# Patient Record
Sex: FEMALE | Race: WHITE | NOT HISPANIC OR LATINO | Employment: UNEMPLOYED | URBAN - NONMETROPOLITAN AREA
[De-identification: names, ages, dates, MRNs, and addresses within clinical notes are randomized per-mention and may not be internally consistent; named-entity substitution may affect disease eponyms.]

---

## 2020-12-31 ENCOUNTER — APPOINTMENT (OUTPATIENT)
Dept: CT IMAGING | Facility: HOSPITAL | Age: 75
End: 2020-12-31

## 2020-12-31 ENCOUNTER — HOSPITAL ENCOUNTER (INPATIENT)
Facility: HOSPITAL | Age: 75
LOS: 14 days | Discharge: NURSING, REHAB OR LONG-TERM CARE | End: 2021-01-14
Attending: STUDENT IN AN ORGANIZED HEALTH CARE EDUCATION/TRAINING PROGRAM | Admitting: INTERNAL MEDICINE

## 2020-12-31 ENCOUNTER — APPOINTMENT (OUTPATIENT)
Dept: GENERAL RADIOLOGY | Facility: HOSPITAL | Age: 75
End: 2020-12-31

## 2020-12-31 DIAGNOSIS — G93.40 SEPSIS WITH ENCEPHALOPATHY WITHOUT SEPTIC SHOCK, DUE TO UNSPECIFIED ORGANISM (HCC): ICD-10-CM

## 2020-12-31 DIAGNOSIS — R53.81 DEBILITY: ICD-10-CM

## 2020-12-31 DIAGNOSIS — N17.9 ACUTE RENAL FAILURE, UNSPECIFIED ACUTE RENAL FAILURE TYPE (HCC): Primary | ICD-10-CM

## 2020-12-31 DIAGNOSIS — A41.9 SEPSIS WITH ENCEPHALOPATHY WITHOUT SEPTIC SHOCK, DUE TO UNSPECIFIED ORGANISM (HCC): ICD-10-CM

## 2020-12-31 DIAGNOSIS — I48.92 ATRIAL FLUTTER, UNSPECIFIED TYPE (HCC): ICD-10-CM

## 2020-12-31 DIAGNOSIS — R65.20 SEPSIS WITH ENCEPHALOPATHY WITHOUT SEPTIC SHOCK, DUE TO UNSPECIFIED ORGANISM (HCC): ICD-10-CM

## 2020-12-31 LAB
6-ACETYL MORPHINE: NEGATIVE
A-A DO2: 42.1 MMHG (ref 0–300)
A-A DO2: 46 MMHG (ref 0–300)
ACETONE BLD QL: NEGATIVE
ALBUMIN SERPL-MCNC: 3.64 G/DL (ref 3.5–5.2)
ALBUMIN/GLOB SERPL: 1.1 G/DL
ALP SERPL-CCNC: 105 U/L (ref 39–117)
ALT SERPL W P-5'-P-CCNC: 10 U/L (ref 1–33)
AMPHET+METHAMPHET UR QL: NEGATIVE
ANION GAP SERPL CALCULATED.3IONS-SCNC: 12.6 MMOL/L (ref 5–15)
APAP SERPL-MCNC: <5 MCG/ML (ref 0–30)
ARTERIAL PATENCY WRIST A: POSITIVE
ARTERIAL PATENCY WRIST A: POSITIVE
AST SERPL-CCNC: 13 U/L (ref 1–32)
ATMOSPHERIC PRESS: 731 MMHG
ATMOSPHERIC PRESS: 731 MMHG
BACTERIA UR QL AUTO: ABNORMAL /HPF
BARBITURATES UR QL SCN: NEGATIVE
BASE EXCESS BLDA CALC-SCNC: -20.2 MMOL/L (ref 0–2)
BASE EXCESS BLDA CALC-SCNC: -20.7 MMOL/L (ref 0–2)
BASOPHILS # BLD AUTO: 0.06 10*3/MM3 (ref 0–0.2)
BASOPHILS NFR BLD AUTO: 0.5 % (ref 0–1.5)
BDY SITE: ABNORMAL
BDY SITE: ABNORMAL
BENZODIAZ UR QL SCN: NEGATIVE
BILIRUB SERPL-MCNC: 0.2 MG/DL (ref 0–1.2)
BILIRUB UR QL STRIP: NEGATIVE
BODY TEMPERATURE: 0 C
BODY TEMPERATURE: 0 C
BUN SERPL-MCNC: 70 MG/DL (ref 8–23)
BUN/CREAT SERPL: 40.9 (ref 7–25)
BUPRENORPHINE SERPL-MCNC: NEGATIVE NG/ML
CALCIUM SPEC-SCNC: 9.9 MG/DL (ref 8.6–10.5)
CANNABINOIDS SERPL QL: NEGATIVE
CHLORIDE SERPL-SCNC: 127 MMOL/L (ref 98–107)
CK SERPL-CCNC: 92 U/L (ref 20–180)
CLARITY UR: ABNORMAL
CO2 BLDA-SCNC: 7.7 MMOL/L (ref 22–33)
CO2 BLDA-SCNC: 7.8 MMOL/L (ref 22–33)
CO2 SERPL-SCNC: 7.4 MMOL/L (ref 22–29)
COCAINE UR QL: NEGATIVE
COHGB MFR BLD: 0.7 % (ref 0–5)
COHGB MFR BLD: 0.8 % (ref 0–5)
COLOR UR: YELLOW
CREAT SERPL-MCNC: 1.71 MG/DL (ref 0.57–1)
CRP SERPL-MCNC: 0.75 MG/DL (ref 0–0.5)
D-LACTATE SERPL-SCNC: 0.9 MMOL/L (ref 0.5–2)
DEPRECATED RDW RBC AUTO: 59 FL (ref 37–54)
EOSINOPHIL # BLD AUTO: 0.16 10*3/MM3 (ref 0–0.4)
EOSINOPHIL NFR BLD AUTO: 1.4 % (ref 0.3–6.2)
ERYTHROCYTE [DISTWIDTH] IN BLOOD BY AUTOMATED COUNT: 15.9 % (ref 12.3–15.4)
ERYTHROCYTE [SEDIMENTATION RATE] IN BLOOD: 24 MM/HR (ref 0–30)
FLUAV RNA RESP QL NAA+PROBE: NOT DETECTED
FLUBV RNA RESP QL NAA+PROBE: NOT DETECTED
GAS FLOW AIRWAY: 2 LPM
GAS FLOW AIRWAY: 2 LPM
GFR SERPL CREATININE-BSD FRML MDRD: 29 ML/MIN/1.73
GLOBULIN UR ELPH-MCNC: 3.4 GM/DL
GLUCOSE BLDC GLUCOMTR-MCNC: 125 MG/DL (ref 70–130)
GLUCOSE BLDC GLUCOMTR-MCNC: 69 MG/DL (ref 70–130)
GLUCOSE SERPL-MCNC: 112 MG/DL (ref 65–99)
GLUCOSE UR STRIP-MCNC: NEGATIVE MG/DL
HCO3 BLDA-SCNC: 7.1 MMOL/L (ref 20–26)
HCO3 BLDA-SCNC: 7.1 MMOL/L (ref 20–26)
HCT VFR BLD AUTO: 35.4 % (ref 34–46.6)
HCT VFR BLD CALC: 30.3 % (ref 38–51)
HCT VFR BLD CALC: 33.5 % (ref 38–51)
HGB BLD-MCNC: 11.3 G/DL (ref 12–15.9)
HGB BLDA-MCNC: 10.9 G/DL (ref 13.5–17.5)
HGB BLDA-MCNC: 9.9 G/DL (ref 13.5–17.5)
HGB UR QL STRIP.AUTO: ABNORMAL
HYALINE CASTS UR QL AUTO: ABNORMAL /LPF
IMM GRANULOCYTES # BLD AUTO: 0.13 10*3/MM3 (ref 0–0.05)
IMM GRANULOCYTES NFR BLD AUTO: 1.1 % (ref 0–0.5)
INHALED O2 CONCENTRATION: 28 %
INHALED O2 CONCENTRATION: 28 %
INR PPP: 1.08 (ref 0.9–1.1)
KETONES UR QL STRIP: NEGATIVE
LEUKOCYTE ESTERASE UR QL STRIP.AUTO: ABNORMAL
LIPASE SERPL-CCNC: 51 U/L (ref 13–60)
LYMPHOCYTES # BLD AUTO: 2.24 10*3/MM3 (ref 0.7–3.1)
LYMPHOCYTES NFR BLD AUTO: 19.1 % (ref 19.6–45.3)
Lab: ABNORMAL
MAGNESIUM SERPL-MCNC: 2.2 MG/DL (ref 1.6–2.4)
MCH RBC QN AUTO: 32.1 PG (ref 26.6–33)
MCHC RBC AUTO-ENTMCNC: 31.9 G/DL (ref 31.5–35.7)
MCV RBC AUTO: 100.6 FL (ref 79–97)
METHADONE UR QL SCN: NEGATIVE
METHGB BLD QL: 0.3 % (ref 0–3)
METHGB BLD QL: 0.5 % (ref 0–3)
MODALITY: ABNORMAL
MODALITY: ABNORMAL
MONOCYTES # BLD AUTO: 0.82 10*3/MM3 (ref 0.1–0.9)
MONOCYTES NFR BLD AUTO: 7 % (ref 5–12)
NEUTROPHILS NFR BLD AUTO: 70.9 % (ref 42.7–76)
NEUTROPHILS NFR BLD AUTO: 8.32 10*3/MM3 (ref 1.7–7)
NITRITE UR QL STRIP: POSITIVE
NOTE: ABNORMAL
NOTE: ABNORMAL
NOTIFIED BY: ABNORMAL
NOTIFIED BY: ABNORMAL
NOTIFIED WHO: ABNORMAL
NOTIFIED WHO: ABNORMAL
NRBC BLD AUTO-RTO: 0 /100 WBC (ref 0–0.2)
NT-PROBNP SERPL-MCNC: 1580 PG/ML (ref 0–900)
OPIATES UR QL: NEGATIVE
OXYCODONE UR QL SCN: NEGATIVE
OXYHGB MFR BLDV: 97.6 % (ref 94–99)
OXYHGB MFR BLDV: 97.8 % (ref 94–99)
PCO2 BLDA: 21.1 MM HG (ref 35–45)
PCO2 BLDA: 22.2 MM HG (ref 35–45)
PCO2 TEMP ADJ BLD: ABNORMAL MM[HG]
PCO2 TEMP ADJ BLD: ABNORMAL MM[HG]
PCP UR QL SCN: NEGATIVE
PH BLDA: 7.11 PH UNITS (ref 7.35–7.45)
PH BLDA: 7.14 PH UNITS (ref 7.35–7.45)
PH UR STRIP.AUTO: 6 [PH] (ref 5–8)
PH, TEMP CORRECTED: ABNORMAL
PH, TEMP CORRECTED: ABNORMAL
PHOSPHATE SERPL-MCNC: 3.6 MG/DL (ref 2.5–4.5)
PLATELET # BLD AUTO: 449 10*3/MM3 (ref 140–450)
PMV BLD AUTO: 9 FL (ref 6–12)
PO2 BLDA: 122 MM HG (ref 83–108)
PO2 BLDA: 125 MM HG (ref 83–108)
PO2 TEMP ADJ BLD: ABNORMAL MM[HG]
PO2 TEMP ADJ BLD: ABNORMAL MM[HG]
POTASSIUM SERPL-SCNC: 3.9 MMOL/L (ref 3.5–5.2)
PROT SERPL-MCNC: 7 G/DL (ref 6–8.5)
PROT UR QL STRIP: ABNORMAL
PROTHROMBIN TIME: 13.8 SECONDS (ref 11.9–14.1)
RBC # BLD AUTO: 3.52 10*6/MM3 (ref 3.77–5.28)
RBC # UR: ABNORMAL /HPF
REF LAB TEST METHOD: ABNORMAL
SALICYLATES SERPL-MCNC: <0.3 MG/DL
SAO2 % BLDCOA: 98.8 % (ref 94–99)
SAO2 % BLDCOA: 98.9 % (ref 94–99)
SARS-COV-2 RNA RESP QL NAA+PROBE: NOT DETECTED
SODIUM SERPL-SCNC: 147 MMOL/L (ref 136–145)
SP GR UR STRIP: 1.02 (ref 1–1.03)
SQUAMOUS #/AREA URNS HPF: ABNORMAL /HPF
TROPONIN T SERPL-MCNC: 0.05 NG/ML (ref 0–0.03)
TSH SERPL DL<=0.05 MIU/L-ACNC: 1.24 UIU/ML (ref 0.27–4.2)
UROBILINOGEN UR QL STRIP: ABNORMAL
VENTILATOR MODE: ABNORMAL
VENTILATOR MODE: ABNORMAL
WBC # BLD AUTO: 11.73 10*3/MM3 (ref 3.4–10.8)
WBC UR QL AUTO: ABNORMAL /HPF

## 2020-12-31 PROCEDURE — 80307 DRUG TEST PRSMV CHEM ANLYZR: CPT | Performed by: STUDENT IN AN ORGANIZED HEALTH CARE EDUCATION/TRAINING PROGRAM

## 2020-12-31 PROCEDURE — 80053 COMPREHEN METABOLIC PANEL: CPT | Performed by: STUDENT IN AN ORGANIZED HEALTH CARE EDUCATION/TRAINING PROGRAM

## 2020-12-31 PROCEDURE — 82805 BLOOD GASES W/O2 SATURATION: CPT

## 2020-12-31 PROCEDURE — 83880 ASSAY OF NATRIURETIC PEPTIDE: CPT | Performed by: STUDENT IN AN ORGANIZED HEALTH CARE EDUCATION/TRAINING PROGRAM

## 2020-12-31 PROCEDURE — 82550 ASSAY OF CK (CPK): CPT | Performed by: STUDENT IN AN ORGANIZED HEALTH CARE EDUCATION/TRAINING PROGRAM

## 2020-12-31 PROCEDURE — 83605 ASSAY OF LACTIC ACID: CPT | Performed by: STUDENT IN AN ORGANIZED HEALTH CARE EDUCATION/TRAINING PROGRAM

## 2020-12-31 PROCEDURE — G0480 DRUG TEST DEF 1-7 CLASSES: HCPCS | Performed by: STUDENT IN AN ORGANIZED HEALTH CARE EDUCATION/TRAINING PROGRAM

## 2020-12-31 PROCEDURE — 82009 KETONE BODYS QUAL: CPT | Performed by: STUDENT IN AN ORGANIZED HEALTH CARE EDUCATION/TRAINING PROGRAM

## 2020-12-31 PROCEDURE — 74176 CT ABD & PELVIS W/O CONTRAST: CPT

## 2020-12-31 PROCEDURE — 84100 ASSAY OF PHOSPHORUS: CPT | Performed by: STUDENT IN AN ORGANIZED HEALTH CARE EDUCATION/TRAINING PROGRAM

## 2020-12-31 PROCEDURE — 36600 WITHDRAWAL OF ARTERIAL BLOOD: CPT

## 2020-12-31 PROCEDURE — 83050 HGB METHEMOGLOBIN QUAN: CPT

## 2020-12-31 PROCEDURE — 71045 X-RAY EXAM CHEST 1 VIEW: CPT | Performed by: RADIOLOGY

## 2020-12-31 PROCEDURE — 83690 ASSAY OF LIPASE: CPT | Performed by: STUDENT IN AN ORGANIZED HEALTH CARE EDUCATION/TRAINING PROGRAM

## 2020-12-31 PROCEDURE — 85025 COMPLETE CBC W/AUTO DIFF WBC: CPT | Performed by: STUDENT IN AN ORGANIZED HEALTH CARE EDUCATION/TRAINING PROGRAM

## 2020-12-31 PROCEDURE — 83036 HEMOGLOBIN GLYCOSYLATED A1C: CPT | Performed by: INTERNAL MEDICINE

## 2020-12-31 PROCEDURE — 93010 ELECTROCARDIOGRAM REPORT: CPT | Performed by: INTERNAL MEDICINE

## 2020-12-31 PROCEDURE — 82962 GLUCOSE BLOOD TEST: CPT

## 2020-12-31 PROCEDURE — 84443 ASSAY THYROID STIM HORMONE: CPT | Performed by: STUDENT IN AN ORGANIZED HEALTH CARE EDUCATION/TRAINING PROGRAM

## 2020-12-31 PROCEDURE — 84484 ASSAY OF TROPONIN QUANT: CPT | Performed by: STUDENT IN AN ORGANIZED HEALTH CARE EDUCATION/TRAINING PROGRAM

## 2020-12-31 PROCEDURE — 99223 1ST HOSP IP/OBS HIGH 75: CPT | Performed by: INTERNAL MEDICINE

## 2020-12-31 PROCEDURE — 87086 URINE CULTURE/COLONY COUNT: CPT | Performed by: INTERNAL MEDICINE

## 2020-12-31 PROCEDURE — 71250 CT THORAX DX C-: CPT

## 2020-12-31 PROCEDURE — 87150 DNA/RNA AMPLIFIED PROBE: CPT | Performed by: STUDENT IN AN ORGANIZED HEALTH CARE EDUCATION/TRAINING PROGRAM

## 2020-12-31 PROCEDURE — 70450 CT HEAD/BRAIN W/O DYE: CPT

## 2020-12-31 PROCEDURE — 87077 CULTURE AEROBIC IDENTIFY: CPT | Performed by: INTERNAL MEDICINE

## 2020-12-31 PROCEDURE — 87186 SC STD MICRODIL/AGAR DIL: CPT | Performed by: INTERNAL MEDICINE

## 2020-12-31 PROCEDURE — 87636 SARSCOV2 & INF A&B AMP PRB: CPT | Performed by: STUDENT IN AN ORGANIZED HEALTH CARE EDUCATION/TRAINING PROGRAM

## 2020-12-31 PROCEDURE — 85652 RBC SED RATE AUTOMATED: CPT | Performed by: STUDENT IN AN ORGANIZED HEALTH CARE EDUCATION/TRAINING PROGRAM

## 2020-12-31 PROCEDURE — 87186 SC STD MICRODIL/AGAR DIL: CPT | Performed by: STUDENT IN AN ORGANIZED HEALTH CARE EDUCATION/TRAINING PROGRAM

## 2020-12-31 PROCEDURE — 93005 ELECTROCARDIOGRAM TRACING: CPT | Performed by: STUDENT IN AN ORGANIZED HEALTH CARE EDUCATION/TRAINING PROGRAM

## 2020-12-31 PROCEDURE — 82375 ASSAY CARBOXYHB QUANT: CPT

## 2020-12-31 PROCEDURE — 25010000002 VANCOMYCIN: Performed by: STUDENT IN AN ORGANIZED HEALTH CARE EDUCATION/TRAINING PROGRAM

## 2020-12-31 PROCEDURE — 87077 CULTURE AEROBIC IDENTIFY: CPT | Performed by: STUDENT IN AN ORGANIZED HEALTH CARE EDUCATION/TRAINING PROGRAM

## 2020-12-31 PROCEDURE — 25010000002 CEFEPIME PER 500 MG: Performed by: STUDENT IN AN ORGANIZED HEALTH CARE EDUCATION/TRAINING PROGRAM

## 2020-12-31 PROCEDURE — 86140 C-REACTIVE PROTEIN: CPT | Performed by: STUDENT IN AN ORGANIZED HEALTH CARE EDUCATION/TRAINING PROGRAM

## 2020-12-31 PROCEDURE — 71045 X-RAY EXAM CHEST 1 VIEW: CPT

## 2020-12-31 PROCEDURE — 99285 EMERGENCY DEPT VISIT HI MDM: CPT

## 2020-12-31 PROCEDURE — 81001 URINALYSIS AUTO W/SCOPE: CPT | Performed by: STUDENT IN AN ORGANIZED HEALTH CARE EDUCATION/TRAINING PROGRAM

## 2020-12-31 PROCEDURE — 83735 ASSAY OF MAGNESIUM: CPT | Performed by: STUDENT IN AN ORGANIZED HEALTH CARE EDUCATION/TRAINING PROGRAM

## 2020-12-31 PROCEDURE — 85610 PROTHROMBIN TIME: CPT | Performed by: STUDENT IN AN ORGANIZED HEALTH CARE EDUCATION/TRAINING PROGRAM

## 2020-12-31 PROCEDURE — 87040 BLOOD CULTURE FOR BACTERIA: CPT | Performed by: STUDENT IN AN ORGANIZED HEALTH CARE EDUCATION/TRAINING PROGRAM

## 2020-12-31 RX ORDER — ASPIRIN 81 MG/1
324 TABLET, CHEWABLE ORAL ONCE
Status: DISCONTINUED | OUTPATIENT
Start: 2020-12-31 | End: 2021-01-05

## 2020-12-31 RX ORDER — DEXTROSE MONOHYDRATE 25 G/50ML
25 INJECTION, SOLUTION INTRAVENOUS ONCE
Status: COMPLETED | OUTPATIENT
Start: 2020-12-31 | End: 2020-12-31

## 2020-12-31 RX ORDER — SODIUM CHLORIDE 0.9 % (FLUSH) 0.9 %
10 SYRINGE (ML) INJECTION AS NEEDED
Status: DISCONTINUED | OUTPATIENT
Start: 2020-12-31 | End: 2021-01-14 | Stop reason: HOSPADM

## 2020-12-31 RX ORDER — DEXTROSE MONOHYDRATE 25 G/50ML
INJECTION, SOLUTION INTRAVENOUS
Status: DISPENSED
Start: 2020-12-31 | End: 2021-01-01

## 2020-12-31 RX ORDER — DEXTROSE AND SODIUM CHLORIDE 5; .9 G/100ML; G/100ML
50 INJECTION, SOLUTION INTRAVENOUS CONTINUOUS
Status: DISCONTINUED | OUTPATIENT
Start: 2020-12-31 | End: 2021-01-01

## 2020-12-31 RX ADMIN — DEXTROSE AND SODIUM CHLORIDE 50 ML/HR: 5; 900 INJECTION, SOLUTION INTRAVENOUS at 19:24

## 2020-12-31 RX ADMIN — CEFEPIME 2 G: 2 INJECTION, POWDER, FOR SOLUTION INTRAVENOUS at 21:12

## 2020-12-31 RX ADMIN — SODIUM CHLORIDE 500 ML: 9 INJECTION, SOLUTION INTRAVENOUS at 19:23

## 2020-12-31 RX ADMIN — VANCOMYCIN HYDROCHLORIDE 1250 MG: 10 INJECTION, POWDER, LYOPHILIZED, FOR SOLUTION INTRAVENOUS at 22:12

## 2020-12-31 RX ADMIN — DEXTROSE MONOHYDRATE 25 G: 25 INJECTION, SOLUTION INTRAVENOUS at 23:34

## 2020-12-31 RX ADMIN — SODIUM BICARBONATE 150 MEQ: 84 INJECTION, SOLUTION INTRAVENOUS at 23:58

## 2021-01-01 ENCOUNTER — APPOINTMENT (OUTPATIENT)
Dept: CARDIOLOGY | Facility: HOSPITAL | Age: 76
End: 2021-01-01

## 2021-01-01 LAB
A-A DO2: 113.8 MMHG (ref 0–300)
ALBUMIN SERPL-MCNC: 3.34 G/DL (ref 3.5–5.2)
ALBUMIN/GLOB SERPL: 1 G/DL
ALP SERPL-CCNC: 102 U/L (ref 39–117)
ALT SERPL W P-5'-P-CCNC: 9 U/L (ref 1–33)
ANION GAP SERPL CALCULATED.3IONS-SCNC: 12.3 MMOL/L (ref 5–15)
ARTERIAL PATENCY WRIST A: POSITIVE
AST SERPL-CCNC: 14 U/L (ref 1–32)
ATMOSPHERIC PRESS: 730 MMHG
BACTERIA BLD CULT: ABNORMAL
BASE EXCESS BLDA CALC-SCNC: -12.1 MMOL/L (ref 0–2)
BASOPHILS # BLD AUTO: 0.08 10*3/MM3 (ref 0–0.2)
BASOPHILS NFR BLD AUTO: 0.6 % (ref 0–1.5)
BDY SITE: ABNORMAL
BH CV ECHO MEAS - ACS: 1 CM
BH CV ECHO MEAS - AO MAX PG: 18.7 MMHG
BH CV ECHO MEAS - AO MEAN PG: 8 MMHG
BH CV ECHO MEAS - AO ROOT AREA (BSA CORRECTED): 1.8
BH CV ECHO MEAS - AO ROOT AREA: 8.3 CM^2
BH CV ECHO MEAS - AO ROOT DIAM: 3.3 CM
BH CV ECHO MEAS - AO V2 MAX: 216 CM/SEC
BH CV ECHO MEAS - AO V2 MEAN: 121 CM/SEC
BH CV ECHO MEAS - AO V2 VTI: 41.5 CM
BH CV ECHO MEAS - BSA(HAYCOCK): 1.9 M^2
BH CV ECHO MEAS - BSA: 1.8 M^2
BH CV ECHO MEAS - BZI_BMI: 29.8 KILOGRAMS/M^2
BH CV ECHO MEAS - BZI_METRIC_HEIGHT: 160 CM
BH CV ECHO MEAS - BZI_METRIC_WEIGHT: 76.2 KG
BH CV ECHO MEAS - EDV(MOD-SP4): 20.4 ML
BH CV ECHO MEAS - EF(MOD-SP4): 54.4 %
BH CV ECHO MEAS - ESV(MOD-SP4): 9.3 ML
BH CV ECHO MEAS - LA DIMENSION: 2.4 CM
BH CV ECHO MEAS - LA/AO: 0.74
BH CV ECHO MEAS - LV DIASTOLIC VOL/BSA (35-75): 11.4 ML/M^2
BH CV ECHO MEAS - LV SYSTOLIC VOL/BSA (12-30): 5.2 ML/M^2
BH CV ECHO MEAS - LVLD AP4: 6.6 CM
BH CV ECHO MEAS - LVLS AP4: 5.8 CM
BH CV ECHO MEAS - LVOT AREA (M): 2.3 CM^2
BH CV ECHO MEAS - LVOT AREA: 2.3 CM^2
BH CV ECHO MEAS - LVOT DIAM: 1.7 CM
BH CV ECHO MEAS - MV E MAX VEL: 202 CM/SEC
BH CV ECHO MEAS - PA ACC TIME: 0.11 SEC
BH CV ECHO MEAS - PA PR(ACCEL): 30 MMHG
BH CV ECHO MEAS - RAP SYSTOLE: 10 MMHG
BH CV ECHO MEAS - RVSP: 26.8 MMHG
BH CV ECHO MEAS - SI(AO): 191.7 ML/M^2
BH CV ECHO MEAS - SI(MOD-SP4): 6.2 ML/M^2
BH CV ECHO MEAS - SV(AO): 344.3 ML
BH CV ECHO MEAS - SV(MOD-SP4): 11.1 ML
BH CV ECHO MEAS - TR MAX VEL: 205 CM/SEC
BILIRUB SERPL-MCNC: 0.2 MG/DL (ref 0–1.2)
BODY TEMPERATURE: 0 C
BOTTLE TYPE: ABNORMAL
BUN SERPL-MCNC: 62 MG/DL (ref 8–23)
BUN/CREAT SERPL: 43.1 (ref 7–25)
CALCIUM SPEC-SCNC: 9.5 MG/DL (ref 8.6–10.5)
CHLORIDE SERPL-SCNC: 130 MMOL/L (ref 98–107)
CO2 BLDA-SCNC: 13.6 MMOL/L (ref 22–33)
CO2 SERPL-SCNC: 10.7 MMOL/L (ref 22–29)
COHGB MFR BLD: 1.2 % (ref 0–5)
CREAT SERPL-MCNC: 1.44 MG/DL (ref 0.57–1)
CRP SERPL-MCNC: 0.78 MG/DL (ref 0–0.5)
D-LACTATE SERPL-SCNC: 0.9 MMOL/L (ref 0.5–2)
DEPRECATED RDW RBC AUTO: 61.8 FL (ref 37–54)
EOSINOPHIL # BLD AUTO: 0.25 10*3/MM3 (ref 0–0.4)
EOSINOPHIL NFR BLD AUTO: 2 % (ref 0.3–6.2)
ERYTHROCYTE [DISTWIDTH] IN BLOOD BY AUTOMATED COUNT: 16.1 % (ref 12.3–15.4)
GAS FLOW AIRWAY: 2 LPM
GFR SERPL CREATININE-BSD FRML MDRD: 36 ML/MIN/1.73
GLOBULIN UR ELPH-MCNC: 3.3 GM/DL
GLUCOSE BLDC GLUCOMTR-MCNC: 120 MG/DL (ref 70–130)
GLUCOSE BLDC GLUCOMTR-MCNC: 123 MG/DL (ref 70–130)
GLUCOSE BLDC GLUCOMTR-MCNC: 235 MG/DL (ref 70–130)
GLUCOSE BLDC GLUCOMTR-MCNC: 318 MG/DL (ref 70–130)
GLUCOSE BLDC GLUCOMTR-MCNC: 425 MG/DL (ref 70–130)
GLUCOSE SERPL-MCNC: 103 MG/DL (ref 65–99)
HBA1C MFR BLD: 7.9 % (ref 4.8–5.6)
HCO3 BLDA-SCNC: 12.8 MMOL/L (ref 20–26)
HCT VFR BLD AUTO: 37 % (ref 34–46.6)
HCT VFR BLD CALC: 33.5 % (ref 38–51)
HGB BLD-MCNC: 11.3 G/DL (ref 12–15.9)
HGB BLDA-MCNC: 10.9 G/DL (ref 13.5–17.5)
IMM GRANULOCYTES # BLD AUTO: 0.1 10*3/MM3 (ref 0–0.05)
IMM GRANULOCYTES NFR BLD AUTO: 0.8 % (ref 0–0.5)
INHALED O2 CONCENTRATION: 28 %
LYMPHOCYTES # BLD AUTO: 1.93 10*3/MM3 (ref 0.7–3.1)
LYMPHOCYTES NFR BLD AUTO: 15.4 % (ref 19.6–45.3)
Lab: ABNORMAL
Lab: ABNORMAL
MAXIMAL PREDICTED HEART RATE: 146 BPM
MCH RBC QN AUTO: 31.6 PG (ref 26.6–33)
MCHC RBC AUTO-ENTMCNC: 30.5 G/DL (ref 31.5–35.7)
MCV RBC AUTO: 103.4 FL (ref 79–97)
METHGB BLD QL: 0.2 % (ref 0–3)
MODALITY: ABNORMAL
MONOCYTES # BLD AUTO: 1.29 10*3/MM3 (ref 0.1–0.9)
MONOCYTES NFR BLD AUTO: 10.3 % (ref 5–12)
NEUTROPHILS NFR BLD AUTO: 70.9 % (ref 42.7–76)
NEUTROPHILS NFR BLD AUTO: 8.87 10*3/MM3 (ref 1.7–7)
NOTE: ABNORMAL
NOTIFIED BY: ABNORMAL
NOTIFIED WHO: ABNORMAL
NRBC BLD AUTO-RTO: 0.2 /100 WBC (ref 0–0.2)
OXYHGB MFR BLDV: 85.8 % (ref 94–99)
PCO2 BLDA: 26.2 MM HG (ref 35–45)
PCO2 TEMP ADJ BLD: ABNORMAL MM[HG]
PH BLDA: 7.3 PH UNITS (ref 7.35–7.45)
PH, TEMP CORRECTED: ABNORMAL
PHOSPHATE SERPL-MCNC: 2.3 MG/DL (ref 2.5–4.5)
PLATELET # BLD AUTO: 413 10*3/MM3 (ref 140–450)
PMV BLD AUTO: 8.9 FL (ref 6–12)
PO2 BLDA: 48.1 MM HG (ref 83–108)
PO2 TEMP ADJ BLD: ABNORMAL MM[HG]
POTASSIUM SERPL-SCNC: 3.2 MMOL/L (ref 3.5–5.2)
PROT SERPL-MCNC: 6.6 G/DL (ref 6–8.5)
QT INTERVAL: 328 MS
QTC INTERVAL: 437 MS
RBC # BLD AUTO: 3.58 10*6/MM3 (ref 3.77–5.28)
SAO2 % BLDCOA: 87 % (ref 94–99)
SODIUM SERPL-SCNC: 153 MMOL/L (ref 136–145)
STRESS TARGET HR: 124 BPM
TROPONIN T SERPL-MCNC: 0.04 NG/ML (ref 0–0.03)
VENTILATOR MODE: ABNORMAL
WBC # BLD AUTO: 12.52 10*3/MM3 (ref 3.4–10.8)

## 2021-01-01 PROCEDURE — 80053 COMPREHEN METABOLIC PANEL: CPT | Performed by: INTERNAL MEDICINE

## 2021-01-01 PROCEDURE — 36600 WITHDRAWAL OF ARTERIAL BLOOD: CPT

## 2021-01-01 PROCEDURE — 63710000001 INSULIN ASPART PER 5 UNITS: Performed by: INTERNAL MEDICINE

## 2021-01-01 PROCEDURE — 25010000002 CEFTRIAXONE PER 250 MG: Performed by: INTERNAL MEDICINE

## 2021-01-01 PROCEDURE — 84484 ASSAY OF TROPONIN QUANT: CPT | Performed by: INTERNAL MEDICINE

## 2021-01-01 PROCEDURE — 84100 ASSAY OF PHOSPHORUS: CPT | Performed by: INTERNAL MEDICINE

## 2021-01-01 PROCEDURE — 82375 ASSAY CARBOXYHB QUANT: CPT

## 2021-01-01 PROCEDURE — 93306 TTE W/DOPPLER COMPLETE: CPT

## 2021-01-01 PROCEDURE — 86140 C-REACTIVE PROTEIN: CPT | Performed by: INTERNAL MEDICINE

## 2021-01-01 PROCEDURE — 82805 BLOOD GASES W/O2 SATURATION: CPT

## 2021-01-01 PROCEDURE — 92610 EVALUATE SWALLOWING FUNCTION: CPT

## 2021-01-01 PROCEDURE — 97165 OT EVAL LOW COMPLEX 30 MIN: CPT

## 2021-01-01 PROCEDURE — 82962 GLUCOSE BLOOD TEST: CPT

## 2021-01-01 PROCEDURE — 97163 PT EVAL HIGH COMPLEX 45 MIN: CPT

## 2021-01-01 PROCEDURE — 99233 SBSQ HOSP IP/OBS HIGH 50: CPT | Performed by: STUDENT IN AN ORGANIZED HEALTH CARE EDUCATION/TRAINING PROGRAM

## 2021-01-01 PROCEDURE — 83050 HGB METHEMOGLOBIN QUAN: CPT

## 2021-01-01 PROCEDURE — 85025 COMPLETE CBC W/AUTO DIFF WBC: CPT | Performed by: INTERNAL MEDICINE

## 2021-01-01 PROCEDURE — 83605 ASSAY OF LACTIC ACID: CPT | Performed by: INTERNAL MEDICINE

## 2021-01-01 PROCEDURE — 25010000002 ENOXAPARIN PER 10 MG: Performed by: INTERNAL MEDICINE

## 2021-01-01 PROCEDURE — 93306 TTE W/DOPPLER COMPLETE: CPT | Performed by: INTERNAL MEDICINE

## 2021-01-01 PROCEDURE — 94799 UNLISTED PULMONARY SVC/PX: CPT

## 2021-01-01 RX ORDER — POTASSIUM CHLORIDE 1.5 G/1.77G
40 POWDER, FOR SOLUTION ORAL AS NEEDED
Status: DISCONTINUED | OUTPATIENT
Start: 2021-01-01 | End: 2021-01-14 | Stop reason: HOSPADM

## 2021-01-01 RX ORDER — PANTOPRAZOLE SODIUM 40 MG/1
40 TABLET, DELAYED RELEASE ORAL DAILY
Status: CANCELLED | OUTPATIENT
Start: 2021-01-01

## 2021-01-01 RX ORDER — PANTOPRAZOLE SODIUM 40 MG/1
40 TABLET, DELAYED RELEASE ORAL DAILY
COMMUNITY

## 2021-01-01 RX ORDER — NICOTINE POLACRILEX 4 MG
15 LOZENGE BUCCAL
Status: DISCONTINUED | OUTPATIENT
Start: 2021-01-01 | End: 2021-01-08 | Stop reason: SDUPTHER

## 2021-01-01 RX ORDER — AMLODIPINE BESYLATE 10 MG/1
10 TABLET ORAL DAILY
Status: CANCELLED | OUTPATIENT
Start: 2021-01-01

## 2021-01-01 RX ORDER — SODIUM CHLORIDE 0.9 % (FLUSH) 0.9 %
3 SYRINGE (ML) INJECTION EVERY 12 HOURS SCHEDULED
Status: DISCONTINUED | OUTPATIENT
Start: 2021-01-01 | End: 2021-01-14 | Stop reason: HOSPADM

## 2021-01-01 RX ORDER — DEXTROSE MONOHYDRATE 25 G/50ML
25 INJECTION, SOLUTION INTRAVENOUS
Status: DISCONTINUED | OUTPATIENT
Start: 2021-01-01 | End: 2021-01-08

## 2021-01-01 RX ORDER — AMLODIPINE BESYLATE 10 MG/1
10 TABLET ORAL DAILY
COMMUNITY

## 2021-01-01 RX ORDER — ALBUTEROL SULFATE 90 UG/1
1 AEROSOL, METERED RESPIRATORY (INHALATION) EVERY 4 HOURS
Status: ON HOLD | COMMUNITY
End: 2021-01-14 | Stop reason: SDUPTHER

## 2021-01-01 RX ORDER — SODIUM CHLORIDE 0.9 % (FLUSH) 0.9 %
3-10 SYRINGE (ML) INJECTION AS NEEDED
Status: DISCONTINUED | OUTPATIENT
Start: 2021-01-01 | End: 2021-01-14 | Stop reason: HOSPADM

## 2021-01-01 RX ORDER — DEXTROSE MONOHYDRATE 25 G/50ML
25 INJECTION, SOLUTION INTRAVENOUS
Status: DISCONTINUED | OUTPATIENT
Start: 2021-01-01 | End: 2021-01-08 | Stop reason: SDUPTHER

## 2021-01-01 RX ORDER — SODIUM CHLORIDE 9 MG/ML
75 INJECTION, SOLUTION INTRAVENOUS CONTINUOUS
Status: DISCONTINUED | OUTPATIENT
Start: 2021-01-01 | End: 2021-01-03

## 2021-01-01 RX ORDER — NICOTINE POLACRILEX 4 MG
15 LOZENGE BUCCAL
Status: DISCONTINUED | OUTPATIENT
Start: 2021-01-01 | End: 2021-01-08

## 2021-01-01 RX ORDER — POTASSIUM CHLORIDE 7.45 MG/ML
10 INJECTION INTRAVENOUS
Status: DISCONTINUED | OUTPATIENT
Start: 2021-01-01 | End: 2021-01-14 | Stop reason: HOSPADM

## 2021-01-01 RX ORDER — ASPIRIN 81 MG/1
81 TABLET ORAL DAILY
COMMUNITY

## 2021-01-01 RX ORDER — ASPIRIN 81 MG/1
81 TABLET ORAL DAILY
Status: CANCELLED | OUTPATIENT
Start: 2021-01-02

## 2021-01-01 RX ORDER — PANTOPRAZOLE SODIUM 40 MG/1
40 TABLET, DELAYED RELEASE ORAL DAILY
Status: DISCONTINUED | OUTPATIENT
Start: 2021-01-01 | End: 2021-01-14 | Stop reason: HOSPADM

## 2021-01-01 RX ORDER — NITROGLYCERIN 0.4 MG/1
0.4 TABLET SUBLINGUAL
Status: DISCONTINUED | OUTPATIENT
Start: 2021-01-01 | End: 2021-01-14 | Stop reason: HOSPADM

## 2021-01-01 RX ORDER — ALBUTEROL SULFATE 2.5 MG/3ML
2.5 SOLUTION RESPIRATORY (INHALATION)
Status: CANCELLED | OUTPATIENT
Start: 2021-01-01

## 2021-01-01 RX ORDER — POTASSIUM CHLORIDE 750 MG/1
40 CAPSULE, EXTENDED RELEASE ORAL AS NEEDED
Status: DISCONTINUED | OUTPATIENT
Start: 2021-01-01 | End: 2021-01-14 | Stop reason: HOSPADM

## 2021-01-01 RX ORDER — LISINOPRIL AND HYDROCHLOROTHIAZIDE 25; 20 MG/1; MG/1
1 TABLET ORAL DAILY
COMMUNITY
End: 2021-01-14 | Stop reason: HOSPADM

## 2021-01-01 RX ADMIN — INSULIN ASPART 7 UNITS: 100 INJECTION, SOLUTION INTRAVENOUS; SUBCUTANEOUS at 20:41

## 2021-01-01 RX ADMIN — DEXTROSE AND SODIUM CHLORIDE 50 ML/HR: 5; 900 INJECTION, SOLUTION INTRAVENOUS at 00:57

## 2021-01-01 RX ADMIN — SODIUM CHLORIDE, PRESERVATIVE FREE 3 ML: 5 INJECTION INTRAVENOUS at 01:11

## 2021-01-01 RX ADMIN — ENOXAPARIN SODIUM 30 MG: 30 INJECTION SUBCUTANEOUS at 15:13

## 2021-01-01 RX ADMIN — SODIUM CHLORIDE, PRESERVATIVE FREE 3 ML: 5 INJECTION INTRAVENOUS at 20:01

## 2021-01-01 RX ADMIN — VANCOMYCIN HYDROCHLORIDE 1000 MG: 1 INJECTION, POWDER, LYOPHILIZED, FOR SOLUTION INTRAVENOUS at 20:01

## 2021-01-01 RX ADMIN — SODIUM BICARBONATE: 84 INJECTION, SOLUTION INTRAVENOUS at 05:47

## 2021-01-01 RX ADMIN — CEFTRIAXONE 2 G: 2 INJECTION, POWDER, FOR SOLUTION INTRAMUSCULAR; INTRAVENOUS at 05:21

## 2021-01-01 RX ADMIN — SODIUM CHLORIDE, PRESERVATIVE FREE 3 ML: 5 INJECTION INTRAVENOUS at 15:12

## 2021-01-01 RX ADMIN — POTASSIUM PHOSPHATE, MONOBASIC AND POTASSIUM PHOSPHATE, DIBASIC 15 MMOL: 224; 236 INJECTION, SOLUTION, CONCENTRATE INTRAVENOUS at 18:08

## 2021-01-01 RX ADMIN — SODIUM CHLORIDE 75 ML/HR: 9 INJECTION, SOLUTION INTRAVENOUS at 20:41

## 2021-01-01 RX ADMIN — SODIUM BICARBONATE: 84 INJECTION, SOLUTION INTRAVENOUS at 20:01

## 2021-01-01 NOTE — ED NOTES
Per EMS Pt was picked up at [facility] in [place]. Contacted [facility] at this time for well check for Pt .       Vera Preciado RN  12/31/20 [id number]

## 2021-01-01 NOTE — PAYOR COMM NOTE
"[facility]   KIT HERNÁNDEZ  PHONE  [phone]  FAX  [phone]  NPI:  [id number]    PENDING REF#[id number]    Ashley Roland (75 y.o. Female)     Date of Birth Social Security Number Address Home Phone MRN    1945  [address] [facility] [place] [address] [phone] [id number]    Denominational Marital Status          None        Admission Date Admission Type Admitting Provider Attending Provider Department, Room/Bed    20 Emergency Norman Saravia MD Cagle, William, DO [facility] PROGRESS CARE, P203/S2    Discharge Date Discharge Disposition Discharge Destination                       Attending Provider: Jorge Sidhu DO    Allergies: No Known Allergies    Isolation: None   Infection: COVID (rule out) (20)   Code Status: CPR    Ht: 160 cm (63\")   Wt: 76.2 kg (168 lb)    Admission Cmt: None   Principal Problem: Sepsis (CMS/[facility]) [A41.9]                 Active Insurance as of 2020     Primary Coverage     Payor Plan Insurance Group Employer/Plan Group    HUMANA MEDICARE REPLACEMENT HUMANA MEDICARE REPLACEMENT [id number]     Payor Plan Address Payor Plan Phone Number Payor Plan Fax Number Effective Dates    [address] [address] [phone]  2018 - None Entered    [facility] [id number]       Subscriber Name Subscriber Birth Date Member ID       ASHLEY ROLAND 1945 [id number]                 Emergency Contacts      (Rel.) Home Phone Work Phone Mobile Phone    FREDDIE ROLAND (Spouse) [phone] -- --               History & Physical      Norman Saravia MD at 20 2316              [facility] HOSPITALIST HISTORY AND PHYSICAL    Patient Identification:  Name:  Ashley Roland  Age:  75 y.o.  Sex:  female  :  1945  MRN:  [id number]   Visit Number:  [id number]  Admit Date: 2020   Admit Date: 2020   Room number:  P203/S2  Primary Care Physician:  Provider, No Known    Date of Admission: 2020     "   Subjective     Chief complaint:    Chief Complaint   Patient presents with   • Altered Mental Status     History of presenting illness:  75 y.o. female who was admitted on 12/31/2020 from the ED via EMS for confusion.  The patient had previously been at [facility] emergency department for pneumonia and discharged home with some antibiotics.  The  called EMS and this as the patient was not able to talk to him and appeared ill.  By the time she arrived in our emergency department, the patient was able to talk to the ER provider.  The patient was slightly oriented but able to answer most questions appropriately.  Please note that currently the patient is not oriented and as a result I am unable to obtain any history from her.  No family members are at bedside.  The previous HPI was obtained from review of the emergency department records.  Please note that the emergency department tried several times to contact the  via phone and they were unsuccessful.    The patient was able to tell me that she is not experienced any dysuria and she does not have any pain or fevers.  ---------------------------------------------------------------------------------------------------------------------   Review of Systems   Unable to perform ROS: Mental status change     ---------------------------------------------------------------------------------------------------------------------   Past Medical History:   Diagnosis Date   • Anemia    • Anterolisthesis     Grade 1 anterolisthesis of L5 on S1 due to bilateral L5 pars defects   • Diverticulosis    • Essential hypertension    • Lacunar infarction (CMS/HCC)    • Type 2 diabetes mellitus (CMS/HCC)      Past Surgical History:   Procedure Laterality Date   • CHOLECYSTECTOMY     • HYSTERECTOMY       Family History   Family history unknown: Yes     Social History     Socioeconomic History   • Marital status:    Tobacco Use   • Smoking status: Never Smoker    • Smokeless tobacco: Never Used   Substance and Sexual Activity   • Alcohol use: Never     Frequency: Never   • Drug use: Never   • Sexual activity: Defer     ---------------------------------------------------------------------------------------------------------------------   Allergies:  Patient has no known allergies.  ---------------------------------------------------------------------------------------------------------------------   Medications below are reported home medications pulling from within the system; at this time, these medications have not been reconciled unless otherwise specified and are in the verification process for further verifcation as current home medications.      Prior to Admission Medications     None        Objective     Vital Signs:  Temp:  [98.2 °F (36.8 °C)-98.4 °F (36.9 °C)] 98.2 °F (36.8 °C)  Heart Rate:  [] 118  Resp:  [16-22] 22  BP: ()/(52-98) 141/79    Mean Arterial Pressure (Non-Invasive) for the past 24 hrs (Last 3 readings):   Noninvasive MAP (mmHg)   01/01/21 0302 90   01/01/21 0105 102   01/01/21 0050 111     SpO2:  [92 %-100 %] 99 %  on  Flow (L/min):  [2] 2;   Device (Oxygen Therapy): nasal cannula  Body mass index is 29.76 kg/m².    Wt Readings from Last 3 Encounters:   01/01/21 76.2 kg (168 lb)      ----------------------------------------------------------------------------------------------------------------------  Physical Exam  Vitals signs reviewed.   Constitutional:       General: She is sleeping. She is in acute distress.      Appearance: Normal appearance. She is well-developed. She is not toxic-appearing or diaphoretic.      Interventions: Nasal cannula in place.   HENT:      Head: Normocephalic and atraumatic.      Right Ear: External ear normal.      Left Ear: External ear normal.      Nose: Nose normal.   Eyes:      General: No scleral icterus.        Right eye: No discharge.         Left eye: No discharge.      Pupils: Pupils are equal,  round, and reactive to light.   Cardiovascular:      Rate and Rhythm: Normal rate and regular rhythm.      Heart sounds: No murmur.   Pulmonary:      Effort: Pulmonary effort is normal. No respiratory distress.      Breath sounds: No stridor. No wheezing or rales.   Abdominal:      General: Abdomen is flat. Bowel sounds are normal. There is no distension.   Musculoskeletal:         General: No swelling, deformity or signs of injury.   Skin:     General: Skin is warm.      Capillary Refill: Capillary refill takes less than 2 seconds.      Coloration: Skin is not jaundiced.      Findings: No erythema or rash.      Comments: Well-healed vertical suprapubic incision; well-healed trocar sites in the right upper quadrant.   Neurological:      General: No focal deficit present.      Mental Status: She is easily aroused. She is confused.      Cranial Nerves: Cranial nerves are intact. No dysarthria.   Psychiatric:         Attention and Perception: Attention normal.         Mood and Affect: Mood normal.         Speech: Speech normal.         Behavior: Behavior normal.         Thought Content: Thought content normal.         Cognition and Memory: Cognition is impaired.         Judgment: Judgment normal.       ---------------------------------------------------------------------------------------------------------------------  EKG: Sinus tachycardia with a heart rate of 107 and a QTC of 437 ms.  I see inferior Q waves but I do not see any acute ST elevation.  There are no comparison EKGs.    Telemetry: Sinus tachycardia with heart rates 125.    I have personally looked at both the EKG and the telemetry strips.  --------------------------------------------------------------------------------------------------------------------  LABS:    CBC and coagulation:  Results from last 7 days   Lab Units 01/01/21 0347 12/31/20 2009   LACTATE mmol/L 0.9 0.9   SED RATE mm/hr  --  24   CRP mg/dL 0.78* 0.75*   WBC 10*3/mm3 12.52* 11.73*      HEMOGLOBIN g/dL 11.3* 11.3*   HEMATOCRIT % 37.0 35.4   MCV fL 103.4* 100.6*   MCHC g/dL 30.5* 31.9   PLATELETS 10*3/mm3 413 449   INR   --  1.08     Acid/base balance:  Results from last 7 days   Lab Units 01/01/21  0322 12/31/20  2344 12/31/20  1902   PH, ARTERIAL pH units 7.300* 7.112* 7.137*   PO2 ART mm Hg 48.1* 125.0* 122.0*   PCO2, ARTERIAL mm Hg 26.2* 22.2* 21.1*   HCO3 ART mmol/L 12.8* 7.1* 7.1*     Renal and electrolytes:  Results from last 7 days   Lab Units 01/01/21 0347 12/31/20 2009   SODIUM mmol/L 153* 147*   POTASSIUM mmol/L 3.2* 3.9   MAGNESIUM mg/dL  --  2.2   CHLORIDE mmol/L 130* 127*   CO2 mmol/L 10.7* 7.4*   BUN mg/dL 62* 70*   CREATININE mg/dL 1.44* 1.71*   EGFR IF NONAFRICN AM mL/min/1.73 36* 29*   CALCIUM mg/dL 9.5 9.9   PHOSPHORUS mg/dL 2.3* 3.6   GLUCOSE mg/dL 103* 112*     Estimated Creatinine Clearance: 33.5 mL/min (A) (by C-G formula based on SCr of 1.44 mg/dL (H)).    Liver and pancreatic function:  Results from last 7 days   Lab Units 01/01/21 0347 12/31/20 2009   ALBUMIN g/dL 3.34* 3.64   BILIRUBIN mg/dL 0.2 0.2   ALK PHOS U/L 102 105   AST (SGOT) U/L 14 13   ALT (SGPT) U/L 9 10   LIPASE U/L  --  51     Endocrine function:  Lab Results   Component Value Date    HGBA1C 7.90 (H) 12/31/2020     Point of care bedside glucose levels:  Results from last 7 days   Lab Units 01/01/21  0119 12/31/20  2329 12/31/20  1856   GLUCOSE mg/dL 123 69* 125     Lab Results   Component Value Date    TSH 1.240 12/31/2020     Cardiac:  Results from last 7 days   Lab Units 01/01/21 0347 12/31/20  2009   CK TOTAL U/L  --  92   TROPONIN T ng/mL 0.037* 0.052*   PROBNP pg/mL  --  1,580.0*       Cultures:  Lab Results   Component Value Date    COLORU Yellow 12/31/2020    CLARITYU Cloudy (A) 12/31/2020    PHUR 6.0 12/31/2020    GLUCOSEU Negative 12/31/2020    KETONESU Negative 12/31/2020    BLOODU Moderate (2+) (A) 12/31/2020    NITRITEU Positive (A) 12/31/2020    LEUKOCYTESUR Large (3+) (A) 12/31/2020     BILIRUBINUR Negative 12/31/2020    UROBILINOGEN 0.2 E.U./dL 12/31/2020    RBCUA 6-12 (A) 12/31/2020    WBCUA Too Numerous to Count (A) 12/31/2020    BACTERIA None Seen 12/31/2020       I have personally looked at the labs and they are summarized above.  ----------------------------------------------------------------------------------------------------------------------  Detailed radiology reports for the last 24 hours:    Imaging Results (Last 24 Hours)     Procedure Component Value Units Date/Time    CT Abdomen Pelvis Without Contrast [[id number]] Collected: 12/31/20 2232     Updated: 12/31/20 2234    Narrative:      CT Abdomen Pelvis WO    INDICATION:   Abdominal pain.    TECHNIQUE:   CT of the abdomen and pelvis without IV contrast. Coronal and sagittal reconstructions were obtained.  Radiation dose reduction techniques included automated exposure control or exposure modulation based on body size. Count of known CT and cardiac nuc  med studies performed in previous 12 months: 0.     COMPARISON:   None available.    FINDINGS:  Please see chest CT report dictated separately. Exam is motion degraded. Gallbladder is surgically absent. Calcification in the portacaval space is likely a densely calcified lymph node. It measures up to 2.2 cm. There is diffuse atherosclerotic disease  with no aortic aneurysm. There is a 1.2 x 1.8 cm low density left adrenal nodule, likely a benign adenoma in the absence of a known malignancy. Calcifications in both kidneys are probably vascular. No ureteral stones are seen on either side, and there is  no hydronephrosis. Solid abdominal organs are otherwise grossly normal allowing for excessive motion.    The uterus is surgically absent. Urinary bladder is not well distended. There is mild bladder wall thickening which may indicate cystitis or may be artifact due to incomplete distention. Large amount of stool is noted in the rectal vault. There is  colonic diverticulosis without focal  diverticulitis. The appendix is normal. No small bowel obstruction is seen. There is no free fluid. There is grade 1 anterolisthesis of L5 on S1 due to bilateral L5 pars defects.      Impression:        1. Motion degraded exam.  2. Atherosclerotic disease with probable vascular calcifications in both kidneys. No ureteral stones are seen on either side, and there is no hydronephrosis.  3. Mild thickening of the urinary bladder wall may be due to incomplete distention or cystitis.  4. Large amount of stool in the rectal vault. There is colonic diverticulosis without diverticulitis. The appendix and small bowel are within normal limits.  5. Small left adrenal nodule, likely a benign adenoma in the absence of a known malignancy. This could be assessed with nonemergent adrenal protocol MRI or CT if indicated.  6. Cholecystectomy and hysterectomy.        Signer Name: Rm Ku MD   Signed: 12/31/2020 10:32 PM   Workstation Name: [email or web address]    [facility]    CT Chest Without Contrast [[id number]] Collected: 12/31/20 2220     Updated: 12/31/20 2222    Narrative:      CT Chest WO    INDICATION:   Increased work of breathing. Abnormal chest x-ray.    TECHNIQUE:   CT of the thorax without IV contrast. Coronal and sagittal reconstructions were obtained.  Radiation dose reduction techniques included automated exposure control or exposure modulation based on body size. Count of known CT and cardiac nuc med studies  performed in previous 12 months: 0.     COMPARISON:   None available.    FINDINGS:  There is some motion degradation. There is atherosclerotic disease and extensive coronary artery disease. There is a large hiatal hernia to the left of midline which probably accounts for density on the patient's prior chest x-ray. The hernia contains  the gastric fundus and a portion of the gastric body. No pleural or pericardial effusion. There is no adenopathy. Images of the upper abdomen show  cholecystectomy. There is a calcification in the portacaval space which is probably a large calcified lymph  node. There is a small left adrenal nodule measuring 1.2 x 1.8 cm. While technically indeterminate, this is likely a small benign adenoma.    There is a 7 mm noncalcified right lower lobe nodule. There is some atelectasis in the left lower lobe adjacent to the hernia sac. The lungs are otherwise clear. No CT findings present to indicate pneumonia. Note: CT may be negative in the earliest  stages of COVID-19.      Impression:        1. No evidence of acute pneumonia.  2. Large hiatal hernia at the left lung base.  3. Noncalcified nodule in the right lower lobe measuring 7 mm. Chest CT follow-up in 6 months is recommended.  4. Left adrenal nodule, most likely a benign adenoma. This can be further characterized with adrenal protocol CT or MRI on a nonemergent basis if indicated.    Signer Name: Rm Ku MD   Signed: 12/31/2020 10:20 PM   Workstation Name: [email or web address]    [facility]    CT Head Without Contrast [[id number]] Collected: 12/31/20 2209     Updated: 12/31/20 2211    Narrative:      CT Head WO    HISTORY:   Altered mental status, patient in the emergency    TECHNIQUE:   Routine noncontrast head CT. Radiation dose reduction techniques included automated exposure control or exposure modulation based on body size. Radiation audit for CT and nuclear cardiology exams in the last 12 months: 0.    COMPARISON:   None.    FINDINGS:       No evidence of acute intracranial hemorrhage. There is a CSF density mass in the right posterior fossa causing some mass effect on the right cerebellar hemisphere, most likely an arachnoid cyst but poorly evaluated on this exam. Mild global volume loss.    The gray-white matter differentiation is preserved. There are scattered ill-defined and patchy hypoattenuating foci within the bilateral cerebral and deep white matter which are nonspecific but  most commonly associated with chronic microvascular ischemic  change. Probably remote right basal ganglia lacunar infarcts.    Partially empty sella.    Visualized paranasal sinuses are clear. Visualized mastoid air cells are clear.    No acute osseous abnormality.        Impression:        1.  No acute intracranial abnormality.  2.  CSF density mass right posterior fossa with mild mass effect on the right cerebellar convexity, most likely an arachnoid cyst. Consider further evaluation with MRI without and with contrast.  3.  Presumed chronic microvascular ischemic changes.    Signer Name: AMANDA SAWYER MD   Signed: 12/31/2020 10:09 PM   Workstation Name: [id number]    [facility]    XR Chest 1 View [[id number]] Collected: 12/31/20 2034     Updated: 12/31/20 2037    Narrative:      XR CHEST 1 VW-     CLINICAL INDICATION: increased work of breathing        COMPARISON: None available      TECHNIQUE: Single frontal view of the chest.     FINDINGS:     Left effusion and left basilar consolidation. Consider CT to exclude  mass  The cardiac silhouette is normal. The pulmonary vasculature is  unremarkable.  There is no evidence of an acute osseous abnormality.              Impression:      Left effusion and left basilar consolidation. Consider CT to evaluate  for mass     This report was finalized on 12/31/2020 8:35 PM by Dr. Serafin Yip MD.           I have personally looked at the radiology images and I have read the available final reports.    Assessment & Plan       -Sepsis that was present on admission (respiratory rate 22, heart rate , CRP 0.75, metabolic cephalopathy) due to an acute urinary tract infection  -Acute kidney injury versus acute on chronic kidney disease versus chronic kidney disease with baseline creatinine unknown and admission creatinine 1.71; creatinine on 12/30/2020 at [facility] was 1.98  -Acidemia with a non-anion gap metabolic acidosis and respiratory  alkalosis  -Noncalcified nodule in the right lower lobe measuring 7 mm. Chest CT follow-up in 6 months  -CSF density mass seen on noncontrasted head CT, right posterior fossa with mild mass effect on the right cerebellar convexity, most likely an arachnoid cyst  -Large hiatal hernia at the left lung base  -Acute hypophosphatemia  -Acute hypokalemia  -History of type 2 diabetes mellitus with some hypoglycemia while in the emergency department  -History of essential hypertension  -History of prior lacunar infarcts  -History of anemia, currently with macrocytic anemia    Admitted to the progressive care unit.  We will start the patient on D5 with 3 A of bicarbonate added at 100 mL/h; we will continue to replace the bicarbonate until it is above 20 as I suspect that the acidemia is due to kidney failure.  We will trend the creatinine; it is already starting to trend downward.  We will start the patient on high-dose Rocephin for the UTI and we will send a urine collected in the emergency department for culture.  We will replace the potassium and phosphorus per our replacement protocols.  We will monitor her glucose levels 4 times a day, especially since she came in with hypoglycemia.  The hypoglycemia protocol has been added.  We will obtain an echocardiogram as patient does have an elevated proBNP on admission and she has sepsis and prior lacunar infarcts as well.  We will repeat her blood work in the morning.  We will also follow serial ABGs to see how her acidosis is trending.  Because of the patient's advanced age, we will consult PT, OT, and speech.    VTE Prophylaxis:   Mechanical Order History:     None      Pharmalogical Order History:      Ordered     Dose Route Frequency Stop    Signed and Held  Pharmacy to Dose enoxaparin (LOVENOX)     Question:  Indication of use  Answer:  Prophylaxis    -- XX Continuous PRN --              The patient is high risk due to the following diagnoses/reasons: Sepsis and acute  "kidney injury.      Norman Saravia MD  [facility] Hospitalist  21  04:54 EST        Electronically signed by Norman Saravia MD at 21 0530          Emergency Department Notes      Vera Preciado RN at 20 1848        Pt presents with IV in place to right forearm with no date or time on dressing, dressing partially removed, IV catheter not in place, IV removed.      Vera Preciado RN  20      Electronically signed by Vera Preciado RN at 20     Vera Preciado RN at 20 1850        Pt presents to the ER at this time via EMS. Per EMS report Pt was discharged from [place] with home health with home medications for IV rocephin and fluids. Per EMS home health stated that Pt was \"too sick to be at home and needs to be admitted.\" Per EMS, Pt baseline mental status is unknown. Pt presents alert, does not answer questions when asked, Per EMS report, Pt wears O2 at 2L NC at all time. No family contact information was given, no social security or further information was given. Per EMS Pt  told them that they would come to the ER tomorrow. Pt noted to have old hospital bracelet in place, with name and . Per EMS reports, the name and birthday was verified with home health prior to departure of home. EMS states that on arrival Pt BG was 60 and home health administered dextrose prior to departure. Provider made aware.     Vera Preciado RN  20       Vera Preciado RN  20      Electronically signed by Vera Preciado RN at 20     Vera Preciado RN at 20        Dr. Oliver made aware of Pt pH 7.137 and HCO3 7.1, no new orders received.      Vera Preciado RN  20      Electronically signed by Vera Preciado RN at 20     Vera Preciado RN at 20        Small amount of normal saline infiltrated to IV site, no redness, minor edema noted. Infusion " stopped, compress applied, IV no longer patent. No extravasation protocol needed per protocol.      Vera Preciado RN  12/31/20 2050      Electronically signed by Vera Preciado RN at 12/31/20 2050     Vera Preciado RN at 12/31/20 1945        Pt moved from hallway to room 113. Pt noted to have dressing to bilateral lower extremities, removed banadages at this time. Pt noted to have multiple small circular ulcerations below each bilateral knee to ankle. Ulcerations noted to have yellow/green drainage. Provider made aware. Clean, non-adherant dressings applied. Pt noted to have large bowel movement and brief soiled with urine, Pt cleaned, noted to have non-blanchable redness to bilateral buttocks, small circular area to coccyx with thick white drainage. Provider made aware. Mepalex applied. Pt turned to left side with pillow support.      Vera Preciado RN  12/31/20 2056      Electronically signed by Vera Preciado RN at 12/31/20 2056     Vera Preciado RN at 12/31/20 2052        Contacted [facility] at this time for records per Dr. Benito LEONARD, awaiting records to be faxed. Unable to have authorization of release of medical records due to Pt AMS and no available family contact.      Vera Preciado RN  12/31/20 2053      Electronically signed by Vera Preciado RN at 12/31/20 2053     Vera Preciado RN at 12/31/20 2316        Per [facility] Pt was picked up at [facility] in [place]. Contacted [facility] Dispatch at this time for well check for Pt .       Vera Preciado RN  12/31/20 2317      Electronically signed by Vera Preciado RN at 12/31/20 2317     Vera Preciado RN at 12/31/20 2329        Dispatch called with Pt  phone number Zack Galvez [phone]. Contacted Pt  at this time, states that at baseline Pt is always confused and bedridden and how she presents today is normal for her. Pt  states that Pt was seen at [place]  last night and discharged back home but the home health nurse was concerned due to [facility] stating that she needed admitted but they could not admit her due to not having a bed. Pt  states that he will come to the hospital tomorrow and bring Pt documents and registration forms. Provider made aware.     Vera Preciado RN  12/31/20 2343      Electronically signed by Vera Preciado RN at 12/31/20 2343     Vera Preciado RN at 12/31/20 2332        Pt BG 69, Dr. Oliver made aware with new orders received.      Vera Preciado RN  12/31/20 2335      Electronically signed by Vera Preciado RN at 12/31/20 2335     Vera Preciado RN at 01/01/21 0011        Report called to MIGEL [facility].      Vera Preciado RN  01/01/21 0012      Electronically signed by Vera Preciado RN at 01/01/21 0012         Current Facility-Administered Medications   Medication Dose Route Frequency Provider Last Rate Last Admin   • aspirin chewable tablet 324 mg  324 mg Oral Once Norman Saravia MD       • cefTRIAXone (ROCEPHIN) 2 g/100 mL 0.9% NS IVPB (MBP)  2 g Intravenous Q24H Norman Saravia MD   2 g at 01/01/21 0521   • dextrose (D50W) 25 g/ 50mL Intravenous Solution 25 g  25 g Intravenous Q15 Min PRN Norman Saravia MD       • dextrose (GLUTOSE) oral gel 15 g  15 g Oral Q15 Min PRN Norman Saravia MD       • dextrose 5 % 850 mL with sodium bicarbonate 8.4 % 150 mEq infusion   Intravenous Continuous Norman Saravia  mL/hr at 01/01/21 0547 New Bag at 01/01/21 0547   • enoxaparin (LOVENOX) syringe 30 mg  30 mg Subcutaneous Daily Norman Saravia MD       • glucagon (human recombinant) (GLUCAGEN DIAGNOSTIC) injection 1 mg  1 mg Subcutaneous Q15 Min PRN Norman Saravia MD       • nitroglycerin (NITROSTAT) SL tablet 0.4 mg  0.4 mg Sublingual Q5 Min PRN Norman Saravia MD       • Pharmacy to Dose enoxaparin (LOVENOX)   Does not apply Continuous PRN Norman Saravia MD       •  potassium chloride (MICRO-K) CR capsule 40 mEq  40 mEq Oral PRN Norman Saravia MD        Or   • potassium chloride (KLOR-CON) packet 40 mEq  40 mEq Oral PRN Norman Saravia MD        Or   • potassium chloride 10 mEq in 100 mL IVPB  10 mEq Intravenous Q1H PRN Norman Saravia MD       • potassium phosphate 45 mmol in sodium chloride 0.9 % 500 mL infusion  45 mmol Intravenous PRN Norman Saravia MD        Or   • potassium phosphate 30 mmol in sodium chloride 0.9 % 250 mL infusion  30 mmol Intravenous PRN Norman Saravia MD        Or   • potassium phosphate 15 mmol in sodium chloride 0.9 % 100 mL infusion  15 mmol Intravenous PRN Norman Saravia MD        Or   • sodium phosphates 45 mmol in sodium chloride 0.9 % 500 mL IVPB  45 mmol Intravenous PRN Norman Saravia MD        Or   • sodium phosphates 30 mmol in sodium chloride 0.9 % 250 mL IVPB  30 mmol Intravenous PRN Norman Saravia MD        Or   • sodium phosphates 15 mmol in sodium chloride 0.9 % 250 mL IVPB  15 mmol Intravenous PRN Norman Saravia MD       • sodium chloride 0.9 % flush 10 mL  10 mL Intravenous PRN Norman Saravia MD       • sodium chloride 0.9 % flush 3 mL  3 mL Intravenous Q12H Norman Saravia MD   3 mL at 01/01/21 0111   • sodium chloride 0.9 % flush 3-10 mL  3-10 mL Intravenous PRN Norman Saravia MD         Orders (last 24 hrs)      Start     Ordered    01/02/21 0600  Hemoglobin A1c  Morning Draw      01/01/21 0336    01/01/21 1245  Blood Culture ID, PCR - Blood, Arm, Left  Once      01/01/21 1244    01/01/21 1149  POC Glucose Once  Once      01/01/21 1133    01/01/21 1016  Diet Pureed; Thin  Diet Effective Now      01/01/21 1015    01/01/21 0900  enoxaparin (LOVENOX) syringe 30 mg  Daily      01/01/21 0113    01/01/21 0623  POC Glucose Once  Once      01/01/21 0549    01/01/21 0600  POC Glucose Q6H  Every 6 Hours      01/01/21 0317    01/01/21 0545  dextrose 5 % 850 mL with sodium bicarbonate 8.4 % 150 mEq  infusion  Continuous      01/01/21 0458    01/01/21 0500  cefTRIAXone (ROCEPHIN) 2 g/100 mL 0.9% NS IVPB (MBP)  Every 24 Hours      01/01/21 0335    01/01/21 0459  PT Consult: Eval & Treat As Tolerated  Once      01/01/21 0500    01/01/21 0459  SLP Consult: Eval & Treat Swallow Disorder; Regular - No Dietary Restrictions, Consistent Carbohydrate  Once     Comments: Came in confused from sepsis and is below baseline functioning    01/01/21 0500    01/01/21 0459  OT Consult: Eval & Treat  Once      01/01/21 0500    01/01/21 0458  Patient Currently On Electrolyte Replacement Protocol - Please Refer to MAR for Protocol Details  Misc Nursing Order (Specify)  Daily     Comments: Patient Currently On Electrolyte Replacement Protocol - Please Refer to MAR for Protocol Details    01/01/21 0457    01/01/21 0456  potassium chloride (MICRO-K) CR capsule 40 mEq  As Needed      01/01/21 0457    01/01/21 0456  potassium chloride (KLOR-CON) packet 40 mEq  As Needed      01/01/21 0457    01/01/21 0456  potassium chloride 10 mEq in 100 mL IVPB  Every 1 Hour PRN      01/01/21 0457    01/01/21 0456  potassium phosphate 45 mmol in sodium chloride 0.9 % 500 mL infusion  As Needed      01/01/21 0457    01/01/21 0456  potassium phosphate 30 mmol in sodium chloride 0.9 % 250 mL infusion  As Needed      01/01/21 0457    01/01/21 0456  potassium phosphate 15 mmol in sodium chloride 0.9 % 100 mL infusion  As Needed      01/01/21 0457    01/01/21 0456  sodium phosphates 45 mmol in sodium chloride 0.9 % 500 mL IVPB  As Needed      01/01/21 0457    01/01/21 0456  sodium phosphates 30 mmol in sodium chloride 0.9 % 250 mL IVPB  As Needed      01/01/21 0457    01/01/21 0456  sodium phosphates 15 mmol in sodium chloride 0.9 % 250 mL IVPB  As Needed      01/01/21 0457    01/01/21 0347  Urine Culture - Urine, Urine, Catheter  Once      01/01/21 0346    01/01/21 0336  Urinalysis With Culture If Indicated - Urine, Catheter  Once,   Status:  Canceled       01/01/21 0335    01/01/21 0333  Hemoglobin & Hematocrit, Blood  STAT,   Status:  Canceled      01/01/21 0332    01/01/21 0333  C-reactive Protein  STAT      01/01/21 0332    01/01/21 0333  CBC & Differential  STAT      01/01/21 0332    01/01/21 0333  Comprehensive Metabolic Panel  STAT      01/01/21 0332    01/01/21 0333  Troponin  STAT      01/01/21 0332    01/01/21 0333  Lactic Acid, Plasma  STAT      01/01/21 0332    01/01/21 0333  Phosphorus  STAT      01/01/21 0332    01/01/21 0333  CBC Auto Differential  PROCEDURE ONCE      01/01/21 0332    01/01/21 0328  Blood Gas, Arterial With Co-Ox  Once      01/01/21 0322    01/01/21 0318  Hemoglobin A1c  Once      01/01/21 0317    01/01/21 0317  Do NOT Hold Basal or Correction Scale Insulin When Patient is NPO, Hold Scheduled Mealtime (Bolus) Insulin if NPO  Continuous      01/01/21 0317    01/01/21 0317  Follow [facility] Hypoglycemia Standing Orders For Blood Glucose Less Than 70 mg/dL  Until Discontinued     Comments: ALERT PATIENT - NOT NPO & CAN SAFELY SWALLOW  Administer 4 oz Fruit Juice OR 4 oz Regular Soda OR 8 oz Milk OR 15-30 grams (1 tube) of Glucose Gel.  Recheck Blood Glucose Approximately 15 Minutes After Ingestion, Repeat Treatment & Continue to Recheck Blood Sugar Approximately Every 15 Minutes Until Blood Glucose is 70 or Higher.  Once Blood Glucose is 70 or Higher & if It Will Be More Than 60 Minutes Until Next Meal, Provide Appropriate Snack (Including Carbohydrate Food) Based on Meal Plan Order. Give Meal Tray As Soon As Possible.    PATIENT HAS IV ACCESS - UNRESPONSIVE, NPO OR UNABLE TO SAFELY SWALLOW  Administer 25g (50ml) D50W IV Push.  Recheck Blood Glucose Approximately 15 Minutes After Administration, if Blood Glucose Remains Less Than 70, Repeat Treatment   Recheck Blood Glucose Approximately 15 Minutes After 2nd Administration, if Blood Glucose Remains Less Than 70 After 2nd Dose of D50W, Contact Provider for Further Treatment Orders & Consider  Adding IVF With D5W for Maintenance    PATIENT WITHOUT IV ACCESS - UNRESPONSIVE, NPO OR UNABLE TO SAFELY SWALLOW  Administer 1mg Glucagon SQ & Establish IV Access.  Turn Patient on Side - Nausea / Vomiting May Occur.  Recheck Blood Glucose Approximately 15 Minutes After Administration.  If Blood Glucose Remains Less Than 70, Administer 25g D50W IV Push (50ml).  Recheck Blood Glucose Approximately 15 Minutes After Administration of D50W, if Blood Glucose Remains Less Than 70, Contact Provider for Further Treatment Orders & Consider Adding IVF With D5 for Maintenance    Document Event & Patient Response to Interventions in EMR, Document Medications on MAR  Notify Provider if Hypoglycemia Treatment Needed    01/01/21 0317 01/01/21 0316  dextrose (D50W) 25 g/ 50mL Intravenous Solution 25 g  Every 15 Minutes PRN      01/01/21 0317    01/01/21 0316  glucagon (human recombinant) (GLUCAGEN DIAGNOSTIC) injection 1 mg  Every 15 Minutes PRN      01/01/21 0317    01/01/21 0316  dextrose (GLUTOSE) oral gel 15 g  Every 15 Minutes PRN      01/01/21 0317    01/01/21 0310  Adult Transthoracic Echo Complete W/ Cont if Necessary Per Protocol  Once      01/01/21 0310    01/01/21 0308  Blood Gas, Arterial -With Co-Ox Panel: Yes  STAT      01/01/21 0308    01/01/21 0200  sodium chloride 0.9 % flush 3 mL  Every 12 Hours Scheduled      01/01/21 0100    01/01/21 0153  Stage III Pressure Ulcer Care - Twice Daily  Every 12 Hours     Comments: - Gently Cleanse With Normal Saline  - Pack Loosely With Moist to Moist Normal Saline Fluffed Gauze  - Cover With Dry Dressing Twice Daily    01/01/21 0152    01/01/21 0153  Stage III Pressure Ulcer Care - As Needed  Per Order Details     Comments: - Gently Cleanse With Normal Saline  - Pack Loosely With Moist to Moist Normal Saline Fluffed Gauze  - Cover With Dry Dressing As Needed    01/01/21 0152    01/01/21 0152  Wound Ostomy Eval & Treat  Once      01/01/21 0152    01/01/21 0127  POC Glucose Once   Once      01/01/21 0119    01/01/21 0101  Vital Signs  Per Hospital Policy      01/01/21 0100    01/01/21 0101  Weigh Patient  Once      01/01/21 0100 01/01/21 0101  Oxygen Therapy- Nasal Cannula; Titrate for SPO2: 90% - 95%  Continuous      01/01/21 0100    01/01/21 0101  Insert Peripheral IV  Once      01/01/21 0100    01/01/21 0101  Saline Lock & Maintain IV Access  Continuous,   Status:  Canceled      01/01/21 0100    01/01/21 0101  Telemetry - Maintain IV Access  Continuous      01/01/21 0100 01/01/21 0101  Continuous Cardiac Monitoring  Continuous,   Status:  Canceled      01/01/21 0100 01/01/21 0101  May Be Off Telemetry for Tests  Continuous      01/01/21 0100    01/01/21 0101  ACLS Protocol For Life Threatening Dysrhythmias (Unless Code Status Indicates Otherwise)  Continuous      01/01/21 0100    01/01/21 0101  Notify Provider if ACLS Protocol Activated  Until Discontinued      01/01/21 0100    01/01/21 0101  Cardiac Monitoring  Continuous      01/01/21 0100    01/01/21 0101  Pulse Oximetry, Continuous  Continuous      01/01/21 0100    01/01/21 0101  Activity - Strict Bed Rest  Until Discontinued      01/01/21 0100 01/01/21 0101  Fall Precautions  Continuous      01/01/21 0100    01/01/21 0101  POC Glucose Once  Once      01/01/21 0100    01/01/21 0101  Diet Clear Liquid  Diet Effective Now,   Status:  Canceled      01/01/21 0100    01/01/21 0100  Intake & Output  Every Shift      01/01/21 0100    01/01/21 0100  sodium chloride 0.9 % flush 3-10 mL  As Needed      01/01/21 0100    01/01/21 0100  Pharmacy to Dose enoxaparin (LOVENOX)  Continuous PRN      01/01/21 0100 01/01/21 0100  Telemetry - Pulse Oximetry  Continuous PRN,   Status:  Canceled     Comments: If Patient Develops Unresponsiveness, Acute Dyspnea, Cyanosis or Suspected Hypoxemia Start Continuous Pulse Ox Monitoring, Apply Oxygen & Notify Provider    01/01/21 0100    01/01/21 0100  nitroglycerin (NITROSTAT) SL tablet 0.4 mg   Every 5 Minutes PRN      01/01/21 0100    12/31/20 2351  sodium bicarbonate injection 8.4% 150 mEq  Once      12/31/20 2349    12/31/20 2347  Blood Gas, Arterial With Co-Ox  Once      12/31/20 2344    12/31/20 2342  POC Glucose Once  Once      12/31/20 2329    12/31/20 2332  dextrose (D50W) 25 g/ 50mL Intravenous Solution 25 g  Once      12/31/20 2330    12/31/20 2326  POC Glucose STAT  STAT      12/31/20 2325    12/31/20 2318  COVID-19 and FLU A/B PCR - Swab, Nasopharynx  Once      12/31/20 2317 12/31/20 2314  Code Status and Medical Interventions:  Continuous      12/31/20 2315 12/31/20 2314  Inpatient Admission  Once      12/31/20 2315 12/31/20 2310  Blood Gas, Arterial -With Co-Ox Panel: Yes  Once      12/31/20 2309 12/31/20 2145  vancomycin 1250 mg/250 mL 0.9% NS IVPB ([facility])  Once      12/31/20 2047    12/31/20 2049  aspirin chewable tablet 324 mg  Once      12/31/20 2047    12/31/20 2049  cefepime (MAXIPIME) 2 g/100 mL 0.9% NS (mbp)  Once      12/31/20 2047    12/31/20 2048  Acetone  Once      12/31/20 2048    12/31/20 2047  CT Abdomen Pelvis Without Contrast  1 Time Imaging     Comments: NON-CONTRASTED STUDY      12/31/20 2046    12/31/20 2046  CT Chest Without Contrast  1 Time Imaging      12/31/20 2045    12/31/20 2042  CT Head Without Contrast  1 Time Imaging      12/31/20 2041    12/31/20 2029  Urinalysis, Microscopic Only - Urine, Catheter  Once      12/31/20 2028 12/31/20 1909  sodium chloride 0.9 % bolus 500 mL  Once      12/31/20 1907 12/31/20 1909  dextrose 5 % and sodium chloride 0.9 % infusion  Continuous,   Status:  Discontinued      12/31/20 1907    12/31/20 1908  Acetaminophen Level  Once      12/31/20 1907    12/31/20 1908  Salicylate Level  Once      12/31/20 1907 12/31/20 1906  ECG 12 Lead  STAT     Comments: Abdominal pain    12/31/20 1905    12/31/20 1906  Blood Gas, Arterial -With Co-Ox Panel: Yes  Once      12/31/20 1907 12/31/20 1906  Insert peripheral IV  Once         12/31/20 1907    12/31/20 1905  sodium chloride 0.9 % flush 10 mL  As Needed      12/31/20 1907    12/31/20 1904  Blood Gas, Arterial With Co-Ox  Once      12/31/20 1902    12/31/20 1904  Comprehensive Metabolic Panel  Once      12/31/20 1905    12/31/20 1904  Protime-INR  Once      12/31/20 1905    12/31/20 1904  Lipase  Once      12/31/20 1905    12/31/20 1904  Urinalysis With Microscopic If Indicated (No Culture) - Urine, Catheter  Once      12/31/20 1905    12/31/20 1904  Troponin  Once      12/31/20 1905    12/31/20 1904  BNP  Once      12/31/20 1905    12/31/20 1904  Blood Culture - Blood, Arm, Right  Once      12/31/20 1905    12/31/20 1904  Blood Culture - Blood, Arm, Left  Once      12/31/20 1905    12/31/20 1904  Lactic Acid, Plasma  Once      12/31/20 1905    12/31/20 1904  Sedimentation Rate  Once      12/31/20 1905    12/31/20 1904  C-reactive Protein  Once      12/31/20 1905    12/31/20 1904  Urine Drug Screen - Urine, Clean Catch  Once      12/31/20 1905    12/31/20 1904  CK  Once      12/31/20 1905    12/31/20 1904  Magnesium  Once      12/31/20 1905    12/31/20 1904  Phosphorus  Once      12/31/20 1905    12/31/20 1904  TSH  Once      12/31/20 1905    12/31/20 1904  XR Chest 1 View  1 Time Imaging      12/31/20 1905    12/31/20 1903  POC Glucose Once  Once      12/31/20 1856    12/31/20 1903  CBC & Differential  Once      12/31/20 1905    12/31/20 1903  CBC Auto Differential  PROCEDURE ONCE      12/31/20 1905    Unscheduled  Straight cath  As Needed      12/31/20 2047    Unscheduled  Oxygen Therapy- Nasal Cannula; Titrate for SPO2: 90% - 95%  Continuous PRN     Comments: If Patient Develops Unresponsiveness, Acute Dyspnea, Cyanosis or Suspected Hypoxemia Start Continuous Pulse Ox Monitoring, Apply Oxygen & Notify Provider    01/01/21 0100    Unscheduled  ECG 12 Lead  As Needed     Comments: Nurse to Release if Patient Expericences Acute Chest Pain or Dysrhythmias    01/01/21 0100    Unscheduled   Potassium  As Needed     Comments: For Ventricular Arrhythmias      01/01/21 0100    Unscheduled  Magnesium  As Needed     Comments: For Ventricular Arrhythmias      01/01/21 0100    Unscheduled  Troponin  As Needed     Comments: For Chest Pain      01/01/21 0100    Unscheduled  Digoxin Level  As Needed     Comments: For Atrial Arrhythmias      01/01/21 0100    Unscheduled  Blood Gas, Arterial -With Co-Ox Panel: Yes  As Needed     Comments: Per O2 PolicyNotify Physician      01/01/21 0100    Unscheduled  Magnesium  As Needed      01/01/21 0457    Unscheduled  Potassium  As Needed      01/01/21 0457                Physician Progress Notes (last 24 hours) (Notes from 12/31/20 1253 through 01/01/21 1253)    No notes of this type exist for this encounter.         Consult Notes (last 24 hours) (Notes from 12/31/20 1253 through 01/01/21 1253)    No notes of this type exist for this encounter.

## 2021-01-01 NOTE — ED NOTES
Contacted [facility] at this time for records per Dr. Benito LEONARD, awaiting records to be faxed. Unable to have authorization of release of medical records due to Pt AMS and no available family contact.      Vera Preciado RN  12/31/20 2053

## 2021-01-01 NOTE — ED NOTES
Pt moved from hallway to room 113. Pt noted to have dressing to bilateral lower extremities, removed banadages at this time. Pt noted to have multiple small circular ulcerations below each bilateral knee to ankle. Ulcerations noted to have yellow/green drainage. Provider made aware. Clean, non-adherant dressings applied. Pt noted to have large bowel movement and brief soiled with urine, Pt cleaned, noted to have non-blanchable redness to bilateral buttocks, small circular area to coccyx with thick white drainage. Provider made aware. Mepalex applied. Pt turned to left side with pillow support.      Vera Preciado, RN  12/31/20 1029

## 2021-01-01 NOTE — PROGRESS NOTES
Patient initiated on vancomycin for bacteremia. Based on patient's age, weight and SCr, will start vancomycin at 1000 mg IV q24h. Will check a trough level at steady state and follow.    Thank you for this consult,  Jose PrietoD

## 2021-01-01 NOTE — H&P
[facility]    Patient Identification:  Name:  Ashley Galvez  Age:  75 y.o.  Sex:  female  :  1945  MRN:  [id number]   Visit Number:  [id number]  Admit Date: 2020   Admit Date: 2020   Room number:  P203/S2  Primary Care Physician:  Provider, No Known    Date of Admission: 2020     Subjective     Chief complaint:    Chief Complaint   Patient presents with   • Altered Mental Status     History of presenting illness:  75 y.o. female who was admitted on 2020 from the ED via EMS for confusion.  The patient had previously been at [facility] [place] emergency department for pneumonia and discharged home with some antibiotics.  The  called EMS and this as the patient was not able to talk to him and appeared ill.  By the time she arrived in our emergency department, the patient was able to talk to the ER provider.  The patient was slightly oriented but able to answer most questions appropriately.  Please note that currently the patient is not oriented and as a result I am unable to obtain any history from her.  No family members are at bedside.  The previous HPI was obtained from review of the emergency department records.  Please note that the emergency department tried several times to contact the  via phone and they were unsuccessful.    The patient was able to tell me that she is not experienced any dysuria and she does not have any pain or fevers.  ---------------------------------------------------------------------------------------------------------------------   Review of Systems   Unable to perform ROS: Mental status change     ---------------------------------------------------------------------------------------------------------------------   Past Medical History:   Diagnosis Date   • Anemia    • Anterolisthesis     Grade 1 anterolisthesis of L5 on S1 due to bilateral L5 pars defects   • Diverticulosis    •  Essential hypertension    • Lacunar infarction (CMS/HCC)    • Type 2 diabetes mellitus (CMS/HCC)      Past Surgical History:   Procedure Laterality Date   • CHOLECYSTECTOMY     • HYSTERECTOMY       Family History   Family history unknown: Yes     Social History     Socioeconomic History   • Marital status:    Tobacco Use   • Smoking status: Never Smoker   • Smokeless tobacco: Never Used   Substance and Sexual Activity   • Alcohol use: Never     Frequency: Never   • Drug use: Never   • Sexual activity: Defer     ---------------------------------------------------------------------------------------------------------------------   Allergies:  Patient has no known allergies.  ---------------------------------------------------------------------------------------------------------------------   Medications below are reported home medications pulling from within the system; at this time, these medications have not been reconciled unless otherwise specified and are in the verification process for further verifcation as current home medications.      Prior to Admission Medications     None        Objective     Vital Signs:  Temp:  [98.2 °F (36.8 °C)-98.4 °F (36.9 °C)] 98.2 °F (36.8 °C)  Heart Rate:  [] 118  Resp:  [16-22] 22  BP: ()/(52-98) 141/79    Mean Arterial Pressure (Non-Invasive) for the past 24 hrs (Last 3 readings):   Noninvasive MAP (mmHg)   01/01/21 0302 90   01/01/21 0105 102   01/01/21 0050 111     SpO2:  [92 %-100 %] 99 %  on  Flow (L/min):  [2] 2;   Device (Oxygen Therapy): nasal cannula  Body mass index is 29.76 kg/m².    Wt Readings from Last 3 Encounters:   01/01/21 76.2 kg (168 lb)      ----------------------------------------------------------------------------------------------------------------------  Physical Exam  Vitals signs reviewed.   Constitutional:       General: She is sleeping. She is in acute distress.      Appearance: Normal appearance. She is well-developed. She is not  toxic-appearing or diaphoretic.      Interventions: Nasal cannula in place.   HENT:      Head: Normocephalic and atraumatic.      Right Ear: External ear normal.      Left Ear: External ear normal.      Nose: Nose normal.   Eyes:      General: No scleral icterus.        Right eye: No discharge.         Left eye: No discharge.      Pupils: Pupils are equal, round, and reactive to light.   Cardiovascular:      Rate and Rhythm: Normal rate and regular rhythm.      Heart sounds: No murmur.   Pulmonary:      Effort: Pulmonary effort is normal. No respiratory distress.      Breath sounds: No stridor. No wheezing or rales.   Abdominal:      General: Abdomen is flat. Bowel sounds are normal. There is no distension.   Musculoskeletal:         General: No swelling, deformity or signs of injury.   Skin:     General: Skin is warm.      Capillary Refill: Capillary refill takes less than 2 seconds.      Coloration: Skin is not jaundiced.      Findings: No erythema or rash.      Comments: Well-healed vertical suprapubic incision; well-healed trocar sites in the right upper quadrant.   Neurological:      General: No focal deficit present.      Mental Status: She is easily aroused. She is confused.      Cranial Nerves: Cranial nerves are intact. No dysarthria.   Psychiatric:         Attention and Perception: Attention normal.         Mood and Affect: Mood normal.         Speech: Speech normal.         Behavior: Behavior normal.         Thought Content: Thought content normal.         Cognition and Memory: Cognition is impaired.         Judgment: Judgment normal.       ---------------------------------------------------------------------------------------------------------------------  EKG: Sinus tachycardia with a heart rate of 107 and a QTC of 437 ms.  I see inferior Q waves but I do not see any acute ST elevation.  There are no comparison EKGs.    Telemetry: Sinus tachycardia with heart rates 125.    I have personally looked at  both the EKG and the telemetry strips.  --------------------------------------------------------------------------------------------------------------------  LABS:    CBC and coagulation:  Results from last 7 days   Lab Units 01/01/21 0347 12/31/20 2009   LACTATE mmol/L 0.9 0.9   SED RATE mm/hr  --  24   CRP mg/dL 0.78* 0.75*   WBC 10*3/mm3 12.52* 11.73*   HEMOGLOBIN g/dL 11.3* 11.3*   HEMATOCRIT % 37.0 35.4   MCV fL 103.4* 100.6*   MCHC g/dL 30.5* 31.9   PLATELETS 10*3/mm3 413 449   INR   --  1.08     Acid/base balance:  Results from last 7 days   Lab Units 01/01/21  0322 12/31/20  2344 12/31/20  1902   PH, ARTERIAL pH units 7.300* 7.112* 7.137*   PO2 ART mm Hg 48.1* 125.0* 122.0*   PCO2, ARTERIAL mm Hg 26.2* 22.2* 21.1*   HCO3 ART mmol/L 12.8* 7.1* 7.1*     Renal and electrolytes:  Results from last 7 days   Lab Units 01/01/21 0347 12/31/20 2009   SODIUM mmol/L 153* 147*   POTASSIUM mmol/L 3.2* 3.9   MAGNESIUM mg/dL  --  2.2   CHLORIDE mmol/L 130* 127*   CO2 mmol/L 10.7* 7.4*   BUN mg/dL 62* 70*   CREATININE mg/dL 1.44* 1.71*   EGFR IF NONAFRICN AM mL/min/1.73 36* 29*   CALCIUM mg/dL 9.5 9.9   PHOSPHORUS mg/dL 2.3* 3.6   GLUCOSE mg/dL 103* 112*     Estimated Creatinine Clearance: 33.5 mL/min (A) (by C-G formula based on SCr of 1.44 mg/dL (H)).    Liver and pancreatic function:  Results from last 7 days   Lab Units 01/01/21 0347 12/31/20 2009   ALBUMIN g/dL 3.34* 3.64   BILIRUBIN mg/dL 0.2 0.2   ALK PHOS U/L 102 105   AST (SGOT) U/L 14 13   ALT (SGPT) U/L 9 10   LIPASE U/L  --  51     Endocrine function:  Lab Results   Component Value Date    HGBA1C 7.90 (H) 12/31/2020     Point of care bedside glucose levels:  Results from last 7 days   Lab Units 01/01/21  0119 12/31/20  2329 12/31/20  1856   GLUCOSE mg/dL 123 69* 125     Lab Results   Component Value Date    TSH 1.240 12/31/2020     Cardiac:  Results from last 7 days   Lab Units 01/01/21 0347 12/31/20 2009   CK TOTAL U/L  --  92   TROPONIN T ng/mL 0.037*  0.052*   PROBNP pg/mL  --  1,580.0*       Cultures:  Lab Results   Component Value Date    COLORU Yellow 12/31/2020    CLARITYU Cloudy (A) 12/31/2020    PHUR 6.0 12/31/2020    GLUCOSEU Negative 12/31/2020    KETONESU Negative 12/31/2020    BLOODU Moderate (2+) (A) 12/31/2020    NITRITEU Positive (A) 12/31/2020    LEUKOCYTESUR Large (3+) (A) 12/31/2020    BILIRUBINUR Negative 12/31/2020    UROBILINOGEN 0.2 E.U./dL 12/31/2020    RBCUA 6-12 (A) 12/31/2020    WBCUA Too Numerous to Count (A) 12/31/2020    BACTERIA None Seen 12/31/2020       I have personally looked at the labs and they are summarized above.  ----------------------------------------------------------------------------------------------------------------------  Detailed radiology reports for the last 24 hours:    Imaging Results (Last 24 Hours)     Procedure Component Value Units Date/Time    CT Abdomen Pelvis Without Contrast [[id number]] Collected: 12/31/20 2232     Updated: 12/31/20 2234    Narrative:      CT Abdomen Pelvis WO    INDICATION:   Abdominal pain.    TECHNIQUE:   CT of the abdomen and pelvis without IV contrast. Coronal and sagittal reconstructions were obtained.  Radiation dose reduction techniques included automated exposure control or exposure modulation based on body size. Count of known CT and cardiac nuc  med studies performed in previous 12 months: 0.     COMPARISON:   None available.    FINDINGS:  Please see chest CT report dictated separately. Exam is motion degraded. Gallbladder is surgically absent. Calcification in the portacaval space is likely a densely calcified lymph node. It measures up to 2.2 cm. There is diffuse atherosclerotic disease  with no aortic aneurysm. There is a 1.2 x 1.8 cm low density left adrenal nodule, likely a benign adenoma in the absence of a known malignancy. Calcifications in both kidneys are probably vascular. No ureteral stones are seen on either side, and there is  no hydronephrosis. Solid abdominal  organs are otherwise grossly normal allowing for excessive motion.    The uterus is surgically absent. Urinary bladder is not well distended. There is mild bladder wall thickening which may indicate cystitis or may be artifact due to incomplete distention. Large amount of stool is noted in the rectal vault. There is  colonic diverticulosis without focal diverticulitis. The appendix is normal. No small bowel obstruction is seen. There is no free fluid. There is grade 1 anterolisthesis of L5 on S1 due to bilateral L5 pars defects.      Impression:        1. Motion degraded exam.  2. Atherosclerotic disease with probable vascular calcifications in both kidneys. No ureteral stones are seen on either side, and there is no hydronephrosis.  3. Mild thickening of the urinary bladder wall may be due to incomplete distention or cystitis.  4. Large amount of stool in the rectal vault. There is colonic diverticulosis without diverticulitis. The appendix and small bowel are within normal limits.  5. Small left adrenal nodule, likely a benign adenoma in the absence of a known malignancy. This could be assessed with nonemergent adrenal protocol MRI or CT if indicated.  6. Cholecystectomy and hysterectomy.        Signer Name: Rm Ku MD   Signed: 12/31/2020 10:32 PM   Workstation Name: [facility]    [facility]    CT Chest Without Contrast [[id number]] Collected: 12/31/20 2220     Updated: 12/31/20 2222    Narrative:      CT Chest WO    INDICATION:   Increased work of breathing. Abnormal chest x-ray.    TECHNIQUE:   CT of the thorax without IV contrast. Coronal and sagittal reconstructions were obtained.  Radiation dose reduction techniques included automated exposure control or exposure modulation based on body size. Count of known CT and cardiac nuc med studies  performed in previous 12 months: 0.     COMPARISON:   None available.    FINDINGS:  There is some motion degradation. There is  atherosclerotic disease and extensive coronary artery disease. There is a large hiatal hernia to the left of midline which probably accounts for density on the patient's prior chest x-ray. The hernia contains  the gastric fundus and a portion of the gastric body. No pleural or pericardial effusion. There is no adenopathy. Images of the upper abdomen show cholecystectomy. There is a calcification in the portacaval space which is probably a large calcified lymph  node. There is a small left adrenal nodule measuring 1.2 x 1.8 cm. While technically indeterminate, this is likely a small benign adenoma.    There is a 7 mm noncalcified right lower lobe nodule. There is some atelectasis in the left lower lobe adjacent to the hernia sac. The lungs are otherwise clear. No CT findings present to indicate pneumonia. Note: CT may be negative in the earliest  stages of COVID-19.      Impression:        1. No evidence of acute pneumonia.  2. Large hiatal hernia at the left lung base.  3. Noncalcified nodule in the right lower lobe measuring 7 mm. Chest CT follow-up in 6 months is recommended.  4. Left adrenal nodule, most likely a benign adenoma. This can be further characterized with adrenal protocol CT or MRI on a nonemergent basis if indicated.    Signer Name: Rm Ku MD   Signed: 12/31/2020 10:20 PM   Workstation Name: [facility]    [facility]    CT Head Without Contrast [[id number]] Collected: 12/31/20 2209     Updated: 12/31/20 2211    Narrative:      CT Head WO    HISTORY:   Altered mental status, patient in the emergency    TECHNIQUE:   Routine noncontrast head CT. Radiation dose reduction techniques included automated exposure control or exposure modulation based on body size. Radiation audit for CT and nuclear cardiology exams in the last 12 months: 0.    COMPARISON:   None.    FINDINGS:       No evidence of acute intracranial hemorrhage. There is a CSF density mass in the right posterior  fossa causing some mass effect on the right cerebellar hemisphere, most likely an arachnoid cyst but poorly evaluated on this exam. Mild global volume loss.    The gray-white matter differentiation is preserved. There are scattered ill-defined and patchy hypoattenuating foci within the bilateral cerebral and deep white matter which are nonspecific but most commonly associated with chronic microvascular ischemic  change. Probably remote right basal ganglia lacunar infarcts.    Partially empty sella.    Visualized paranasal sinuses are clear. Visualized mastoid air cells are clear.    No acute osseous abnormality.        Impression:        1.  No acute intracranial abnormality.  2.  CSF density mass right posterior fossa with mild mass effect on the right cerebellar convexity, most likely an arachnoid cyst. Consider further evaluation with MRI without and with contrast.  3.  Presumed chronic microvascular ischemic changes.    Signer Name: AMANDA SAWYER MD   Signed: 12/31/2020 10:09 PM   [facility]: [facility]    [facility]    XR Chest 1 View [[id number]] Collected: 12/31/20 2034     Updated: 12/31/20 2037    Narrative:      XR CHEST 1 VW-     CLINICAL INDICATION: increased work of breathing        COMPARISON: None available      TECHNIQUE: Single frontal view of the chest.     FINDINGS:     Left effusion and left basilar consolidation. Consider CT to exclude  mass  The cardiac silhouette is normal. The pulmonary vasculature is  unremarkable.  There is no evidence of an acute osseous abnormality.              Impression:      Left effusion and left basilar consolidation. Consider CT to evaluate  for mass     This report was finalized on 12/31/2020 8:35 PM by Dr. Serfain Yip MD.           I have personally looked at the radiology images and I have read the available final reports.    Assessment & Plan       -Sepsis that was present on admission (respiratory rate 22, heart rate , CRP  0.75, metabolic cephalopathy) due to an acute urinary tract infection  -Acute kidney injury versus acute on chronic kidney disease versus chronic kidney disease with baseline creatinine unknown and admission creatinine 1.71; creatinine on 12/30/2020 at [facility] was 1.98  -Acidemia with a non-anion gap metabolic acidosis and respiratory alkalosis  -Noncalcified nodule in the right lower lobe measuring 7 mm. Chest CT follow-up in 6 months  -CSF density mass seen on noncontrasted head CT, right posterior fossa with mild mass effect on the right cerebellar convexity, most likely an arachnoid cyst  -Large hiatal hernia at the left lung base  -Acute hypophosphatemia  -Acute hypokalemia  -History of type 2 diabetes mellitus with some hypoglycemia while in the emergency department  -History of essential hypertension  -History of prior lacunar infarcts  -History of anemia, currently with macrocytic anemia    Admitted to the progressive care unit.  We will start the patient on D5 with 3 A of bicarbonate added at 100 mL/h; we will continue to replace the bicarbonate until it is above 20 as I suspect that the acidemia is due to kidney failure.  We will trend the creatinine; it is already starting to trend downward.  We will start the patient on high-dose Rocephin for the UTI and we will send a urine collected in the emergency department for culture.  We will replace the potassium and phosphorus per our replacement protocols.  We will monitor her glucose levels 4 times a day, especially since she came in with hypoglycemia.  The hypoglycemia protocol has been added.  We will obtain an echocardiogram as patient does have an elevated proBNP on admission and she has sepsis and prior lacunar infarcts as well.  We will repeat her blood work in the morning.  We will also follow serial ABGs to see how her acidosis is trending.  Because of the patient's advanced age, we will consult PT, OT, and speech.    VTE Prophylaxis:      Mechanical Order History:     None      Pharmalogical Order History:      Ordered     Dose Route Frequency Stop    Signed and Held  Pharmacy to Dose enoxaparin (LOVENOX)     Question:  Indication of use  Answer:  Prophylaxis    -- XX Continuous PRN --              The patient is high risk due to the following diagnoses/reasons: Sepsis and acute kidney injury.      Norman Saravia MD  [facility] Hospitalist  01/01/21  04:54 EST

## 2021-01-01 NOTE — ED NOTES
Dispatch called with Pt  phone number Zack Galvez [phone]. Contacted Pt  at this time, states that at baseline Pt is always confused and bedridden and how she presents today is normal for her. Pt  states that Pt was seen at [facility] last night and discharged back home but the home health nurse was concerned due to [facility] stating that she needed admitted but they could not admit her due to not having a bed. Pt  states that he will come to the hospital tomorrow and bring Pt documents and registration forms. Provider made aware.     Vera Preciado, RN  12/31/20 [id number]

## 2021-01-01 NOTE — ED NOTES
"Pt presents to the ER at this time via EMS. Per EMS report Pt was discharged from [facility] with home health with home medications for IV rocephin and fluids. Per EMS home health stated that Pt was \"too sick to be at home and needs to be admitted.\" Per EMS, Pt baseline mental status is unknown. Pt presents alert, does not answer questions when asked, Per EMS report, Pt wears O2 at 2L NC at all time. No family contact information was given, no social security or further information was given. Per EMS Pt  told them that they would come to the ER tomorrow. Pt noted to have old hospital bracelet in place, with name and . Per EMS reports, the name and birthday was verified with home health prior to departure of home. EMS states that on arrival Pt BG was 60 and home health administered dextrose prior to departure. Provider made aware.     Vera Preciado, RN  20       Vera Preciado RN  20    "

## 2021-01-01 NOTE — THERAPY EVALUATION
Acute Care - Physical Therapy Initial Evaluation   [facility]     Patient Name: Ashley Galvez  : 1945  MRN: [id number]  Today's Date: 2021   Onset of Illness/Injury or Date of Surgery: 20       PT Assessment (last 12 hours)      PT Evaluation and Treatment     Row Name 2129          Physical Therapy Time and Intention    Subjective Information  no complaints  -BC     Document Type  evaluation;therapy note (daily note)  -BC     Patient Effort  fair  -BC     Row Name 21          General Information    Patient Profile Reviewed  yes  -BC     Onset of Illness/Injury or Date of Surgery  20  -BC     Existing Precautions/Restrictions  fall  -BC     Row Name 21          Living Environment    Current Living Arrangements  home/apartment/condo  -BC     Lives With  spouse  -BC     Row Name 21          Cognition    Affect/Mental Status (Cognitive)  confused;low arousal/lethargic  -BC     Orientation Status (Cognition)  disoriented to  -BC     Follows Commands (Cognition)  does not follow one-step commands  -BC     Cognitive Function (Cognitive)  safety deficit  -BC     Safety Deficit (Cognitive)  severe deficit;moderate deficit  -BC     Row Name 21          Mobility    Extremity Weight-bearing Status  --  -BC     Row Name 21          Gait/Stairs (Locomotion)    Comment (Gait/Stairs)  pt. unable to ambulate  -BC     Row Name             Wound 21 0040 Right gluteal    Wound - Properties Group Placement Date: 21 Placement Time: 40 Present on Hospital Admission: Y  -SA Side: Right  -SA Location: gluteal  -SA    Retired Wound - Properties Group Date first assessed: 21 Time first assessed: 40 Present on Hospital Admission: Y  -SA Side: Right  -SA Location: gluteal  -SA    Row Name             Wound 21 0042 Left anterior greater trochanter    Wound - Properties Group Placement Date: 21  Placement Time: 0042 -SA Side: Left  -SA Orientation: anterior  -SA Location: greater trochanter  -SA    Retired Wound - Properties Group Date first assessed: 01/01/21 -SA Time first assessed: 0042  -SA Side: Left  -SA Location: greater trochanter  -SA    Row Name             Wound 01/01/21 0044 Left lower leg    Wound - Properties Group Placement Date: 01/01/21 -SA Placement Time: 0044  -SA Side: Left  -SA Orientation: lower  -SA Location: leg  -SA    Retired Wound - Properties Group Date first assessed: 01/01/21 -SA Time first assessed: 0044  -SA Side: Left  -SA Location: leg  -SA    Row Name             Wound 01/01/21 0044 Right lower leg    Wound - Properties Group Placement Date: 01/01/21 -SA Placement Time: 0044 -SA Side: Right  -SA Orientation: lower  -SA Location: leg  -SA    Retired Wound - Properties Group Date first assessed: 01/01/21 -SA Time first assessed: 0044  -SA Side: Right  -SA Location: leg  -SA    Row Name 01/01/21 0929          Plan of Care Review    Plan of Care Reviewed With  patient  -BC     Row Name 01/01/21 0929          Positioning and Restraints    Pre-Treatment Position  in bed  -BC     Post Treatment Position  bed  -BC     In Bed  notified nsg;supine;call light within reach;encouraged to call for assist  -BC       User Key  (r) = Recorded By, (t) = Taken By, (c) = Cosigned By    Initials Name Provider Type    Michelle Berg PT Physical Therapist    Joy Mckinnon RN Registered Nurse          PT Recommendation and Plan     Plan of Care Reviewed With: patient       Time Calculation:   PT Charges     Row Name 01/01/21 0941             Time Calculation    PT Received On  01/01/21  -BC         Time Calculation- PT    Total Timed Code Minutes- PT  60 minute(s)  -BC        User Key  (r) = Recorded By, (t) = Taken By, (c) = Cosigned By    Initials Name Provider Type    Michelle Berg PT Physical Therapist        Therapy Charges for Today     Code Description Service  Date Service Provider Modifiers Qty    [id number]  PT EVAL HIGH COMPLEXITY 4 1/1/2021 Michelle Jenkins, PT GP 1               Michelle Jenkins, PT  1/1/2021

## 2021-01-01 NOTE — PLAN OF CARE
Goal Outcome Evaluation:  Plan of Care Reviewed With: patient  Progress: no change   Pt. Resting quietly in bed with eyes closed. She marlyn. Her supper well. No SOA noted. VS are noted. Call bell in reach. Skin clean and dry. Ext. Cath working. No fall reported or noted. IV fluids infusing. HOB is up after supper.

## 2021-01-01 NOTE — ED NOTES
Pt presents with IV in place to right forearm with no date or time on dressing, dressing partially removed, IV catheter not in place, IV removed.      Vera Preciado, RN  12/31/20 5902

## 2021-01-01 NOTE — ED NOTES
Dr. Oliver made aware of Pt pH 7.137 and HCO3 7.1, no new orders received.      Vera Preciado RN  12/31/20 0613

## 2021-01-01 NOTE — PLAN OF CARE
Goal Outcome Evaluation:  Plan of Care Reviewed With: patient  Progress: no change  Outcome Summary: Pt is oriented to self only, tachycardic, afebrile.

## 2021-01-01 NOTE — ED NOTES
Small amount of normal saline infiltrated to IV site, no redness, minor edema noted. Infusion stopped, compress applied, IV no longer patent. No extravasation protocol needed per protocol.      Vera Preciado RN  12/31/20 1056

## 2021-01-01 NOTE — THERAPY EVALUATION
Acute Care - Speech Language Pathology   Swallow Initial Evaluation [facility]   CLINICAL DYSPHAGIA ASSESSMENT     Patient Name: Ashley Galvez  : 1945  MRN: [id number]  Today's Date: 2021  Onset of Illness/Injury or Date of Surgery: 20            Admit Date: 2020    Visit Dx:     ICD-10-CM ICD-9-CM   1. Acute renal failure, unspecified acute renal failure type (CMS/HCC)  N17.9 584.9     Patient Active Problem List   Diagnosis   • Sepsis (CMS/HCC)     Past Medical History:   Diagnosis Date   • Anemia    • Anterolisthesis     Grade 1 anterolisthesis of L5 on S1 due to bilateral L5 pars defects   • Diverticulosis    • Essential hypertension    • Lacunar infarction (CMS/HCC)    • Type 2 diabetes mellitus (CMS/HCC)      Past Surgical History:   Procedure Laterality Date   • CHOLECYSTECTOMY     • HYSTERECTOMY       Ashley Galvez  is seen at bedside this am on PCU to assess safety/efficacy of swallowing fnx, determine safest/least restrictive diet tolerance. She is currently on a clear liquid diet. Ms. Galvez is admitted to [facility] w/ ams and sepsis 2/2 acute UTI.     Social History     Socioeconomic History   • Marital status:      Spouse name: Not on file   • Number of children: Not on file   • Years of education: Not on file   • Highest education level: Not on file   Tobacco Use   • Smoking status: Never Smoker   • Smokeless tobacco: Never Used   Substance and Sexual Activity   • Alcohol use: Never     Frequency: Never   • Drug use: Never   • Sexual activity: Defer      CT Chest WO     INDICATION:   Increased work of breathing. Abnormal chest x-ray.     TECHNIQUE:   CT of the thorax without IV contrast. Coronal and sagittal reconstructions were obtained.  Radiation dose reduction techniques included automated exposure control or exposure modulation based on body size. Count of known CT and cardiac nuc med studies  performed in previous 12 months: 0.      COMPARISON:   None  available.     FINDINGS:  There is some motion degradation. There is atherosclerotic disease and extensive coronary artery disease. There is a large hiatal hernia to the left of midline which probably accounts for density on the patient's prior chest x-ray. The hernia contains  the gastric fundus and a portion of the gastric body. No pleural or pericardial effusion. There is no adenopathy. Images of the upper abdomen show cholecystectomy. There is a calcification in the portacaval space which is probably a large calcified lymph  node. There is a small left adrenal nodule measuring 1.2 x 1.8 cm. While technically indeterminate, this is likely a small benign adenoma.     There is a 7 mm noncalcified right lower lobe nodule. There is some atelectasis in the left lower lobe adjacent to the hernia sac. The lungs are otherwise clear. No CT findings present to indicate pneumonia. Note: CT may be negative in the earliest  stages of COVID-19.     IMPRESSION:     1. No evidence of acute pneumonia.  2. Large hiatal hernia at the left lung base.  3. Noncalcified nodule in the right lower lobe measuring 7 mm. Chest CT follow-up in 6 months is recommended.  4. Left adrenal nodule, most likely a benign adenoma. This can be further characterized with adrenal protocol CT or MRI on a nonemergent basis if indicated.     Signer Name: Rm Ku MD   Signed: 12/31/2020 10:20 PM   Workstation Name: [id number]    [facility]    Diet Orders (active) (From admission, onward)     Start     Ordered    01/01/21 1016  Diet Pureed; Thin  Diet Effective Now      01/01/21 1015              She is observed on 2L O2 via NC w/o complications.     Pt is positioned upright and centered in bed to accept multiple po presentations of ice chips, solid cracker, puree and thin liquids via spoon, cup and straw. She does not self feed.     Facial/oral structures are symmetrical upon observation. Lingual protrusion reveals no  deviation. Oral mucosa are moderately xerostomic, pink. Secretions are white tinged, tacky and controlled orally. She is noted w/ mild xerostomic accumulations on labial surfaces. OROM/WALKER is generally weak and delayed to imitate oral postures. Gag is not assessed. Volitional cough CNA 2/2 pt unable to imitate. Voice is adequate in intensity, clear in quality w/ intelligible speech. She is evidenced w/ verbalization x1 only of her . She does intermittently follow basic one step directives w/ mod cues, but is overall confused and non-interactive.     Upon po presentations, adequate bolus anticipation and acceptance w/ mildly weak labial seal for bolus clearance via spoon bowl, cup rim stability and suction via straw. Bolus formation, manipulation and control are generally weak w/ rolling of solid cracker, eventually expelled from oral cavity w/ mod verbal cues. A-p transit is moderately delayed w/o significant oral residue. No overt s/s aspiration evidenced before the swallow.     Pharyngeal swallow is timely w/ adequate hyolaryngeal elevation per palpation. No overt s/s aspiration evidenced across this assessment. No silent aspiration suspected as pt is w/o changes in vocal quality, respirations or secretions post po presentations.     Impression: Per this assessment, Ms. Galvez presents w/ a moderately severe oral dysphagia, wfl pharyngeal dysphagia. She is felt to most benefit from a modified po diet of puree consistency, thin liquids, 1:1 feeding assistance. Observed dysphagia felt to be most likely related to ams per current medical status.       EDUCATION  The patient has been educated in the following areas:   Dysphagia (Swallowing Impairment) Modified Diet Instruction.    SLP Recommendation and Plan  1. Puree, thin liquids.    2. Meds whole in puree, crush PRN.   3. Upright and centered for all po intake.  4. DANIEL precautions.  5. Oral care protocol.  6. 1:1 feeding assistance w/ all po.   SLP to f/u for  diet safety/assessment for upgrade.      D/w pt results and recommendations w/ verbal agreement.    D/w RN results and recommendations w/ verbal agreement.    Thank you for allowing me to participate in the care of your patient-  Ruth Mccray M.A., CCC-SLP       Time Calculation:   Time Calculation- SLP     Row Name 01/01/21 1317             Time Calculation- SLP    SLP Received On  01/01/21  -ABISAI      SLP - Next Appointment  01/04/21  -ABISAI        User Key  (r) = Recorded By, (t) = Taken By, (c) = Cosigned By    Initials Name Provider Type    Ruth Pham MA,CCC-SLP Speech and Language Pathologist          Therapy Charges for Today     Code Description Service Date Service Provider Modifiers Qty    [id number] HC ST EVAL ORAL PHARYNG SWALLOW 4 1/1/2021 Ruth Mccray MA,CCC-SLP GN 1        Patient was not wearing a face mask during this therapy encounter. Therapist used appropriate personal protective equipment including mask, eye protection and gloves.  Mask used was standard procedure mask. Appropriate PPE was worn during the entire therapy session. The patient did not cough during this evaluation. Therapist was within 6 feet for 15 minutes or more during the evaluation. Hand hygiene was completed before and after therapy session. Patient is not in enhanced droplet precautions.            Ruth Mccray MA,CCC-SLP  1/1/2021

## 2021-01-01 NOTE — THERAPY EVALUATION
Acute Care - Occupational Therapy Initial Evaluation   [facility]     Patient Name: Ashley Galvez  : 1945  MRN: [id number]  Today's Date: 2021  Onset of Illness/Injury or Date of Surgery: 20          Admit Date: 2020       ICD-10-CM ICD-9-CM   1. Acute renal failure, unspecified acute renal failure type (CMS/HCC)  N17.9 584.9     Patient Active Problem List   Diagnosis   • Sepsis (CMS/HCC)     Past Medical History:   Diagnosis Date   • Anemia    • Anterolisthesis     Grade 1 anterolisthesis of L5 on S1 due to bilateral L5 pars defects   • Diverticulosis    • Essential hypertension    • Lacunar infarction (CMS/HCC)    • Type 2 diabetes mellitus (CMS/HCC)      Past Surgical History:   Procedure Laterality Date   • CHOLECYSTECTOMY     • HYSTERECTOMY              OT ASSESSMENT FLOWSHEET (last 12 hours)      OT Evaluation and Treatment     Row Name 21 0906                   OT Time and Intention    Subjective Information  no complaints  -LM        Document Type  evaluation  -LM        Mode of Treatment  occupational therapy  -LM        Patient Effort  fair  -LM        Comment  Evaluation only, patient unable to follow commands, total assist with all badl and fxl mobility  -LM           General Information    Patient Profile Reviewed  yes  -LM        Onset of Illness/Injury or Date of Surgery  20  -LM        Prior Level of Function  -- unknown  -LM        Equipment Currently Used at Home  -- unknown  -LM        Pertinent History of Current Functional Problem  Admitted with sepsis.  PMH:  anemia, diverticulosis, htn, lacunar infarct, dm  -LM        Existing Precautions/Restrictions  fall;other (see comments) cognitive  -LM        Limitations/Impairments  safety/cognitive;visual possible visual deficit  -LM           Previous Level of Function/Home Environm    BADLs, Premorbid Functional Level  other (see comments) unknown.  Appears chronic debility  -LM           Living Environment     Current Living Arrangements  home/apartment/condo  -LM        Lives With  spouse  -LM           Cognition    Affect/Mental Status (Cognitive)  confused;low arousal/lethargic  -LM        Orientation Status (Cognition)  disoriented to;place;situation;time  -LM        Follows Commands (Cognition)  does not follow one-step commands;verbal cues/prompting required;repetition of directions required;physical/tactile prompts required;unable/difficult to assess;delayed response/completion;increased processing time needed;initiation impaired  -LM        Cognitive Function (Cognitive)  safety deficit  -LM        Safety Deficit (Cognitive)  severe deficit;moderate deficit  -LM           Range of Motion (ROM)    Range of Motion  bilateral upper extremities shoulders 1/4, otherwise 3/4  -LM           Strength (Manual Muscle Testing)    Strength (Manual Muscle Testing)  bilateral upper extremities 2+/5 shoulders, otherwise 3-/5  -LM           Transfer Assessment/Treatment    Comment (Transfers)  unable to perform  -LM           Activities of Daily Living    BADL Assessment/Intervention  bathing;upper body dressing;lower body dressing;grooming;feeding;toileting  -LM           Bathing Assessment/Intervention    [place] Level (Bathing)  dependent (less than 25% patient effort)  -LM           Upper Body Dressing Assessment/Training    [place] Level (Upper Body Dressing)  dependent (less than 25% patient effort)  -LM           Lower Body Dressing Assessment/Training    [place] Level (Lower Body Dressing)  dependent (less than 25% patient effort)  -LM           Grooming Assessment/Training    [place] Level (Grooming)  dependent (less than 25% patient effort)  -LM           Self-Feeding Assessment/Training    [place] Level (Feeding)  dependent (less than 25% patient effort)  -LM           Toileting Assessment/Training    [place] Level (Toileting)  dependent (less than 25% patient effort)  -LM           Wound  01/01/21 0040 Right gluteal    Wound - Properties Group Placement Date: 01/01/21 -SA Placement Time: 0040  -SA Present on Hospital Admission: Y  -SA Side: Right  -SA Location: gluteal  -SA    Retired Wound - Properties Group Date first assessed: 01/01/21 -SA Time first assessed: 0040 -SA Present on Hospital Admission: Y  -SA Side: Right  -SA Location: gluteal  -SA       Wound 01/01/21 0042 Left anterior greater trochanter    Wound - Properties Group Placement Date: 01/01/21 -SA Placement Time: 0042  -SA Side: Left  -SA Orientation: anterior  -SA Location: greater trochanter  -SA    Retired Wound - Properties Group Date first assessed: 01/01/21 -SA Time first assessed: 0042  -SA Side: Left  -SA Location: greater trochanter  -SA       Wound 01/01/21 0044 Left lower leg    Wound - Properties Group Placement Date: 01/01/21 -SA Placement Time: 0044  -SA Side: Left  -SA Orientation: lower  -SA Location: leg  -SA    Retired Wound - Properties Group Date first assessed: 01/01/21 -SA Time first assessed: 0044  -SA Side: Left  -SA Location: leg  -SA       Wound 01/01/21 0044 Right lower leg    Wound - Properties Group Placement Date: 01/01/21 -SA Placement Time: 0044 -SA Side: Right  -SA Orientation: lower  -SA Location: leg  -SA    Retired Wound - Properties Group Date first assessed: 01/01/21 -SA Time first assessed: 0044  -SA Side: Right  -SA Location: leg  -SA       Plan of Care Review    Plan of Care Reviewed With  patient  -LM           Positioning and Restraints    Post Treatment Position  bed  -LM        In Bed  call light within reach;encouraged to call for assist  -LM           Therapy Assessment/Plan (OT)    OT Diagnosis  debility  -LM        Criteria for Skilled Therapeutic Interventions Met (OT)  does not meet criteria for skilled intervention decreased cognition, total assist with badl and fxl mobiltit  -LM        Comment, Therapy Assessment/Plan (OT)  Please re-consult as cognition and med status  improves  -          User Key  (r) = Recorded By, (t) = Taken By, (c) = Cosigned By    Initials Name Effective Dates     Sheron Finley OT 03/14/16 -     Joy Mckinnon RN 01/22/20 -                OT Recommendation and Plan     Plan of Care Review  Plan of Care Reviewed With: patient  Plan of Care Reviewed With: patient        Time Calculation:     Therapy Charges for Today     Code Description Service Date Service Provider Modifiers Qty    [id number]  OT EVAL LOW COMPLEXITY 4 1/1/2021 Sheron Finley OT GO 1               Sheron Finley OT  1/1/2021

## 2021-01-02 LAB
A-A DO2: 28.3 MMHG (ref 0–300)
ALBUMIN SERPL-MCNC: 3.1 G/DL (ref 3.5–5.2)
ALBUMIN/GLOB SERPL: 1 G/DL
ALP SERPL-CCNC: 96 U/L (ref 39–117)
ALT SERPL W P-5'-P-CCNC: 9 U/L (ref 1–33)
ANION GAP SERPL CALCULATED.3IONS-SCNC: 10.8 MMOL/L (ref 5–15)
ARTERIAL PATENCY WRIST A: POSITIVE
AST SERPL-CCNC: 12 U/L (ref 1–32)
ATMOSPHERIC PRESS: 727 MMHG
BASE EXCESS BLDA CALC-SCNC: -3.7 MMOL/L (ref 0–2)
BASOPHILS # BLD AUTO: 0.06 10*3/MM3 (ref 0–0.2)
BASOPHILS NFR BLD AUTO: 0.5 % (ref 0–1.5)
BDY SITE: ABNORMAL
BILIRUB SERPL-MCNC: 0.2 MG/DL (ref 0–1.2)
BODY TEMPERATURE: 0 C
BUN SERPL-MCNC: 39 MG/DL (ref 8–23)
BUN/CREAT SERPL: 33.1 (ref 7–25)
CALCIUM SPEC-SCNC: 8.9 MG/DL (ref 8.6–10.5)
CHLORIDE SERPL-SCNC: 122 MMOL/L (ref 98–107)
CO2 BLDA-SCNC: 21.3 MMOL/L (ref 22–33)
CO2 SERPL-SCNC: 18.2 MMOL/L (ref 22–29)
COHGB MFR BLD: 1.1 % (ref 0–5)
CREAT SERPL-MCNC: 1.18 MG/DL (ref 0.57–1)
DEPRECATED RDW RBC AUTO: 54.5 FL (ref 37–54)
EOSINOPHIL # BLD AUTO: 0.18 10*3/MM3 (ref 0–0.4)
EOSINOPHIL NFR BLD AUTO: 1.4 % (ref 0.3–6.2)
ERYTHROCYTE [DISTWIDTH] IN BLOOD BY AUTOMATED COUNT: 15.9 % (ref 12.3–15.4)
GFR SERPL CREATININE-BSD FRML MDRD: 45 ML/MIN/1.73
GLOBULIN UR ELPH-MCNC: 3 GM/DL
GLUCOSE BLDC GLUCOMTR-MCNC: 116 MG/DL (ref 70–130)
GLUCOSE BLDC GLUCOMTR-MCNC: 232 MG/DL (ref 70–130)
GLUCOSE BLDC GLUCOMTR-MCNC: 270 MG/DL (ref 70–130)
GLUCOSE BLDC GLUCOMTR-MCNC: 279 MG/DL (ref 70–130)
GLUCOSE BLDC GLUCOMTR-MCNC: 284 MG/DL (ref 70–130)
GLUCOSE SERPL-MCNC: 135 MG/DL (ref 65–99)
HCO3 BLDA-SCNC: 20.3 MMOL/L (ref 20–26)
HCT VFR BLD AUTO: 33.5 % (ref 34–46.6)
HCT VFR BLD CALC: 33.3 % (ref 38–51)
HGB BLD-MCNC: 11 G/DL (ref 12–15.9)
HGB BLDA-MCNC: 10.9 G/DL (ref 13.5–17.5)
IMM GRANULOCYTES # BLD AUTO: 0.11 10*3/MM3 (ref 0–0.05)
IMM GRANULOCYTES NFR BLD AUTO: 0.9 % (ref 0–0.5)
INHALED O2 CONCENTRATION: 21 %
LYMPHOCYTES # BLD AUTO: 2.64 10*3/MM3 (ref 0.7–3.1)
LYMPHOCYTES NFR BLD AUTO: 21.2 % (ref 19.6–45.3)
Lab: ABNORMAL
MAGNESIUM SERPL-MCNC: 1.8 MG/DL (ref 1.6–2.4)
MCH RBC QN AUTO: 31.1 PG (ref 26.6–33)
MCHC RBC AUTO-ENTMCNC: 32.8 G/DL (ref 31.5–35.7)
MCV RBC AUTO: 94.6 FL (ref 79–97)
METHGB BLD QL: 0.3 % (ref 0–3)
MODALITY: ABNORMAL
MONOCYTES # BLD AUTO: 1.84 10*3/MM3 (ref 0.1–0.9)
MONOCYTES NFR BLD AUTO: 14.7 % (ref 5–12)
NEUTROPHILS NFR BLD AUTO: 61.3 % (ref 42.7–76)
NEUTROPHILS NFR BLD AUTO: 7.65 10*3/MM3 (ref 1.7–7)
NOTE: ABNORMAL
NRBC BLD AUTO-RTO: 0.2 /100 WBC (ref 0–0.2)
OXYHGB MFR BLDV: 95.1 % (ref 94–99)
PCO2 BLDA: 32.4 MM HG (ref 35–45)
PCO2 TEMP ADJ BLD: ABNORMAL MM[HG]
PH BLDA: 7.41 PH UNITS (ref 7.35–7.45)
PH, TEMP CORRECTED: ABNORMAL
PHOSPHATE SERPL-MCNC: 3 MG/DL (ref 2.5–4.5)
PLATELET # BLD AUTO: 406 10*3/MM3 (ref 140–450)
PMV BLD AUTO: 9.2 FL (ref 6–12)
PO2 BLDA: 77.9 MM HG (ref 83–108)
PO2 TEMP ADJ BLD: ABNORMAL MM[HG]
POTASSIUM SERPL-SCNC: 2.9 MMOL/L (ref 3.5–5.2)
POTASSIUM SERPL-SCNC: 5 MMOL/L (ref 3.5–5.2)
PROT SERPL-MCNC: 6.1 G/DL (ref 6–8.5)
QT INTERVAL: 324 MS
QTC INTERVAL: 454 MS
RBC # BLD AUTO: 3.54 10*6/MM3 (ref 3.77–5.28)
SAO2 % BLDCOA: 96.5 % (ref 94–99)
SODIUM SERPL-SCNC: 151 MMOL/L (ref 136–145)
VENTILATOR MODE: ABNORMAL
WBC # BLD AUTO: 12.48 10*3/MM3 (ref 3.4–10.8)

## 2021-01-02 PROCEDURE — 25010000002 CEFTRIAXONE PER 250 MG: Performed by: INTERNAL MEDICINE

## 2021-01-02 PROCEDURE — 82962 GLUCOSE BLOOD TEST: CPT

## 2021-01-02 PROCEDURE — 82805 BLOOD GASES W/O2 SATURATION: CPT

## 2021-01-02 PROCEDURE — 84132 ASSAY OF SERUM POTASSIUM: CPT | Performed by: STUDENT IN AN ORGANIZED HEALTH CARE EDUCATION/TRAINING PROGRAM

## 2021-01-02 PROCEDURE — 83735 ASSAY OF MAGNESIUM: CPT | Performed by: STUDENT IN AN ORGANIZED HEALTH CARE EDUCATION/TRAINING PROGRAM

## 2021-01-02 PROCEDURE — 25010000002 ENOXAPARIN PER 10 MG: Performed by: INTERNAL MEDICINE

## 2021-01-02 PROCEDURE — 94799 UNLISTED PULMONARY SVC/PX: CPT

## 2021-01-02 PROCEDURE — 63710000001 INSULIN ASPART PER 5 UNITS: Performed by: STUDENT IN AN ORGANIZED HEALTH CARE EDUCATION/TRAINING PROGRAM

## 2021-01-02 PROCEDURE — 83050 HGB METHEMOGLOBIN QUAN: CPT

## 2021-01-02 PROCEDURE — 87040 BLOOD CULTURE FOR BACTERIA: CPT | Performed by: STUDENT IN AN ORGANIZED HEALTH CARE EDUCATION/TRAINING PROGRAM

## 2021-01-02 PROCEDURE — 93010 ELECTROCARDIOGRAM REPORT: CPT | Performed by: INTERNAL MEDICINE

## 2021-01-02 PROCEDURE — 99233 SBSQ HOSP IP/OBS HIGH 50: CPT | Performed by: STUDENT IN AN ORGANIZED HEALTH CARE EDUCATION/TRAINING PROGRAM

## 2021-01-02 PROCEDURE — 82375 ASSAY CARBOXYHB QUANT: CPT

## 2021-01-02 PROCEDURE — 80053 COMPREHEN METABOLIC PANEL: CPT | Performed by: STUDENT IN AN ORGANIZED HEALTH CARE EDUCATION/TRAINING PROGRAM

## 2021-01-02 PROCEDURE — 93005 ELECTROCARDIOGRAM TRACING: CPT | Performed by: INTERNAL MEDICINE

## 2021-01-02 PROCEDURE — 85025 COMPLETE CBC W/AUTO DIFF WBC: CPT | Performed by: STUDENT IN AN ORGANIZED HEALTH CARE EDUCATION/TRAINING PROGRAM

## 2021-01-02 PROCEDURE — 84100 ASSAY OF PHOSPHORUS: CPT | Performed by: STUDENT IN AN ORGANIZED HEALTH CARE EDUCATION/TRAINING PROGRAM

## 2021-01-02 PROCEDURE — 36600 WITHDRAWAL OF ARTERIAL BLOOD: CPT

## 2021-01-02 RX ORDER — POTASSIUM CHLORIDE 1.5 G/1.77G
40 POWDER, FOR SOLUTION ORAL EVERY 4 HOURS
Status: COMPLETED | OUTPATIENT
Start: 2021-01-02 | End: 2021-01-02

## 2021-01-02 RX ORDER — AMLODIPINE BESYLATE 10 MG/1
10 TABLET ORAL DAILY
Status: DISCONTINUED | OUTPATIENT
Start: 2021-01-02 | End: 2021-01-02

## 2021-01-02 RX ORDER — METOPROLOL TARTRATE 5 MG/5ML
5 INJECTION INTRAVENOUS ONCE
Status: COMPLETED | OUTPATIENT
Start: 2021-01-02 | End: 2021-01-02

## 2021-01-02 RX ADMIN — METOPROLOL TARTRATE 5 MG: 1 INJECTION, SOLUTION INTRAVENOUS at 02:27

## 2021-01-02 RX ADMIN — INSULIN ASPART 4 UNITS: 100 INJECTION, SOLUTION INTRAVENOUS; SUBCUTANEOUS at 11:16

## 2021-01-02 RX ADMIN — SODIUM CHLORIDE, PRESERVATIVE FREE 3 ML: 5 INJECTION INTRAVENOUS at 21:37

## 2021-01-02 RX ADMIN — LISINOPRIL: 10 TABLET ORAL at 09:54

## 2021-01-02 RX ADMIN — SODIUM CHLORIDE, PRESERVATIVE FREE 3 ML: 5 INJECTION INTRAVENOUS at 08:01

## 2021-01-02 RX ADMIN — POTASSIUM CHLORIDE 40 MEQ: 1.5 POWDER, FOR SOLUTION ORAL at 13:39

## 2021-01-02 RX ADMIN — CEFTRIAXONE 2 G: 2 INJECTION, POWDER, FOR SOLUTION INTRAMUSCULAR; INTRAVENOUS at 05:55

## 2021-01-02 RX ADMIN — PANTOPRAZOLE SODIUM 40 MG: 40 TABLET, DELAYED RELEASE ORAL at 08:01

## 2021-01-02 RX ADMIN — INSULIN ASPART 3 UNITS: 100 INJECTION, SOLUTION INTRAVENOUS; SUBCUTANEOUS at 08:00

## 2021-01-02 RX ADMIN — METRONIDAZOLE 500 MG: 500 INJECTION, SOLUTION INTRAVENOUS at 21:37

## 2021-01-02 RX ADMIN — SODIUM CHLORIDE 75 ML/HR: 9 INJECTION, SOLUTION INTRAVENOUS at 11:17

## 2021-01-02 RX ADMIN — POTASSIUM CHLORIDE 40 MEQ: 1.5 POWDER, FOR SOLUTION ORAL at 09:53

## 2021-01-02 RX ADMIN — POTASSIUM CHLORIDE 40 MEQ: 1.5 POWDER, FOR SOLUTION ORAL at 17:02

## 2021-01-02 RX ADMIN — ENOXAPARIN SODIUM 30 MG: 30 INJECTION SUBCUTANEOUS at 08:01

## 2021-01-02 RX ADMIN — INSULIN ASPART 4 UNITS: 100 INJECTION, SOLUTION INTRAVENOUS; SUBCUTANEOUS at 17:00

## 2021-01-02 RX ADMIN — METRONIDAZOLE 500 MG: 500 INJECTION, SOLUTION INTRAVENOUS at 11:17

## 2021-01-02 RX ADMIN — VANCOMYCIN HYDROCHLORIDE 1000 MG: 1 INJECTION, POWDER, LYOPHILIZED, FOR SOLUTION INTRAVENOUS at 21:37

## 2021-01-02 NOTE — PROGRESS NOTES
[facility] HOSPITALIST PROGRESS NOTE     Patient Identification:  Name:  Ashley Galvez  Age:  75 y.o.  Sex:  female  :  1945  MRN:  [id number]  Visit Number:  [id number]  ROOM: [place] [id number]     Primary Care Provider:  Provider, No Known    Length of stay in inpatient status:  1    Subjective     Chief Compliant:    Chief Complaint   Patient presents with   • Altered Mental Status       History of Presenting Illness: Patient seen and evaluated in follow-up for acute encephalopathy, acute on chronic general debility, with acute kidney injury and general malaise.  Patient today minimally interactive and requires repetitive questioning to obtain answers to questions.  Only oriented to person.    Objective     Current Hospital Meds:aspirin, 324 mg, Oral, Once  cefTRIAXone, 2 g, Intravenous, Q24H  enoxaparin, 30 mg, Subcutaneous, Daily  potassium phosphate, 15 mmol, Intravenous, Once  sodium chloride, 3 mL, Intravenous, Q12H  vancomycin, 1,000 mg, Intravenous, Q24H    custom IV infusion builder, , Last Rate: 100 mL/hr at 21 0547  Pharmacy to Dose enoxaparin (LOVENOX),         Current Antimicrobial Therapy:  Anti-Infectives (From admission, onward)    Ordered     Dose/Rate Route Frequency Start Stop    21 1453  vancomycin 1 g/250 mL 0.9% NS (vial-mate)     Ordering Provider: Jorge Sidhu DO    1,000 mg  over 60 Minutes Intravenous Every 24 Hours 21 2100 21 2059    21 0335  cefTRIAXone (ROCEPHIN) 2 g/100 mL 0.9% NS IVPB (MBP)     Ordering Provider: Norman Saravia MD    2 g  over 30 Minutes Intravenous Every 24 Hours 21 0500 21 0459    20 2047  vancomycin 1250 mg/250 mL 0.9% NS IVPB ([facility])     Ordering Provider: Mal Oliver DO    20 mg/kg × 68 kg  over 90 Minutes Intravenous Once 20 2145 20 2322    20 2047  cefepime (MAXIPIME) 2 g/100 mL 0.9% NS (mbp)     Ordering Provider: Mal Oliver DO    2 g  200 mL/hr over 30 Minutes  Intravenous Once 12/31/20 2049 12/31/20 2142        Current Diuretic Therapy:  Diuretics (From admission, onward)    None        ----------------------------------------------------------------------------------------------------------------------  Vital Signs:  Temp:  [97.8 °F (36.6 °C)-98.4 °F (36.9 °C)] 97.8 °F (36.6 °C)  Heart Rate:  [] 122  Resp:  [16-22] 20  BP: ()/() 122/100  SpO2:  [92 %-100 %] 97 %  on  Flow (L/min):  [2] 2;   Device (Oxygen Therapy): nasal cannula  Body mass index is 29.76 kg/m².    Wt Readings from Last 3 Encounters:   01/01/21 76.2 kg (168 lb)     Intake & Output (last 3 days)       12/30 0701 - 12/31 0700 12/31 0701 - 01/01 0700 01/01 0701 - 01/02 0700    P.O.   480    I.V. (mL/kg)  302.8 (4) 1035 (13.6)    IV Piggyback  850     Total Intake(mL/kg)  1152.8 (15.1) 1515 (19.9)    Urine (mL/kg/hr)  1475     Emesis/NG output  0     Stool  0     Total Output  1475     Net  -322.2 +1515           Urine Unmeasured Occurrence  3 x     Stool Unmeasured Occurrence  1 x     Emesis Unmeasured Occurrence  0 x         Diet Pureed; Thin  ----------------------------------------------------------------------------------------------------------------------  Physical exam:  Constitutional: Chronically ill-appearing and malnourished elderly female..      HENT:  Head:  Normocephalic and atraumatic.  Mouth:  Moist mucous membranes.    Eyes:  Conjunctivae and EOM are normal. No scleral icterus.    Neck:  Neck supple.  No JVD present.    Cardiovascular:  Normal rate, regular rhythm and normal heart sounds with no murmur.  Pulmonary/Chest:  No respiratory distress, no wheezes, no crackles, with normal breath sounds and good air movement.  Abdominal:  Soft.  Bowel sounds are normal.  No distension and no tenderness.   Musculoskeletal:  No edema, no tenderness, and no deformity.  No red or swollen joints anywhere.  Functional ROM intact.   Neurological:  No cranial nerve deficit.  No tongue  deviation.  No facial droop.  Skin:  Skin is warm and dry. No rash or lesion noted. No pallor.   Peripheral vascular:  Pulses in all 4 extremities with no clubbing, no cyanosis, no edema.  Psychiatric: Patient lethargic and minimally interactive does occasionally answer questions appropriately but mostly does not interact.  ----------------------------------------------------------------------------------------------------------------------  Tele:    ----------------------------------------------------------------------------------------------------------------------  Results from last 7 days   Lab Units 01/01/21 0347 12/31/20 2009   CRP mg/dL 0.78* 0.75*   LACTATE mmol/L 0.9 0.9   WBC 10*3/mm3 12.52* 11.73*   HEMOGLOBIN g/dL 11.3* 11.3*   HEMATOCRIT % 37.0 35.4   MCV fL 103.4* 100.6*   MCHC g/dL 30.5* 31.9   PLATELETS 10*3/mm3 413 449   INR   --  1.08     Results from last 7 days   Lab Units 01/01/21  0322   PH, ARTERIAL pH units 7.300*   PO2 ART mm Hg 48.1*   PCO2, ARTERIAL mm Hg 26.2*   HCO3 ART mmol/L 12.8*     Results from last 7 days   Lab Units 01/01/21 0347 12/31/20 2009   SODIUM mmol/L 153* 147*   POTASSIUM mmol/L 3.2* 3.9   MAGNESIUM mg/dL  --  2.2   CHLORIDE mmol/L 130* 127*   CO2 mmol/L 10.7* 7.4*   BUN mg/dL 62* 70*   CREATININE mg/dL 1.44* 1.71*   EGFR IF NONAFRICN AM mL/min/1.73 36* 29*   CALCIUM mg/dL 9.5 9.9   PHOSPHORUS mg/dL 2.3* 3.6   GLUCOSE mg/dL 103* 112*   ALBUMIN g/dL 3.34* 3.64   BILIRUBIN mg/dL 0.2 0.2   ALK PHOS U/L 102 105   AST (SGOT) U/L 14 13   ALT (SGPT) U/L 9 10   Estimated Creatinine Clearance: 33.5 mL/min (A) (by C-G formula based on SCr of 1.44 mg/dL (H)).  No results found for: AMMONIA  Results from last 7 days   Lab Units 01/01/21  0347 12/31/20 2009   CK TOTAL U/L  --  92   TROPONIN T ng/mL 0.037* 0.052*     Results from last 7 days   Lab Units 12/31/20 2009   PROBNP pg/mL 1,580.0*         Hemoglobin A1C   Date/Time Value Ref Range Status   12/31/2020 2009 7.90 (H) 4.80  - 5.60 % Final     Glucose   Date/Time Value Ref Range Status   01/01/2021 1651 318 (H) 70 - 130 mg/dL Final   01/01/2021 1133 235 (H) 70 - 130 mg/dL Final   01/01/2021 0549 120 70 - 130 mg/dL Final   01/01/2021 0119 123 70 - 130 mg/dL Final   12/31/2020 2329 69 (L) 70 - 130 mg/dL Final   12/31/2020 1856 125 70 - 130 mg/dL Final     Lab Results   Component Value Date    TSH 1.240 12/31/2020     No results found for: PREGTESTUR, PREGSERUM, HCG, HCGQUANT  Pain Management Panel     Pain Management Panel Latest Ref Rng & Units 12/31/2020    AMPHETAMINES SCREEN, URINE Negative Negative    BARBITURATES SCREEN Negative Negative    BENZODIAZEPINE SCREEN, URINE Negative Negative    BUPRENORPHINEUR Negative Negative    COCAINE SCREEN, URINE Negative Negative    METHADONE SCREEN, URINE Negative Negative        Brief Urine Lab Results  (Last result in the past 365 days)      Color   Clarity   Blood   Leuk Est   Nitrite   Protein   CREAT   Urine HCG        12/31/20 2011 Yellow Cloudy Moderate (2+) Large (3+) Positive 30 mg/dL (1+)             Blood Culture   Date Value Ref Range Status   12/31/2020 Abnormal Stain (C)  Preliminary     No results found for: URINECX  No results found for: WOUNDCX  No results found for: STOOLCX  No results found for: RESPCX  No results found for: AFBCX  Results from last 7 days   Lab Units 01/01/21  0347 12/31/20 2009   LACTATE mmol/L 0.9 0.9   SED RATE mm/hr  --  24   CRP mg/dL 0.78* 0.75*       I have personally looked at the labs and they are summarized above.  ----------------------------------------------------------------------------------------------------------------------  Detailed radiology reports for the last 24 hours:    Imaging Results (Last 24 Hours)     Procedure Component Value Units Date/Time    CT Abdomen Pelvis Without Contrast [[id number]] Collected: 12/31/20 2232     Updated: 12/31/20 2234    Narrative:      CT Abdomen Pelvis WO    INDICATION:   Abdominal pain.    TECHNIQUE:      CT of the abdomen and pelvis without IV contrast. Coronal and sagittal reconstructions were obtained.  Radiation dose reduction techniques included automated exposure control or exposure modulation based on body size. Count of known CT and cardiac nuc  med studies performed in previous 12 months: 0.     COMPARISON:   None available.    FINDINGS:  Please see chest CT report dictated separately. Exam is motion degraded. Gallbladder is surgically absent. Calcification in the portacaval space is likely a densely calcified lymph node. It measures up to 2.2 cm. There is diffuse atherosclerotic disease  with no aortic aneurysm. There is a 1.2 x 1.8 cm low density left adrenal nodule, likely a benign adenoma in the absence of a known malignancy. Calcifications in both kidneys are probably vascular. No ureteral stones are seen on either side, and there is  no hydronephrosis. Solid abdominal organs are otherwise grossly normal allowing for excessive motion.    The uterus is surgically absent. Urinary bladder is not well distended. There is mild bladder wall thickening which may indicate cystitis or may be artifact due to incomplete distention. Large amount of stool is noted in the rectal vault. There is  colonic diverticulosis without focal diverticulitis. The appendix is normal. No small bowel obstruction is seen. There is no free fluid. There is grade 1 anterolisthesis of L5 on S1 due to bilateral L5 pars defects.      Impression:        1. Motion degraded exam.  2. Atherosclerotic disease with probable vascular calcifications in both kidneys. No ureteral stones are seen on either side, and there is no hydronephrosis.  3. Mild thickening of the urinary bladder wall may be due to incomplete distention or cystitis.  4. Large amount of stool in the rectal vault. There is colonic diverticulosis without diverticulitis. The appendix and small bowel are within normal limits.  5. Small left adrenal nodule, likely a benign adenoma  in the absence of a known malignancy. This could be assessed with nonemergent adrenal protocol MRI or CT if indicated.  6. Cholecystectomy and hysterectomy.        Signer Name: Rm Ku MD   Signed: 12/31/2020 10:32 PM   Workstation Name: [email or web address]    [facility]    CT Chest Without Contrast [[id number]] Collected: 12/31/20 2220     Updated: 12/31/20 2222    Narrative:      CT Chest WO    INDICATION:   Increased work of breathing. Abnormal chest x-ray.    TECHNIQUE:   CT of the thorax without IV contrast. Coronal and sagittal reconstructions were obtained.  Radiation dose reduction techniques included automated exposure control or exposure modulation based on body size. Count of known CT and cardiac nuc med studies  performed in previous 12 months: 0.     COMPARISON:   None available.    FINDINGS:  There is some motion degradation. There is atherosclerotic disease and extensive coronary artery disease. There is a large hiatal hernia to the left of midline which probably accounts for density on the patient's prior chest x-ray. The hernia contains  the gastric fundus and a portion of the gastric body. No pleural or pericardial effusion. There is no adenopathy. Images of the upper abdomen show cholecystectomy. There is a calcification in the portacaval space which is probably a large calcified lymph  node. There is a small left adrenal nodule measuring 1.2 x 1.8 cm. While technically indeterminate, this is likely a small benign adenoma.    There is a 7 mm noncalcified right lower lobe nodule. There is some atelectasis in the left lower lobe adjacent to the hernia sac. The lungs are otherwise clear. No CT findings present to indicate pneumonia. Note: CT may be negative in the earliest  stages of COVID-19.      Impression:        1. No evidence of acute pneumonia.  2. Large hiatal hernia at the left lung base.  3. Noncalcified nodule in the right lower lobe measuring 7 mm. Chest CT  follow-up in 6 months is recommended.  4. Left adrenal nodule, most likely a benign adenoma. This can be further characterized with adrenal protocol CT or MRI on a nonemergent basis if indicated.    Signer Name: Rm Ku MD   Signed: 12/31/2020 10:20 PM   Workstation Name: [email or web address]    [facility]    CT Head Without Contrast [[id number]] Collected: 12/31/20 2209     Updated: 12/31/20 2211    Narrative:      CT Head WO    HISTORY:   Altered mental status, patient in the emergency    TECHNIQUE:   Routine noncontrast head CT. Radiation dose reduction techniques included automated exposure control or exposure modulation based on body size. Radiation audit for CT and nuclear cardiology exams in the last 12 months: 0.    COMPARISON:   None.    FINDINGS:       No evidence of acute intracranial hemorrhage. There is a CSF density mass in the right posterior fossa causing some mass effect on the right cerebellar hemisphere, most likely an arachnoid cyst but poorly evaluated on this exam. Mild global volume loss.    The gray-white matter differentiation is preserved. There are scattered ill-defined and patchy hypoattenuating foci within the bilateral cerebral and deep white matter which are nonspecific but most commonly associated with chronic microvascular ischemic  change. Probably remote right basal ganglia lacunar infarcts.    Partially empty sella.    Visualized paranasal sinuses are clear. Visualized mastoid air cells are clear.    No acute osseous abnormality.        Impression:        1.  No acute intracranial abnormality.  2.  CSF density mass right posterior fossa with mild mass effect on the right cerebellar convexity, most likely an arachnoid cyst. Consider further evaluation with MRI without and with contrast.  3.  Presumed chronic microvascular ischemic changes.    Signer Name: AMANDA SAWYER MD   Signed: 12/31/2020 10:09 PM   Workstation Name: [email or web address]    [facility]    XR Chest 1 View [[id number]] Collected: 12/31/20 2034     Updated: 12/31/20 2037    Narrative:      XR CHEST 1 VW-     CLINICAL INDICATION: increased work of breathing        COMPARISON: None available      TECHNIQUE: Single frontal view of the chest.     FINDINGS:     Left effusion and left basilar consolidation. Consider CT to exclude  mass  The cardiac silhouette is normal. The pulmonary vasculature is  unremarkable.  There is no evidence of an acute osseous abnormality.              Impression:      Left effusion and left basilar consolidation. Consider CT to evaluate  for mass     This report was finalized on 12/31/2020 8:35 PM by Dr. Serafin Yip MD.           Assessment & Plan      Sepsis, present on admission  Enterococcus bacteremia  Possible urinary tract infection   -Patient with acute on chronic debility and decline according to  has been progressively becoming more weak and unresponsive over the last 2 years.  Patient seen in [place] ER 6 months ago for similar symptoms and found to have a nodule with recommended follow-up.   -Approximately 2 years ago patient had been placed in acute inpatient rehab and has been doing better but then ran out of insurance days and was sent home with home health physical therapy where she maintains somewhat level of independence before those insurance days ran out and then has been gotten progressively weaker since.   - says that the patient is essentially bedbound and that he has to provide all of her care and that she interacts minimally and he has to help change her and that she is incontinent.   -Of note blood cultures obtained in the ER returned with 1 of 2 being positive by PCR ID for enteric coccus and patient was started on vancomycin.  We will continue Rocephin for now as patient being ruled out for urinary tract infection.   -Infectious disease not available today but will consult tomorrow.    Acute kidney injury  Possible CKD, no  known baseline  Non-anion gap metabolic acidosis   -Suspect kidney injury and NAGMA due to poor p.o. intake as well as ongoing above infectious etiology.  -Supportive care and treatment as above.    Essential hypertension   -Blood pressure very labile at this time with periods of relative hypotension, will hold home antihypertensives for now    Diabetes mellitus type 2   -Initially hypoglycemic in the ER, however becoming hypoglycemic later today, will start low-dose sliding scale insulin in the face of her acute kidney injury despite being on 20 units every morning and 10 units every afternoon of insulin NPH.    CF density mass with mild mass-effect  History of prior lacunar infarcts   -CT of the head showing CSF density in the right posterior fossa with mild mass-effect on the right cerebellar convexity believed most likely be an arachnoid cyst.  However radiology does recommend evaluation with MRI with and without contrast.   -MRI ordered.        VTE Prophylaxis:   Mechanical Order History:     None      Pharmalogical Order History:      Ordered     Dose Route Frequency Stop    01/01/21 0113  enoxaparin (LOVENOX) syringe 30 mg      30 mg SC Daily --    01/01/21 0100  Pharmacy to Dose enoxaparin (LOVENOX)     Question:  Indication of use  Answer:  Prophylaxis    -- XX Continuous PRN --                Jorge Sidhu DO  [facility] ER[facility]  01/01/21  19:24 EST

## 2021-01-02 NOTE — PROGRESS NOTES
[facility] HOSPITALIST PROGRESS NOTE     Patient Identification:  Name:  Ashley Galvez  Age:  75 y.o.  Sex:  female  :  1945  MRN:  [id number]  Visit Number:  [id number]  ROOM: [place] [address]     Primary Care Provider:  Provider, No Known    Length of stay in inpatient status:  2    Subjective     Chief Compliant:    Chief Complaint   Patient presents with   • Altered Mental Status       History of Presenting Illness: Patient seen and evaluated in follow-up for acute encephalopathy, acute on chronic general debility, with acute kidney injury and general malaise newly found to have have enterococcus bacteremia.  Patient today much improved in mentation able to answer questions appropriately and does not nod off during conversation as previously yesterday.    Objective     Current Hospital Meds:aspirin, 324 mg, Oral, Once  [START ON 1/3/2021] enoxaparin, 40 mg, Subcutaneous, Daily  insulin aspart, 0-7 Units, Subcutaneous, TID AC  lisinopril-hydroCHLOROthiazide (ZESTORTIC) 20-25 mg combo dose, , Oral, Q24H  metroNIDAZOLE, 500 mg, Intravenous, Q8H  pantoprazole, 40 mg, Oral, Daily  sodium chloride, 3 mL, Intravenous, Q12H  vancomycin, 1,000 mg, Intravenous, Q24H    Pharmacy to Dose enoxaparin (LOVENOX),   sodium chloride, 75 mL/hr, Last Rate: 75 mL/hr (21 1117)        Current Antimicrobial Therapy:  Anti-Infectives (From admission, onward)    Ordered     Dose/Rate Route Frequency Start Stop    21 1012  metroNIDAZOLE (FLAGYL) 500 mg/100mL IVPB     Ordering Provider: Yvan Dempsey MD    500 mg  over 60 Minutes Intravenous Every 8 Hours 21 1200 21 1159    21 1453  vancomycin 1 g/250 mL 0.9% NS (vial-mate)     Ordering Provider: Jorge Sidhu DO    1,000 mg  over 60 Minutes Intravenous Every 24 Hours 21 2100 21  vancomycin 1250 mg/250 mL 0.9% NS IVPB ([facility])     Ordering Provider: Mal Oliver DO    20 mg/kg × 68 kg  over 90  Minutes Intravenous Once 12/31/20 2145 12/31/20 2322 12/31/20 2047  cefepime (MAXIPIME) 2 g/100 mL 0.9% NS (mbp)     Ordering Provider: Mal Oliver DO    2 g  200 mL/hr over 30 Minutes Intravenous Once 12/31/20 2049 12/31/20 2142        Current Diuretic Therapy:  Diuretics (From admission, onward)    Ordered     Dose/Rate Route Frequency Start Stop    01/02/21 0808  lisinopril (PRINIVIL,ZESTRIL) 20 mg, hydroCHLOROthiazide (HYDRODIURIL) 25 mg for ZESTORETIC 20-25     Ordering Provider: Jorge Sidhu DO     Oral Every 24 Hours Scheduled 01/02/21 1000          ----------------------------------------------------------------------------------------------------------------------  Vital Signs:  Temp:  [97.3 °F (36.3 °C)-98.7 °F (37.1 °C)] 97.8 °F (36.6 °C)  Heart Rate:  [] 114  Resp:  [18-20] 20  BP: ()/() 137/84  SpO2:  [93 %-100 %] 98 %  on  Flow (L/min):  [2] 2;   Device (Oxygen Therapy): nasal cannula  Body mass index is 29.76 kg/m².    Wt Readings from Last 3 Encounters:   01/02/21 76.2 kg (168 lb)     Intake & Output (last 3 days)       12/30 0701 - 12/31 0700 12/31 0701 - 01/01 0700 01/01 0701 - 01/02 0700 01/02 0701 - 01/03 0700    P.O.   540 680    I.V. (mL/kg)  302.8 (4) 2143.7 (28.1)     IV Piggyback  850      Total Intake(mL/kg)  1152.8 (15.1) 2683.7 (35.2) 680 (8.9)    Urine (mL/kg/hr)  1475 1350 (0.7) 700 (0.8)    Emesis/NG output  0      Stool  0  0    Total Output  1475 1350 700    Net  -322.2 +1333.7 -20            Urine Unmeasured Occurrence  3 x  1 x    Stool Unmeasured Occurrence  1 x  1 x    Emesis Unmeasured Occurrence  0 x          Diet Pureed; Thin  ----------------------------------------------------------------------------------------------------------------------  Physical exam:  Constitutional: Chronically ill-appearing and malnourished elderly female..      HENT:  Head:  Normocephalic and atraumatic.  Mouth:  Moist mucous membranes.    Eyes:  Conjunctivae and EOM  are normal. No scleral icterus.    Neck:  Neck supple.  No JVD present.    Cardiovascular:  Normal rate, regular rhythm and normal heart sounds with no murmur.  Pulmonary/Chest:  No respiratory distress, no wheezes, no crackles, with normal breath sounds and good air movement.  Abdominal:  Soft.  Bowel sounds are normal.  No distension and no tenderness.   Musculoskeletal:  No edema, no tenderness, and no deformity.  No red or swollen joints anywhere.  Functional ROM intact.   Neurological:  No cranial nerve deficit.  No tongue deviation.  No facial droop.  Skin:  Skin is warm and dry. No rash or lesion noted. No pallor.   Peripheral vascular:  Pulses in all 4 extremities with no clubbing, no cyanosis, no edema.  Psychiatric: Patient lethargic and minimally interactive does occasionally answer questions appropriately but mostly does not interact.  ----------------------------------------------------------------------------------------------------------------------  Tele:    ----------------------------------------------------------------------------------------------------------------------  Results from last 7 days   Lab Units 01/02/21  0135 01/01/21  0347 12/31/20 2009   CRP mg/dL  --  0.78* 0.75*   LACTATE mmol/L  --  0.9 0.9   WBC 10*3/mm3 12.48* 12.52* 11.73*   HEMOGLOBIN g/dL 11.0* 11.3* 11.3*   HEMATOCRIT % 33.5* 37.0 35.4   MCV fL 94.6 103.4* 100.6*   MCHC g/dL 32.8 30.5* 31.9   PLATELETS 10*3/mm3 406 413 449   INR   --   --  1.08     Results from last 7 days   Lab Units 01/02/21  0419   PH, ARTERIAL pH units 7.406   PO2 ART mm Hg 77.9*   PCO2, ARTERIAL mm Hg 32.4*   HCO3 ART mmol/L 20.3     Results from last 7 days   Lab Units 01/02/21  0135 01/01/21  0347 12/31/20 2009   SODIUM mmol/L 151* 153* 147*   POTASSIUM mmol/L 2.9* 3.2* 3.9   MAGNESIUM mg/dL 1.8  --  2.2   CHLORIDE mmol/L 122* 130* 127*   CO2 mmol/L 18.2* 10.7* 7.4*   BUN mg/dL 39* 62* 70*   CREATININE mg/dL 1.18* 1.44* 1.71*   EGFR IF NONAFRICN  AM mL/min/1.73 45* 36* 29*   CALCIUM mg/dL 8.9 9.5 9.9   PHOSPHORUS mg/dL 3.0 2.3* 3.6   GLUCOSE mg/dL 135* 103* 112*   ALBUMIN g/dL 3.10* 3.34* 3.64   BILIRUBIN mg/dL 0.2 0.2 0.2   ALK PHOS U/L 96 102 105   AST (SGOT) U/L 12 14 13   ALT (SGPT) U/L 9 9 10   Estimated Creatinine Clearance: 40.9 mL/min (A) (by C-G formula based on SCr of 1.18 mg/dL (H)).  No results found for: AMMONIA  Results from last 7 days   Lab Units 01/01/21  0347 12/31/20 2009   CK TOTAL U/L  --  92   TROPONIN T ng/mL 0.037* 0.052*     Results from last 7 days   Lab Units 12/31/20 2009   PROBNP pg/mL 1,580.0*         Hemoglobin A1C   Date/Time Value Ref Range Status   12/31/2020 2009 7.90 (H) 4.80 - 5.60 % Final     Glucose   Date/Time Value Ref Range Status   01/02/2021 1657 279 (H) 70 - 130 mg/dL Final   01/02/2021 1110 284 (H) 70 - 130 mg/dL Final   01/02/2021 0555 232 (H) 70 - 130 mg/dL Final   01/02/2021 0010 116 70 - 130 mg/dL Final   01/01/2021 2026 425 (C) 70 - 130 mg/dL Final   01/01/2021 1651 318 (H) 70 - 130 mg/dL Final   01/01/2021 1133 235 (H) 70 - 130 mg/dL Final   01/01/2021 0549 120 70 - 130 mg/dL Final     Lab Results   Component Value Date    TSH 1.240 12/31/2020     No results found for: PREGTESTUR, PREGSERUM, HCG, HCGQUANT  Pain Management Panel     Pain Management Panel Latest Ref Rng & Units 12/31/2020    AMPHETAMINES SCREEN, URINE Negative Negative    BARBITURATES SCREEN Negative Negative    BENZODIAZEPINE SCREEN, URINE Negative Negative    BUPRENORPHINEUR Negative Negative    COCAINE SCREEN, URINE Negative Negative    METHADONE SCREEN, URINE Negative Negative        Brief Urine Lab Results  (Last result in the past 365 days)      Color   Clarity   Blood   Leuk Est   Nitrite   Protein   CREAT   Urine HCG        12/31/20 2011 Yellow Cloudy Moderate (2+) Large (3+) Positive 30 mg/dL (1+)             Blood Culture   Date Value Ref Range Status   12/31/2020 Abnormal Stain (C)  Preliminary     No results found for:  URINECX  No results found for: WOUNDCX  No results found for: STOOLCX  No results found for: RESPCX  No results found for: AFBCX  Results from last 7 days   Lab Units 01/01/21  0347 12/31/20 2009   LACTATE mmol/L 0.9 0.9   SED RATE mm/hr  --  24   CRP mg/dL 0.78* 0.75*       I have personally looked at the labs and they are summarized above.  ----------------------------------------------------------------------------------------------------------------------  Detailed radiology reports for the last 24 hours:    Imaging Results (Last 24 Hours)     ** No results found for the last 24 hours. **        Assessment & Plan      Sepsis, present on admission  Enterococcus bacteremia  Possible urinary tract infection   -Patient with acute on chronic debility and decline according to  has been progressively becoming more weak and unresponsive over the last 2 years.  Patient seen in [place] [facility] 6 months ago for similar symptoms and found to have a nodule with recommended follow-up.   -Approximately 2 years ago patient had been placed in acute inpatient rehab and has been doing better but then ran out of insurance days and was sent home with home health physical therapy where she maintains somewhat level of independence before those insurance days ran out and then has been gotten progressively weaker since.   - says that the patient is essentially bedbound and that he has to provide all of her care and that she interacts minimally and he has to help change her and that she is incontinent.   -Of note blood cultures obtained in the ER returned with 1 of 2 being positive by PCR ID for enteric coccus and patient was started on vancomycin.    -Infectious disease consulted and has continued vancomycin with the addition of flagyl.      Acute kidney injury, improving  Possible CKD, no known baseline  Non-anion gap metabolic acidosis   -Suspect kidney injury and NAGMA due to poor p.o. intake as well as ongoing above  infectious etiology.  -Supportive care and treatment as above.    Essential hypertension   -Blood pressure very labile at this time with periods of relative hypotension, will hold home antihypertensives for now    Diabetes mellitus type 2   -Initially hypoglycemic in the ER, however becoming hypoglycemic later today, will start low-dose sliding scale insulin in the face of her acute kidney injury despite being on 20 units every morning and 10 units every afternoon of insulin NPH.    CF density mass with mild mass-effect  History of prior lacunar infarcts   -CT of the head showing CSF density in the right posterior fossa with mild mass-effect on the right cerebellar convexity believed most likely be an arachnoid cyst.  However radiology does recommend evaluation with MRI with and without contrast.   -MRI ordered.        VTE Prophylaxis:   Mechanical Order History:     None      Pharmalogical Order History:      Ordered     Dose Route Frequency Stop    01/01/21 0113  enoxaparin (LOVENOX) syringe 30 mg      30 mg SC Daily --    01/01/21 0100  Pharmacy to Dose enoxaparin (LOVENOX)     Question:  Indication of use  Answer:  Prophylaxis    -- XX Continuous PRN --                Jorge Sidhu DO  [facility] Hospitalist  01/02/21  18:17 EST

## 2021-01-02 NOTE — PLAN OF CARE
Goal Outcome Evaluation:  Plan of Care Reviewed With: patient  Progress: no change   Patient heart rate increased today. Her rate stays between 90 to 126 beats per minute. She has been drowsy today; however, she has become increasingly alert. Her rhythm is sinus tachycardia. Her blood pressure has been elevated, and it currently 144/88 (97). Patient's potassium has been replaced per protocol. Most recent potassium was 2.9. Patient has continually received normal saline and her antibiotics as ordered. Patient's  visited with her today, and she has no complaints at this time. Will continue to monitor.

## 2021-01-02 NOTE — CONSULTS
INFECTIOUS DISEASE CONSULTATION REPORT        Patient Identification:  Name:  Ashley Galvez  Age:  75 y.o.  Sex:  female  :  1945  MRN:  [id number]   Visit Number:  [id number]  Primary Care Physician:  Provider, No Known       LOS: 2 days        Subjective       Subjective     History of present illness:      Thank you Dr. Sidhu for allowing us to participate in the care of your patient.  As you well know, Ms. Ashley Galvez is a 75 y.o. female with past medical history significant for diverticulosis, essential hypertension, lacunar infarction, and type 2 diabetes, who presented to [facility] on 2020 for confusion.    Patient was recently seen at [facility] emergency department for pneumonia and was discharged home with some antibiotics.  The patient's  called EMS after she began having worsening confusion at home.    The patient is currently on 2 L nasal cannula.  Afebrile, no diarrhea.    CRP is slightly elevated at 0.78.  White count is elevated at 12.48.  Lactic acid is normal at 0.9.    Urinalysis on 2020 was positive with WBCs too numerous to count.  Urine culture is in process.    Chest x-ray on 2020 showed left effusion and left basilar consolidation with recommendation to consider CT to evaluate for mass.  CT chest without contrast on 2020 showed no evidence of acute pneumonia.  Large hiatal hernia at the left lung base.  Noncalcified nodule in the right lower lobe measuring 7 mm with recommended chest CT follow-up in 6 months.  Left adrenal nodule, most likely a benign adenoma.  This can be further characterized with adrenal protocol CT or MRI on a nonemergent basis if indicated.    CT head without contrast on 2020 showed no acute intracranial abnormality.  CSF density mass of the right posterior fossa with mild mass-effect on the right cerebellar convexity, most likely an arachnoid cyst with recommendation to  evaluate further with MRI without and with contrast.  Presumed chronic microvascular ischemic changes.    CT abdomen pelvis without contrast on 12/31/2020 showed atherosclerotic disease with probable vascular calcifications in both kidneys.  No ureteral stones as seen on either side, and there is no hydronephrosis.  Mild thickening of the urinary bladder wall may be due to incomplete distention or cystitis.  Large amount of stool in the rectal vault.  There is colonic diverticulosis without diverticulitis.  The appendix and small bowel are within normal limits.  Small left adrenal nodule, likely a benign adenoma in the absence of known malignancy.  This could be assessed with nonemergent adrenal protocol MRI or CT if indicated.  Cholecystectomy and hysterectomy.    Blood cultures on 12/31/2020 are so far showing 1 out of 2 with Enterococcus.  BC ID on 12/31/2020 finalized as Enterococcus.  Repeat blood cultures are in process.    COVID-19 and flu A/B PCR on 12/31/2020 was negative.    Infectious Disease consultation was requested for antimicrobial management.    ---------------------------------------------------------------------------------------------------------------------     Review Of Systems:    Unable to obtain due to patient's mental status.    ---------------------------------------------------------------------------------------------------------------------     Past Medical History    Past Medical History:   Diagnosis Date   • Anemia    • Anterolisthesis     Grade 1 anterolisthesis of L5 on S1 due to bilateral L5 pars defects   • Diverticulosis    • Essential hypertension    • Lacunar infarction (CMS/HCC)    • Type 2 diabetes mellitus (CMS/HCC)        Past Surgical History    Past Surgical History:   Procedure Laterality Date   • CHOLECYSTECTOMY     • HYSTERECTOMY         Family History    Family History   Family history unknown: Yes       Social History    Social History     Tobacco Use   • Smoking  status: Never Smoker   • Smokeless tobacco: Never Used   Substance Use Topics   • Alcohol use: Never     Frequency: Never   • Drug use: Never       Allergies    Patient has no known allergies.  ---------------------------------------------------------------------------------------------------------------------     Home Medications:    Prior to Admission Medications     Prescriptions Last Dose Informant Patient Reported? Taking?    albuterol sulfate  (90 Base) MCG/ACT inhaler Unknown Spouse/Significant Other Yes No    Inhale 1 puff Every 4 (Four) Hours.    amLODIPine (NORVASC) 10 MG tablet Unknown Spouse/Significant Other Yes No    Take 10 mg by mouth Daily.    aspirin 81 MG EC tablet Unknown Spouse/Significant Other Yes No    Take 81 mg by mouth Daily.    insulin NPH (humuLIN N,novoLIN N) 100 UNIT/ML injection Unknown Spouse/Significant Other Yes No    Inject 20 Units under the skin into the appropriate area as directed Every Morning.    insulin NPH (humuLIN N,novoLIN N) 100 UNIT/ML injection Unknown Spouse/Significant Other Yes No    Inject 10 Units under the skin into the appropriate area as directed every night at bedtime.    lisinopril-hydrochlorothiazide (PRINZIDE,ZESTORETIC) 20-25 MG per tablet Unknown Spouse/Significant Other Yes No    Take 1 tablet by mouth Daily.    pantoprazole (PROTONIX) 40 MG EC tablet Unknown Spouse/Significant Other Yes No    Take 40 mg by mouth Daily.        ---------------------------------------------------------------------------------------------------------------------    Objective       Objective     [facility] Scheduled Meds:  aspirin, 324 mg, Oral, Once  [START ON 1/3/2021] enoxaparin, 40 mg, Subcutaneous, Daily  insulin aspart, 0-7 Units, Subcutaneous, TID AC  lisinopril-hydroCHLOROthiazide (ZESTORTIC) 20-25 mg combo dose, , Oral, Q24H  metroNIDAZOLE, 500 mg, Intravenous, Q8H  pantoprazole, 40 mg, Oral, Daily  potassium chloride, 40 mEq, Oral, Q4H  sodium chloride, 3 mL,  Intravenous, Q12H  vancomycin, 1,000 mg, Intravenous, Q24H      Pharmacy to Dose enoxaparin (LOVENOX),   sodium chloride, 75 mL/hr, Last Rate: 75 mL/hr (01/01/21 2041)      ---------------------------------------------------------------------------------------------------------------------   Vital Signs:  Temp:  [97.3 °F (36.3 °C)-98.7 °F (37.1 °C)] 97.9 °F (36.6 °C)  Heart Rate:  [] 117  Resp:  [18-20] 20  BP: ()/() 109/69  Mean Arterial Pressure (Non-Invasive) for the past 24 hrs (Last 3 readings):   Noninvasive MAP (mmHg)   01/02/21 0903 98   01/02/21 0803 121   01/02/21 0703 138     SpO2 Percentage    01/02/21 0703 01/02/21 0803 01/02/21 0903   SpO2: 97% 98% 93%     SpO2:  [93 %-100 %] 93 %  on  Flow (L/min):  [2] 2;   Device (Oxygen Therapy): nasal cannula    Body mass index is 29.76 kg/m².  Wt Readings from Last 3 Encounters:   01/02/21 76.2 kg (168 lb)     ---------------------------------------------------------------------------------------------------------------------     Physical Exam:    Constitutional:  Well-developed and well-nourished.  No respiratory distress.      HENT:  Head: Normocephalic and atraumatic.  Mouth:  Moist mucous membranes.    Eyes:  Conjunctivae and EOM are normal.  No scleral icterus.  Neck:  Neck supple.  No JVD present.    Cardiovascular:  Normal rate, regular rhythm and normal heart sounds with no murmur. No edema.  Pulmonary/Chest:  No respiratory distress, no wheezes, no crackles, with normal breath sounds and good air movement.  Abdominal:  Soft.  Bowel sounds are normal.  No distension.  Tenderness to palpation in all 4 quadrants, but especially in left upper quadrant.    Musculoskeletal:  No edema, no tenderness, and no deformity.  No swelling or redness of joints.  Neurological:  Alert and oriented to person.  Confused.  No facial droop.  No slurred speech.   Skin:  Skin is warm and dry.  No rash noted.  No pallor.  Onychomycosis.  Psychiatric: Awake, but  confused.    ---------------------------------------------------------------------------------------------------------------------    Results from last 7 days   Lab Units 01/01/21 0347 12/31/20 2009   CK TOTAL U/L  --  92   TROPONIN T ng/mL 0.037* 0.052*     Results from last 7 days   Lab Units 12/31/20 2009   PROBNP pg/mL 1,580.0*       Results from last 7 days   Lab Units 01/02/21 0419   PH, ARTERIAL pH units 7.406   PO2 ART mm Hg 77.9*   PCO2, ARTERIAL mm Hg 32.4*   HCO3 ART mmol/L 20.3     Results from last 7 days   Lab Units 01/02/21 0135 01/01/21 0347 12/31/20 2009   CRP mg/dL  --  0.78* 0.75*   LACTATE mmol/L  --  0.9 0.9   WBC 10*3/mm3 12.48* 12.52* 11.73*   HEMOGLOBIN g/dL 11.0* 11.3* 11.3*   HEMATOCRIT % 33.5* 37.0 35.4   MCV fL 94.6 103.4* 100.6*   MCHC g/dL 32.8 30.5* 31.9   PLATELETS 10*3/mm3 406 413 449   INR   --   --  1.08     Results from last 7 days   Lab Units 01/02/21 0135 01/01/21 0347 12/31/20 2009   SODIUM mmol/L 151* 153* 147*   POTASSIUM mmol/L 2.9* 3.2* 3.9   MAGNESIUM mg/dL 1.8  --  2.2   CHLORIDE mmol/L 122* 130* 127*   CO2 mmol/L 18.2* 10.7* 7.4*   BUN mg/dL 39* 62* 70*   CREATININE mg/dL 1.18* 1.44* 1.71*   EGFR IF NONAFRICN AM mL/min/1.73 45* 36* 29*   CALCIUM mg/dL 8.9 9.5 9.9   GLUCOSE mg/dL 135* 103* 112*   ALBUMIN g/dL 3.10* 3.34* 3.64   BILIRUBIN mg/dL 0.2 0.2 0.2   ALK PHOS U/L 96 102 105   AST (SGOT) U/L 12 14 13   ALT (SGPT) U/L 9 9 10   Estimated Creatinine Clearance: 40.9 mL/min (A) (by C-G formula based on SCr of 1.18 mg/dL (H)).  No results found for: AMMONIA    Hemoglobin A1C   Date/Time Value Ref Range Status   12/31/2020 2009 7.90 (H) 4.80 - 5.60 % Final     Glucose   Date/Time Value Ref Range Status   01/02/2021 0555 232 (H) 70 - 130 mg/dL Final   01/02/2021 0010 116 70 - 130 mg/dL Final   01/01/2021 2026 425 (C) 70 - 130 mg/dL Final   01/01/2021 1651 318 (H) 70 - 130 mg/dL Final   01/01/2021 1133 235 (H) 70 - 130 mg/dL Final   01/01/2021 0549 120 70 - 130  mg/dL Final   01/01/2021 0119 123 70 - 130 mg/dL Final   12/31/2020 2329 69 (L) 70 - 130 mg/dL Final     Lab Results   Component Value Date    HGBA1C 7.90 (H) 12/31/2020     Lab Results   Component Value Date    TSH 1.240 12/31/2020       Blood Culture   Date Value Ref Range Status   12/31/2020 No growth at 24 hours  Preliminary   12/31/2020 Enterococcus species (C)  Preliminary     Comment:     Infectious disease consultation is highly recommended.     No results found for: URINECX  No results found for: WOUNDCX  No results found for: STOOLCX  No results found for: RESPCX  Pain Management Panel     Pain Management Panel Latest Ref Rng & Units 12/31/2020    AMPHETAMINES SCREEN, URINE Negative Negative    BARBITURATES SCREEN Negative Negative    BENZODIAZEPINE SCREEN, URINE Negative Negative    BUPRENORPHINEUR Negative Negative    COCAINE SCREEN, URINE Negative Negative    METHADONE SCREEN, URINE Negative Negative        I have personally reviewed the above laboratory results.   ---------------------------------------------------------------------------------------------------------------------  Imaging Results (Last 7 Days)     Procedure Component Value Units Date/Time    CT Abdomen Pelvis Without Contrast [[id number]] Collected: 12/31/20 2232     Updated: 12/31/20 2234    Narrative:      CT Abdomen Pelvis WO    INDICATION:   Abdominal pain.    TECHNIQUE:   CT of the abdomen and pelvis without IV contrast. Coronal and sagittal reconstructions were obtained.  Radiation dose reduction techniques included automated exposure control or exposure modulation based on body size. Count of known CT and cardiac nuc  med studies performed in previous 12 months: 0.     COMPARISON:   None available.    FINDINGS:  Please see chest CT report dictated separately. Exam is motion degraded. Gallbladder is surgically absent. Calcification in the portacaval space is likely a densely calcified lymph node. It measures up to 2.2 cm. There is  diffuse atherosclerotic disease  with no aortic aneurysm. There is a 1.2 x 1.8 cm low density left adrenal nodule, likely a benign adenoma in the absence of a known malignancy. Calcifications in both kidneys are probably vascular. No ureteral stones are seen on either side, and there is  no hydronephrosis. Solid abdominal organs are otherwise grossly normal allowing for excessive motion.    The uterus is surgically absent. Urinary bladder is not well distended. There is mild bladder wall thickening which may indicate cystitis or may be artifact due to incomplete distention. Large amount of stool is noted in the rectal vault. There is  colonic diverticulosis without focal diverticulitis. The appendix is normal. No small bowel obstruction is seen. There is no free fluid. There is grade 1 anterolisthesis of L5 on S1 due to bilateral L5 pars defects.      Impression:        1. Motion degraded exam.  2. Atherosclerotic disease with probable vascular calcifications in both kidneys. No ureteral stones are seen on either side, and there is no hydronephrosis.  3. Mild thickening of the urinary bladder wall may be due to incomplete distention or cystitis.  4. Large amount of stool in the rectal vault. There is colonic diverticulosis without diverticulitis. The appendix and small bowel are within normal limits.  5. Small left adrenal nodule, likely a benign adenoma in the absence of a known malignancy. This could be assessed with nonemergent adrenal protocol MRI or CT if indicated.  6. Cholecystectomy and hysterectomy.        Signer Name: Rm Ku MD   Signed: 12/31/2020 10:32 PM   Workstation Name: [facility]    [facility]    CT Chest Without Contrast [[id number]] Collected: 12/31/20 2220     Updated: 12/31/20 2222    Narrative:      CT Chest WO    INDICATION:   Increased work of breathing. Abnormal chest x-ray.    TECHNIQUE:   CT of the thorax without IV contrast. Coronal and sagittal  reconstructions were obtained.  Radiation dose reduction techniques included automated exposure control or exposure modulation based on body size. Count of known CT and cardiac nuc med studies  performed in previous 12 months: 0.     COMPARISON:   None available.    FINDINGS:  There is some motion degradation. There is atherosclerotic disease and extensive coronary artery disease. There is a large hiatal hernia to the left of midline which probably accounts for density on the patient's prior chest x-ray. The hernia contains  the gastric fundus and a portion of the gastric body. No pleural or pericardial effusion. There is no adenopathy. Images of the upper abdomen show cholecystectomy. There is a calcification in the portacaval space which is probably a large calcified lymph  node. There is a small left adrenal nodule measuring 1.2 x 1.8 cm. While technically indeterminate, this is likely a small benign adenoma.    There is a 7 mm noncalcified right lower lobe nodule. There is some atelectasis in the left lower lobe adjacent to the hernia sac. The lungs are otherwise clear. No CT findings present to indicate pneumonia. Note: CT may be negative in the earliest  stages of COVID-19.      Impression:        1. No evidence of acute pneumonia.  2. Large hiatal hernia at the left lung base.  3. Noncalcified nodule in the right lower lobe measuring 7 mm. Chest CT follow-up in 6 months is recommended.  4. Left adrenal nodule, most likely a benign adenoma. This can be further characterized with adrenal protocol CT or MRI on a nonemergent basis if indicated.    Signer Name: Rm Ku MD   Signed: 12/31/2020 10:20 PM   Workstation Name: [email or web address]    [facility]    CT Head Without Contrast [[id number]] Collected: 12/31/20 2209     Updated: 12/31/20 2211    Narrative:      CT Head WO    HISTORY:   Altered mental status, patient in the emergency    TECHNIQUE:   Routine noncontrast head CT. Radiation  dose reduction techniques included automated exposure control or exposure modulation based on body size. Radiation audit for CT and nuclear cardiology exams in the last 12 months: 0.    COMPARISON:   None.    FINDINGS:       No evidence of acute intracranial hemorrhage. There is a CSF density mass in the right posterior fossa causing some mass effect on the right cerebellar hemisphere, most likely an arachnoid cyst but poorly evaluated on this exam. Mild global volume loss.    The gray-white matter differentiation is preserved. There are scattered ill-defined and patchy hypoattenuating foci within the bilateral cerebral and deep white matter which are nonspecific but most commonly associated with chronic microvascular ischemic  change. Probably remote right basal ganglia lacunar infarcts.    Partially empty sella.    Visualized paranasal sinuses are clear. Visualized mastoid air cells are clear.    No acute osseous abnormality.        Impression:        1.  No acute intracranial abnormality.  2.  CSF density mass right posterior fossa with mild mass effect on the right cerebellar convexity, most likely an arachnoid cyst. Consider further evaluation with MRI without and with contrast.  3.  Presumed chronic microvascular ischemic changes.    Signer Name: AMANDA SAWYER MD   Signed: 12/31/2020 10:09 PM   Workstation Name: [email or web address]    [facility]    XR Chest 1 View [[id number]] Collected: 12/31/20 2034     Updated: 12/31/20 2037    Narrative:      XR CHEST 1 VW-     CLINICAL INDICATION: increased work of breathing        COMPARISON: None available      TECHNIQUE: Single frontal view of the chest.     FINDINGS:     Left effusion and left basilar consolidation. Consider CT to exclude  mass  The cardiac silhouette is normal. The pulmonary vasculature is  unremarkable.  There is no evidence of an acute osseous abnormality.              Impression:      Left effusion and left basilar consolidation.  Consider CT to evaluate  for mass     This report was finalized on 12/31/2020 8:35 PM by Dr. Serafin Yip MD.           I have personally reviewed the above radiology results.   ---------------------------------------------------------------------------------------------------------------------      Assessment & Plan        Assessment/Plan       ASSESSMENT:    1.  Sepsis  2.  Pyelonephritis  3.  Enterococcus bacteremia    PLAN:    [facility].    She was recently seen at [facility] emergency department for pneumonia and was discharged home with some antibiotics.  The patient's  called EMS after she began having worsening confusion at home.    Currently on 2 L nasal cannula.  Afebrile, no diarrhea.    CRP is slightly elevated at 0.78.  White count is elevated at 12.48.  Lactic acid is normal at 0.9.    Urinalysis on 12/31/2020 was positive with WBCs too numerous to count.  Urine culture is in process.    Chest x-ray on 12/31/2020 showed left effusion and left basilar consolidation with recommendation to consider CT to evaluate for mass.  CT chest without contrast on 12/31/2020 showed no evidence of acute pneumonia.  Large hiatal hernia at the left lung base.  Noncalcified nodule in the right lower lobe measuring 7 mm with recommended chest CT follow-up in 6 months.  Left adrenal nodule, most likely a benign adenoma.  This can be further characterized with adrenal protocol CT or MRI on a nonemergent basis if indicated.    CT head without contrast on 12/31/2020 showed no acute intracranial abnormality.  CSF density mass of the right posterior fossa with mild mass-effect on the right cerebellar convexity, most likely an arachnoid cyst with recommendation to evaluate further with MRI without and with contrast.  Presumed chronic microvascular ischemic changes.    CT abdomen pelvis without contrast on 12/31/2020 showed  atherosclerotic disease with probable vascular calcifications in both kidneys.  No ureteral stones as seen on either side, and there is no hydronephrosis.  Mild thickening of the urinary bladder wall may be due to incomplete distention or cystitis.  Large amount of stool in the rectal vault.  There is colonic diverticulosis without diverticulitis.  The appendix and small bowel are within normal limits.  Small left adrenal nodule, likely a benign adenoma in the absence of known malignancy.  This could be assessed with nonemergent adrenal protocol MRI or CT if indicated.  Cholecystectomy and hysterectomy.    Blood cultures on 12/31/2020 are so far showing 1 out of 2 with Enterococcus.  BC ID on 12/31/2020 finalized as Enterococcus.  Repeat blood cultures are in process.    COVID-19 and flu A/B PCR on 12/31/2020 was negative.    The primary team have initiated high-dose Rocephin and vancomycin.  We are agreeable with vancomycin, which is appropriate coverage for Enterococcus bacteremia.  High-dose Rocephin was discontinued today.  Flagyl 500 mg IV every 8 hours x 7 days was initiated today for concerns of possible intra-abdominal infection.  We will follow urine culture results and adjust antibiotic therapy as appropriate.    We also recommend MRI for brain mass/cyst.    Again, thank you Dr. Sidhu for allowing us to participate in the care of your patient and please feel free to call for any questions you may have.    Code Status:   Code Status and Medical Interventions:   Ordered at: 12/31/20 7099     Level Of Support Discussed With:    Patient     Code Status:    CPR     Medical Interventions (Level of Support Prior to Arrest):    Full       PATRICE Baires  01/02/21  10:39 EST

## 2021-01-02 NOTE — PLAN OF CARE
Goal Outcome Evaluation:  Plan of Care Reviewed With: patient  Progress: no change  Outcome Summary: tachycardiac, resting with safety measures in place.

## 2021-01-03 LAB
ALBUMIN SERPL-MCNC: 2.82 G/DL (ref 3.5–5.2)
ALBUMIN/GLOB SERPL: 0.9 G/DL
ALP SERPL-CCNC: 87 U/L (ref 39–117)
ALT SERPL W P-5'-P-CCNC: 10 U/L (ref 1–33)
ANION GAP SERPL CALCULATED.3IONS-SCNC: 9.2 MMOL/L (ref 5–15)
AST SERPL-CCNC: 18 U/L (ref 1–32)
BACTERIA SPEC AEROBE CULT: ABNORMAL
BASOPHILS # BLD AUTO: 0.07 10*3/MM3 (ref 0–0.2)
BASOPHILS NFR BLD AUTO: 0.7 % (ref 0–1.5)
BILIRUB SERPL-MCNC: 0.2 MG/DL (ref 0–1.2)
BUN SERPL-MCNC: 32 MG/DL (ref 8–23)
BUN/CREAT SERPL: 33.7 (ref 7–25)
CALCIUM SPEC-SCNC: 8.4 MG/DL (ref 8.6–10.5)
CHLORIDE SERPL-SCNC: 128 MMOL/L (ref 98–107)
CO2 SERPL-SCNC: 16.8 MMOL/L (ref 22–29)
CREAT SERPL-MCNC: 0.95 MG/DL (ref 0.57–1)
DEPRECATED RDW RBC AUTO: 63.3 FL (ref 37–54)
EOSINOPHIL # BLD AUTO: 0.21 10*3/MM3 (ref 0–0.4)
EOSINOPHIL NFR BLD AUTO: 2.2 % (ref 0.3–6.2)
ERYTHROCYTE [DISTWIDTH] IN BLOOD BY AUTOMATED COUNT: 16.8 % (ref 12.3–15.4)
GFR SERPL CREATININE-BSD FRML MDRD: 58 ML/MIN/1.73
GLOBULIN UR ELPH-MCNC: 3.1 GM/DL
GLUCOSE BLDC GLUCOMTR-MCNC: 248 MG/DL (ref 70–130)
GLUCOSE BLDC GLUCOMTR-MCNC: 282 MG/DL (ref 70–130)
GLUCOSE BLDC GLUCOMTR-MCNC: 311 MG/DL (ref 70–130)
GLUCOSE BLDC GLUCOMTR-MCNC: 368 MG/DL (ref 70–130)
GLUCOSE BLDC GLUCOMTR-MCNC: 386 MG/DL (ref 70–130)
GLUCOSE SERPL-MCNC: 249 MG/DL (ref 65–99)
GRAM STN SPEC: ABNORMAL
HCT VFR BLD AUTO: 31.9 % (ref 34–46.6)
HGB BLD-MCNC: 9.6 G/DL (ref 12–15.9)
IMM GRANULOCYTES # BLD AUTO: 0.06 10*3/MM3 (ref 0–0.05)
IMM GRANULOCYTES NFR BLD AUTO: 0.6 % (ref 0–0.5)
ISOLATED FROM: ABNORMAL
LYMPHOCYTES # BLD AUTO: 2.39 10*3/MM3 (ref 0.7–3.1)
LYMPHOCYTES NFR BLD AUTO: 24.7 % (ref 19.6–45.3)
MCH RBC QN AUTO: 30.9 PG (ref 26.6–33)
MCHC RBC AUTO-ENTMCNC: 30.1 G/DL (ref 31.5–35.7)
MCV RBC AUTO: 102.6 FL (ref 79–97)
MONOCYTES # BLD AUTO: 1.39 10*3/MM3 (ref 0.1–0.9)
MONOCYTES NFR BLD AUTO: 14.4 % (ref 5–12)
NEUTROPHILS NFR BLD AUTO: 5.54 10*3/MM3 (ref 1.7–7)
NEUTROPHILS NFR BLD AUTO: 57.4 % (ref 42.7–76)
NRBC BLD AUTO-RTO: 0.2 /100 WBC (ref 0–0.2)
PHOSPHATE SERPL-MCNC: 1.6 MG/DL (ref 2.5–4.5)
PLATELET # BLD AUTO: 364 10*3/MM3 (ref 140–450)
PMV BLD AUTO: 9.1 FL (ref 6–12)
POTASSIUM SERPL-SCNC: 4.7 MMOL/L (ref 3.5–5.2)
PROT SERPL-MCNC: 5.9 G/DL (ref 6–8.5)
RBC # BLD AUTO: 3.11 10*6/MM3 (ref 3.77–5.28)
SODIUM SERPL-SCNC: 154 MMOL/L (ref 136–145)
VANCOMYCIN TROUGH SERPL-MCNC: 17.6 MCG/ML (ref 5–20)
WBC # BLD AUTO: 9.66 10*3/MM3 (ref 3.4–10.8)

## 2021-01-03 PROCEDURE — 84100 ASSAY OF PHOSPHORUS: CPT | Performed by: STUDENT IN AN ORGANIZED HEALTH CARE EDUCATION/TRAINING PROGRAM

## 2021-01-03 PROCEDURE — 99233 SBSQ HOSP IP/OBS HIGH 50: CPT | Performed by: STUDENT IN AN ORGANIZED HEALTH CARE EDUCATION/TRAINING PROGRAM

## 2021-01-03 PROCEDURE — 82962 GLUCOSE BLOOD TEST: CPT

## 2021-01-03 PROCEDURE — 63710000001 INSULIN DETEMIR PER 5 UNITS: Performed by: STUDENT IN AN ORGANIZED HEALTH CARE EDUCATION/TRAINING PROGRAM

## 2021-01-03 PROCEDURE — 63710000001 INSULIN ASPART PER 5 UNITS: Performed by: STUDENT IN AN ORGANIZED HEALTH CARE EDUCATION/TRAINING PROGRAM

## 2021-01-03 PROCEDURE — 85025 COMPLETE CBC W/AUTO DIFF WBC: CPT | Performed by: STUDENT IN AN ORGANIZED HEALTH CARE EDUCATION/TRAINING PROGRAM

## 2021-01-03 PROCEDURE — 25010000002 VANCOMYCIN 1 G RECONSTITUTED SOLUTION: Performed by: STUDENT IN AN ORGANIZED HEALTH CARE EDUCATION/TRAINING PROGRAM

## 2021-01-03 PROCEDURE — 94799 UNLISTED PULMONARY SVC/PX: CPT

## 2021-01-03 PROCEDURE — 80202 ASSAY OF VANCOMYCIN: CPT | Performed by: STUDENT IN AN ORGANIZED HEALTH CARE EDUCATION/TRAINING PROGRAM

## 2021-01-03 PROCEDURE — 80053 COMPREHEN METABOLIC PANEL: CPT | Performed by: STUDENT IN AN ORGANIZED HEALTH CARE EDUCATION/TRAINING PROGRAM

## 2021-01-03 PROCEDURE — 94640 AIRWAY INHALATION TREATMENT: CPT

## 2021-01-03 PROCEDURE — 25010000002 ENOXAPARIN PER 10 MG: Performed by: STUDENT IN AN ORGANIZED HEALTH CARE EDUCATION/TRAINING PROGRAM

## 2021-01-03 RX ORDER — DEXTROSE MONOHYDRATE 50 MG/ML
100 INJECTION, SOLUTION INTRAVENOUS CONTINUOUS
Status: DISCONTINUED | OUTPATIENT
Start: 2021-01-03 | End: 2021-01-04

## 2021-01-03 RX ORDER — IPRATROPIUM BROMIDE AND ALBUTEROL SULFATE 2.5; .5 MG/3ML; MG/3ML
3 SOLUTION RESPIRATORY (INHALATION)
Status: DISCONTINUED | OUTPATIENT
Start: 2021-01-03 | End: 2021-01-14 | Stop reason: HOSPADM

## 2021-01-03 RX ORDER — CASTOR OIL AND BALSAM, PERU 788; 87 MG/G; MG/G
OINTMENT TOPICAL EVERY 12 HOURS SCHEDULED
Status: DISCONTINUED | OUTPATIENT
Start: 2021-01-03 | End: 2021-01-14 | Stop reason: HOSPADM

## 2021-01-03 RX ADMIN — INSULIN ASPART 8 UNITS: 100 INJECTION, SOLUTION INTRAVENOUS; SUBCUTANEOUS at 11:03

## 2021-01-03 RX ADMIN — DEXTROSE MONOHYDRATE 100 ML/HR: 50 INJECTION, SOLUTION INTRAVENOUS at 08:21

## 2021-01-03 RX ADMIN — INSULIN DETEMIR 15 UNITS: 100 INJECTION, SOLUTION SUBCUTANEOUS at 20:38

## 2021-01-03 RX ADMIN — SODIUM CHLORIDE, PRESERVATIVE FREE 3 ML: 5 INJECTION INTRAVENOUS at 08:26

## 2021-01-03 RX ADMIN — METRONIDAZOLE 500 MG: 500 INJECTION, SOLUTION INTRAVENOUS at 11:04

## 2021-01-03 RX ADMIN — SODIUM CHLORIDE, PRESERVATIVE FREE 3 ML: 5 INJECTION INTRAVENOUS at 20:38

## 2021-01-03 RX ADMIN — IPRATROPIUM BROMIDE AND ALBUTEROL SULFATE 3 ML: .5; 3 SOLUTION RESPIRATORY (INHALATION) at 19:12

## 2021-01-03 RX ADMIN — LISINOPRIL: 10 TABLET ORAL at 08:22

## 2021-01-03 RX ADMIN — SODIUM CHLORIDE 75 ML/HR: 9 INJECTION, SOLUTION INTRAVENOUS at 05:05

## 2021-01-03 RX ADMIN — INSULIN ASPART 7 UNITS: 100 INJECTION, SOLUTION INTRAVENOUS; SUBCUTANEOUS at 16:14

## 2021-01-03 RX ADMIN — METRONIDAZOLE 500 MG: 500 INJECTION, SOLUTION INTRAVENOUS at 04:54

## 2021-01-03 RX ADMIN — SODIUM PHOSPHATE, MONOBASIC, MONOHYDRATE AND SODIUM PHOSPHATE, DIBASIC, ANHYDROUS 30 MMOL: 276; 142 INJECTION, SOLUTION INTRAVENOUS at 05:08

## 2021-01-03 RX ADMIN — METRONIDAZOLE 500 MG: 500 INJECTION, SOLUTION INTRAVENOUS at 20:38

## 2021-01-03 RX ADMIN — PANTOPRAZOLE SODIUM 40 MG: 40 TABLET, DELAYED RELEASE ORAL at 08:23

## 2021-01-03 RX ADMIN — ENOXAPARIN SODIUM 40 MG: 40 INJECTION SUBCUTANEOUS at 08:23

## 2021-01-03 RX ADMIN — VANCOMYCIN HYDROCHLORIDE 1000 MG: 1 INJECTION, POWDER, LYOPHILIZED, FOR SOLUTION INTRAVENOUS at 21:48

## 2021-01-03 RX ADMIN — CASTOR OIL AND BALSAM, PERU: 788; 87 OINTMENT TOPICAL at 20:38

## 2021-01-03 RX ADMIN — CASTOR OIL AND BALSAM, PERU: 788; 87 OINTMENT TOPICAL at 11:04

## 2021-01-03 RX ADMIN — INSULIN ASPART 8 UNITS: 100 INJECTION, SOLUTION INTRAVENOUS; SUBCUTANEOUS at 08:22

## 2021-01-03 NOTE — PROGRESS NOTES
PROGRESS NOTE         Patient Identification:  Name:  Ashley Galvez  Age:  75 y.o.  Sex:  female  :  1945  MRN:  [id number]  Visit Number:  [id number]  Primary Care Provider:  Provider, No Known         LOS: 3 days       ----------------------------------------------------------------------------------------------------------------------  Subjective       Chief Complaints:    Altered Mental Status        Interval History:      The patient is sitting up in bed this morning with nurse at bedside.  She is much more awake and alert this morning and is answering questions easily denies any complaints at this time.  Afebrile, no diarrhea.  Leukocytosis is resolved.    Review of Systems:    Constitutional: no fever, chills and night sweats.  Generalized fatigue.    Eyes: no eye drainage, itching or redness.  HEENT: no mouth sores, dysphagia or nose bleed.  Respiratory: no for shortness of breath, cough or production of sputum.  Cardiovascular: no chest pain, no palpitations, no orthopnea.  Gastrointestinal: no nausea, vomiting or diarrhea. No abdominal pain, hematemesis or rectal bleeding.  Genitourinary: no dysuria or polyuria.  Hematologic/lymphatic: no lymph node abnormalities, no easy bruising or easy bleeding.  Musculoskeletal: no muscle or joint pain.  Skin: No rash and no itching.  Neurological: no loss of consciousness, no seizure, no headache.  Behavioral/Psych: no depression or suicidal ideation.  Endocrine: no hot flashes.  Immunologic: negative.    ----------------------------------------------------------------------------------------------------------------------      Objective       Current Hospital Meds:  aspirin, 324 mg, Oral, Once  castor oil-balsam peru, , Topical, Q12H  enoxaparin, 40 mg, Subcutaneous, Daily  insulin aspart, 0-9 Units, Subcutaneous, TID AC  insulin detemir, 15 Units, Subcutaneous, Nightly  lisinopril-hydroCHLOROthiazide (ZESTORTIC) 20-25 mg combo dose, , Oral,  Q24H  metroNIDAZOLE, 500 mg, Intravenous, Q8H  pantoprazole, 40 mg, Oral, Daily  sodium chloride, 3 mL, Intravenous, Q12H  sodium phosphate IVPB, 30 mmol, Intravenous, Once  vancomycin, 1,000 mg, Intravenous, Q24H      dextrose, 100 mL/hr, Last Rate: 100 mL/hr (01/03/21 0821)  Pharmacy to Dose enoxaparin (LOVENOX),       ----------------------------------------------------------------------------------------------------------------------    Vital Signs:  Temp:  [97.6 °F (36.4 °C)-98.2 °F (36.8 °C)] 97.6 °F (36.4 °C)  Heart Rate:  [] 105  Resp:  [16-20] 18  BP: ()/() 103/82  Mean Arterial Pressure (Non-Invasive) for the past 24 hrs (Last 3 readings):   Noninvasive MAP (mmHg)   01/03/21 0902 82   01/03/21 0802 85   01/03/21 0702 114     SpO2 Percentage    01/03/21 0302 01/03/21 0506 01/03/21 0902   SpO2: 97% 96% 98%     SpO2:  [93 %-98 %] 98 %  on  Flow (L/min):  [2] 2;   Device (Oxygen Therapy): room air    Body mass index is 30.61 kg/m².  Wt Readings from Last 3 Encounters:   01/03/21 78.4 kg (172 lb 12.8 oz)        Intake/Output Summary (Last 24 hours) at 1/3/2021 1134  Last data filed at 1/3/2021 0600  Gross per 24 hour   Intake 2426 ml   Output 1200 ml   Net 1226 ml     Diet Pureed; Thin  ----------------------------------------------------------------------------------------------------------------------      Physical Exam:    Constitutional: Chronically ill-appearing elderly lady.  No respiratory distress. Much more awake and alert today.     HENT:  Head: Normocephalic and atraumatic.  Mouth:  Moist mucous membranes.    Eyes:  Conjunctivae and EOM are normal.  No scleral icterus.  Neck:  Neck supple.  No JVD present.    Cardiovascular:  Normal rate, regular rhythm and normal heart sounds with no murmur. No edema.  Pulmonary/Chest:  No respiratory distress, no wheezes, no crackles, with normal breath sounds and good air movement.  Abdominal:  Soft.  Bowel sounds are normal.  No distension and  no tenderness to palpation.  Musculoskeletal:  No edema, no tenderness, and no deformity.  No swelling or redness of joints.  Neurological:  Alert and oriented to person, place, and time.  No facial droop.  No slurred speech.   Skin:  Skin is warm and dry.  No rash noted.  No pallor.  Onychomycosis.  Psychiatric: Normal mood and affect.  Behavior is normal.  ----------------------------------------------------------------------------------------------------------------------  Results from last 7 days   Lab Units 01/01/21  0347 12/31/20 2009   CK TOTAL U/L  --  92   TROPONIN T ng/mL 0.037* 0.052*     Results from last 7 days   Lab Units 12/31/20 2009   PROBNP pg/mL 1,580.0*       Results from last 7 days   Lab Units 01/02/21  0419   PH, ARTERIAL pH units 7.406   PO2 ART mm Hg 77.9*   PCO2, ARTERIAL mm Hg 32.4*   HCO3 ART mmol/L 20.3     Results from last 7 days   Lab Units 01/03/21  0121 01/02/21  0135 01/01/21  0347 12/31/20 2009   CRP mg/dL  --   --  0.78* 0.75*   LACTATE mmol/L  --   --  0.9 0.9   WBC 10*3/mm3 9.66 12.48* 12.52* 11.73*   HEMOGLOBIN g/dL 9.6* 11.0* 11.3* 11.3*   HEMATOCRIT % 31.9* 33.5* 37.0 35.4   MCV fL 102.6* 94.6 103.4* 100.6*   MCHC g/dL 30.1* 32.8 30.5* 31.9   PLATELETS 10*3/mm3 364 406 413 449   INR   --   --   --  1.08     Results from last 7 days   Lab Units 01/03/21  0121 01/02/21  1735 01/02/21  0135 01/01/21  0347 12/31/20 2009   SODIUM mmol/L 154*  --  151* 153* 147*   POTASSIUM mmol/L 4.7 5.0 2.9* 3.2* 3.9   MAGNESIUM mg/dL  --   --  1.8  --  2.2   CHLORIDE mmol/L 128*  --  122* 130* 127*   CO2 mmol/L 16.8*  --  18.2* 10.7* 7.4*   BUN mg/dL 32*  --  39* 62* 70*   CREATININE mg/dL 0.95  --  1.18* 1.44* 1.71*   EGFR IF NONAFRICN AM mL/min/1.73 58*  --  45* 36* 29*   CALCIUM mg/dL 8.4*  --  8.9 9.5 9.9   GLUCOSE mg/dL 249*  --  135* 103* 112*   ALBUMIN g/dL 2.82*  --  3.10* 3.34* 3.64   BILIRUBIN mg/dL 0.2  --  0.2 0.2 0.2   ALK PHOS U/L 87  --  96 102 105   AST (SGOT) U/L 18  --  12  14 13   ALT (SGPT) U/L 10  --  9 9 10   Estimated Creatinine Clearance: 51.5 mL/min (by C-G formula based on SCr of 0.95 mg/dL).  No results found for: AMMONIA    Hemoglobin A1C   Date/Time Value Ref Range Status   12/31/2020 2009 7.90 (H) 4.80 - 5.60 % Final     Glucose   Date/Time Value Ref Range Status   01/03/2021 0817 368 (H) 70 - 130 mg/dL Final   01/03/2021 0008 248 (H) 70 - 130 mg/dL Final   01/02/2021 2128 270 (H) 70 - 130 mg/dL Final   01/02/2021 1657 279 (H) 70 - 130 mg/dL Final   01/02/2021 1110 284 (H) 70 - 130 mg/dL Final   01/02/2021 0555 232 (H) 70 - 130 mg/dL Final   01/02/2021 0010 116 70 - 130 mg/dL Final   01/01/2021 2026 425 (C) 70 - 130 mg/dL Final     Lab Results   Component Value Date    HGBA1C 7.90 (H) 12/31/2020     Lab Results   Component Value Date    TSH 1.240 12/31/2020       Blood Culture   Date Value Ref Range Status   01/02/2021 No growth at 24 hours  Preliminary   01/02/2021 No growth at 24 hours  Preliminary   12/31/2020 No growth at 2 days  Preliminary   12/31/2020 Enterococcus faecalis (C)  Final     Comment:     Infectious disease consultation is highly recommended.     Urine Culture   Date Value Ref Range Status   12/31/2020 Culture in progress  Preliminary     No results found for: WOUNDCX  No results found for: STOOLCX  No results found for: RESPCX  Pain Management Panel     Pain Management Panel Latest Ref Rng & Units 12/31/2020    AMPHETAMINES SCREEN, URINE Negative Negative    BARBITURATES SCREEN Negative Negative    BENZODIAZEPINE SCREEN, URINE Negative Negative    BUPRENORPHINEUR Negative Negative    COCAINE SCREEN, URINE Negative Negative    METHADONE SCREEN, URINE Negative Negative            ----------------------------------------------------------------------------------------------------------------------  Imaging Results (Last 24 Hours)     ** No results found for the last 24 hours. **           ----------------------------------------------------------------------------------------------------------------------    Assessment/Plan       Assessment/Plan     ASSESSMENT:    1.  Sepsis  2.  Pyelonephritis  3.  Enterococcus bacteremia    PLAN:    The patient is sitting up in bed this morning with nurse at bedside.  She is much more awake and alert this morning and is answering questions easily denies any complaints at this time.  Afebrile, no diarrhea.  Leukocytosis is resolved.    Urinalysis on 12/31/2020 was positive with WBCs too numerous to count.  Urine culture is in process.     Chest x-ray on 12/31/2020 showed left effusion and left basilar consolidation with recommendation to consider CT to evaluate for mass.  CT chest without contrast on 12/31/2020 showed no evidence of acute pneumonia.  Large hiatal hernia at the left lung base.  Noncalcified nodule in the right lower lobe measuring 7 mm with recommended chest CT follow-up in 6 months.  Left adrenal nodule, most likely a benign adenoma.  This can be further characterized with adrenal protocol CT or MRI on a nonemergent basis if indicated.     CT head without contrast on 12/31/2020 showed no acute intracranial abnormality.  CSF density mass of the right posterior fossa with mild mass-effect on the right cerebellar convexity, most likely an arachnoid cyst with recommendation to evaluate further with MRI without and with contrast.  Presumed chronic microvascular ischemic changes.     CT abdomen pelvis without contrast on 12/31/2020 showed atherosclerotic disease with probable vascular calcifications in both kidneys.  No ureteral stones as seen on either side, and there is no hydronephrosis.  Mild thickening of the urinary bladder wall may be due to incomplete distention or cystitis.  Large amount of stool in the rectal vault.  There is colonic diverticulosis without diverticulitis.  The appendix and small bowel are within normal limits.  Small left  adrenal nodule, likely a benign adenoma in the absence of known malignancy.  This could be assessed with nonemergent adrenal protocol MRI or CT if indicated.  Cholecystectomy and hysterectomy.     Blood cultures on 12/31/2020 are so far showing 1 out of 2 with Enterococcus.  BC ID on 12/31/2020 finalized as Enterococcus.  Repeat blood cultures are in process.     COVID-19 and flu A/B PCR on 12/31/2020 was negative.     Recommend to continue with vancomycin, which is appropriate coverage for Enterococcus bacteremia.  Recommend to continue with Flagyl 500 mg IV every 8 hours x 7 days for concerns of possible intra-abdominal infection.  We will follow urine culture results and adjust antibiotic therapy as appropriate.     We also recommend MRI for brain mass/cyst.    Code Status:   Code Status and Medical Interventions:   Ordered at: 12/31/20 4461     Level Of Support Discussed With:    Patient     Code Status:    CPR     Medical Interventions (Level of Support Prior to Arrest):    Full       PATRICE Baires  01/03/21  11:34 EST

## 2021-01-03 NOTE — PLAN OF CARE
Goal Outcome Evaluation:  Plan of Care Reviewed With: patient  Progress: improving  Outcome Summary: Patient is alert and oriented x4. She has recieved antibiotics as ordered. Blood glucose monitored and coverage provided as ordered. Patient has no complaints of pain. She remains on room air, and her oxygen saturation is currently 95%. Patient has been afebrile today. She has been turned every two hours, and dressing has been changed on coccyx. Additionally, Venelex ointment applied to left hip wound. Patient educated on the need for increased protein and turning. Patient's phosphorus replaced. She is on Lovenox for DVT prophalaxis. No complaints at this time. Patient's condition seems to be improving. Will continue to monitor.

## 2021-01-03 NOTE — NURSING NOTE
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 Stage 3 presents with a full thickness loss of the dermis with 25% sloth and 75% non granular tissue. This has been a chronic problem area as evidenced by slow to josh discolored areas from prior  Episode. New order for Thera- honey and cover with a mepilex daily. Wound to RT greater trochanter presents with a partial loss of the dermis with shallow pink ulcer. This is a stage 2 pressure are, however. darkened slow to josh areas are evidence this could have been staged higher on a previous encounter. New order for Venelex Q shift.

## 2021-01-03 NOTE — PROGRESS NOTES
[facility] HOSPITALIST PROGRESS NOTE     Patient Identification:  Name:  Ashley Galvez  Age:  75 y.o.  Sex:  female  :  1945  MRN:  [id number]  Visit Number:  [id number]  ROOM: [place] [address]     Primary Care Provider:  Provider, No Known    Length of stay in inpatient status:  3    Subjective     Chief Compliant:    Chief Complaint   Patient presents with   • Altered Mental Status       History of Presenting Illness: Patient seen and evaluated in follow-up for acute encephalopathy, acute on chronic general debility, with acute kidney injury and general malaise found to have have enterococcus bacteremia.  Patient today continues to improve, is alert and oriented x3 and carrying on complete conversations with intact thought processing.     Objective     Current Hospital Meds:aspirin, 324 mg, Oral, Once  castor oil-balsam peru, , Topical, Q12H  enoxaparin, 40 mg, Subcutaneous, Daily  insulin aspart, 0-9 Units, Subcutaneous, TID AC  insulin detemir, 15 Units, Subcutaneous, Nightly  lisinopril-hydroCHLOROthiazide (ZESTORTIC) 20-25 mg combo dose, , Oral, Q24H  metroNIDAZOLE, 500 mg, Intravenous, Q8H  pantoprazole, 40 mg, Oral, Daily  sodium chloride, 3 mL, Intravenous, Q12H  sodium phosphate IVPB, 30 mmol, Intravenous, Once  vancomycin, 1,000 mg, Intravenous, Q24H    dextrose, 100 mL/hr, Last Rate: 100 mL/hr (21 0821)  Pharmacy to Dose enoxaparin (LOVENOX),         Current Antimicrobial Therapy:  Anti-Infectives (From admission, onward)    Ordered     Dose/Rate Route Frequency Start Stop    21 1012  metroNIDAZOLE (FLAGYL) 500 mg/100mL IVPB     Ordering Provider: Yvan Dempsey MD    500 mg  over 60 Minutes Intravenous Every 8 Hours 21 1200 21 1159    21 1453  vancomycin 1 g/250 mL 0.9% NS (vial-mate)     Ordering Provider: Jorge Sidhu DO    1,000 mg  over 60 Minutes Intravenous Every 24 Hours 21 2100 21 204  vancomycin 1250 mg/250 mL  0.9% NS IVPB ([facility])     Ordering Provider: Mal Oliver DO    20 mg/kg × 68 kg  over 90 Minutes Intravenous Once 12/31/20 2145 12/31/20 2322 12/31/20 2047  cefepime (MAXIPIME) 2 g/100 mL 0.9% NS (mbp)     Ordering Provider: Mal Oliver DO    2 g  200 mL/hr over 30 Minutes Intravenous Once 12/31/20 2049 12/31/20 2142        Current Diuretic Therapy:  Diuretics (From admission, onward)    Ordered     Dose/Rate Route Frequency Start Stop    01/02/21 0808  lisinopril (PRINIVIL,ZESTRIL) 20 mg, hydroCHLOROthiazide (HYDRODIURIL) 25 mg for ZESTORETIC 20-25     Ordering Provider: Jorge Sidhu DO     Oral Every 24 Hours Scheduled 01/02/21 1000          ----------------------------------------------------------------------------------------------------------------------  Vital Signs:  Temp:  [97.6 °F (36.4 °C)-98.2 °F (36.8 °C)] 97.6 °F (36.4 °C)  Heart Rate:  [] 105  Resp:  [16-20] 18  BP: ()/() 103/82  SpO2:  [93 %-98 %] 98 %  on  Flow (L/min):  [2] 2;   Device (Oxygen Therapy): room air  Body mass index is 30.61 kg/m².    Wt Readings from Last 3 Encounters:   01/03/21 78.4 kg (172 lb 12.8 oz)     Intake & Output (last 3 days)       12/31 0701 - 01/01 0700 01/01 0701 - 01/02 0700 01/02 0701 - 01/03 0700 01/03 0701 - 01/04 0700    P.O.  540 680     I.V. (mL/kg) 302.8 (4) 2143.7 (28.1) 2066 (26.4)     IV Piggyback 850       Total Intake(mL/kg) 1152.8 (15.1) 2683.7 (35.2) 2746 (35)     Urine (mL/kg/hr) 1475 1350 (0.7) 1450 (0.8)     Emesis/NG output 0       Stool 0  0     Total Output 1475 1350 1450     Net -322.2 +1333.7 +1296             Urine Unmeasured Occurrence 3 x  1 x     Stool Unmeasured Occurrence 1 x  1 x     Emesis Unmeasured Occurrence 0 x           Diet Pureed; Thin  ----------------------------------------------------------------------------------------------------------------------  Physical exam:  Constitutional: Chronically ill-appearing and malnourished elderly female..        HENT:  Head:  Normocephalic and atraumatic.  Mouth:  Moist mucous membranes.    Eyes:  Conjunctivae and EOM are normal. No scleral icterus.    Neck:  Neck supple.  No JVD present.    Cardiovascular:  Normal rate, regular rhythm and normal heart sounds with no murmur.  Pulmonary/Chest:  No respiratory distress, no wheezes, no crackles, with normal breath sounds and good air movement.  Abdominal:  Soft.  Bowel sounds are normal.  No distension and no tenderness.   Musculoskeletal:  No edema, no tenderness, and no deformity.  No red or swollen joints anywhere.  Functional ROM intact.   Neurological:  No cranial nerve deficit.  No tongue deviation.  No facial droop.  Skin:  Skin is warm and dry. No rash however there is 2 stage 3 pressure injuries to the right midline gluteal cleft as well as greater trochanter.   Peripheral vascular:  Pulses in all 4 extremities with no clubbing, no cyanosis, no edema.  Psychiatric: Patient alert and oriented x3.  ----------------------------------------------------------------------------------------------------------------------  Tele:    ----------------------------------------------------------------------------------------------------------------------  Results from last 7 days   Lab Units 01/03/21  0121 01/02/21  0135 01/01/21  0347 12/31/20 2009   CRP mg/dL  --   --  0.78* 0.75*   LACTATE mmol/L  --   --  0.9 0.9   WBC 10*3/mm3 9.66 12.48* 12.52* 11.73*   HEMOGLOBIN g/dL 9.6* 11.0* 11.3* 11.3*   HEMATOCRIT % 31.9* 33.5* 37.0 35.4   MCV fL 102.6* 94.6 103.4* 100.6*   MCHC g/dL 30.1* 32.8 30.5* 31.9   PLATELETS 10*3/mm3 364 406 413 449   INR   --   --   --  1.08     Results from last 7 days   Lab Units 01/02/21  0419   PH, ARTERIAL pH units 7.406   PO2 ART mm Hg 77.9*   PCO2, ARTERIAL mm Hg 32.4*   HCO3 ART mmol/L 20.3     Results from last 7 days   Lab Units 01/03/21  0121 01/02/21  1735 01/02/21  0135 01/01/21  0347 12/31/20 2009   SODIUM mmol/L 154*  --  151* 153* 147*    POTASSIUM mmol/L 4.7 5.0 2.9* 3.2* 3.9   MAGNESIUM mg/dL  --   --  1.8  --  2.2   CHLORIDE mmol/L 128*  --  122* 130* 127*   CO2 mmol/L 16.8*  --  18.2* 10.7* 7.4*   BUN mg/dL 32*  --  39* 62* 70*   CREATININE mg/dL 0.95  --  1.18* 1.44* 1.71*   EGFR IF NONAFRICN AM mL/min/1.73 58*  --  45* 36* 29*   CALCIUM mg/dL 8.4*  --  8.9 9.5 9.9   PHOSPHORUS mg/dL 1.6*  --  3.0 2.3* 3.6   GLUCOSE mg/dL 249*  --  135* 103* 112*   ALBUMIN g/dL 2.82*  --  3.10* 3.34* 3.64   BILIRUBIN mg/dL 0.2  --  0.2 0.2 0.2   ALK PHOS U/L 87  --  96 102 105   AST (SGOT) U/L 18  --  12 14 13   ALT (SGPT) U/L 10  --  9 9 10   Estimated Creatinine Clearance: 51.5 mL/min (by C-G formula based on SCr of 0.95 mg/dL).  No results found for: AMMONIA  Results from last 7 days   Lab Units 01/01/21  0347 12/31/20 2009   CK TOTAL U/L  --  92   TROPONIN T ng/mL 0.037* 0.052*     Results from last 7 days   Lab Units 12/31/20 2009   PROBNP pg/mL 1,580.0*         Hemoglobin A1C   Date/Time Value Ref Range Status   12/31/2020 2009 7.90 (H) 4.80 - 5.60 % Final     Glucose   Date/Time Value Ref Range Status   01/03/2021 0817 368 (H) 70 - 130 mg/dL Final   01/03/2021 0008 248 (H) 70 - 130 mg/dL Final   01/02/2021 2128 270 (H) 70 - 130 mg/dL Final   01/02/2021 1657 279 (H) 70 - 130 mg/dL Final   01/02/2021 1110 284 (H) 70 - 130 mg/dL Final   01/02/2021 0555 232 (H) 70 - 130 mg/dL Final   01/02/2021 0010 116 70 - 130 mg/dL Final   01/01/2021 2026 425 (C) 70 - 130 mg/dL Final     Lab Results   Component Value Date    TSH 1.240 12/31/2020     No results found for: PREGTESTUR, PREGSERUM, HCG, HCGQUANT  Pain Management Panel     Pain Management Panel Latest Ref Rng & Units 12/31/2020    AMPHETAMINES SCREEN, URINE Negative Negative    BARBITURATES SCREEN Negative Negative    BENZODIAZEPINE SCREEN, URINE Negative Negative    BUPRENORPHINEUR Negative Negative    COCAINE SCREEN, URINE Negative Negative    METHADONE SCREEN, URINE Negative Negative        Brief Urine  Lab Results  (Last result in the past 365 days)      Color   Clarity   Blood   Leuk Est   Nitrite   Protein   CREAT   Urine HCG        12/31/20 2011 Yellow Cloudy Moderate (2+) Large (3+) Positive 30 mg/dL (1+)             Blood Culture   Date Value Ref Range Status   12/31/2020 Abnormal Stain (C)  Preliminary     No results found for: URINECX  No results found for: WOUNDCX  No results found for: STOOLCX  No results found for: RESPCX  No results found for: AFBCX  Results from last 7 days   Lab Units 01/01/21  0347 12/31/20 2009   LACTATE mmol/L 0.9 0.9   SED RATE mm/hr  --  24   CRP mg/dL 0.78* 0.75*       I have personally looked at the labs and they are summarized above.  ----------------------------------------------------------------------------------------------------------------------  Detailed radiology reports for the last 24 hours:    Imaging Results (Last 24 Hours)     ** No results found for the last 24 hours. **        Assessment & Plan      Sepsis, present on admission  Enterococcus bacteremia  Possible urinary tract infection   -Patient with acute on chronic debility and decline according to  has been progressively becoming more weak and unresponsive over the last 2 years.  Patient seen in [place] ER 6 months ago for similar symptoms and found to have a nodule with recommended follow-up.   -Approximately 2 years ago patient had been placed in acute inpatient rehab and has been doing better but then ran out of insurance days and was sent home with home health physical therapy where she maintains somewhat level of independence before those insurance days ran out and then has been gotten progressively weaker since.   - says that the patient is essentially bedbound and that he has to provide all of her care and that she interacts minimally and he has to help change her and that she is incontinent.   -Of note blood cultures obtained in the ER returned with 1 of 2 being positive by PCR ID for  enterococcus and patient was started on vancomycin.    -Infectious disease consulted and has continued vancomycin with the addition of flagyl.     -Suspect source of infection from deep tissue injury with her bed bound status and bowel movements in the area.  Will consider addition of immodium if patient having more than 2 bowel movements a day to help keep area clean.     Acute kidney injury, improving  Possible CKD, no known baseline  Non-anion gap metabolic acidosis   -Suspect kidney injury and NAGMA due to poor p.o. intake as well as ongoing above infectious etiology.  -Supportive care and treatment as above, as patient renal function continues to improve    Essential hypertension   -Blood pressure mostly normotensive will continue to hold antihypertensives at this time unless sustained elevated bp.     Diabetes mellitus type 2   -Initially hypoglycemic in the ER, however hyperglycemic now   -Increased SSI, will add nightly levemir    CF density mass with mild mass-effect  History of prior lacunar infarcts   -CT of the head showing CSF density in the right posterior fossa with mild mass-effect on the right cerebellar convexity believed most likely be an arachnoid cyst.  However radiology does recommend evaluation with MRI with and without contrast.   -MRI ordered no availability on the weekend and was unable to be performed on day of admission due to holiday.      VTE Prophylaxis:   Mechanical Order History:     None      Pharmalogical Order History:      Ordered     Dose Route Frequency Stop    01/01/21 0113  enoxaparin (LOVENOX) syringe 30 mg      30 mg SC Daily --    01/01/21 0100  Pharmacy to Dose enoxaparin (LOVENOX)     Question:  Indication of use  Answer:  Prophylaxis    -- XX Continuous PRN --                Jorge Sidhu DO  [facility]  01/03/21  11:26 EST

## 2021-01-04 ENCOUNTER — APPOINTMENT (OUTPATIENT)
Dept: GENERAL RADIOLOGY | Facility: HOSPITAL | Age: 76
End: 2021-01-04

## 2021-01-04 ENCOUNTER — APPOINTMENT (OUTPATIENT)
Dept: MRI IMAGING | Facility: HOSPITAL | Age: 76
End: 2021-01-04

## 2021-01-04 LAB
ALBUMIN SERPL-MCNC: 2.47 G/DL (ref 3.5–5.2)
ALBUMIN/GLOB SERPL: 1 G/DL
ALP SERPL-CCNC: 68 U/L (ref 39–117)
ALT SERPL W P-5'-P-CCNC: 9 U/L (ref 1–33)
ANION GAP SERPL CALCULATED.3IONS-SCNC: 9.4 MMOL/L (ref 5–15)
AST SERPL-CCNC: 8 U/L (ref 1–32)
BASOPHILS # BLD AUTO: 0.03 10*3/MM3 (ref 0–0.2)
BASOPHILS NFR BLD AUTO: 0.3 % (ref 0–1.5)
BILIRUB SERPL-MCNC: 0.2 MG/DL (ref 0–1.2)
BUN SERPL-MCNC: 27 MG/DL (ref 8–23)
BUN/CREAT SERPL: 29.7 (ref 7–25)
CALCIUM SPEC-SCNC: 7.3 MG/DL (ref 8.6–10.5)
CHLORIDE SERPL-SCNC: 114 MMOL/L (ref 98–107)
CO2 SERPL-SCNC: 18.6 MMOL/L (ref 22–29)
CREAT SERPL-MCNC: 0.91 MG/DL (ref 0.57–1)
DEPRECATED RDW RBC AUTO: 62.4 FL (ref 37–54)
EOSINOPHIL # BLD AUTO: 0.24 10*3/MM3 (ref 0–0.4)
EOSINOPHIL NFR BLD AUTO: 2.5 % (ref 0.3–6.2)
ERYTHROCYTE [DISTWIDTH] IN BLOOD BY AUTOMATED COUNT: 16.3 % (ref 12.3–15.4)
GFR SERPL CREATININE-BSD FRML MDRD: 60 ML/MIN/1.73
GLOBULIN UR ELPH-MCNC: 2.5 GM/DL
GLUCOSE BLDC GLUCOMTR-MCNC: 162 MG/DL (ref 70–130)
GLUCOSE BLDC GLUCOMTR-MCNC: 265 MG/DL (ref 70–130)
GLUCOSE BLDC GLUCOMTR-MCNC: 325 MG/DL (ref 70–130)
GLUCOSE BLDC GLUCOMTR-MCNC: 364 MG/DL (ref 70–130)
GLUCOSE SERPL-MCNC: 265 MG/DL (ref 65–99)
HCT VFR BLD AUTO: 28.2 % (ref 34–46.6)
HGB BLD-MCNC: 8.5 G/DL (ref 12–15.9)
IMM GRANULOCYTES # BLD AUTO: 0.05 10*3/MM3 (ref 0–0.05)
IMM GRANULOCYTES NFR BLD AUTO: 0.5 % (ref 0–0.5)
LYMPHOCYTES # BLD AUTO: 2.67 10*3/MM3 (ref 0.7–3.1)
LYMPHOCYTES NFR BLD AUTO: 27.8 % (ref 19.6–45.3)
MCH RBC QN AUTO: 31 PG (ref 26.6–33)
MCHC RBC AUTO-ENTMCNC: 30.1 G/DL (ref 31.5–35.7)
MCV RBC AUTO: 102.9 FL (ref 79–97)
MONOCYTES # BLD AUTO: 0.93 10*3/MM3 (ref 0.1–0.9)
MONOCYTES NFR BLD AUTO: 9.7 % (ref 5–12)
NEUTROPHILS NFR BLD AUTO: 5.69 10*3/MM3 (ref 1.7–7)
NEUTROPHILS NFR BLD AUTO: 59.2 % (ref 42.7–76)
NRBC BLD AUTO-RTO: 0 /100 WBC (ref 0–0.2)
PHOSPHATE SERPL-MCNC: 3.1 MG/DL (ref 2.5–4.5)
PLATELET # BLD AUTO: 241 10*3/MM3 (ref 140–450)
PMV BLD AUTO: 9.2 FL (ref 6–12)
POTASSIUM SERPL-SCNC: 3.9 MMOL/L (ref 3.5–5.2)
PROT SERPL-MCNC: 5 G/DL (ref 6–8.5)
RBC # BLD AUTO: 2.74 10*6/MM3 (ref 3.77–5.28)
SODIUM SERPL-SCNC: 142 MMOL/L (ref 136–145)
WBC # BLD AUTO: 9.61 10*3/MM3 (ref 3.4–10.8)

## 2021-01-04 PROCEDURE — 93005 ELECTROCARDIOGRAM TRACING: CPT | Performed by: INTERNAL MEDICINE

## 2021-01-04 PROCEDURE — 73610 X-RAY EXAM OF ANKLE: CPT

## 2021-01-04 PROCEDURE — 63710000001 INSULIN DETEMIR PER 5 UNITS: Performed by: INTERNAL MEDICINE

## 2021-01-04 PROCEDURE — 82962 GLUCOSE BLOOD TEST: CPT

## 2021-01-04 PROCEDURE — 93010 ELECTROCARDIOGRAM REPORT: CPT | Performed by: INTERNAL MEDICINE

## 2021-01-04 PROCEDURE — 25010000002 ENOXAPARIN PER 10 MG: Performed by: STUDENT IN AN ORGANIZED HEALTH CARE EDUCATION/TRAINING PROGRAM

## 2021-01-04 PROCEDURE — 70551 MRI BRAIN STEM W/O DYE: CPT

## 2021-01-04 PROCEDURE — 92610 EVALUATE SWALLOWING FUNCTION: CPT

## 2021-01-04 PROCEDURE — 63710000001 INSULIN ASPART PER 5 UNITS: Performed by: STUDENT IN AN ORGANIZED HEALTH CARE EDUCATION/TRAINING PROGRAM

## 2021-01-04 PROCEDURE — 99232 SBSQ HOSP IP/OBS MODERATE 35: CPT | Performed by: INTERNAL MEDICINE

## 2021-01-04 PROCEDURE — 80053 COMPREHEN METABOLIC PANEL: CPT | Performed by: STUDENT IN AN ORGANIZED HEALTH CARE EDUCATION/TRAINING PROGRAM

## 2021-01-04 PROCEDURE — 84100 ASSAY OF PHOSPHORUS: CPT | Performed by: STUDENT IN AN ORGANIZED HEALTH CARE EDUCATION/TRAINING PROGRAM

## 2021-01-04 PROCEDURE — 70551 MRI BRAIN STEM W/O DYE: CPT | Performed by: RADIOLOGY

## 2021-01-04 PROCEDURE — 85025 COMPLETE CBC W/AUTO DIFF WBC: CPT | Performed by: STUDENT IN AN ORGANIZED HEALTH CARE EDUCATION/TRAINING PROGRAM

## 2021-01-04 PROCEDURE — 73610 X-RAY EXAM OF ANKLE: CPT | Performed by: RADIOLOGY

## 2021-01-04 PROCEDURE — 94799 UNLISTED PULMONARY SVC/PX: CPT

## 2021-01-04 PROCEDURE — 25010000002 CEFEPIME PER 500 MG: Performed by: INTERNAL MEDICINE

## 2021-01-04 RX ADMIN — INSULIN ASPART 2 UNITS: 100 INJECTION, SOLUTION INTRAVENOUS; SUBCUTANEOUS at 08:27

## 2021-01-04 RX ADMIN — INSULIN DETEMIR 20 UNITS: 100 INJECTION, SOLUTION SUBCUTANEOUS at 21:03

## 2021-01-04 RX ADMIN — INSULIN ASPART 3 UNITS: 100 INJECTION, SOLUTION INTRAVENOUS; SUBCUTANEOUS at 18:32

## 2021-01-04 RX ADMIN — INSULIN ASPART 8 UNITS: 100 INJECTION, SOLUTION INTRAVENOUS; SUBCUTANEOUS at 12:30

## 2021-01-04 RX ADMIN — LISINOPRIL: 10 TABLET ORAL at 08:27

## 2021-01-04 RX ADMIN — IPRATROPIUM BROMIDE AND ALBUTEROL SULFATE 3 ML: .5; 3 SOLUTION RESPIRATORY (INHALATION) at 06:32

## 2021-01-04 RX ADMIN — CASTOR OIL AND BALSAM, PERU: 788; 87 OINTMENT TOPICAL at 21:03

## 2021-01-04 RX ADMIN — CEFEPIME 2 G: 2 INJECTION, POWDER, FOR SOLUTION INTRAVENOUS at 21:02

## 2021-01-04 RX ADMIN — CEFEPIME 2 G: 2 INJECTION, POWDER, FOR SOLUTION INTRAVENOUS at 12:59

## 2021-01-04 RX ADMIN — METRONIDAZOLE 500 MG: 500 INJECTION, SOLUTION INTRAVENOUS at 03:50

## 2021-01-04 RX ADMIN — ENOXAPARIN SODIUM 40 MG: 40 INJECTION SUBCUTANEOUS at 08:26

## 2021-01-04 RX ADMIN — PANTOPRAZOLE SODIUM 40 MG: 40 TABLET, DELAYED RELEASE ORAL at 08:27

## 2021-01-04 RX ADMIN — VANCOMYCIN HYDROCHLORIDE 1000 MG: 1 INJECTION, POWDER, LYOPHILIZED, FOR SOLUTION INTRAVENOUS at 21:02

## 2021-01-04 RX ADMIN — IPRATROPIUM BROMIDE AND ALBUTEROL SULFATE 3 ML: .5; 3 SOLUTION RESPIRATORY (INHALATION) at 13:45

## 2021-01-04 RX ADMIN — SODIUM CHLORIDE, PRESERVATIVE FREE 3 ML: 5 INJECTION INTRAVENOUS at 08:28

## 2021-01-04 RX ADMIN — IPRATROPIUM BROMIDE AND ALBUTEROL SULFATE 3 ML: .5; 3 SOLUTION RESPIRATORY (INHALATION) at 00:04

## 2021-01-04 RX ADMIN — CASTOR OIL AND BALSAM, PERU: 788; 87 OINTMENT TOPICAL at 08:27

## 2021-01-04 RX ADMIN — SODIUM CHLORIDE, PRESERVATIVE FREE 3 ML: 5 INJECTION INTRAVENOUS at 21:01

## 2021-01-04 NOTE — PROGRESS NOTES
Discharge Planning Assessment   [facility]     Patient Name: Ashley Galvez  MRN: [id number]  Today's Date: 1/4/2021    Admit Date: 12/31/2020    Discharge Needs Assessment     Row Name 01/04/21 1535       Living Environment    Lives With  spouse    Current Living Arrangements  home/apartment/condo    Primary Care Provided by  spouse/significant other    Provides Primary Care For  no one, unable/limited ability to care for self    Family Caregiver if Needed  spouse    Quality of Family Relationships  unable to assess    Able to Return to Prior Arrangements  yes       Resource/Environmental Concerns    Transportation Concerns  car, none       Transition Planning    Patient/Family Anticipates Transition to  home with family    Transportation Anticipated  health plan transportation       Discharge Needs Assessment    Equipment Currently Used at Home  hospital bed;wheelchair;commode        Discharge Plan     Row Name 01/04/21 1531       Plan    Plan  SS spoke with Pt. Pt lives with spouse. Pt does not receive any home health services. Pt utilizes a hospital bed, wheelchair and bedside commode. Pt is unsure of PCP and Pharmacy. Pt does not have a POA or living will. Pt would like to go home at discharge and reports she will need EMS transport. SS will continue to follow and assist with discharge planning.    Patient/Family in Agreement with Plan  yes                         Demographic Summary     Row Name 01/04/21 1530       General Information    Admission Type  inpatient    Referral Source  nursing SS received consult for may benefit or has home health due to a pressure ulcer on coccyx.          Abbey Chung

## 2021-01-04 NOTE — THERAPY RE-EVALUATION
Acute Care - Speech Language Pathology   Swallow Re-Assessment  [facility]   CLINICAL DYSPHAGIA REASSESSMENT     Patient Name: Ashley Galvez  : 1945  MRN: [id number]  Today's Date: 2021  Onset of Illness/Injury or Date of Surgery: 20          Admit Date: 2020    Visit Dx:     ICD-10-CM ICD-9-CM   1. Acute renal failure, unspecified acute renal failure type (CMS/HCC)  N17.9 584.9     Patient Active Problem List   Diagnosis   • Sepsis (CMS/HCC)     Past Medical History:   Diagnosis Date   • Anemia    • Anterolisthesis     Grade 1 anterolisthesis of L5 on S1 due to bilateral L5 pars defects   • Diverticulosis    • Essential hypertension    • Lacunar infarction (CMS/HCC)    • Type 2 diabetes mellitus (CMS/HCC)      Past Surgical History:   Procedure Laterality Date   • CHOLECYSTECTOMY     • HYSTERECTOMY       Ashley Galvez  is seen at bedside this am on PCU to reassess safety/efficacy of swallowing fnx, determine safest/least restrictive diet tolerance. She is s/p initial clinical dysphagia assessment 21 w/ recommendation for puree diet, thin liquids. She is much improved in ams and interaction today, assessed for diet upgrade.     Social History     Socioeconomic History   • Marital status:      Spouse name: Not on file   • Number of children: Not on file   • Years of education: Not on file   • Highest education level: Not on file   Tobacco Use   • Smoking status: Never Smoker   • Smokeless tobacco: Never Used   Substance and Sexual Activity   • Alcohol use: Never     Frequency: Never   • Drug use: Never   • Sexual activity: Defer      No recent chest xray available for review.     Diet Orders (active) (From admission, onward)     Start     Ordered    21 1223  Diet Soft Texture; Chopped; Thin  Diet Effective Now      21 1222    21 1800  Dietary Nutrition Supplements Boost Plus (Ensure Enlive, Ensure Plus)  Daily With Breakfast, Lunch & Dinner      21 1347               She is observed on ra w/o complications.     Pt is positioned upright and centered in bed to accept multiple po presentations of ice chips, solid cracker, puree and thin liquids via spoon, cup and straw. She is able to self feed.     Facial/oral structures are symmetrical upon observation. Lingual protrusion reveals no deviation. Oral mucosa are moist, pink, and clean. Secretions are clear, thin, and well controlled. OROM/WALKER is adequate to imitate oral postures. Gag is not assessed. Volitional cough is intact w/ adequate intensity, clear in quality, non-productive. Voice is adequate in intensity, clear in quality w/ intelligible speech. She is oriented to person and place, follows simple directives and participates in simple conversational exchanges.     Upon po presentations, adequate bolus anticipation and acceptance w/ good labial seal for bolus clearance via spoon bowl, cup rim stability and suction via straw. Bolus formation, manipulation and control are wfl w/ mildly increased oral prep time w/ solid, rotary mastication pattern and piecemeal deglutition. A-p transit is slightly delayed w/o oral residue. No overt s/s aspiration evidenced before the swallow.     Pharyngeal swallow is timely w/ adequate hyolaryngeal elevation per palpation. No overt s/s aspiration evidenced across this assessment. No silent aspiration suspected as pt is w/o changes in vocal quality, respirations or secretions post po presentations. Pt denies odynophagia.    Impression: Per this reassessment, Ms. Galvez presents w/ a trace-mild mastication weakness, wfl pharyngeal swallow w/o s/s aspiration. No s/s indicative of silent aspiration. Recommend upgrade to least restrictive mechanical soft diet, chopped meats, thin liquids.     EDUCATION  The patient has been educated in the following areas:   Dysphagia (Swallowing Impairment) Modified Diet Instruction.    SLP Recommendation and Plan  1. Mechanical soft diet, chopped meats, thin  liquids.    2. Meds whole in puree/thins.   3. Upright and centered for all po intake.  4. DANIEL precautions.  5. Oral care protocol.  SLP to sign off at this time.     D/w pt results and recommendations w/ verbal agreement.    Thank you for allowing me to participate in the care of your patient-  Ruth Mccray M.A., CCC-SLP         Time Calculation:       Therapy Charges for Today     Code Description Service Date Service Provider Modifiers Qty    [id number]  ST EVAL ORAL PHARYNG SWALLOW 3 1/4/2021 Ruth Mccray MA,CCC-SLP GN 1        Patient was not wearing a face mask during this therapy encounter. Therapist used appropriate personal protective equipment including mask, eye protection and gloves.  Mask used was standard procedure mask. Appropriate PPE was worn during the entire therapy session. The patient coughed during this evaluation. Therapist was within 6 feet for 15 minutes or more during the evaluation. Hand hygiene was completed before and after therapy session. Patient is not in enhanced droplet precautions.            Ruth Mccray MA,CCC-SLP  1/4/2021

## 2021-01-04 NOTE — PROGRESS NOTES
PROGRESS NOTE         Patient Identification:  Name:  Ashley Galvez  Age:  75 y.o.  Sex:  female  :  1945  MRN:  [id number]  Visit Number:  [id number]  Primary Care Provider:  Provider, No Known         LOS: 4 days       ----------------------------------------------------------------------------------------------------------------------  Subjective       Chief Complaints:    Altered Mental Status        Interval History:      The patient is sitting up in bed this morning on room air in no apparent distress.  Denies any complaints at this time.  Afebrile, no diarrhea WBC remains normal.  Urine culture on 1/3/2021 finalized as Pseudomonas aeruginosa and Proteus mirabilis.     Review of Systems:    Constitutional: no fever, chills and night sweats.  Generalized fatigue.    Eyes: no eye drainage, itching or redness.  HEENT: no mouth sores, dysphagia or nose bleed.  Respiratory: no for shortness of breath, cough or production of sputum.  Cardiovascular: no chest pain, no palpitations, no orthopnea.  Gastrointestinal: no nausea, vomiting or diarrhea. No abdominal pain, hematemesis or rectal bleeding.  Genitourinary: no dysuria or polyuria.  Hematologic/lymphatic: no lymph node abnormalities, no easy bruising or easy bleeding.  Musculoskeletal: no muscle or joint pain.  Skin: No rash and no itching.  Neurological: no loss of consciousness, no seizure, no headache.  Behavioral/Psych: no depression or suicidal ideation.  Endocrine: no hot flashes.  Immunologic: negative.    ----------------------------------------------------------------------------------------------------------------------      Objective       Current Hospital Meds:  aspirin, 324 mg, Oral, Once  castor oil-balsam peru, , Topical, Q12H  cefepime, 2 g, Intravenous, Q12H  enoxaparin, 40 mg, Subcutaneous, Daily  insulin aspart, 0-9 Units, Subcutaneous, TID AC  insulin detemir, 20 Units, Subcutaneous, Nightly  ipratropium-albuterol, 3 mL,  Nebulization, 4x Daily - RT  lisinopril-hydroCHLOROthiazide (ZESTORTIC) 20-25 mg combo dose, , Oral, Q24H  pantoprazole, 40 mg, Oral, Daily  sodium chloride, 3 mL, Intravenous, Q12H  vancomycin, 1,000 mg, Intravenous, Q24H      Pharmacy to Dose enoxaparin (LOVENOX),       ----------------------------------------------------------------------------------------------------------------------    Vital Signs:  Temp:  [97.2 °F (36.2 °C)-98 °F (36.7 °C)] 98 °F (36.7 °C)  Heart Rate:  [] 90  Resp:  [16-20] 18  BP: (115-148)/() 126/101  Mean Arterial Pressure (Non-Invasive) for the past 24 hrs (Last 3 readings):   Noninvasive MAP (mmHg)   01/04/21 0900 110   01/04/21 0800 102   01/04/21 0700 102     SpO2 Percentage    01/04/21 0700 01/04/21 0800 01/04/21 0900   SpO2: 100% 98% 100%     SpO2:  [97 %-100 %] 100 %  on  Flow (L/min):  [2] 2;   Device (Oxygen Therapy): room air    Body mass index is 31.53 kg/m².  Wt Readings from Last 3 Encounters:   01/04/21 80.7 kg (178 lb)        Intake/Output Summary (Last 24 hours) at 1/4/2021 1333  Last data filed at 1/4/2021 1100  Gross per 24 hour   Intake 3258 ml   Output 600 ml   Net 2658 ml     Diet Soft Texture; Chopped; Thin  ----------------------------------------------------------------------------------------------------------------------      Physical Exam:    Constitutional: Chronically ill-appearing elderly lady.  No respiratory distress. Much more awake and alert today.     HENT:  Head: Normocephalic and atraumatic.  Mouth:  Moist mucous membranes.    Eyes:  Conjunctivae and EOM are normal.  No scleral icterus.  Neck:  Neck supple.  No JVD present.    Cardiovascular:   Regular rate.  Irregularly irregular rhythm.  No murmur auscultated.  Pulmonary/Chest:  No respiratory distress, no wheezes, no crackles, with normal breath sounds and good air movement.  Abdominal:  Soft.  Bowel sounds are normal.  No distension and no tenderness to palpation.  Musculoskeletal:  No  edema, no tenderness, and no deformity.  No swelling or redness of joints.  Ulcerations of pretibial area which do not appear infectious.  Right ankle turns inward.  Neurological:  Alert and oriented to person, place, and time.  No facial droop.  No slurred speech.   Skin:  Skin is warm and dry.  No rash noted.  No pallor.  Onychomycosis.  Psychiatric: Normal mood and affect.  Behavior is normal.  ----------------------------------------------------------------------------------------------------------------------  Results from last 7 days   Lab Units 01/01/21  0347 12/31/20 2009   CK TOTAL U/L  --  92   TROPONIN T ng/mL 0.037* 0.052*     Results from last 7 days   Lab Units 12/31/20 2009   PROBNP pg/mL 1,580.0*       Results from last 7 days   Lab Units 01/02/21  0419   PH, ARTERIAL pH units 7.406   PO2 ART mm Hg 77.9*   PCO2, ARTERIAL mm Hg 32.4*   HCO3 ART mmol/L 20.3     Results from last 7 days   Lab Units 01/04/21  0140 01/03/21  0121 01/02/21  0135 01/01/21 0347 12/31/20 2009   CRP mg/dL  --   --   --  0.78* 0.75*   LACTATE mmol/L  --   --   --  0.9 0.9   WBC 10*3/mm3 9.61 9.66 12.48* 12.52* 11.73*   HEMOGLOBIN g/dL 8.5* 9.6* 11.0* 11.3* 11.3*   HEMATOCRIT % 28.2* 31.9* 33.5* 37.0 35.4   MCV fL 102.9* 102.6* 94.6 103.4* 100.6*   MCHC g/dL 30.1* 30.1* 32.8 30.5* 31.9   PLATELETS 10*3/mm3 241 364 406 413 449   INR   --   --   --   --  1.08     Results from last 7 days   Lab Units 01/04/21  0140 01/03/21  0121 01/02/21  1735 01/02/21  0135  12/31/20 2009   SODIUM mmol/L 142 154*  --  151*   < > 147*   POTASSIUM mmol/L 3.9 4.7 5.0 2.9*   < > 3.9   MAGNESIUM mg/dL  --   --   --  1.8  --  2.2   CHLORIDE mmol/L 114* 128*  --  122*   < > 127*   CO2 mmol/L 18.6* 16.8*  --  18.2*   < > 7.4*   BUN mg/dL 27* 32*  --  39*   < > 70*   CREATININE mg/dL 0.91 0.95  --  1.18*   < > 1.71*   EGFR IF NONAFRICN AM mL/min/1.73 60* 58*  --  45*   < > 29*   CALCIUM mg/dL 7.3* 8.4*  --  8.9   < > 9.9   GLUCOSE mg/dL 265* 249*  --   135*   < > 112*   ALBUMIN g/dL 2.47* 2.82*  --  3.10*   < > 3.64   BILIRUBIN mg/dL 0.2 0.2  --  0.2   < > 0.2   ALK PHOS U/L 68 87  --  96   < > 105   AST (SGOT) U/L 8 18  --  12   < > 13   ALT (SGPT) U/L 9 10  --  9   < > 10    < > = values in this interval not displayed.   Estimated Creatinine Clearance: 54.5 mL/min (by C-G formula based on SCr of 0.91 mg/dL).  No results found for: AMMONIA    Glucose   Date/Time Value Ref Range Status   01/04/2021 1234 325 (H) 70 - 130 mg/dL Final   01/04/2021 0623 162 (H) 70 - 130 mg/dL Final   01/03/2021 2001 282 (H) 70 - 130 mg/dL Final   01/03/2021 1609 311 (H) 70 - 130 mg/dL Final   01/03/2021 1059 386 (H) 70 - 130 mg/dL Final   01/03/2021 0817 368 (H) 70 - 130 mg/dL Final   01/03/2021 0008 248 (H) 70 - 130 mg/dL Final   01/02/2021 2128 270 (H) 70 - 130 mg/dL Final     Lab Results   Component Value Date    HGBA1C 7.90 (H) 12/31/2020     Lab Results   Component Value Date    TSH 1.240 12/31/2020       Blood Culture   Date Value Ref Range Status   01/02/2021 No growth at 24 hours  Preliminary   01/02/2021 No growth at 24 hours  Preliminary   12/31/2020 No growth at 2 days  Preliminary   12/31/2020 Enterococcus faecalis (C)  Final     Comment:     Infectious disease consultation is highly recommended.     Urine Culture   Date Value Ref Range Status   12/31/2020 Culture in progress  Preliminary     No results found for: WOUNDCX  No results found for: STOOLCX  No results found for: RESPCX  Pain Management Panel     Pain Management Panel Latest Ref Rng & Units 12/31/2020    AMPHETAMINES SCREEN, URINE Negative Negative    BARBITURATES SCREEN Negative Negative    BENZODIAZEPINE SCREEN, URINE Negative Negative    BUPRENORPHINEUR Negative Negative    COCAINE SCREEN, URINE Negative Negative    METHADONE SCREEN, URINE Negative Negative            ----------------------------------------------------------------------------------------------------------------------  Imaging Results (Last  24 Hours)     Procedure Component Value Units Date/Time    MRI Brain Without Contrast [[id number]] Collected: 01/04/21 1044     Updated: 01/04/21 1120    Narrative:      EXAM:    MR Head WITHOUT Intravenous Contrast     EXAM DATE:    1/4/2021 9:20 AM     CLINICAL HISTORY:    CSF density mass with mass effect, MRI w/wo was recommended, patient  with AMS; N17.9-Acute kidney failure, unspecified     TECHNIQUE:    Magnetic resonance images of the head/brain without intravenous  contrast in multiple planes.     COMPARISON:    CT 12/31/2020     FINDINGS:    Brain:  Near fluid density process in the region of the right  cerebellar hemisphere is noted corresponding to abnormality identified  on CT and appears to be parenchymal in nature and most probably  represents marked encephalomalacia from sequela of a previous cerebellar  infarction.  Additionally, there is marked gliosis noted with cortical  sclerosis of the right posterior medial occipital lobe compatible with  sequela of remote occipital region infarct.  Advanced chronic small  vessel ischemic disease noted.  Generalized cerebral atrophy in part  age-related.  No hemorrhage.    Ventricles:  Unremarkable.  No ventriculomegaly.    Bones/joints:  Unremarkable.    Sinuses:  Mild chronic ethmoid sinusitis.    Mastoid air cells:  Marked left mastoiditis changes are noted.    Orbits:  Unremarkable as visualized.       Impression:      1.  Near CSF density process involving the right cerebellar hemisphere  which is most probably intra-axial in location and has features ON the  MRI that are most compatible with sequela of a remote infarct with  extensive encephalomalacia changes. Cyst also considered but felt less  likely.  2.  No solid appearing features and no mass effect is noted.  3.  Chronic appearing infarct in the right occipital lobe with cortical  sclerosis noted.  4.  Generalized atrophy and advanced chronic small vessel ischemic  disease.  5.  Marked left  mastoiditis.  6.  Mild chronic appearing ethmoid sinusitis.  7.  No acute intracranial findings are otherwise noted.     This report was finalized on 1/4/2021 10:47 AM by Dr. Sagar Romero MD.       XR Ankle 3+ View Right [[id number]] Resulted: 01/04/21 0953     Updated: 01/04/21 1042          ----------------------------------------------------------------------------------------------------------------------    Assessment/Plan       Assessment/Plan     ASSESSMENT:    1.  Sepsis  2.  Pyelonephritis  3.  Enterococcus bacteremia    PLAN:    The patient is sitting up in bed this morning on room air in no apparent distress.  Denies any complaints at this time.  Afebrile, no diarrhea WBC remains normal.  Urine culture on 1/3/2021 finalized as Pseudomonas aeruginosa and Proteus mirabilis.     Urinalysis on 12/31/2020 was positive with WBCs too numerous to count.  Urine culture is in process.     Chest x-ray on 12/31/2020 showed left effusion and left basilar consolidation with recommendation to consider CT to evaluate for mass.  CT chest without contrast on 12/31/2020 showed no evidence of acute pneumonia.  Large hiatal hernia at the left lung base.  Noncalcified nodule in the right lower lobe measuring 7 mm with recommended chest CT follow-up in 6 months.  Left adrenal nodule, most likely a benign adenoma.  This can be further characterized with adrenal protocol CT or MRI on a nonemergent basis if indicated.     CT head without contrast on 12/31/2020 showed no acute intracranial abnormality.  CSF density mass of the right posterior fossa with mild mass-effect on the right cerebellar convexity, most likely an arachnoid cyst with recommendation to evaluate further with MRI without and with contrast.  Presumed chronic microvascular ischemic changes.     CT abdomen pelvis without contrast on 12/31/2020 showed atherosclerotic disease with probable vascular calcifications in both kidneys.  No ureteral stones as seen on either  side, and there is no hydronephrosis.  Mild thickening of the urinary bladder wall may be due to incomplete distention or cystitis.  Large amount of stool in the rectal vault.  There is colonic diverticulosis without diverticulitis.  The appendix and small bowel are within normal limits.  Small left adrenal nodule, likely a benign adenoma in the absence of known malignancy.  This could be assessed with nonemergent adrenal protocol MRI or CT if indicated.  Cholecystectomy and hysterectomy.     Blood cultures on 12/31/2020 are so far showing 1 out of 2 with Enterococcus.  BC ID on 12/31/2020 finalized as Enterococcus.  Repeat blood cultures are in process.     COVID-19 and flu A/B PCR on 12/31/2020 was negative.     Recommend to continue with vancomycin, which is appropriate coverage for Enterococcus bacteremia.  Flagyl was discontinued today, as there is no longer concern for intra-abdominal infection.  As urine culture has finalized as Pseudomonas and Proteus, cefepime 2 g IV every 12 hours was initiated today to continue through 1/11/2021 for UTI.    Code Status:   Code Status and Medical Interventions:   Ordered at: 12/31/20 7722     Level Of Support Discussed With:    Patient     Code Status:    CPR     Medical Interventions (Level of Support Prior to Arrest):    Full       PATRICE Baires  01/04/21  13:33 EST

## 2021-01-04 NOTE — PROGRESS NOTES
Assisted By: Abbey LAINEZ    CC: F/U bacteremia    Interview History/HPI: Patient states she is feeling okay, no shortness of breath no chest pain, she states she has been bedridden for about 2 years, she is  to her  of 55 years who is ill from a mesothelioma.  He has been bringing her food in bed and overall cleans her up when she has a bowel movement.  She states 2 years ago she noticed that sometimes she could take a few steps but then her legs would just give out.  She is found to be bacteremic here and was admitted for further evaluation and treatment.      Vitals:    01/04/21 0800   BP: 123/83   Pulse: 94   Resp: 17   Temp: 98 °F (36.7 °C)   SpO2: 98%         Intake/Output Summary (Last 24 hours) at 1/4/2021 0936  Last data filed at 1/4/2021 0800  Gross per 24 hour   Intake 2898 ml   Output 750 ml   Net 2148 ml       EXAM: Patient is pleasant alert and oriented.  She has no distress, saturation 99% on room air.  Mood is good skin warm and dry strength is symmetric lower extremities are weak and to gross testing she has decreased sensation in a stocking fashion on her legs.  She has some ulcerations noted bilaterally in the pretibial areas on the leg but appears not to have secondary infection at this time.  Lungs have bilateral breath sounds rare wheeze heard otherwise clear without rhonchi rales or wheezing, heart is tachycardic irregularly irregular rhythm currently at 112 without murmur rub or gallop, abdomen is soft benign.  She does have a inward turning deformity of her right ankle, no edema is noted, therapy is to see her but I am going to get an x-ray of this ankle to make sure there is no evidence of fracture.      EKG: Initial EKG image reviewed.  Sinus    Tele: Appears to be atrial fibrillation with a rapid rate, pending EKG    LABS:   Lab Results (last 48 hours)     Procedure Component Value Units Date/Time    Blood Culture - Blood, Arm, Left [[id number]] Collected: 01/02/21 0814     Specimen: Blood from Arm, Left Updated: 01/04/21 0845     Blood Culture No growth at 2 days    Blood Culture - Blood, Hand, Left [[id number]] Collected: 01/02/21 0814    Specimen: Blood from Hand, Left Updated: 01/04/21 0845     Blood Culture No growth at 2 days    POC Glucose Once [[id number]]  (Abnormal) Collected: 01/04/21 0623    Specimen: Blood Updated: 01/04/21 0718     Glucose 162 mg/dL     Phosphorus [[id number]]  (Normal) Collected: 01/04/21 0140    Specimen: Blood Updated: 01/04/21 0321     Phosphorus 3.1 mg/dL     Comprehensive Metabolic Panel [[id number]]  (Abnormal) Collected: 01/04/21 0140    Specimen: Blood Updated: 01/04/21 0215     Glucose 265 mg/dL      BUN 27 mg/dL      Creatinine 0.91 mg/dL      Sodium 142 mmol/L      Potassium 3.9 mmol/L      Chloride 114 mmol/L      CO2 18.6 mmol/L      Calcium 7.3 mg/dL      Total Protein 5.0 g/dL      Albumin 2.47 g/dL      ALT (SGPT) 9 U/L      AST (SGOT) 8 U/L      Alkaline Phosphatase 68 U/L      Total Bilirubin 0.2 mg/dL      eGFR Non African Amer 60 mL/min/1.73      Globulin 2.5 gm/dL      A/G Ratio 1.0 g/dL      BUN/Creatinine Ratio 29.7     Anion Gap 9.4 mmol/L     Narrative:      GFR Normal >60  Chronic Kidney Disease <60  Kidney Failure <15      CBC & Differential [[id number]]  (Abnormal) Collected: 01/04/21 0140    Specimen: Blood Updated: 01/04/21 0149    Narrative:      The following orders were created for panel order CBC & Differential.  Procedure                               Abnormality         Status                     ---------                               -----------         ------                     CBC Auto Differential[[id number]]        Abnormal            Final result                 Please view results for these tests on the individual orders.    CBC Auto Differential [[id number]]  (Abnormal) Collected: 01/04/21 0140    Specimen: Blood Updated: 01/04/21 0149     WBC 9.61 10*3/mm3      RBC 2.74 10*6/mm3      Hemoglobin 8.5 g/dL       Hematocrit 28.2 %      .9 fL      MCH 31.0 pg      MCHC 30.1 g/dL      RDW 16.3 %      RDW-SD 62.4 fl      MPV 9.2 fL      Platelets 241 10*3/mm3      Neutrophil % 59.2 %      Lymphocyte % 27.8 %      Monocyte % 9.7 %      Eosinophil % 2.5 %      Basophil % 0.3 %      Immature Grans % 0.5 %      Neutrophils, Absolute 5.69 10*3/mm3      Lymphocytes, Absolute 2.67 10*3/mm3      Monocytes, Absolute 0.93 10*3/mm3      Eosinophils, Absolute 0.24 10*3/mm3      Basophils, Absolute 0.03 10*3/mm3      Immature Grans, Absolute 0.05 10*3/mm3      nRBC 0.0 /100 WBC     Vancomycin, Trough [[id number]]  (Normal) Collected: 01/03/21 1956    Specimen: Blood Updated: 01/03/21 2040     Vancomycin Trough 17.60 mcg/mL     Blood Culture - Blood, Arm, Right [[id number]] Collected: 12/31/20 2009    Specimen: Blood from Arm, Right Updated: 01/03/21 2030     Blood Culture No growth at 3 days    POC Glucose Once [[id number]]  (Abnormal) Collected: 01/03/21 2001    Specimen: Blood Updated: 01/03/21 2008     Glucose 282 mg/dL     POC Glucose Once [[id number]]  (Abnormal) Collected: 01/03/21 1609    Specimen: Blood Updated: 01/03/21 1745     Glucose 311 mg/dL     POC Glucose Once [[id number]]  (Abnormal) Collected: 01/03/21 1059    Specimen: Blood Updated: 01/03/21 1206     Glucose 386 mg/dL     Urine Culture - Urine, Urine, Catheter [[id number]]  (Abnormal)  (Susceptibility) Collected: 12/31/20 2011    Specimen: Urine, Catheter Updated: 01/03/21 1152     Urine Culture >100,000 CFU/mL Pseudomonas aeruginosa      50,000 CFU/mL Proteus mirabilis    Susceptibility      Pseudomonas aeruginosa     RAVEN     Cefepime Susceptible     Ceftazidime Susceptible     Ciprofloxacin Susceptible     Gentamicin Susceptible     Levofloxacin Susceptible     Piperacillin + Tazobactam Susceptible                Susceptibility      Proteus mirabilis     RAVEN     Ampicillin Susceptible     Ampicillin + Sulbactam Susceptible     Cefazolin Susceptible     Cefepime  Susceptible     Ceftazidime Susceptible     Ceftriaxone Susceptible     Gentamicin Susceptible     Levofloxacin Resistant     Nitrofurantoin Resistant     Piperacillin + Tazobactam Susceptible     Tetracycline Resistant     Trimethoprim + Sulfamethoxazole Susceptible                    POC Glucose Once [[id number]]  (Abnormal) Collected: 01/03/21 0817    Specimen: Blood Updated: 01/03/21 0842     Glucose 368 mg/dL     Blood Culture - Blood, Arm, Left [[id number]]  (Abnormal)  (Susceptibility) Collected: 12/31/20 2009    Specimen: Blood from Arm, Left Updated: 01/03/21 0639     Blood Culture Enterococcus faecalis     Comment: Infectious disease consultation is highly recommended.        Isolated from Aerobic Bottle     Gram Stain Aerobic Bottle Gram positive cocci in pairs and chains    Susceptibility      Enterococcus faecalis     RAVEN     Ampicillin Susceptible     Gentamicin High Level Synergy Susceptible     Vancomycin Susceptible                    Phosphorus [[id number]]  (Abnormal) Collected: 01/03/21 0121    Specimen: Blood Updated: 01/03/21 0345     Phosphorus 1.6 mg/dL     CBC & Differential [[id number]]  (Abnormal) Collected: 01/03/21 0121    Specimen: Blood Updated: 01/03/21 0248    Narrative:      The following orders were created for panel order CBC & Differential.  Procedure                               Abnormality         Status                     ---------                               -----------         ------                     CBC Auto Differential[[id number]]        Abnormal            Final result                 Please view results for these tests on the individual orders.    CBC Auto Differential [[id number]]  (Abnormal) Collected: 01/03/21 0121    Specimen: Blood Updated: 01/03/21 0248     WBC 9.66 10*3/mm3      RBC 3.11 10*6/mm3      Hemoglobin 9.6 g/dL      Hematocrit 31.9 %      .6 fL      MCH 30.9 pg      MCHC 30.1 g/dL      RDW 16.8 %      RDW-SD 63.3 fl      MPV 9.1 fL       Platelets 364 10*3/mm3      Neutrophil % 57.4 %      Lymphocyte % 24.7 %      Monocyte % 14.4 %      Eosinophil % 2.2 %      Basophil % 0.7 %      Immature Grans % 0.6 %      Neutrophils, Absolute 5.54 10*3/mm3      Lymphocytes, Absolute 2.39 10*3/mm3      Monocytes, Absolute 1.39 10*3/mm3      Eosinophils, Absolute 0.21 10*3/mm3      Basophils, Absolute 0.07 10*3/mm3      Immature Grans, Absolute 0.06 10*3/mm3      nRBC 0.2 /100 WBC     Comprehensive Metabolic Panel [[id number]]  (Abnormal) Collected: 01/03/21 0121    Specimen: Blood Updated: 01/03/21 0247     Glucose 249 mg/dL      BUN 32 mg/dL      Creatinine 0.95 mg/dL      Sodium 154 mmol/L      Potassium 4.7 mmol/L      Comment: Specimen hemolyzed.  Results may be affected.        Chloride 128 mmol/L      CO2 16.8 mmol/L      Calcium 8.4 mg/dL      Total Protein 5.9 g/dL      Albumin 2.82 g/dL      ALT (SGPT) 10 U/L      Comment: Specimen hemolyzed.  Results may be affected.        AST (SGOT) 18 U/L      Alkaline Phosphatase 87 U/L      Total Bilirubin 0.2 mg/dL      eGFR Non African Amer 58 mL/min/1.73      Globulin 3.1 gm/dL      A/G Ratio 0.9 g/dL      BUN/Creatinine Ratio 33.7     Anion Gap 9.2 mmol/L     Narrative:      GFR Normal >60  Chronic Kidney Disease <60  Kidney Failure <15      POC Glucose Once [[id number]]  (Abnormal) Collected: 01/03/21 0008    Specimen: Blood Updated: 01/03/21 0021     Glucose 248 mg/dL     POC Glucose Once [[id number]]  (Abnormal) Collected: 01/02/21 2128    Specimen: Blood Updated: 01/02/21 2135     Glucose 270 mg/dL     Potassium [[id number]]  (Normal) Collected: 01/02/21 1735    Specimen: Blood Updated: 01/02/21 1821     Potassium 5.0 mmol/L      Comment: 2+ Hemolysis  Specimen hemolyzed.  Results may be affected.       POC Glucose Once [[id number]]  (Abnormal) Collected: 01/02/21 1657    Specimen: Blood Updated: 01/02/21 1711     Glucose 279 mg/dL     POC Glucose Once [[id number]]  (Abnormal) Collected: 01/02/21 1110     Specimen: Blood Updated: 01/02/21 1154     Glucose 284 mg/dL           Radiology:    Imaging Results (Last 72 Hours)     Procedure Component Value Units Date/Time    MRI Brain With & Without Contrast [[id number]] Resulted: 01/04/21 0920     Updated: 01/04/21 0920      MRI pending, CT image results noted.    Results for orders placed during the hospital encounter of 12/31/20   Adult Transthoracic Echo Complete W/ Cont if Necessary Per Protocol    Narrative · Left ventricular ejection fraction appears to be 61 - 65%. Left   ventricular systolic function is normal.  · Left ventricular diastolic function is consistent with (grade II w/high   LAP) pseudonormalization.  · Mild aortic valve stenosis is present.          Assessment/Plan:   Sepsis secondary to Enterococcus bacteremia, on vancomycin, infectious diseases following, clinically she has improved, recheck CRP in the a.m.  Noted urine has grown out Pseudomonas and Proteus, will discuss further with infectious disease about the need to treat this.    Reported altered mental status on admission, resolved    Probable arachnoid cyst, MRI to be done today    Atrial fibrillation by monitor, EKG pending, will review EKG and make further recommendations once this is back, EF is normal and TSH was normal.    Metabolic acidosis not increased anion gap on admission acetone was negative, follow    Diabetes, not well controlled, insulin adjusted and will follow.    DVT prophylaxis, subcu Lovenox, this may have to be adjusted if she is in atrial fibrillation.    Hypertension, currently controlled    Hypernatremia, resolved, stopped her free water    Electrolyte abnormalities, improved    Ankle deformity, x-ray ordered    Wounds, wound care dressing    KAY on admission, resolved

## 2021-01-04 NOTE — PLAN OF CARE
Problem: Fall Injury Risk  Goal: Absence of Fall and Fall-Related Injury  Intervention: Identify and Manage Contributors to Fall Injury Risk  Recent Flowsheet Documentation  Taken 1/4/2021 1550 by Abbey Chauhan RN  Medication Review/Management: medications reviewed  Self-Care Promotion:   independence encouraged   BADL personal objects within reach   BADL personal routines maintained  Taken 1/4/2021 1500 by Abbey Chauhan RN  Medication Review/Management: medications reviewed  Taken 1/4/2021 1300 by Abbey Chauhan RN  Medication Review/Management: medications reviewed  Taken 1/4/2021 1100 by Abbey Chauhan RN  Medication Review/Management: medications reviewed  Taken 1/4/2021 0900 by Abbey Chauhan RN  Medication Review/Management: medications reviewed  Taken 1/4/2021 0815 by Abbey Chauhan RN  Medication Review/Management: medications reviewed  Taken 1/4/2021 0700 by Abbey Chauhan RN  Medication Review/Management: medications reviewed  Intervention: Promote Injury-Free Environment  Recent Flowsheet Documentation  Taken 1/4/2021 1550 by Abbey Chauhan RN  Safety Promotion/Fall Prevention:   activity supervised   assistive device/personal items within reach   fall prevention program maintained   room organization consistent   safety round/check completed   nonskid shoes/slippers when out of bed  Taken 1/4/2021 1500 by Abbey Chauhan RN  Safety Promotion/Fall Prevention:   activity supervised   assistive device/personal items within reach   clutter free environment maintained   fall prevention program maintained   room organization consistent   safety round/check completed  Taken 1/4/2021 1300 by Abbey Chauhan RN  Safety Promotion/Fall Prevention:   activity supervised   assistive device/personal items within reach   clutter free environment maintained   fall prevention program maintained   room organization consistent   safety round/check completed  Taken 1/4/2021 1100 by Abbey Chauhan RN  Safety Promotion/Fall  Prevention:   activity supervised   assistive device/personal items within reach   clutter free environment maintained   fall prevention program maintained   room organization consistent   safety round/check completed  Taken 1/4/2021 0900 by Abbey Chauhan RN  Safety Promotion/Fall Prevention:   activity supervised   assistive device/personal items within reach   clutter free environment maintained   fall prevention program maintained   room organization consistent   safety round/check completed  Taken 1/4/2021 0815 by Abbey Chauhan RN  Safety Promotion/Fall Prevention: activity supervised  Taken 1/4/2021 0700 by Abbey Chauhan RN  Safety Promotion/Fall Prevention:   activity supervised   assistive device/personal items within reach   clutter free environment maintained   fall prevention program maintained   room organization consistent   safety round/check completed     Problem: Fall Injury Risk  Goal: Absence of Fall and Fall-Related Injury  Outcome: Ongoing, Progressing  Intervention: Identify and Manage Contributors to Fall Injury Risk  Flowsheets  Taken 1/4/2021 1550  Medication Review/Management: medications reviewed  Self-Care Promotion:   independence encouraged   BADL personal objects within reach   BADL personal routines maintained  Taken 1/4/2021 1500  Medication Review/Management: medications reviewed  Taken 1/4/2021 1300  Medication Review/Management: medications reviewed  Taken 1/4/2021 1100  Medication Review/Management: medications reviewed  Taken 1/4/2021 0900  Medication Review/Management: medications reviewed  Taken 1/4/2021 0815  Medication Review/Management: medications reviewed  Taken 1/4/2021 0700  Medication Review/Management: medications reviewed  Intervention: Promote Injury-Free Environment  Flowsheets  Taken 1/4/2021 1550  Safety Promotion/Fall Prevention:   activity supervised   assistive device/personal items within reach   fall prevention program maintained   room organization  consistent   safety round/check completed   nonskid shoes/slippers when out of bed  Taken 1/4/2021 1500  Safety Promotion/Fall Prevention:   activity supervised   assistive device/personal items within reach   clutter free environment maintained   fall prevention program maintained   room organization consistent   safety round/check completed  Taken 1/4/2021 1300  Safety Promotion/Fall Prevention:   activity supervised   assistive device/personal items within reach   clutter free environment maintained   fall prevention program maintained   room organization consistent   safety round/check completed  Taken 1/4/2021 1100  Safety Promotion/Fall Prevention:   activity supervised   assistive device/personal items within reach   clutter free environment maintained   fall prevention program maintained   room organization consistent   safety round/check completed  Taken 1/4/2021 0900  Safety Promotion/Fall Prevention:   activity supervised   assistive device/personal items within reach   clutter free environment maintained   fall prevention program maintained   room organization consistent   safety round/check completed  Taken 1/4/2021 0815  Safety Promotion/Fall Prevention: activity supervised  Taken 1/4/2021 0700  Safety Promotion/Fall Prevention:   activity supervised   assistive device/personal items within reach   clutter free environment maintained   fall prevention program maintained   room organization consistent   safety round/check completed     Problem: Adult Inpatient Plan of Care  Goal: Absence of Hospital-Acquired Illness or Injury  Intervention: Identify and Manage Fall Risk  Recent Flowsheet Documentation  Taken 1/4/2021 1550 by Abbey Chauhan RN  Safety Promotion/Fall Prevention:   activity supervised   assistive device/personal items within reach   fall prevention program maintained   room organization consistent   safety round/check completed   nonskid shoes/slippers when out of bed  Taken 1/4/2021 1500  by Green, Abbey, RN  Safety Promotion/Fall Prevention:   activity supervised   assistive device/personal items within reach   clutter free environment maintained   fall prevention program maintained   room organization consistent   safety round/check completed  Taken 1/4/2021 1300 by Abbey Chauhan RN  Safety Promotion/Fall Prevention:   activity supervised   assistive device/personal items within reach   clutter free environment maintained   fall prevention program maintained   room organization consistent   safety round/check completed  Taken 1/4/2021 1100 by Abbey Chauhan RN  Safety Promotion/Fall Prevention:   activity supervised   assistive device/personal items within reach   clutter free environment maintained   fall prevention program maintained   room organization consistent   safety round/check completed  Taken 1/4/2021 0900 by Abbey Chauhan RN  Safety Promotion/Fall Prevention:   activity supervised   assistive device/personal items within reach   clutter free environment maintained   fall prevention program maintained   room organization consistent   safety round/check completed  Taken 1/4/2021 0815 by Abbey Chauhan RN  Safety Promotion/Fall Prevention: activity supervised  Taken 1/4/2021 0700 by Abbey Chauhan RN  Safety Promotion/Fall Prevention:   activity supervised   assistive device/personal items within reach   clutter free environment maintained   fall prevention program maintained   room organization consistent   safety round/check completed  Intervention: Prevent and Manage VTE (venous thromboembolism) Risk  Recent Flowsheet Documentation  Taken 1/4/2021 0815 by Abbey Chauhan RN  VTE Prevention/Management: (See MAR) other (see comments)  Intervention: Prevent Infection  Recent Flowsheet Documentation  Taken 1/4/2021 1500 by Abbey Chauhan RN  Infection Prevention:   rest/sleep promoted   single patient room provided  Taken 1/4/2021 1300 by Abbey Chauhan RN  Infection Prevention:    rest/sleep promoted   single patient room provided  Taken 1/4/2021 1100 by Abbey Chauhan RN  Infection Prevention:   rest/sleep promoted   single patient room provided  Taken 1/4/2021 0900 by Abbey Chauhan RN  Infection Prevention: rest/sleep promoted  Taken 1/4/2021 0815 by Abbey Chauhan RN  Infection Prevention: rest/sleep promoted  Taken 1/4/2021 0700 by Abbey Chauhan RN  Infection Prevention:   rest/sleep promoted   single patient room provided   visitors restricted/screened  Goal: Optimal Comfort and Wellbeing  Intervention: Provide Person-Centered Care  Recent Flowsheet Documentation  Taken 1/4/2021 0815 by Abbey Chauhan RN  Trust Relationship/Rapport: care explained     Problem: Skin Injury Risk Increased  Goal: Skin Health and Integrity  Intervention: Optimize Skin Protection  Recent Flowsheet Documentation  Taken 1/4/2021 1550 by Abbey Chauhan RN  Pressure Reduction Techniques:   weight shift assistance provided   positioned off wounds  Head of Bed (HOB): HOB at 30-45 degrees  Pressure Reduction Devices: pressure-redistributing mattress utilized  Skin Protection:   adhesive use limited   incontinence pads utilized   tubing/devices free from skin contact   hydrocolloids used  Taken 1/4/2021 1500 by Abbey Chauhan RN  Head of Bed (HOB): HOB at 30-45 degrees  Taken 1/4/2021 1300 by Abbey Chuahan RN  Head of Bed (HOB): HOB at 30-45 degrees  Taken 1/4/2021 1100 by Abbey Chauhan RN  Head of Bed (HOB): HOB at 30-45 degrees  Taken 1/4/2021 0900 by Abbey Chauhan RN  Head of Bed (HOB): HOB at 30-45 degrees  Taken 1/4/2021 0815 by Abbey Chauhan RN  Head of Bed (HOB): HOB at 30-45 degrees  Taken 1/4/2021 0700 by Abbey Chauhan RN  Head of Bed (HOB): HOB at 30-45 degrees  Intervention: Promote and Optimize Oral Intake  Recent Flowsheet Documentation  Taken 1/4/2021 1550 by Abbey Chauhan RN  Oral Nutrition Promotion: calorie-dense foods provided  Taken 1/4/2021 0815 by Abbey Chauhan RN  Oral Nutrition  Promotion: calorie-dense foods provided     Problem: Skin Injury Risk Increased  Goal: Skin Health and Integrity  Outcome: Ongoing, Progressing  Intervention: Optimize Skin Protection  Flowsheets  Taken 1/4/2021 1550  Pressure Reduction Techniques:   weight shift assistance provided   positioned off wounds  Head of Bed (HOB): HOB at 30-45 degrees  Pressure Reduction Devices: pressure-redistributing mattress utilized  Skin Protection:   adhesive use limited   incontinence pads utilized   tubing/devices free from skin contact   hydrocolloids used  Taken 1/4/2021 1500  Head of Bed (HOB): HOB at 30-45 degrees  Taken 1/4/2021 1300  Head of Bed (HOB): HOB at 30-45 degrees  Taken 1/4/2021 1100  Head of Bed (HOB): HOB at 30-45 degrees  Taken 1/4/2021 0900  Head of Bed (HOB): HOB at 30-45 degrees  Taken 1/4/2021 0815  Head of Bed (HOB): HOB at 30-45 degrees  Taken 1/4/2021 0700  Head of Bed (HOB): HOB at 30-45 degrees  Intervention: Promote and Optimize Oral Intake  Flowsheets  Taken 1/4/2021 1550  Oral Nutrition Promotion: calorie-dense foods provided  Taken 1/4/2021 0815  Oral Nutrition Promotion: calorie-dense foods provided   Goal Outcome Evaluation:  Plan of Care Reviewed With: patient  Progress: improving

## 2021-01-05 ENCOUNTER — APPOINTMENT (OUTPATIENT)
Dept: ULTRASOUND IMAGING | Facility: HOSPITAL | Age: 76
End: 2021-01-05

## 2021-01-05 LAB
A-A DO2: 37 MMHG (ref 0–300)
ALBUMIN SERPL-MCNC: 2.35 G/DL (ref 3.5–5.2)
ALBUMIN/GLOB SERPL: 0.9 G/DL
ALP SERPL-CCNC: 65 U/L (ref 39–117)
ALT SERPL W P-5'-P-CCNC: 9 U/L (ref 1–33)
ANION GAP SERPL CALCULATED.3IONS-SCNC: 7.4 MMOL/L (ref 5–15)
ARTERIAL PATENCY WRIST A: POSITIVE
AST SERPL-CCNC: 7 U/L (ref 1–32)
ATMOSPHERIC PRESS: 731 MMHG
BACTERIA SPEC AEROBE CULT: NORMAL
BASE EXCESS BLDA CALC-SCNC: -3.5 MMOL/L (ref 0–2)
BASOPHILS # BLD AUTO: 0.03 10*3/MM3 (ref 0–0.2)
BASOPHILS NFR BLD AUTO: 0.3 % (ref 0–1.5)
BDY SITE: ABNORMAL
BILIRUB SERPL-MCNC: 0.2 MG/DL (ref 0–1.2)
BODY TEMPERATURE: 0 C
BUN SERPL-MCNC: 27 MG/DL (ref 8–23)
BUN/CREAT SERPL: 25.7 (ref 7–25)
CALCIUM SPEC-SCNC: 7.7 MG/DL (ref 8.6–10.5)
CHLORIDE SERPL-SCNC: 107 MMOL/L (ref 98–107)
CO2 BLDA-SCNC: 21.9 MMOL/L (ref 22–33)
CO2 SERPL-SCNC: 18.6 MMOL/L (ref 22–29)
COHGB MFR BLD: 1.1 % (ref 0–5)
CREAT SERPL-MCNC: 1.05 MG/DL (ref 0.57–1)
CRP SERPL-MCNC: 4.69 MG/DL (ref 0–0.5)
DEPRECATED RDW RBC AUTO: 58.7 FL (ref 37–54)
EOSINOPHIL # BLD AUTO: 0.57 10*3/MM3 (ref 0–0.4)
EOSINOPHIL NFR BLD AUTO: 5.7 % (ref 0.3–6.2)
ERYTHROCYTE [DISTWIDTH] IN BLOOD BY AUTOMATED COUNT: 15.7 % (ref 12.3–15.4)
GFR SERPL CREATININE-BSD FRML MDRD: 51 ML/MIN/1.73
GLOBULIN UR ELPH-MCNC: 2.8 GM/DL
GLUCOSE BLDC GLUCOMTR-MCNC: 186 MG/DL (ref 70–130)
GLUCOSE BLDC GLUCOMTR-MCNC: 255 MG/DL (ref 70–130)
GLUCOSE BLDC GLUCOMTR-MCNC: 292 MG/DL (ref 70–130)
GLUCOSE BLDC GLUCOMTR-MCNC: 381 MG/DL (ref 70–130)
GLUCOSE BLDC GLUCOMTR-MCNC: 399 MG/DL (ref 70–130)
GLUCOSE SERPL-MCNC: 242 MG/DL (ref 65–99)
HCO3 BLDA-SCNC: 20.9 MMOL/L (ref 20–26)
HCT VFR BLD AUTO: 28.7 % (ref 34–46.6)
HCT VFR BLD CALC: 27.1 % (ref 38–51)
HGB BLD-MCNC: 8.8 G/DL (ref 12–15.9)
HGB BLDA-MCNC: 8.8 G/DL (ref 13.5–17.5)
IMM GRANULOCYTES # BLD AUTO: 0.05 10*3/MM3 (ref 0–0.05)
IMM GRANULOCYTES NFR BLD AUTO: 0.5 % (ref 0–0.5)
INHALED O2 CONCENTRATION: 21 %
LYMPHOCYTES # BLD AUTO: 2 10*3/MM3 (ref 0.7–3.1)
LYMPHOCYTES NFR BLD AUTO: 19.9 % (ref 19.6–45.3)
Lab: ABNORMAL
MAGNESIUM SERPL-MCNC: 1.5 MG/DL (ref 1.6–2.4)
MCH RBC QN AUTO: 31.3 PG (ref 26.6–33)
MCHC RBC AUTO-ENTMCNC: 30.7 G/DL (ref 31.5–35.7)
MCV RBC AUTO: 102.1 FL (ref 79–97)
METHGB BLD QL: 0 % (ref 0–3)
MODALITY: ABNORMAL
MONOCYTES # BLD AUTO: 1.05 10*3/MM3 (ref 0.1–0.9)
MONOCYTES NFR BLD AUTO: 10.5 % (ref 5–12)
NEUTROPHILS NFR BLD AUTO: 6.33 10*3/MM3 (ref 1.7–7)
NEUTROPHILS NFR BLD AUTO: 63.1 % (ref 42.7–76)
NOTE: ABNORMAL
NRBC BLD AUTO-RTO: 0 /100 WBC (ref 0–0.2)
OXYHGB MFR BLDV: 93.1 % (ref 94–99)
PCO2 BLDA: 33.8 MM HG (ref 35–45)
PCO2 TEMP ADJ BLD: ABNORMAL MM[HG]
PH BLDA: 7.4 PH UNITS (ref 7.35–7.45)
PH, TEMP CORRECTED: ABNORMAL
PHOSPHATE SERPL-MCNC: 2.8 MG/DL (ref 2.5–4.5)
PLATELET # BLD AUTO: 240 10*3/MM3 (ref 140–450)
PMV BLD AUTO: 9.3 FL (ref 6–12)
PO2 BLDA: 68.6 MM HG (ref 83–108)
PO2 TEMP ADJ BLD: ABNORMAL MM[HG]
POTASSIUM SERPL-SCNC: 4 MMOL/L (ref 3.5–5.2)
PROT SERPL-MCNC: 5.1 G/DL (ref 6–8.5)
QT INTERVAL: 348 MS
QTC INTERVAL: 435 MS
RBC # BLD AUTO: 2.81 10*6/MM3 (ref 3.77–5.28)
SAO2 % BLDCOA: 94 % (ref 94–99)
SODIUM SERPL-SCNC: 133 MMOL/L (ref 136–145)
SODIUM SERPL-SCNC: 134 MMOL/L (ref 136–145)
VENTILATOR MODE: ABNORMAL
WBC # BLD AUTO: 10.03 10*3/MM3 (ref 3.4–10.8)

## 2021-01-05 PROCEDURE — 84295 ASSAY OF SERUM SODIUM: CPT | Performed by: INTERNAL MEDICINE

## 2021-01-05 PROCEDURE — 97163 PT EVAL HIGH COMPLEX 45 MIN: CPT

## 2021-01-05 PROCEDURE — 94799 UNLISTED PULMONARY SVC/PX: CPT

## 2021-01-05 PROCEDURE — 82375 ASSAY CARBOXYHB QUANT: CPT

## 2021-01-05 PROCEDURE — 63710000001 INSULIN ASPART PER 5 UNITS: Performed by: INTERNAL MEDICINE

## 2021-01-05 PROCEDURE — 93880 EXTRACRANIAL BILAT STUDY: CPT

## 2021-01-05 PROCEDURE — 97166 OT EVAL MOD COMPLEX 45 MIN: CPT

## 2021-01-05 PROCEDURE — 25010000002 CEFEPIME PER 500 MG: Performed by: INTERNAL MEDICINE

## 2021-01-05 PROCEDURE — 63710000001 INSULIN DETEMIR PER 5 UNITS: Performed by: INTERNAL MEDICINE

## 2021-01-05 PROCEDURE — 80053 COMPREHEN METABOLIC PANEL: CPT | Performed by: INTERNAL MEDICINE

## 2021-01-05 PROCEDURE — 99232 SBSQ HOSP IP/OBS MODERATE 35: CPT | Performed by: INTERNAL MEDICINE

## 2021-01-05 PROCEDURE — 82962 GLUCOSE BLOOD TEST: CPT

## 2021-01-05 PROCEDURE — 83050 HGB METHEMOGLOBIN QUAN: CPT

## 2021-01-05 PROCEDURE — 63710000001 INSULIN ASPART PER 5 UNITS: Performed by: STUDENT IN AN ORGANIZED HEALTH CARE EDUCATION/TRAINING PROGRAM

## 2021-01-05 PROCEDURE — 25010000002 ENOXAPARIN PER 10 MG: Performed by: STUDENT IN AN ORGANIZED HEALTH CARE EDUCATION/TRAINING PROGRAM

## 2021-01-05 PROCEDURE — 93880 EXTRACRANIAL BILAT STUDY: CPT | Performed by: RADIOLOGY

## 2021-01-05 PROCEDURE — 83735 ASSAY OF MAGNESIUM: CPT | Performed by: INTERNAL MEDICINE

## 2021-01-05 PROCEDURE — 99222 1ST HOSP IP/OBS MODERATE 55: CPT | Performed by: SPECIALIST

## 2021-01-05 PROCEDURE — 85025 COMPLETE CBC W/AUTO DIFF WBC: CPT | Performed by: INTERNAL MEDICINE

## 2021-01-05 PROCEDURE — 84100 ASSAY OF PHOSPHORUS: CPT | Performed by: INTERNAL MEDICINE

## 2021-01-05 PROCEDURE — 25010000002 MAGNESIUM SULFATE 2 GM/50ML SOLUTION: Performed by: INTERNAL MEDICINE

## 2021-01-05 PROCEDURE — 36600 WITHDRAWAL OF ARTERIAL BLOOD: CPT

## 2021-01-05 PROCEDURE — 82805 BLOOD GASES W/O2 SATURATION: CPT

## 2021-01-05 PROCEDURE — 86140 C-REACTIVE PROTEIN: CPT | Performed by: INTERNAL MEDICINE

## 2021-01-05 RX ORDER — IPRATROPIUM BROMIDE AND ALBUTEROL SULFATE 2.5; .5 MG/3ML; MG/3ML
3 SOLUTION RESPIRATORY (INHALATION) ONCE
Status: COMPLETED | OUTPATIENT
Start: 2021-01-05 | End: 2021-01-05

## 2021-01-05 RX ORDER — MAGNESIUM SULFATE HEPTAHYDRATE 40 MG/ML
2 INJECTION, SOLUTION INTRAVENOUS AS NEEDED
Status: DISCONTINUED | OUTPATIENT
Start: 2021-01-05 | End: 2021-01-14 | Stop reason: HOSPADM

## 2021-01-05 RX ORDER — CASTOR OIL AND BALSAM, PERU 788; 87 MG/G; MG/G
OINTMENT TOPICAL EVERY 12 HOURS SCHEDULED
Status: DISCONTINUED | OUTPATIENT
Start: 2021-01-05 | End: 2021-01-14 | Stop reason: HOSPADM

## 2021-01-05 RX ORDER — LORAZEPAM 2 MG/ML
INJECTION INTRAMUSCULAR
Status: DISPENSED
Start: 2021-01-05 | End: 2021-01-06

## 2021-01-05 RX ORDER — ATORVASTATIN CALCIUM 20 MG/1
20 TABLET, FILM COATED ORAL NIGHTLY
Status: DISCONTINUED | OUTPATIENT
Start: 2021-01-05 | End: 2021-01-14 | Stop reason: HOSPADM

## 2021-01-05 RX ORDER — MAGNESIUM SULFATE HEPTAHYDRATE 40 MG/ML
2 INJECTION, SOLUTION INTRAVENOUS
Status: COMPLETED | OUTPATIENT
Start: 2021-01-05 | End: 2021-01-05

## 2021-01-05 RX ORDER — L.ACID,PARA/B.BIFIDUM/S.THERM 8B CELL
1 CAPSULE ORAL DAILY
Status: DISCONTINUED | OUTPATIENT
Start: 2021-01-05 | End: 2021-01-11

## 2021-01-05 RX ORDER — MAGNESIUM SULFATE HEPTAHYDRATE 40 MG/ML
4 INJECTION, SOLUTION INTRAVENOUS AS NEEDED
Status: DISCONTINUED | OUTPATIENT
Start: 2021-01-05 | End: 2021-01-14 | Stop reason: HOSPADM

## 2021-01-05 RX ORDER — ASPIRIN 81 MG/1
81 TABLET ORAL DAILY
Status: DISCONTINUED | OUTPATIENT
Start: 2021-01-05 | End: 2021-01-14 | Stop reason: HOSPADM

## 2021-01-05 RX ADMIN — INSULIN ASPART 6 UNITS: 100 INJECTION, SOLUTION INTRAVENOUS; SUBCUTANEOUS at 11:22

## 2021-01-05 RX ADMIN — ASPIRIN 81 MG: 81 TABLET, COATED ORAL at 10:22

## 2021-01-05 RX ADMIN — IPRATROPIUM BROMIDE AND ALBUTEROL SULFATE 3 ML: .5; 3 SOLUTION RESPIRATORY (INHALATION) at 06:49

## 2021-01-05 RX ADMIN — ATORVASTATIN CALCIUM 20 MG: 20 TABLET, FILM COATED ORAL at 21:18

## 2021-01-05 RX ADMIN — ENOXAPARIN SODIUM 40 MG: 40 INJECTION SUBCUTANEOUS at 09:12

## 2021-01-05 RX ADMIN — IPRATROPIUM BROMIDE AND ALBUTEROL SULFATE 3 ML: .5; 3 SOLUTION RESPIRATORY (INHALATION) at 00:26

## 2021-01-05 RX ADMIN — MAGNESIUM SULFATE HEPTAHYDRATE 2 G: 40 INJECTION, SOLUTION INTRAVENOUS at 15:18

## 2021-01-05 RX ADMIN — LISINOPRIL: 10 TABLET ORAL at 09:13

## 2021-01-05 RX ADMIN — IPRATROPIUM BROMIDE AND ALBUTEROL SULFATE 3 ML: .5; 3 SOLUTION RESPIRATORY (INHALATION) at 13:27

## 2021-01-05 RX ADMIN — Medication 1 CAPSULE: at 11:22

## 2021-01-05 RX ADMIN — CASTOR OIL AND BALSAM, PERU: 788; 87 OINTMENT TOPICAL at 09:14

## 2021-01-05 RX ADMIN — MAGNESIUM SULFATE HEPTAHYDRATE 2 G: 40 INJECTION, SOLUTION INTRAVENOUS at 13:32

## 2021-01-05 RX ADMIN — CASTOR OIL AND BALSAM, PERU: 788; 87 OINTMENT TOPICAL at 21:18

## 2021-01-05 RX ADMIN — IPRATROPIUM BROMIDE AND ALBUTEROL SULFATE 3 ML: .5; 3 SOLUTION RESPIRATORY (INHALATION) at 18:29

## 2021-01-05 RX ADMIN — CASTOR OIL AND BALSAM, PERU: 788; 87 OINTMENT TOPICAL at 13:32

## 2021-01-05 RX ADMIN — VANCOMYCIN HYDROCHLORIDE 1000 MG: 1 INJECTION, POWDER, LYOPHILIZED, FOR SOLUTION INTRAVENOUS at 21:18

## 2021-01-05 RX ADMIN — CEFEPIME 2 G: 2 INJECTION, POWDER, FOR SOLUTION INTRAVENOUS at 21:20

## 2021-01-05 RX ADMIN — CEFEPIME 2 G: 2 INJECTION, POWDER, FOR SOLUTION INTRAVENOUS at 09:13

## 2021-01-05 RX ADMIN — INSULIN ASPART 2 UNITS: 100 INJECTION, SOLUTION INTRAVENOUS; SUBCUTANEOUS at 09:12

## 2021-01-05 RX ADMIN — CASTOR OIL AND BALSAM, PERU: 788; 87 OINTMENT TOPICAL at 21:19

## 2021-01-05 RX ADMIN — INSULIN DETEMIR 25 UNITS: 100 INJECTION, SOLUTION SUBCUTANEOUS at 21:19

## 2021-01-05 RX ADMIN — INSULIN ASPART 8 UNITS: 100 INJECTION, SOLUTION INTRAVENOUS; SUBCUTANEOUS at 16:37

## 2021-01-05 RX ADMIN — PANTOPRAZOLE SODIUM 40 MG: 40 TABLET, DELAYED RELEASE ORAL at 09:13

## 2021-01-05 RX ADMIN — SODIUM CHLORIDE, PRESERVATIVE FREE 3 ML: 5 INJECTION INTRAVENOUS at 09:14

## 2021-01-05 RX ADMIN — MAGNESIUM SULFATE HEPTAHYDRATE 2 G: 40 INJECTION, SOLUTION INTRAVENOUS at 16:38

## 2021-01-05 RX ADMIN — IPRATROPIUM BROMIDE AND ALBUTEROL SULFATE 3 ML: .5; 3 SOLUTION RESPIRATORY (INHALATION) at 21:42

## 2021-01-05 RX ADMIN — SODIUM CHLORIDE, PRESERVATIVE FREE 3 ML: 5 INJECTION INTRAVENOUS at 21:18

## 2021-01-05 NOTE — PLAN OF CARE
Goal Outcome Evaluation:  Plan of Care Reviewed With: patient  Progress: improving      Patient resting in bed with no complaints, arrived from PCU via bed and transport.  Patient is in no acute distress. Will monitor.

## 2021-01-05 NOTE — PROGRESS NOTES
Discharge Planning Assessment   [facility]     Patient Name: Ashley Galvez  MRN: [id number]  Today's Date: 1/5/2021    Admit Date: 12/31/2020    Discharge Needs Assessment    No documentation.       Discharge Plan     Row Name 01/05/21 1005       Plan    Plan Pt was admitted on 12/31/20. Pt utilizes [facility] . [facility]  [phone] will need contacted at discharge. Pt uses home oxygen at 2 liters nasal cannula, nebulizer (has neb meds) wheelchair, hospital bed, and bedside commode via unknown provider. Pt's PCP is Rony Johnson. Pt uses [facility] in [place] for pharmacy. Pt plans on returning home with spouse at discharge, will need EMS to transport. SS will follow.        Marcella Bryan

## 2021-01-05 NOTE — THERAPY EVALUATION
Acute Care - Physical Therapy Initial Evaluation   [place]     Patient Name: Ashley Galvez  : 1945  MRN: [id number]  Today's Date: 2021   Onset of Illness/Injury or Date of Surgery: 20       PT Assessment (last 12 hours)      PT Evaluation and Treatment     Row Name 21 1024 21 1000       Physical Therapy Time and Intention    Subjective Information  complains of;weakness  -BC  complains of;weakness;fatigue  -BC    Document Type  --  evaluation  -BC    Mode of Treatment  --  physical therapy  -BC    Patient Effort  --  fair  -BC    Row Name 21 1000          General Information    Patient Profile Reviewed  yes  -BC     Onset of Illness/Injury or Date of Surgery  20  -BC     Referring Physician  Dr. Puente  -BC     Patient Observations  cooperative;agree to therapy  -BC     Prior Level of Function  -- same  -BC     Equipment Currently Used at Home  hospital bed;wheelchair;commode, 3-in-1  -BC     Risks Reviewed  patient:;LOB;nausea/vomiting;dizziness;increased discomfort;change in vital signs;increased drainage;lines disloged  -BC     Benefits Reviewed  patient:;improve function;increase independence;increase strength;increase balance;decrease pain;decrease risk of DVT;increase knowledge;improve skin integrity  -BC     Row Name 21 1000          Living Environment    Current Living Arrangements  home/apartment/condo  -BC     Lives With  spouse  -BC     Row Name 21 1000          Cognition    Affect/Mental Status (Cognitive)  WFL  -BC     Orientation Status (Cognition)  oriented x 3  -BC     Follows Commands (Cognition)  WFL  -BC     Cognitive Function (Cognitive)  memory deficit  -BC     Row Name 21 1000          Mobility    Extremity Weight-bearing Status  left lower extremity;right lower extremity  -BC     Left Lower Extremity (Weight-bearing Status)  -- pt is non weight  -BC     Additional Documentation  -- non weight  -BC     Row Name 21 1000           Bed Mobility    Bed Mobility  bed mobility (all) activities  -BC     All Activities, [place] (Bed Mobility)  moderate assist (50% patient effort)  -BC     Bed Mobility, Safety Issues  decreased use of legs for bridging/pushing;decreased use of arms for pushing/pulling;impaired trunk control for bed mobility  -BC     Assistive Device (Bed Mobility)  bed rails  -BC     Row Name 01/05/21 1000          Transfers    Comment (Transfers)  pt cant transfer, pt is bed bound  -BC     Row Name 01/05/21 1000          Gait/Stairs (Locomotion)    Comment (Gait/Stairs)  pt can't ambulate  -BC     Row Name             Wound 01/01/21 0040 Right gluteal    Wound - Properties Group Placement Date: 01/01/21 -SA Placement Time: 0040  -SA Present on Hospital Admission: Y  -SA Side: Right  -SA Location: gluteal  -SA    Retired Wound - Properties Group Date first assessed: 01/01/21 -SA Time first assessed: 0040  -SA Present on Hospital Admission: Y  -SA Side: Right  -SA Location: gluteal  -SA    Row Name             Wound 01/01/21 0042 Left anterior greater trochanter    Wound - Properties Group Placement Date: 01/01/21 -SA Placement Time: 0042  -SA Side: Left  -SA Orientation: anterior  -SA Location: greater trochanter  -SA    Retired Wound - Properties Group Date first assessed: 01/01/21 -SA Time first assessed: 0042  -SA Side: Left  -SA Location: greater trochanter  -SA    Row Name             Wound 01/01/21 0044 Left lower leg    Wound - Properties Group Placement Date: 01/01/21 -SA Placement Time: 0044  -SA Side: Left  -SA Orientation: lower  -SA Location: leg  -SA    Retired Wound - Properties Group Date first assessed: 01/01/21 -SA Time first assessed: 0044  -SA Side: Left  -SA Location: leg  -SA    Row Name             Wound 01/01/21 0044 Right lower leg    Wound - Properties Group Placement Date: 01/01/21 -SA Placement Time: 0044  -SA Side: Right  -SA Orientation: lower  -SA Location: leg  -SA    Retired Wound -  Properties Group Date first assessed: 01/01/21  -SA Time first assessed: 0044 -SA Side: Right  -SA Location: leg  -SA    Row Name             Wound 01/04/21 1953 Right heel    Wound - Properties Group Placement Date: 01/04/21  -LETA Placement Time: 1953  -LETA Present on Hospital Admission: N  -LETA Side: Right  -LETA Location: heel  -LETA Stage, Pressure Injury : deep tissue injury  -LETA    Retired Wound - Properties Group Date first assessed: 01/04/21  -LETA Time first assessed: 1953  -LETA Present on Hospital Admission: N  -LETA Side: Right  -LETA Location: heel  -LETA    Row Name 01/05/21 1000          Coping    Observed Emotional State  afraid/fearful;anxious  -BC     Verbalized Emotional State  acceptance  -BC     Trust Relationship/Rapport  care explained;choices provided  -BC     Row Name 01/05/21 1000          Plan of Care Review    Plan of Care Reviewed With  patient  -[facility] Name 01/05/21 1000          Physical Therapy Goals    Bed Mobility Goal Selection (PT)  bed mobility, PT goal 1  -BC     Transfer Goal Selection (PT)  transfer, PT goal 1  -BC     Gait Training Goal Selection (PT)  gait training, PT goal 1  -BC     Row Name 01/05/21 1000          Bed Mobility Goal 1 (PT)    Activity/Assistive Device (Bed Mobility Goal 1, PT)  bed mobility activities, all  -BC     [place] Level/Cues Needed (Bed Mobility Goal 1, PT)  moderate assist (50-74% patient effort)  -BC     Time Frame (Bed Mobility Goal 1, PT)  by discharge  -BC     Row Name 01/05/21 1000          Transfer Goal 1 (PT)    Activity/Assistive Device (Transfer Goal 1, PT)  transfers, all  -BC     [place] Level/Cues Needed (Transfer Goal 1, PT)  moderate assist (50-74% patient effort)  -BC     Time Frame (Transfer Goal 1, PT)  by discharge  -BC     Row Name 01/05/21 1000          Positioning and Restraints    Pre-Treatment Position  in bed  -BC     Post Treatment Position  bed  -BC     In Bed  notified nsg;supine;call light within reach;encouraged to call  for assist;side rails up x3  -BC       User Key  (r) = Recorded By, (t) = Taken By, (c) = Cosigned By    Initials Name Provider Type    Umberto Zelaya, RN Registered Nurse    BC Michelle Jenkins, PT Physical Therapist    Joy Mckinnon, RN Registered Nurse        Physical Therapy Education                 Title: PT OT SLP Therapies (In Progress)     Topic: Physical Therapy (In Progress)     Point: Mobility training (In Progress)     Learning Progress Summary           Patient Acceptance, E, NR by BC at 1/5/2021 1043                   Point: Home exercise program (In Progress)     Learning Progress Summary           Patient Acceptance, E, NR by BC at 1/5/2021 1043                   Point: Body mechanics (In Progress)     Learning Progress Summary           Patient Acceptance, E, NR by BC at 1/5/2021 1043                   Point: Precautions (In Progress)     Learning Progress Summary           Patient Acceptance, E, NR by BC at 1/5/2021 1043                               User Key     Initials Effective Dates Name Provider Type [facility] 03/14/16 -  Michelle Jenkins PT Physical Therapist PT              PT Recommendation and Plan     Plan of Care Reviewed With: patient       Time Calculation:   PT Charges     Row Name 01/05/21 1043             Time Calculation    PT Received On  01/05/21  -BC         Time Calculation- PT    Total Timed Code Minutes- PT  60 minute(s)  -BC        User Key  (r) = Recorded By, (t) = Taken By, (c) = Cosigned By    Initials Name Provider Type    BC Michelle Jenkins, PT Physical Therapist        Therapy Charges for Today     Code Description Service Date Service Provider Modifiers Qty    [id number] HC PT EVAL HIGH COMPLEXITY 4 1/5/2021 Michelle Jenkins, PT GP 1               Michelle Jenkins PT  1/5/2021

## 2021-01-05 NOTE — THERAPY EVALUATION
Acute Care - Occupational Therapy Initial Evaluation   [place]     Patient Name: Ashley Galvez  : 1945  MRN: [id number]  Today's Date: 2021  Onset of Illness/Injury or Date of Surgery: 20     Referring Physician: Dr. Puente    Admit Date: 2020       ICD-10-CM ICD-9-CM   1. Acute renal failure, unspecified acute renal failure type (CMS/HCC)  N17.9 584.9     Patient Active Problem List   Diagnosis   • Sepsis (CMS/HCC)     Past Medical History:   Diagnosis Date   • Anemia    • Anterolisthesis     Grade 1 anterolisthesis of L5 on S1 due to bilateral L5 pars defects   • Diverticulosis    • Essential hypertension    • Lacunar infarction (CMS/HCC)    • Type 2 diabetes mellitus (CMS/HCC)      Past Surgical History:   Procedure Laterality Date   • CHOLECYSTECTOMY     • HYSTERECTOMY              OT ASSESSMENT FLOWSHEET (last 12 hours)      OT Evaluation and Treatment     Row Name 21 1458                   OT Time and Intention    Subjective Information  complains of;weakness;fatigue  -LM        Document Type  evaluation  -LM        Mode of Treatment  occupational therapy  -LM        Patient Effort  fair  -LM        Comment  New consult due to patient improving with cognition and level of alertness  -LM           General Information    Patient Profile Reviewed  yes  -LM        Onset of Illness/Injury or Date of Surgery  20  -LM        Prior Level of Function  dependent:;max assist:;all household mobility;transfer;ADL's patient reports not walking in 2 years  -LM        Equipment Currently Used at Home  commode, 3-in-1;wheelchair;walker, rolling;hospital bed  -LM        Pertinent History of Current Functional Problem  Admitted with sepsis.  PMH:  anemia, diverticulosis, htn, lacunar infarcts, dm  -LM        Existing Precautions/Restrictions  fall  -LM        Limitations/Impairments  visual;safety/cognitive;hearing blind left eye, decreased vision right eye  -LM        Benefits Reviewed   patient:;improve function;increase independence;increase strength;increase balance  -LM           Previous Level of Function/Home Environm    BADLs, Premorbid Functional Level  unable to perform;needs assistance for safe performance;needs adaptive equipment for safe performance  -LM           Living Environment    Current Living Arrangements  home/apartment/condo  -LM        Lives With  spouse  -LM           Cognition    Affect/Mental Status (Cognitive)  anxious  -LM        Orientation Status (Cognition)  oriented to;person;place;verbal cues/prompts needed for orientation  -LM        Follows Commands (Cognition)  follows one-step commands;follows two-step commands;physical/tactile prompts required;repetition of directions required;verbal cues/prompting required  -LM        Cognitive Function (Cognitive)  safety deficit  -LM        Safety Deficit (Cognitive)  minimal deficit;moderate deficit;insight into deficits/self-awareness;judgment;problem-solving;safety precautions awareness  -LM           Range of Motion (ROM)    Range of Motion  bilateral upper extremities [place] shoulders 1/2, left elbow flexion contracture  -LM           Strength (Manual Muscle Testing)    Strength (Manual Muscle Testing)  bilateral upper extremities 2+/5 shoulders, 3- left elbow, otherwise 3/5  -LM           Activities of Daily Living    BADL Assessment/Intervention  bathing;upper body dressing;lower body dressing;grooming;feeding;toileting  -LM           Bathing Assessment/Intervention    [place] Level (Bathing)  dependent (less than 25% patient effort)  -LM           Upper Body Dressing Assessment/Training    [place] Level (Upper Body Dressing)  dependent (less than 25% patient effort)  -LM           Lower Body Dressing Assessment/Training    [place] Level (Lower Body Dressing)  dependent (less than 25% patient effort)  -LM           Grooming Assessment/Training    [place] Level (Grooming)  dependent (less than 25%  patient effort)  -LM           Self-Feeding Assessment/Training    [place] Level (Feeding)  dependent (less than 25% patient effort)  -LM           Toileting Assessment/Training    [place] Level (Toileting)  dependent (less than 25% patient effort)  -LM           Wound 01/01/21 0040 Right gluteal Pressure Injury    Wound - Properties Group Placement Date: 01/01/21 -SA Placement Time: 0040 -SA Present on Hospital Admission: Y  -SA Side: Right  -SA Location: gluteal  -SA Primary Wound Type: Pressure inj  -TW Stage, Pressure Injury : Stage 3  -TW    Retired Wound - Properties Group Date first assessed: 01/01/21 -SA Time first assessed: 0040 -SA Present on Hospital Admission: Y  -SA Side: Right  -SA Location: gluteal  -SA Primary Wound Type: Pressure inj  -TW       Wound 01/01/21 0042 Left anterior greater trochanter Pressure Injury    Wound - Properties Group Placement Date: 01/01/21 -SA Placement Time: 0042 -SA Present on Hospital Admission: Y  -TW Side: Left  -SA Orientation: anterior  -SA Location: greater trochanter  -SA Primary Wound Type: Pressure inj  -TW Stage, Pressure Injury : Stage 2  -TW    Retired Wound - Properties Group Date first assessed: 01/01/21 -SA Time first assessed: 0042 -SA Present on Hospital Admission: Y  -TW Side: Left  -SA Location: greater trochanter  -SA Primary Wound Type: Pressure inj  -TW       Wound 01/01/21 0044 Left lower leg    Wound - Properties Group Placement Date: 01/01/21 -SA Placement Time: 0044 -SA Side: Left  -SA Orientation: lower  -SA Location: leg  -SA    Retired Wound - Properties Group Date first assessed: 01/01/21 -SA Time first assessed: 0044  -SA Side: Left  -SA Location: leg  -SA       Wound 01/01/21 0044 Right lower leg    Wound - Properties Group Placement Date: 01/01/21 -SA Placement Time: 0044 -SA Side: Right  -SA Orientation: lower  -SA Location: leg  -SA    Retired Wound - Properties Group Date first assessed: 01/01/21 -SA Time first  assessed: 0044 -SA Side: Right  -SA Location: leg  -SA       Wound 01/04/21 1953 Right heel Pressure Injury    Wound - Properties Group Placement Date: 01/04/21  -LETA Placement Time: 1953 -JP Present on Hospital Admission: N  -LETA Side: Right  -LETA Location: heel  -LETA Primary Wound Type: Pressure inj  -TW Stage, Pressure Injury : deep tissue injury  -LETA    Retired Wound - Properties Group Date first assessed: 01/04/21  -JP Time first assessed: 1953  -JP Present on Hospital Admission: N  -LETA Side: Right  -LETA Location: heel  -LETA Primary Wound Type: Pressure inj  -TW       Plan of Care Review    Plan of Care Reviewed With  patient  -LM           OT Goals    Grooming Goal Selection (OT)  grooming, OT goal 1  -LM        Self-Feeding Goal Selection (OT)  self-feeding, OT goal 1  -LM           Grooming Goal 1 (OT)    Activity/Device (Grooming Goal 1, OT)  grooming skills, all  -LM        [place] (Grooming Goal 1, OT)  maximum assist (25-49% patient effort);moderate assist (50-74% patient effort)  -LM        Time Frame (Grooming Goal 1, OT)  by discharge  -LM           Self-Feeding Goal 1 (OT)    Activity/Device (Self-Feeding Goal 1, OT)  self-feeding skills, all  -LM        [place] Level/Cues Needed (Self-Feeding Goal 1, OT)  moderate assist (50-74% patient effort);minimum assist (75% or more patient effort);verbal cues required  -LM        Time Frame (Self-Feeding Goal 1, OT)  by discharge  -LM           Therapy Assessment/Plan (OT)    Patient/Family Therapy Goal Statement (OT)  return to plof  -LM        OT Diagnosis  debility  -LM        Rehab Potential (OT)  fair, will monitor progress closely  -LM        Criteria for Skilled Therapeutic Interventions Met (OT)  yes;skilled treatment is necessary;meets criteria increased cognition  -LM        Problem List (OT)  problems related to;balance;cognition;coordination;range of motion (ROM);strength;vision;other (see comments) badl and fxl activity tolerance  -LM           User Key  (r) = Recorded By, (t) = Taken By, (c) = Cosigned By    Initials Name Effective Dates    TW Dana Ford, MIGEL 06/16/16 -     Umberto Zelaya, MIGEL 07/31/20 -     Sheron Lee OT 03/14/16 -     Joy Mckinnon RN 01/22/20 -                OT Recommendation and Plan     Plan of Care Review  Plan of Care Reviewed With: patient  Plan of Care Reviewed With: patient        Time Calculation:     Therapy Charges for Today     Code Description Service Date Service Provider Modifiers Qty    [id number]  OT EVAL MOD COMPLEXITY 4 1/5/2021 Sheron Finley, OT GO 1               Sheron Finley OT  1/5/2021

## 2021-01-05 NOTE — PROGRESS NOTES
"Assisted By: Vera LAINEZ    CC: Follow-up on sepsis/bacteremia/metabolic encephalopathy    Interview History/HPI: Patient states she is doing okay she guesses.  She denies any acute pain states she is breathing okay she did eat breakfast.  She states she has a \"pinched nerve\" in her back that makes her legs numb.  She denies any chest pain.  No palpitations.      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  Speech is normal no JVD is noted strength overall symmetric left hand  may be slightly less than the right she moves both toes she has a foot deformity as described yesterday, heel protectors are in place, lungs have bilateral breath sounds are clear no rhonchi rales or wheezing heard, heart regular rate and rhythm without murmur rub or gallop.  Abdomen is soft benign bowel sounds are active mood is good      EKG: EKG again reviewed from yesterday, I thought this was sinus with some PACs although discussed with cardiology Dr. Menezes, this may be MAT and cannot rule out underlying atrial fibrillation.    Tele: Sinus rhythm    LABS:   Lab Results (last 48 hours)     Procedure Component Value Units Date/Time    Blood Culture - Blood, Arm, Left [[id number]] Collected: 01/02/21 0814    Specimen: Blood from Arm, Left Updated: 01/05/21 0845     Blood Culture No growth at 3 days    Blood Culture - Blood, Hand, Left [[id number]] Collected: 01/02/21 0814    Specimen: Blood from Hand, Left Updated: 01/05/21 0845     Blood Culture No growth at 3 days    POC Glucose Once [[id number]]  " (Abnormal) Collected: 01/05/21 0634    Specimen: Blood Updated: 01/05/21 0703     Glucose 186 mg/dL     Comprehensive Metabolic Panel [[id number]]  (Abnormal) Collected: 01/05/21 0151    Specimen: Blood Updated: 01/05/21 0222     Glucose 242 mg/dL      BUN 27 mg/dL      Creatinine 1.05 mg/dL      Sodium 133 mmol/L      Potassium 4.0 mmol/L      Chloride 107 mmol/L      CO2 18.6 mmol/L      Calcium 7.7 mg/dL      Total Protein 5.1 g/dL      Albumin 2.35 g/dL      ALT (SGPT) 9 U/L      AST (SGOT) 7 U/L      Alkaline Phosphatase 65 U/L      Total Bilirubin 0.2 mg/dL      eGFR Non African Amer 51 mL/min/1.73      Globulin 2.8 gm/dL      A/G Ratio 0.9 g/dL      BUN/Creatinine Ratio 25.7     Anion Gap 7.4 mmol/L     Narrative:      GFR Normal >60  Chronic Kidney Disease <60  Kidney Failure <15      Magnesium [[id number]]  (Abnormal) Collected: 01/05/21 0151    Specimen: Blood Updated: 01/05/21 0222     Magnesium 1.5 mg/dL     Phosphorus [[id number]]  (Normal) Collected: 01/05/21 0151    Specimen: Blood Updated: 01/05/21 0222     Phosphorus 2.8 mg/dL     C-reactive Protein [[id number]]  (Abnormal) Collected: 01/05/21 0151    Specimen: Blood Updated: 01/05/21 0222     C-Reactive Protein 4.69 mg/dL     CBC & Differential [[id number]]  (Abnormal) Collected: 01/05/21 0151    Specimen: Blood Updated: 01/05/21 0157    Narrative:      The following orders were created for panel order CBC & Differential.  Procedure                               Abnormality         Status                     ---------                               -----------         ------                     CBC Auto Differential[[id number]]        Abnormal            Final result                 Please view results for these tests on the individual orders.    CBC Auto Differential [[id number]]  (Abnormal) Collected: 01/05/21 0151    Specimen: Blood Updated: 01/05/21 0157     WBC 10.03 10*3/mm3      RBC 2.81 10*6/mm3      Hemoglobin 8.8 g/dL      Hematocrit 28.7 %       .1 fL      MCH 31.3 pg      MCHC 30.7 g/dL      RDW 15.7 %      RDW-SD 58.7 fl      MPV 9.3 fL      Platelets 240 10*3/mm3      Neutrophil % 63.1 %      Lymphocyte % 19.9 %      Monocyte % 10.5 %      Eosinophil % 5.7 %      Basophil % 0.3 %      Immature Grans % 0.5 %      Neutrophils, Absolute 6.33 10*3/mm3      Lymphocytes, Absolute 2.00 10*3/mm3      Monocytes, Absolute 1.05 10*3/mm3      Eosinophils, Absolute 0.57 10*3/mm3      Basophils, Absolute 0.03 10*3/mm3      Immature Grans, Absolute 0.05 10*3/mm3      nRBC 0.0 /100 WBC     POC Glucose Once [[id number]]  (Abnormal) Collected: 01/04/21 2304    Specimen: Blood Updated: 01/04/21 2346     Glucose 265 mg/dL     Blood Culture - Blood, Arm, Right [[id number]] Collected: 12/31/20 2009    Specimen: Blood from Arm, Right Updated: 01/04/21 2030     Blood Culture No growth at 4 days    POC Glucose Once [[id number]]  (Abnormal) Collected: 01/04/21 1820    Specimen: Blood Updated: 01/04/21 1827     Glucose 364 mg/dL     POC Glucose Once [[id number]]  (Abnormal) Collected: 01/04/21 1234    Specimen: Blood Updated: 01/04/21 1311     Glucose 325 mg/dL     POC Glucose Once [[id number]]  (Abnormal) Collected: 01/04/21 0623    Specimen: Blood Updated: 01/04/21 0718     Glucose 162 mg/dL     Phosphorus [[id number]]  (Normal) Collected: 01/04/21 0140    Specimen: Blood Updated: 01/04/21 0321     Phosphorus 3.1 mg/dL     Comprehensive Metabolic Panel [[id number]]  (Abnormal) Collected: 01/04/21 0140    Specimen: Blood Updated: 01/04/21 0215     Glucose 265 mg/dL      BUN 27 mg/dL      Creatinine 0.91 mg/dL      Sodium 142 mmol/L      Potassium 3.9 mmol/L      Chloride 114 mmol/L      CO2 18.6 mmol/L      Calcium 7.3 mg/dL      Total Protein 5.0 g/dL      Albumin 2.47 g/dL      ALT (SGPT) 9 U/L      AST (SGOT) 8 U/L      Alkaline Phosphatase 68 U/L      Total Bilirubin 0.2 mg/dL      eGFR Non African Amer 60 mL/min/1.73      Globulin 2.5 gm/dL      A/G Ratio 1.0 g/dL       BUN/Creatinine Ratio 29.7     Anion Gap 9.4 mmol/L     Narrative:      GFR Normal >60  Chronic Kidney Disease <60  Kidney Failure <15      CBC & Differential [[id number]]  (Abnormal) Collected: 01/04/21 0140    Specimen: Blood Updated: 01/04/21 0149    Narrative:      The following orders were created for panel order CBC & Differential.  Procedure                               Abnormality         Status                     ---------                               -----------         ------                     CBC Auto Differential[[id number]]        Abnormal            Final result                 Please view results for these tests on the individual orders.    CBC Auto Differential [[id number]]  (Abnormal) Collected: 01/04/21 0140    Specimen: Blood Updated: 01/04/21 0149     WBC 9.61 10*3/mm3      RBC 2.74 10*6/mm3      Hemoglobin 8.5 g/dL      Hematocrit 28.2 %      .9 fL      MCH 31.0 pg      MCHC 30.1 g/dL      RDW 16.3 %      RDW-SD 62.4 fl      MPV 9.2 fL      Platelets 241 10*3/mm3      Neutrophil % 59.2 %      Lymphocyte % 27.8 %      Monocyte % 9.7 %      Eosinophil % 2.5 %      Basophil % 0.3 %      Immature Grans % 0.5 %      Neutrophils, Absolute 5.69 10*3/mm3      Lymphocytes, Absolute 2.67 10*3/mm3      Monocytes, Absolute 0.93 10*3/mm3      Eosinophils, Absolute 0.24 10*3/mm3      Basophils, Absolute 0.03 10*3/mm3      Immature Grans, Absolute 0.05 10*3/mm3      nRBC 0.0 /100 WBC     Vancomycin, Trough [[id number]]  (Normal) Collected: 01/03/21 1956    Specimen: Blood Updated: 01/03/21 2040     Vancomycin Trough 17.60 mcg/mL     POC Glucose Once [[id number]]  (Abnormal) Collected: 01/03/21 2001    Specimen: Blood Updated: 01/03/21 2008     Glucose 282 mg/dL     POC Glucose Once [[id number]]  (Abnormal) Collected: 01/03/21 1609    Specimen: Blood Updated: 01/03/21 1745     Glucose 311 mg/dL     POC Glucose Once [[id number]]  (Abnormal) Collected: 01/03/21 1059    Specimen: Blood Updated: 01/03/21 1206       Glucose 386 mg/dL     Urine Culture - Urine, Urine, Catheter [[id number]]  (Abnormal)  (Susceptibility) Collected: 12/31/20 2011    Specimen: Urine, Catheter Updated: 01/03/21 1152     Urine Culture >100,000 CFU/mL Pseudomonas aeruginosa      50,000 CFU/mL Proteus mirabilis    Susceptibility      Pseudomonas aeruginosa     RAVEN     Cefepime Susceptible     Ceftazidime Susceptible     Ciprofloxacin Susceptible     Gentamicin Susceptible     Levofloxacin Susceptible     Piperacillin + Tazobactam Susceptible                Susceptibility      Proteus mirabilis     RAVEN     Ampicillin Susceptible     Ampicillin + Sulbactam Susceptible     Cefazolin Susceptible     Cefepime Susceptible     Ceftazidime Susceptible     Ceftriaxone Susceptible     Gentamicin Susceptible     Levofloxacin Resistant     Nitrofurantoin Resistant     Piperacillin + Tazobactam Susceptible     Tetracycline Resistant     Trimethoprim + Sulfamethoxazole Susceptible                          Radiology:    Imaging Results (Last 72 Hours)     Procedure Component Value Units Date/Time    XR Ankle 3+ View Right [[id number]] Collected: 01/04/21 1558     Updated: 01/04/21 1601    Narrative:      EXAM:    XR Right Ankle Complete, 3 or More Views     EXAM DATE:    1/4/2021 3:36 PM     CLINICAL HISTORY:    eval deformity; N17.9-Acute kidney failure, unspecified     TECHNIQUE:    Frontal, lateral and oblique views of the right ankle.     COMPARISON:    No relevant prior studies available.     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  Disuse osteoporosis.  Chronic appearing varus deformity of  the ankle.  No acute appearing displaced fracture or dislocation.    Soft tissues:  Unremarkable.    Vasculature:  Diffuse soft tissue calcifications are noted in either  likely due to a chronic inflammatory process or chronic venous stasis.       Impression:      1.  Advanced hindfoot and midfoot arthrosis and varus positioning  of the  ankle.  2.  No acute appearing fracture or dislocation.  3.  Benign-appearing soft tissue calcifications noted diffusely with  considerations above.     This report was finalized on 1/4/2021 3:59 PM by Dr. Sagar Romero MD.       MRI Brain Without Contrast [[id number]] Collected: 01/04/21 1044     Updated: 01/04/21 1120    Narrative:      EXAM:    MR Head WITHOUT Intravenous Contrast     EXAM DATE:    1/4/2021 9:20 AM     CLINICAL HISTORY:    CSF density mass with mass effect, MRI w/wo was recommended, patient  with AMS; N17.9-Acute kidney failure, unspecified     TECHNIQUE:    Magnetic resonance images of the head/brain without intravenous  contrast in multiple planes.     COMPARISON:    CT 12/31/2020     FINDINGS:    Brain:  Near fluid density process in the region of the right  cerebellar hemisphere is noted corresponding to abnormality identified  on CT and appears to be parenchymal in nature and most probably  represents marked encephalomalacia from sequela of a previous cerebellar  infarction.  Additionally, there is marked gliosis noted with cortical  sclerosis of the right posterior medial occipital lobe compatible with  sequela of remote occipital region infarct.  Advanced chronic small  vessel ischemic disease noted.  Generalized cerebral atrophy in part  age-related.  No hemorrhage.    Ventricles:  Unremarkable.  No ventriculomegaly.    Bones/joints:  Unremarkable.    Sinuses:  Mild chronic ethmoid sinusitis.    Mastoid air cells:  Marked left mastoiditis changes are noted.    Orbits:  Unremarkable as visualized.       Impression:      1.  Near CSF density process involving the right cerebellar hemisphere  which is most probably intra-axial in location and has features ON the  MRI that are most compatible with sequela of a remote infarct with  extensive encephalomalacia changes. Cyst also considered but felt less  likely.  2.  No solid appearing features and no mass effect is noted.  3.  Chronic  appearing infarct in the right occipital lobe with cortical  sclerosis noted.  4.  Generalized atrophy and advanced chronic small vessel ischemic  disease.  5.  Marked left mastoiditis.  6.  Mild chronic appearing ethmoid sinusitis.  7.  No acute intracranial findings are otherwise noted.     This report was finalized on 1/4/2021 10:47 AM by Dr. Sagar Romero MD.             Results for orders placed during the hospital encounter of 12/31/20   Adult Transthoracic Echo Complete W/ Cont if Necessary Per Protocol    Narrative · Left ventricular ejection fraction appears to be 61 - 65%. Left   ventricular systolic function is normal.  · Left ventricular diastolic function is consistent with (grade II w/high   LAP) pseudonormalization.  · Mild aortic valve stenosis is present.          Assessment/Plan:   Sepsis, severe sepsis if metabolic encephalopathy included as end organ dysfunction, patient has been on Enterococcus coverage for her bacteremia with vancomycin, also Maxipime added yesterday for Pseudomonas and Proteus that was in the urine.  Noted her CRP has risen from its baseline but will follow the trend.  White count remains normal.    Macrocytosis, check B12 and folate, TSH was normal on admission    Diabetes not well controlled, basal insulin adjusted, follow    Hypertension, overall controlled although some fluctuations, if she remains hypertensive would consider tomorrow instigation of amlodipine    Generalized weakness, discussed with therapy, they are going to reevaluate today.    Metabolic encephalopathy, improved    CVA by MRI, I will check a carotid Doppler, she has been started on low-dose aspirin and statin.  Will check lipid profile in the a.m., echo showed a normal ejection fraction.  With this CVA noted, certainly concerning for the possibility of some intermittent underlying atrial fibrillation, I discussed with cardiology and they are going to evaluate the patient further.    Wounds, continue  local care    DVT prophylaxis, continue subcu Lovenox for now.    Hyponatremia, she was hypernatremic, fluids stopped yesterday, I am rechecking sodium, if further drop will consider fluid restriction.  Noted sodium corrects to 135 for her glucose at the time sodium was drawn.    Feel patient is stable to transfer to telemetry.

## 2021-01-05 NOTE — PHARMACY PATIENT ASSISTANCE
Pharmacy was consulted on pricing of NOACs for this patient. Per her insurance, either Eliquis or Xarelto is $47. Patient could not tell the patient assistance technician if this is affordable for her, and we've been unable to reach her spouse to clarify.    Thank you,  Dori Roca, PharmD

## 2021-01-05 NOTE — CONSULTS
"Diabetes Education  Assessment/Teaching    Patient Name:  Ashley Galvez  YOB: 1945  MRN: [id number]  Admit Date:  12/31/2020      Assessment Date:  1/5/2021    Most Recent Value   General Information    Height  160 cm (63\")   Height Method  Estimated   Weight  81.6 kg (180 lb)   Weight Method  Bed scale   Pregnancy Assessment   Diabetes History   Education Preferences   Nutrition Information   Assessment Topics   DM Goals            Most Recent Value   DM Education Needs   Meter  Has own   Frequency of Testing  2 times a day   Medication  Insulin   Problem Solving  Hypoglycemia, Hyperglycemia, Sick days, Signs, Treatment, Symptoms   Reducing Risks  A1C testing, Lipids, Blood pressure, Eye exam, Dental exam, Foot care, Immunizations, Cardiovascular, Neuropathy   Healthy Eating  Basic meal plan provided   Physical Activity  Other (comment) [Patient has a hard time ambulating due to weakness]   Physical Activity Frequency  Rarely   Healthy Coping  Appropriate   Discharge Plan  Home, Follow-up with PCP   Motivation  Moderate   Teaching Method  Explanation, Discussion, Demonstration, Handouts   Patient Response  Verbalized understanding, Needs reinforcement            Other Comments:  A1C 7.9 Patient did not wear mask. Patient received education and handouts on diet, activity, blood glucose monitoring, taking medication as prescribed. Patient was given ideals of activity while in bed and sitting in chair to help with movement. If any questions or concerns please call or re-consult thank yoiu        Electronically signed by:  Karla Manzo RN  01/05/21 10:14 EST  "

## 2021-01-05 NOTE — NURSING NOTE
Wound care contacted about what appears to be scattered venous ulcers on BLE. Wound care stated to use vasline gauze and roll gauze until further evaluated.     MIGEL Mcgrath

## 2021-01-05 NOTE — NURSING NOTE
Wound consult for DTI to Rt heel. Wound presents with a dark purple discoloration with skin intact. Carmen wound skin, is slow to josh and boggy. PT admitted with multiple wounds and has an albumin of 2.35 decreasing wound healing and increasing risk for breakdown. Pressure injury protocols in place. New order for Venelex Q shift.

## 2021-01-05 NOTE — PROGRESS NOTES
PROGRESS NOTE         Patient Identification:  Name:  Ashley Galvez  Age:  75 y.o.  Sex:  female  :  1945  MRN:  [id number]  Visit Number:  [id number]  Primary Care Provider:  Provider, No Known         LOS: 5 days       ----------------------------------------------------------------------------------------------------------------------  Subjective       Chief Complaints:    Altered Mental Status        Interval History:      The patient is sitting up in bed this morning on room air in no apparent distress.  Denies any complaints at this time.  Afebrile, no diarrhea.  CRP is slightly worsened at 4.69.  WBC remains normal.  MRI of the brain without contrast on 2021 showed near CSF density process involving the right cerebellar hemisphere which is most probably intra-axial in location and has features on the MRI that are most compatible with sequela of a remote infarct with extensive encephalomalacia changes. Cyst also considered but felt less likely. No solid appearing features and no mass effect is noted. 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 Generalized atrophy and advanced chronic small vessel ischemic disease. Marked left mastoiditis. Mild chronic appearing ethmoid sinusitis. No acute intracranial findings are otherwise noted.     Review of Systems:    Constitutional: no fever, chills and night sweats.  Generalized fatigue.    Eyes: no eye drainage, itching or redness.  HEENT: no mouth sores, dysphagia or nose bleed.  Respiratory: no for shortness of breath, cough or production of sputum.  Cardiovascular: no chest pain, no palpitations, no orthopnea.  Gastrointestinal: no nausea, vomiting or diarrhea. No abdominal pain, hematemesis or rectal bleeding.  Genitourinary: no dysuria or polyuria.  Hematologic/lymphatic: no lymph node abnormalities, no easy bruising or easy bleeding.  Musculoskeletal: no muscle or joint pain.  Skin: No rash and no  itching.  Neurological: no loss of consciousness, no seizure, no headache.  Behavioral/Psych: no depression or suicidal ideation.  Endocrine: no hot flashes.  Immunologic: negative.    ----------------------------------------------------------------------------------------------------------------------      Objective       Current [facility] Meds:  aspirin, 81 mg, Oral, Daily  atorvastatin, 20 mg, Oral, Nightly  castor oil-balsam peru, , Topical, Q12H  castor oil-balsam peru, , Topical, Q12H  cefepime, 2 g, Intravenous, Q12H  enoxaparin, 40 mg, Subcutaneous, Daily  insulin aspart, 0-9 Units, Subcutaneous, TID AC  insulin detemir, 25 Units, Subcutaneous, Nightly  ipratropium-albuterol, 3 mL, Nebulization, 4x Daily - RT  lactobacillus acidophilus, 1 capsule, Oral, Daily  lisinopril-hydroCHLOROthiazide (ZESTORTIC) 20-25 mg combo dose, , Oral, Q24H  magnesium sulfate, 2 g, Intravenous, Q2H  pantoprazole, 40 mg, Oral, Daily  sodium chloride, 3 mL, Intravenous, Q12H  vancomycin, 1,000 mg, Intravenous, Q24H         ----------------------------------------------------------------------------------------------------------------------    Vital Signs:  Temp:  [97.8 °F (36.6 °C)-98.8 °F (37.1 °C)] 97.8 °F (36.6 °C)  Heart Rate:  [] 100  Resp:  [16-22] 20  BP: (103-164)/() 103/85  Mean Arterial Pressure (Non-Invasive) for the past 24 hrs (Last 3 readings):   Noninvasive MAP (mmHg)   01/05/21 1340 90   01/05/21 1203 95   01/05/21 1102 95     SpO2 Percentage    01/05/21 1102 01/05/21 1203 01/05/21 1340   SpO2: 98% 97% 100%     SpO2:  [94 %-100 %] 100 %  on  Flow (L/min):  [2] 2;   Device (Oxygen Therapy): room air    Body mass index is 31.89 kg/m².  Wt Readings from Last 3 Encounters:   01/05/21 81.6 kg (180 lb)        Intake/Output Summary (Last 24 hours) at 1/5/2021 1401  Last data filed at 1/5/2021 1340  Gross per 24 hour   Intake 3461 ml   Output 2075 ml   Net 1386 ml     Diet Soft Texture; Chopped;  Thin  ----------------------------------------------------------------------------------------------------------------------      Physical Exam:    Constitutional: Chronically ill-appearing elderly lady.  No respiratory distress. Much more awake and alert today.     HENT:  Head: Normocephalic and atraumatic.  Mouth:  Moist mucous membranes.    Eyes:  Conjunctivae and EOM are normal.  No scleral icterus.  Neck:  Neck supple.  No JVD present.    Cardiovascular:   Regular rate.  Irregularly irregular rhythm.  No murmur auscultated.  Pulmonary/Chest:  No respiratory distress, no wheezes, no crackles, with normal breath sounds and good air movement.  Abdominal:  Soft.  Bowel sounds are normal.  No distension and no tenderness to palpation.  Musculoskeletal:  No edema, no tenderness, and no deformity.  No swelling or redness of joints.  Ulcerations of pretibial area which do not appear infectious.  Right ankle turns inward.  Neurological:  Alert and oriented to person, place, and time.  No facial droop.  No slurred speech.   Skin:  Skin is warm and dry.  No rash noted.  No pallor.  Onychomycosis.  Psychiatric: Normal mood and affect.  Behavior is normal.  ----------------------------------------------------------------------------------------------------------------------  Results from last 7 days   Lab Units 01/01/21  0347 12/31/20 2009   CK TOTAL U/L  --  92   TROPONIN T ng/mL 0.037* 0.052*     Results from last 7 days   Lab Units 12/31/20 2009   PROBNP pg/mL 1,580.0*       Results from last 7 days   Lab Units 01/02/21  0419   PH, ARTERIAL pH units 7.406   PO2 ART mm Hg 77.9*   PCO2, ARTERIAL mm Hg 32.4*   HCO3 ART mmol/L 20.3     Results from last 7 days   Lab Units 01/05/21  0151 01/04/21  0140 01/03/21  0121  01/01/21  0347 12/31/20 2009   CRP mg/dL 4.69*  --   --   --  0.78* 0.75*   LACTATE mmol/L  --   --   --   --  0.9 0.9   WBC 10*3/mm3 10.03 9.61 9.66   < > 12.52* 11.73*   HEMOGLOBIN g/dL 8.8* 8.5* 9.6*   < >  11.3* 11.3*   HEMATOCRIT % 28.7* 28.2* 31.9*   < > 37.0 35.4   MCV fL 102.1* 102.9* 102.6*   < > 103.4* 100.6*   MCHC g/dL 30.7* 30.1* 30.1*   < > 30.5* 31.9   PLATELETS 10*3/mm3 240 241 364   < > 413 449   INR   --   --   --   --   --  1.08    < > = values in this interval not displayed.     Results from last 7 days   Lab Units 01/05/21  0939 01/05/21  0151 01/04/21  0140 01/03/21  0121  01/02/21  0135  12/31/20 2009   SODIUM mmol/L 134* 133* 142 154*  --  151*   < > 147*   POTASSIUM mmol/L  --  4.0 3.9 4.7   < > 2.9*   < > 3.9   MAGNESIUM mg/dL  --  1.5*  --   --   --  1.8  --  2.2   CHLORIDE mmol/L  --  107 114* 128*  --  122*   < > 127*   CO2 mmol/L  --  18.6* 18.6* 16.8*  --  18.2*   < > 7.4*   BUN mg/dL  --  27* 27* 32*  --  39*   < > 70*   CREATININE mg/dL  --  1.05* 0.91 0.95  --  1.18*   < > 1.71*   EGFR IF NONAFRICN AM mL/min/1.73  --  51* 60* 58*  --  45*   < > 29*   CALCIUM mg/dL  --  7.7* 7.3* 8.4*  --  8.9   < > 9.9   GLUCOSE mg/dL  --  242* 265* 249*  --  135*   < > 112*   ALBUMIN g/dL  --  2.35* 2.47* 2.82*  --  3.10*   < > 3.64   BILIRUBIN mg/dL  --  0.2 0.2 0.2  --  0.2   < > 0.2   ALK PHOS U/L  --  65 68 87  --  96   < > 105   AST (SGOT) U/L  --  7 8 18  --  12   < > 13   ALT (SGPT) U/L  --  9 9 10  --  9   < > 10    < > = values in this interval not displayed.   Estimated Creatinine Clearance: 47.6 mL/min (A) (by C-G formula based on SCr of 1.05 mg/dL (H)).  No results found for: AMMONIA    Glucose   Date/Time Value Ref Range Status   01/05/2021 1044 255 (H) 70 - 130 mg/dL Final   01/05/2021 0634 186 (H) 70 - 130 mg/dL Final   01/04/2021 2304 265 (H) 70 - 130 mg/dL Final   01/04/2021 1820 364 (H) 70 - 130 mg/dL Final   01/04/2021 1234 325 (H) 70 - 130 mg/dL Final   01/04/2021 0623 162 (H) 70 - 130 mg/dL Final   01/03/2021 2001 282 (H) 70 - 130 mg/dL Final   01/03/2021 1609 311 (H) 70 - 130 mg/dL Final     Lab Results   Component Value Date    HGBA1C 7.90 (H) 12/31/2020     Lab Results    Component Value Date    TSH 1.240 12/31/2020       Blood Culture   Date Value Ref Range Status   01/02/2021 No growth at 24 hours  Preliminary   01/02/2021 No growth at 24 hours  Preliminary   12/31/2020 No growth at 2 days  Preliminary   12/31/2020 Enterococcus faecalis (C)  Final     Comment:     Infectious disease consultation is highly recommended.     Urine Culture   Date Value Ref Range Status   12/31/2020 Culture in progress  Preliminary     No results found for: WOUNDCX  No results found for: STOOLCX  No results found for: RESPCX  Pain Management Panel     Pain Management Panel Latest Ref Rng & Units 12/31/2020    AMPHETAMINES SCREEN, URINE Negative Negative    BARBITURATES SCREEN Negative Negative    BENZODIAZEPINE SCREEN, URINE Negative Negative    BUPRENORPHINEUR Negative Negative    COCAINE SCREEN, URINE Negative Negative    METHADONE SCREEN, URINE Negative Negative            ----------------------------------------------------------------------------------------------------------------------  Imaging Results (Last 24 Hours)     Procedure Component Value Units Date/Time    XR Ankle 3+ View Right [[id number]] Collected: 01/04/21 1558     Updated: 01/04/21 1601    Narrative:      EXAM:    XR Right Ankle Complete, 3 or More Views     EXAM DATE:    1/4/2021 3:36 PM     CLINICAL HISTORY:    eval deformity; N17.9-Acute kidney failure, unspecified     TECHNIQUE:    Frontal, lateral and oblique views of the right ankle.     COMPARISON:    No relevant prior studies available.     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  Disuse osteoporosis.  Chronic appearing varus deformity of  the ankle.  No acute appearing displaced fracture or dislocation.    Soft tissues:  Unremarkable.    Vasculature:  Diffuse soft tissue calcifications are noted in either  likely due to a chronic inflammatory process or chronic venous stasis.       Impression:      1.  Advanced hindfoot and  midfoot arthrosis and varus positioning of the  ankle.  2.  No acute appearing fracture or dislocation.  3.  Benign-appearing soft tissue calcifications noted diffusely with  considerations above.     This report was finalized on 1/4/2021 3:59 PM by Dr. Sagar Romero MD.             ----------------------------------------------------------------------------------------------------------------------    Assessment/Plan       Assessment/Plan     ASSESSMENT:    1.  Sepsis  2.  Pyelonephritis  3.  Enterococcus bacteremia    PLAN:    The patient is sitting up in bed this morning on room air in no apparent distress.  Denies any complaints at this time.  Afebrile, no diarrhea.  CRP is slightly worsened at 4.69.  WBC remains normal.  MRI of the brain without contrast on 1/4/2021 showed near CSF density process involving the right cerebellar hemisphere which is most probably intra-axial in location and has features on the MRI that are most compatible with sequela of a remote infarct with extensive encephalomalacia changes. Cyst also considered but felt less likely. No solid appearing features and no mass effect is noted. 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 Generalized atrophy and advanced chronic small vessel ischemic disease. Marked left mastoiditis. Mild chronic appearing ethmoid sinusitis. No acute intracranial findings are otherwise noted.     Urinalysis on 12/31/2020 was positive with WBCs too numerous to count.  Urine culture on 1/3/2021 finalized as Pseudomonas aeruginosa and Proteus mirabilis.      Chest x-ray on 12/31/2020 showed left effusion and left basilar consolidation with recommendation to consider CT to evaluate for mass.  CT chest without contrast on 12/31/2020 showed no evidence of acute pneumonia.  Large hiatal hernia at the left lung base.  Noncalcified nodule in the right lower lobe measuring 7 mm with recommended chest CT follow-up in 6 months.  Left  adrenal nodule, most likely a benign adenoma.  This can be further characterized with adrenal protocol CT or MRI on a nonemergent basis if indicated.     CT head without contrast on 12/31/2020 showed no acute intracranial abnormality.  CSF density mass of the right posterior fossa with mild mass-effect on the right cerebellar convexity, most likely an arachnoid cyst with recommendation to evaluate further with MRI without and with contrast.  Presumed chronic microvascular ischemic changes.     CT abdomen pelvis without contrast on 12/31/2020 showed atherosclerotic disease with probable vascular calcifications in both kidneys.  No ureteral stones as seen on either side, and there is no hydronephrosis.  Mild thickening of the urinary bladder wall may be due to incomplete distention or cystitis.  Large amount of stool in the rectal vault.  There is colonic diverticulosis without diverticulitis.  The appendix and small bowel are within normal limits.  Small left adrenal nodule, likely a benign adenoma in the absence of known malignancy.  This could be assessed with nonemergent adrenal protocol MRI or CT if indicated.  Cholecystectomy and hysterectomy.     Blood cultures on 12/31/2020 are so far showing 1 out of 2 with Enterococcus.  BC ID on 12/31/2020 finalized as Enterococcus.  Repeat blood cultures are in process.     COVID-19 and flu A/B PCR on 12/31/2020 was negative.     Recommend to continue with vancomycin, which is appropriate coverage for Enterococcus bacteremia.  Flagyl was discontinued today, as there is no longer concern for intra-abdominal infection.  As urine culture has finalized as Pseudomonas and Proteus, recommend to continue cefepime 2 g IV every 12 hours through 1/11/2021 for UTI.    Code Status:   Code Status and Medical Interventions:   Ordered at: 12/31/20 6169     Level Of Support Discussed With:    Patient     Code Status:    CPR     Medical Interventions (Level of Support Prior to Arrest):     Full     PATRICE Baires  01/05/21  14:01 EST

## 2021-01-06 ENCOUNTER — APPOINTMENT (OUTPATIENT)
Dept: GENERAL RADIOLOGY | Facility: HOSPITAL | Age: 76
End: 2021-01-06

## 2021-01-06 LAB
ALBUMIN SERPL-MCNC: 2.34 G/DL (ref 3.5–5.2)
ALBUMIN/GLOB SERPL: 0.8 G/DL
ALP SERPL-CCNC: 71 U/L (ref 39–117)
ALT SERPL W P-5'-P-CCNC: 8 U/L (ref 1–33)
ANION GAP SERPL CALCULATED.3IONS-SCNC: 9.7 MMOL/L (ref 5–15)
AST SERPL-CCNC: 9 U/L (ref 1–32)
B PERT DNA SPEC QL NAA+PROBE: NOT DETECTED
BASOPHILS # BLD AUTO: 0.04 10*3/MM3 (ref 0–0.2)
BASOPHILS NFR BLD AUTO: 0.4 % (ref 0–1.5)
BILIRUB SERPL-MCNC: <0.2 MG/DL (ref 0–1.2)
BUN SERPL-MCNC: 22 MG/DL (ref 8–23)
BUN/CREAT SERPL: 22.4 (ref 7–25)
C PNEUM DNA NPH QL NAA+NON-PROBE: NOT DETECTED
CALCIUM SPEC-SCNC: 8.3 MG/DL (ref 8.6–10.5)
CHLORIDE SERPL-SCNC: 103 MMOL/L (ref 98–107)
CHOLEST SERPL-MCNC: 110 MG/DL (ref 0–200)
CO2 SERPL-SCNC: 17.3 MMOL/L (ref 22–29)
CREAT SERPL-MCNC: 0.98 MG/DL (ref 0.57–1)
CRP SERPL-MCNC: 7.01 MG/DL (ref 0–0.5)
DEPRECATED RDW RBC AUTO: 60.6 FL (ref 37–54)
EOSINOPHIL # BLD AUTO: 0.42 10*3/MM3 (ref 0–0.4)
EOSINOPHIL NFR BLD AUTO: 4.5 % (ref 0.3–6.2)
ERYTHROCYTE [DISTWIDTH] IN BLOOD BY AUTOMATED COUNT: 15.4 % (ref 12.3–15.4)
FLUAV SUBTYP SPEC NAA+PROBE: NOT DETECTED
FLUBV RNA ISLT QL NAA+PROBE: NOT DETECTED
FOLATE SERPL-MCNC: 4.82 NG/ML (ref 4.78–24.2)
GFR SERPL CREATININE-BSD FRML MDRD: 55 ML/MIN/1.73
GLOBULIN UR ELPH-MCNC: 3.1 GM/DL
GLUCOSE BLDC GLUCOMTR-MCNC: 280 MG/DL (ref 70–130)
GLUCOSE BLDC GLUCOMTR-MCNC: 326 MG/DL (ref 70–130)
GLUCOSE BLDC GLUCOMTR-MCNC: 427 MG/DL (ref 70–130)
GLUCOSE BLDC GLUCOMTR-MCNC: 482 MG/DL (ref 70–130)
GLUCOSE SERPL-MCNC: 377 MG/DL (ref 65–99)
HADV DNA SPEC NAA+PROBE: NOT DETECTED
HCOV 229E RNA SPEC QL NAA+PROBE: NOT DETECTED
HCOV HKU1 RNA SPEC QL NAA+PROBE: NOT DETECTED
HCOV NL63 RNA SPEC QL NAA+PROBE: NOT DETECTED
HCOV OC43 RNA SPEC QL NAA+PROBE: NOT DETECTED
HCT VFR BLD AUTO: 29.9 % (ref 34–46.6)
HDLC SERPL-MCNC: 29 MG/DL (ref 40–60)
HGB BLD-MCNC: 8.7 G/DL (ref 12–15.9)
HMPV RNA NPH QL NAA+NON-PROBE: NOT DETECTED
HPIV1 RNA SPEC QL NAA+PROBE: NOT DETECTED
HPIV2 RNA SPEC QL NAA+PROBE: NOT DETECTED
HPIV3 RNA NPH QL NAA+PROBE: NOT DETECTED
HPIV4 P GENE NPH QL NAA+PROBE: NOT DETECTED
IMM GRANULOCYTES # BLD AUTO: 0.05 10*3/MM3 (ref 0–0.05)
IMM GRANULOCYTES NFR BLD AUTO: 0.5 % (ref 0–0.5)
LDLC SERPL CALC-MCNC: 56 MG/DL (ref 0–100)
LDLC/HDLC SERPL: 1.8 {RATIO}
LYMPHOCYTES # BLD AUTO: 1.44 10*3/MM3 (ref 0.7–3.1)
LYMPHOCYTES NFR BLD AUTO: 15.3 % (ref 19.6–45.3)
M PNEUMO IGG SER IA-ACNC: NOT DETECTED
MAGNESIUM SERPL-MCNC: 3.1 MG/DL (ref 1.6–2.4)
MCH RBC QN AUTO: 31.3 PG (ref 26.6–33)
MCHC RBC AUTO-ENTMCNC: 29.1 G/DL (ref 31.5–35.7)
MCV RBC AUTO: 107.6 FL (ref 79–97)
MONOCYTES # BLD AUTO: 1.12 10*3/MM3 (ref 0.1–0.9)
MONOCYTES NFR BLD AUTO: 11.9 % (ref 5–12)
NEUTROPHILS NFR BLD AUTO: 6.34 10*3/MM3 (ref 1.7–7)
NEUTROPHILS NFR BLD AUTO: 67.4 % (ref 42.7–76)
NRBC BLD AUTO-RTO: 0 /100 WBC (ref 0–0.2)
PLATELET # BLD AUTO: 274 10*3/MM3 (ref 140–450)
PMV BLD AUTO: 9.6 FL (ref 6–12)
POTASSIUM SERPL-SCNC: 4.6 MMOL/L (ref 3.5–5.2)
PROT SERPL-MCNC: 5.4 G/DL (ref 6–8.5)
QT INTERVAL: 346 MS
QTC INTERVAL: 441 MS
RBC # BLD AUTO: 2.78 10*6/MM3 (ref 3.77–5.28)
RHINOVIRUS RNA SPEC NAA+PROBE: NOT DETECTED
RSV RNA NPH QL NAA+NON-PROBE: NOT DETECTED
SODIUM SERPL-SCNC: 130 MMOL/L (ref 136–145)
TRIGL SERPL-MCNC: 144 MG/DL (ref 0–150)
VIT B12 BLD-MCNC: 528 PG/ML (ref 211–946)
VLDLC SERPL-MCNC: 25 MG/DL (ref 5–40)
WBC # BLD AUTO: 9.41 10*3/MM3 (ref 3.4–10.8)

## 2021-01-06 PROCEDURE — 99232 SBSQ HOSP IP/OBS MODERATE 35: CPT | Performed by: NURSE PRACTITIONER

## 2021-01-06 PROCEDURE — 82746 ASSAY OF FOLIC ACID SERUM: CPT | Performed by: INTERNAL MEDICINE

## 2021-01-06 PROCEDURE — 71045 X-RAY EXAM CHEST 1 VIEW: CPT

## 2021-01-06 PROCEDURE — 83735 ASSAY OF MAGNESIUM: CPT | Performed by: INTERNAL MEDICINE

## 2021-01-06 PROCEDURE — 0099U HC BIOFIRE FILMARRAY RESP PANEL 1: CPT | Performed by: PHYSICIAN ASSISTANT

## 2021-01-06 PROCEDURE — 94799 UNLISTED PULMONARY SVC/PX: CPT

## 2021-01-06 PROCEDURE — 63710000001 INSULIN ASPART PER 5 UNITS: Performed by: HOSPITALIST

## 2021-01-06 PROCEDURE — 63710000001 INSULIN ASPART PER 5 UNITS: Performed by: INTERNAL MEDICINE

## 2021-01-06 PROCEDURE — 63710000001 INSULIN ASPART PER 5 UNITS: Performed by: PHYSICIAN ASSISTANT

## 2021-01-06 PROCEDURE — 25010000002 CEFEPIME PER 500 MG: Performed by: INTERNAL MEDICINE

## 2021-01-06 PROCEDURE — 80053 COMPREHEN METABOLIC PANEL: CPT | Performed by: INTERNAL MEDICINE

## 2021-01-06 PROCEDURE — 99233 SBSQ HOSP IP/OBS HIGH 50: CPT | Performed by: PHYSICIAN ASSISTANT

## 2021-01-06 PROCEDURE — 86140 C-REACTIVE PROTEIN: CPT | Performed by: INTERNAL MEDICINE

## 2021-01-06 PROCEDURE — 82607 VITAMIN B-12: CPT | Performed by: INTERNAL MEDICINE

## 2021-01-06 PROCEDURE — 93005 ELECTROCARDIOGRAM TRACING: CPT | Performed by: HOSPITALIST

## 2021-01-06 PROCEDURE — 97110 THERAPEUTIC EXERCISES: CPT | Performed by: OCCUPATIONAL THERAPIST

## 2021-01-06 PROCEDURE — 80061 LIPID PANEL: CPT | Performed by: INTERNAL MEDICINE

## 2021-01-06 PROCEDURE — 85025 COMPLETE CBC W/AUTO DIFF WBC: CPT | Performed by: INTERNAL MEDICINE

## 2021-01-06 PROCEDURE — 63710000001 INSULIN DETEMIR PER 5 UNITS: Performed by: PHYSICIAN ASSISTANT

## 2021-01-06 PROCEDURE — 25010000002 METHYLPREDNISOLONE PER 40 MG: Performed by: PHYSICIAN ASSISTANT

## 2021-01-06 PROCEDURE — 93010 ELECTROCARDIOGRAM REPORT: CPT | Performed by: INTERNAL MEDICINE

## 2021-01-06 PROCEDURE — 25010000002 ENOXAPARIN PER 10 MG: Performed by: INTERNAL MEDICINE

## 2021-01-06 PROCEDURE — 82962 GLUCOSE BLOOD TEST: CPT

## 2021-01-06 RX ORDER — METHYLPREDNISOLONE SODIUM SUCCINATE 40 MG/ML
40 INJECTION, POWDER, LYOPHILIZED, FOR SOLUTION INTRAMUSCULAR; INTRAVENOUS EVERY 12 HOURS
Status: DISCONTINUED | OUTPATIENT
Start: 2021-01-06 | End: 2021-01-06

## 2021-01-06 RX ORDER — HYDROXYZINE HYDROCHLORIDE 25 MG/1
25 TABLET, FILM COATED ORAL 3 TIMES DAILY PRN
Status: DISCONTINUED | OUTPATIENT
Start: 2021-01-06 | End: 2021-01-14 | Stop reason: HOSPADM

## 2021-01-06 RX ORDER — NICOTINE POLACRILEX 4 MG
15 LOZENGE BUCCAL
Status: DISCONTINUED | OUTPATIENT
Start: 2021-01-06 | End: 2021-01-14 | Stop reason: HOSPADM

## 2021-01-06 RX ORDER — AMLODIPINE BESYLATE 5 MG/1
5 TABLET ORAL
Status: DISCONTINUED | OUTPATIENT
Start: 2021-01-06 | End: 2021-01-13

## 2021-01-06 RX ORDER — DEXTROSE MONOHYDRATE 25 G/50ML
25 INJECTION, SOLUTION INTRAVENOUS
Status: DISCONTINUED | OUTPATIENT
Start: 2021-01-06 | End: 2021-01-14 | Stop reason: HOSPADM

## 2021-01-06 RX ADMIN — INSULIN DETEMIR 30 UNITS: 100 INJECTION, SOLUTION SUBCUTANEOUS at 22:03

## 2021-01-06 RX ADMIN — CASTOR OIL AND BALSAM, PERU: 788; 87 OINTMENT TOPICAL at 20:46

## 2021-01-06 RX ADMIN — HYDROXYZINE HYDROCHLORIDE 25 MG: 25 TABLET, FILM COATED ORAL at 07:14

## 2021-01-06 RX ADMIN — IPRATROPIUM BROMIDE AND ALBUTEROL SULFATE 3 ML: .5; 3 SOLUTION RESPIRATORY (INHALATION) at 13:15

## 2021-01-06 RX ADMIN — CEFEPIME 2 G: 2 INJECTION, POWDER, FOR SOLUTION INTRAVENOUS at 20:46

## 2021-01-06 RX ADMIN — LISINOPRIL: 10 TABLET ORAL at 08:20

## 2021-01-06 RX ADMIN — METRONIDAZOLE 500 MG: 500 INJECTION, SOLUTION INTRAVENOUS at 12:37

## 2021-01-06 RX ADMIN — SODIUM CHLORIDE, PRESERVATIVE FREE 3 ML: 5 INJECTION INTRAVENOUS at 08:22

## 2021-01-06 RX ADMIN — AMLODIPINE BESYLATE 5 MG: 5 TABLET ORAL at 11:21

## 2021-01-06 RX ADMIN — INSULIN ASPART 7 UNITS: 100 INJECTION, SOLUTION INTRAVENOUS; SUBCUTANEOUS at 22:03

## 2021-01-06 RX ADMIN — IPRATROPIUM BROMIDE AND ALBUTEROL SULFATE 3 ML: .5; 3 SOLUTION RESPIRATORY (INHALATION) at 00:33

## 2021-01-06 RX ADMIN — ATORVASTATIN CALCIUM 20 MG: 20 TABLET, FILM COATED ORAL at 20:46

## 2021-01-06 RX ADMIN — METRONIDAZOLE 500 MG: 500 INJECTION, SOLUTION INTRAVENOUS at 20:45

## 2021-01-06 RX ADMIN — CEFEPIME 2 G: 2 INJECTION, POWDER, FOR SOLUTION INTRAVENOUS at 08:21

## 2021-01-06 RX ADMIN — IPRATROPIUM BROMIDE AND ALBUTEROL SULFATE 3 ML: .5; 3 SOLUTION RESPIRATORY (INHALATION) at 18:33

## 2021-01-06 RX ADMIN — SODIUM CHLORIDE, PRESERVATIVE FREE 3 ML: 5 INJECTION INTRAVENOUS at 20:47

## 2021-01-06 RX ADMIN — CASTOR OIL AND BALSAM, PERU: 788; 87 OINTMENT TOPICAL at 08:24

## 2021-01-06 RX ADMIN — INSULIN ASPART 14 UNITS: 100 INJECTION, SOLUTION INTRAVENOUS; SUBCUTANEOUS at 17:55

## 2021-01-06 RX ADMIN — INSULIN ASPART 7 UNITS: 100 INJECTION, SOLUTION INTRAVENOUS; SUBCUTANEOUS at 11:21

## 2021-01-06 RX ADMIN — INSULIN ASPART 5 UNITS: 100 INJECTION, SOLUTION INTRAVENOUS; SUBCUTANEOUS at 17:56

## 2021-01-06 RX ADMIN — INSULIN ASPART 5 UNITS: 100 INJECTION, SOLUTION INTRAVENOUS; SUBCUTANEOUS at 11:20

## 2021-01-06 RX ADMIN — PANTOPRAZOLE SODIUM 40 MG: 40 TABLET, DELAYED RELEASE ORAL at 08:20

## 2021-01-06 RX ADMIN — VANCOMYCIN HYDROCHLORIDE 1000 MG: 1 INJECTION, POWDER, LYOPHILIZED, FOR SOLUTION INTRAVENOUS at 20:46

## 2021-01-06 RX ADMIN — ASPIRIN 81 MG: 81 TABLET, COATED ORAL at 08:20

## 2021-01-06 RX ADMIN — APIXABAN 5 MG: 5 TABLET, FILM COATED ORAL at 20:46

## 2021-01-06 RX ADMIN — METHYLPREDNISOLONE SODIUM SUCCINATE 40 MG: 40 INJECTION, POWDER, FOR SOLUTION INTRAMUSCULAR; INTRAVENOUS at 12:38

## 2021-01-06 RX ADMIN — INSULIN ASPART 6 UNITS: 100 INJECTION, SOLUTION INTRAVENOUS; SUBCUTANEOUS at 08:21

## 2021-01-06 RX ADMIN — Medication 1 CAPSULE: at 08:20

## 2021-01-06 RX ADMIN — ENOXAPARIN SODIUM 40 MG: 40 INJECTION SUBCUTANEOUS at 08:21

## 2021-01-06 RX ADMIN — IPRATROPIUM BROMIDE AND ALBUTEROL SULFATE 3 ML: .5; 3 SOLUTION RESPIRATORY (INHALATION) at 07:05

## 2021-01-06 RX ADMIN — CASTOR OIL AND BALSAM, PERU: 788; 87 OINTMENT TOPICAL at 08:22

## 2021-01-06 NOTE — PROGRESS NOTES
PROGRESS NOTE         Patient Identification:  Name:  Ashley Galvez  Age:  75 y.o.  Sex:  female  :  1945  MRN:  [id number]  Visit Number:  [id number]  Primary Care Provider:  Provider, No Known         LOS: 6 days       ----------------------------------------------------------------------------------------------------------------------  Subjective       Chief Complaints:    Altered Mental Status        Interval History:      The patient is sitting up in bed this morning on room air in no apparent distress.  Denies any complaints at this time.  Afebrile, no diarrhea.  CRP is worsened at 7.01.  WBC remains normal.  Chest x-ray on 2021 shows persistent low lung volumes with bibasilar atelectasis.  Small left pleural effusion is not excluded.  Large hiatal hernia.    Review of Systems:    Constitutional: no fever, chills and night sweats.  Generalized fatigue.    Eyes: no eye drainage, itching or redness.  HEENT: no mouth sores, dysphagia or nose bleed.  Respiratory: no for shortness of breath, cough or production of sputum.  Cardiovascular: no chest pain, no palpitations, no orthopnea.  Gastrointestinal: no nausea, vomiting or diarrhea. No abdominal pain, hematemesis or rectal bleeding.  Genitourinary: no dysuria or polyuria.  Hematologic/lymphatic: no lymph node abnormalities, no easy bruising or easy bleeding.  Musculoskeletal: no muscle or joint pain.  Skin: No rash and no itching.  Neurological: no loss of consciousness, no seizure, no headache.  Behavioral/Psych: no depression or suicidal ideation.  Endocrine: no hot flashes.  Immunologic: negative.    ----------------------------------------------------------------------------------------------------------------------      Objective       [facility] Meds:  amLODIPine, 5 mg, Oral, Q24H  aspirin, 81 mg, Oral, Daily  atorvastatin, 20 mg, Oral, Nightly  castor oil-balsam peru, , Topical, Q12H  castor oil-balsam peru, , Topical,  Q12H  cefepime, 2 g, Intravenous, Q12H  enoxaparin, 40 mg, Subcutaneous, Daily  insulin aspart, 0-9 Units, Subcutaneous, TID AC  insulin aspart, 5 Units, Subcutaneous, TID With Meals  insulin detemir, 30 Units, Subcutaneous, Nightly  ipratropium-albuterol, 3 mL, Nebulization, 4x Daily - RT  lactobacillus acidophilus, 1 capsule, Oral, Daily  lisinopril-hydroCHLOROthiazide (ZESTORTIC) 20-25 mg combo dose, , Oral, Q24H  methylPREDNISolone sodium succinate, 40 mg, Intravenous, Q12H  metroNIDAZOLE, 500 mg, Intravenous, Q8H  pantoprazole, 40 mg, Oral, Daily  sodium chloride, 3 mL, Intravenous, Q12H  vancomycin, 1,000 mg, Intravenous, Q24H         ----------------------------------------------------------------------------------------------------------------------    Vital Signs:  Temp:  [97 °F (36.1 °C)-99.5 °F (37.5 °C)] 97 °F (36.1 °C)  Heart Rate:  [] 101  Resp:  [18-30] 24  BP: (103-173)/(59-94) 133/59  Mean Arterial Pressure (Non-Invasive) for the past 24 hrs (Last 3 readings):   Noninvasive MAP (mmHg)   01/06/21 1100 76   01/06/21 0242 129   01/06/21 0026 112     SpO2 Percentage    01/06/21 0629 01/06/21 0705 01/06/21 1100   SpO2: 95% 96% 95%     SpO2:  [93 %-100 %] 95 %  on  Flow (L/min):  [1] 1;   Device (Oxygen Therapy): nasal cannula    Body mass index is 31.18 kg/m².  Wt Readings from Last 3 Encounters:   01/06/21 79.8 kg (176 lb)        Intake/Output Summary (Last 24 hours) at 1/6/2021 1242  Last data filed at 1/6/2021 1100  Gross per 24 hour   Intake 2706 ml   Output 1400 ml   Net 1306 ml     Diet Soft Texture; Chopped; Thin; Consistent Carbohydrate  ----------------------------------------------------------------------------------------------------------------------      Physical Exam:    Constitutional: Chronically ill-appearing elderly lady.  No respiratory distress. Much more awake and alert today.     HENT:  Head: Normocephalic and atraumatic.  Mouth:  Moist mucous membranes.    Eyes:  Conjunctivae  and EOM are normal.  No scleral icterus.  Neck:  Neck supple.  No JVD present.    Cardiovascular:   Regular rate.  Irregularly irregular rhythm.  No murmur auscultated.  Pulmonary/Chest:  No respiratory distress, no wheezes, no crackles, with normal breath sounds and good air movement.  Abdominal:  Soft.  Bowel sounds are normal.  No distension and no tenderness to palpation.  Musculoskeletal:  No edema, no tenderness, and no deformity.  No swelling or redness of joints.  Ulcerations of pretibial area which do not appear infectious.  Right ankle turns inward.  Neurological:  Alert and oriented to person, place, and time.  No facial droop.  No slurred speech.   Skin:  Skin is warm and dry.  No rash noted.  No pallor.  Onychomycosis.  Psychiatric: Normal mood and affect.  Behavior is normal.  ----------------------------------------------------------------------------------------------------------------------  Results from last 7 days   Lab Units 01/01/21 0347 12/31/20 2009   CK TOTAL U/L  --  92   TROPONIN T ng/mL 0.037* 0.052*     Results from last 7 days   Lab Units 12/31/20 2009   PROBNP pg/mL 1,580.0*     Results from last 7 days   Lab Units 01/06/21  0102   CHOLESTEROL mg/dL 110   TRIGLYCERIDES mg/dL 144   HDL CHOL mg/dL 29*   LDL CHOL mg/dL 56     Results from last 7 days   Lab Units 01/05/21  2321   PH, ARTERIAL pH units 7.399   PO2 ART mm Hg 68.6*   PCO2, ARTERIAL mm Hg 33.8*   HCO3 ART mmol/L 20.9     Results from last 7 days   Lab Units 01/06/21  0102 01/05/21  0151 01/04/21  0140  01/01/21  0347 12/31/20 2009   CRP mg/dL 7.01* 4.69*  --   --  0.78* 0.75*   LACTATE mmol/L  --   --   --   --  0.9 0.9   WBC 10*3/mm3 9.41 10.03 9.61   < > 12.52* 11.73*   HEMOGLOBIN g/dL 8.7* 8.8* 8.5*   < > 11.3* 11.3*   HEMATOCRIT % 29.9* 28.7* 28.2*   < > 37.0 35.4   MCV fL 107.6* 102.1* 102.9*   < > 103.4* 100.6*   MCHC g/dL 29.1* 30.7* 30.1*   < > 30.5* 31.9   PLATELETS 10*3/mm3 274 240 241   < > 413 449   INR   --    --   --   --   --  1.08    < > = values in this interval not displayed.     Results from last 7 days   Lab Units 01/06/21  0102 01/05/21  0939 01/05/21  0151 01/04/21  0140  01/02/21  0135   SODIUM mmol/L 130* 134* 133* 142   < > 151*   POTASSIUM mmol/L 4.6  --  4.0 3.9   < > 2.9*   MAGNESIUM mg/dL 3.1*  --  1.5*  --   --  1.8   CHLORIDE mmol/L 103  --  107 114*   < > 122*   CO2 mmol/L 17.3*  --  18.6* 18.6*   < > 18.2*   BUN mg/dL 22  --  27* 27*   < > 39*   CREATININE mg/dL 0.98  --  1.05* 0.91   < > 1.18*   EGFR IF NONAFRICN AM mL/min/1.73 55*  --  51* 60*   < > 45*   CALCIUM mg/dL 8.3*  --  7.7* 7.3*   < > 8.9   GLUCOSE mg/dL 377*  --  242* 265*   < > 135*   ALBUMIN g/dL 2.34*  --  2.35* 2.47*   < > 3.10*   BILIRUBIN mg/dL <0.2  --  0.2 0.2   < > 0.2   ALK PHOS U/L 71  --  65 68   < > 96   AST (SGOT) U/L 9  --  7 8   < > 12   ALT (SGPT) U/L 8  --  9 9   < > 9    < > = values in this interval not displayed.   Estimated Creatinine Clearance: 50.4 mL/min (by C-G formula based on SCr of 0.98 mg/dL).  No results found for: AMMONIA    Glucose   Date/Time Value Ref Range Status   01/06/2021 1036 326 (H) 70 - 130 mg/dL Final   01/06/2021 0626 280 (H) 70 - 130 mg/dL Final   01/05/2021 2348 399 (H) 70 - 130 mg/dL Final   01/05/2021 1919 292 (H) 70 - 130 mg/dL Final   01/05/2021 1633 381 (H) 70 - 130 mg/dL Final   01/05/2021 1044 255 (H) 70 - 130 mg/dL Final   01/05/2021 0634 186 (H) 70 - 130 mg/dL Final   01/04/2021 2304 265 (H) 70 - 130 mg/dL Final     Lab Results   Component Value Date    HGBA1C 7.90 (H) 12/31/2020     Lab Results   Component Value Date    TSH 1.240 12/31/2020       Blood Culture   Date Value Ref Range Status   01/02/2021 No growth at 24 hours  Preliminary   01/02/2021 No growth at 24 hours  Preliminary   12/31/2020 No growth at 2 days  Preliminary   12/31/2020 Enterococcus faecalis (C)  Final     Comment:     Infectious disease consultation is highly recommended.     Urine Culture   Date Value Ref  Range Status   12/31/2020 Culture in progress  Preliminary     No results found for: WOUNDCX  No results found for: STOOLCX  No results found for: RESPCX  Pain Management Panel     Pain Management Panel Latest Ref Rng & Units 12/31/2020    AMPHETAMINES SCREEN, URINE Negative Negative    BARBITURATES SCREEN Negative Negative    BENZODIAZEPINE SCREEN, URINE Negative Negative    BUPRENORPHINEUR Negative Negative    COCAINE SCREEN, URINE Negative Negative    METHADONE SCREEN, URINE Negative Negative            ----------------------------------------------------------------------------------------------------------------------  Imaging Results (Last 24 Hours)     Procedure Component Value Units Date/Time    XR Chest 1 View [[id number]] Collected: 01/06/21 0153     Updated: 01/06/21 0155    Narrative:      CR Chest 1 Vw    INDICATION:   75-year-old female with shortness of breath today.     COMPARISON:    Chest x-ray and CT chest 12/31/2020    FINDINGS:  Single portable AP view(s) of the chest.    Low lung volumes with bibasilar atelectasis. Small left pleural effusion is not excluded. No pneumothorax. Heart size and mediastinum are within expected limits. Large hiatal hernia.        Impression:        1. Persistent low lung volumes with bibasilar atelectasis. Small left pleural effusion is not excluded.  2. Large hiatal hernia.    Signer Name: Musa Arevalo MD   Signed: 1/6/2021 1:53 AM   Workstation Name: [email or web address]    [facility] Carotid Bilateral [[id number]] Collected: 01/05/21 1456     Updated: 01/05/21 1500    Narrative:      EXAMINATION: US CAROTID BILATERAL-      Technique: Multiple real-time color Doppler images were acquired of  bilateral carotid arteries.     Stenosis measurements if obtained, were performed by the NASCET or  similar method.        CLINICAL INDICATION:     CVA; N17.9-Acute kidney failure, unspecified     COMPARISON:    None     FINDINGS:        RIGHT:  No  significant intimal thickening or plaque is noted. No occlusion.     RIGHT ICA PSV: 143 cm/s  RIGHT ICA EDV: 22.5 cm/s.   Right ICA/CCA Ratio: 0.7  Unable to visualize the right vertebral artery.     LEFT:  Moderate stenosis     LEFT ICA PSV: 178 cm/s  LEFT EDV: 37.2 cm/s.   Left ICA/CCA Ratio: 2.0  Anterograde flow is demonstrated in LEFT vertebral artery.          Impression:      Impression:        1. Moderate stenosis in the left carotid system     This report was finalized on 1/5/2021 2:57 PM by Dr. Serafin Yip MD.             ----------------------------------------------------------------------------------------------------------------------    Assessment/Plan       Assessment/Plan     ASSESSMENT:    1.  Sepsis  2.  Pyelonephritis  3.  Enterococcus bacteremia  4.  Aspiration pneumonia    PLAN:      The patient is sitting up in bed this morning on room air in no apparent distress.  Denies any complaints at this time.  Afebrile, no diarrhea.  CRP is worsened at 7.01.  WBC remains normal.  Chest x-ray on 1/6/2021 shows persistent low lung volumes with bibasilar atelectasis.  Small left pleural effusion is not excluded.  Large hiatal hernia.    Urinalysis on 12/31/2020 was positive with WBCs too numerous to count.  Urine culture on 1/3/2021 finalized as Pseudomonas aeruginosa and Proteus mirabilis.      CT chest without contrast on 12/31/2020 showed no evidence of acute pneumonia.  Large hiatal hernia at the left lung base.  Noncalcified nodule in the right lower lobe measuring 7 mm with recommended chest CT follow-up in 6 months.  Left adrenal nodule, most likely a benign adenoma.  This can be further characterized with adrenal protocol CT or MRI on a nonemergent basis if indicated.     CT head without contrast on 12/31/2020 showed no acute intracranial abnormality.  CSF density mass of the right posterior fossa with mild mass-effect on the right cerebellar convexity, most likely an arachnoid cyst with  recommendation to evaluate further with MRI without and with contrast.  Presumed chronic microvascular ischemic changes.     CT abdomen pelvis without contrast on 12/31/2020 showed atherosclerotic disease with probable vascular calcifications in both kidneys.  No ureteral stones as seen on either side, and there is no hydronephrosis.  Mild thickening of the urinary bladder wall may be due to incomplete distention or cystitis.  Large amount of stool in the rectal vault.  There is colonic diverticulosis without diverticulitis.  The appendix and small bowel are within normal limits.  Small left adrenal nodule, likely a benign adenoma in the absence of known malignancy.  This could be assessed with nonemergent adrenal protocol MRI or CT if indicated.  Cholecystectomy and hysterectomy.     Blood cultures on 12/31/2020 are so far showing 1 out of 2 with Enterococcus.  BC ID on 12/31/2020 finalized as Enterococcus.  Repeat blood cultures on 1/2/2021 are so far showing no growth.     COVID-19 and flu A/B PCR on 12/31/2020 was negative.     Recommend to continue with vancomycin, which is appropriate coverage for Enterococcus bacteremia. As urine culture has finalized as Pseudomonas and Proteus, recommend to continue cefepime 2 g IV every 12 hours through 1/11/2021 for UTI.    We are concerned that the patient may be aspirating with worsening CRP and have added Flagyl 500 mg IV every 8 hours x7 days and will continue to follow closely.  Will consider escalation of coverage if patient continues to worsen.    Code Status:   Code Status and Medical Interventions:   Ordered at: 12/31/20 4555     Level Of Support Discussed With:    Patient     Code Status:    CPR     Medical Interventions (Level of Support Prior to Arrest):    Full     Scribed for Yvan Dempsey MD by PATRICE Baires. 1/6/2021  12:55 EST  PATRICE Baires  01/06/21  12:42 EST    Physician Attestation:    The documentation recorded by the scribe accurately  reflects the service I personally performed and the decisions made by me.    Yvan Dempsey MD  Infectious Diseases  01/06/21  19:45 EST\

## 2021-01-06 NOTE — PROGRESS NOTES
Patient's glucose is over 400, I do not think she is going to be able to tolerate the Solu-Medrol, this was stopped, insulin adjusted, follow glucoses.  CTs have not been done as of yet, these have been changed to stat order.

## 2021-01-06 NOTE — PLAN OF CARE
Goal Outcome Evaluation:  Plan of Care Reviewed With: patient  Progress: improving   Patient resting in bed, alert, NAD. Pt noted with increase respirations when awake, MD stated possibly anxiety related as O2 sat doesn't decrease. Family here today and requesting rehab at discharge. Cont POC.

## 2021-01-06 NOTE — THERAPY TREATMENT NOTE
Acute Care - Occupational Therapy Treatment Note  ANDREW Bansal     Patient Name: Ashley Galvez  : 1945  MRN: [id number]  Today's Date: 2021  Onset of Illness/Injury or Date of Surgery: 20     Referring Physician: Dr. Puente    Admit Date: 2020       ICD-10-CM ICD-9-CM   1. Acute renal failure, unspecified acute renal failure type (CMS/HCC)  N17.9 584.9     Patient Active Problem List   Diagnosis   • Sepsis (CMS/HCC)     Past Medical History:   Diagnosis Date   • Anemia    • Anterolisthesis     Grade 1 anterolisthesis of L5 on S1 due to bilateral L5 pars defects   • Diverticulosis    • Essential hypertension    • Lacunar infarction (CMS/HCC)    • Type 2 diabetes mellitus (CMS/HCC)      Past Surgical History:   Procedure Laterality Date   • CHOLECYSTECTOMY     • HYSTERECTOMY              OT ASSESSMENT FLOWSHEET (last 12 hours)      OT Evaluation and Treatment     Row Name 21 1501                   OT Time and Intention    Subjective Information  complains of;weakness  -BF        Document Type  therapy note (daily note)  -BF        Mode of Treatment  occupational therapy  -BF        Patient Effort  adequate  -BF           General Information    Existing Precautions/Restrictions  fall  -BF        Limitations/Impairments  visual;safety/cognitive;hearing blind left eye, decreased vision right eye  -BF           Cognition    Orientation Status (Cognition)  oriented to;person;place  -BF        Follows Commands (Cognition)  follows one-step commands;verbal cues/prompting required;repetition of directions required;physical/tactile prompts required  -BF           Motor Skills    Motor Skills  therapeutic exercise  -BF        Therapeutic Exercise  other (see comments) [facility] the[facility]  -BF           Wound 21 Right gluteal Pressure Injury    Wound - Properties Group Placement Date: 21  - Placement Time: 40 Present on Hospital Admission:   - Side: Right  -SA Location:  gluteal  -SA Primary Wound Type: Pressure inj  -TW Stage, Pressure Injury : Stage 3  -TW    Retired Wound - Properties Group Date first assessed: 01/01/21 -SA Time first assessed: 0040 -SA Present on Hospital Admission: Y  -SA Side: Right  -SA Location: gluteal  -SA Primary Wound Type: Pressure inj  -TW       Wound 01/01/21 0042 Left anterior greater trochanter Pressure Injury    Wound - Properties Group Placement Date: 01/01/21 -SA Placement Time: 0042 -SA Present on Hospital Admission: Y  -TW Side: Left  -SA Orientation: anterior  -SA Location: greater trochanter  -SA Primary Wound Type: Pressure inj  -TW Stage, Pressure Injury : Stage 2  -TW    Retired Wound - Properties Group Date first assessed: 01/01/21 -SA Time first assessed: 0042 -SA Present on Hospital Admission: Y  -TW Side: Left  -SA Location: greater trochanter  -SA Primary Wound Type: Pressure inj  -TW       Wound 01/01/21 0044 Left lower leg    Wound - Properties Group Placement Date: 01/01/21 -SA Placement Time: 0044 -SA Side: Left  -SA Orientation: lower  -SA Location: leg  -SA    Retired Wound - Properties Group Date first assessed: 01/01/21 -SA Time first assessed: 0044 -SA Side: Left  -SA Location: leg  -SA       Wound 01/01/21 0044 Right lower leg    Wound - Properties Group Placement Date: 01/01/21 -SA Placement Time: 0044 -SA Side: Right  -SA Orientation: lower  -SA Location: leg  -SA    Retired Wound - Properties Group Date first assessed: 01/01/21 -SA Time first assessed: 0044 -SA Side: Right  -SA Location: leg  -SA       Wound 01/04/21 1953 Right heel Pressure Injury    Wound - Properties Group Placement Date: 01/04/21  -JP Placement Time: 1953 -JP Present on Hospital Admission: N  -LETA Side: Right  -LETA Location: heel  -LETA Primary Wound Type: Pressure inj  -TW Stage, Pressure Injury : deep tissue injury  -LETA    Retired Wound - Properties Group Date first assessed: 01/04/21  -JP Time first assessed: 1953 -JP Present on  Hospital Admission: N  -LETA Side: Right  -LETA Location: heel  -LETA Primary Wound Type: Pressure inj  -TW       Positioning and Restraints    In Bed  supine;call light within reach;encouraged to call for assist  -BF          User Key  (r) = Recorded By, (t) = Taken By, (c) = Cosigned By    Initials Name Effective Dates    TW Dana Ford, MIGEL 06/16/16 -     Umberto Zelaya, MIGEL 07/31/20 -     Martina Jane OT 07/05/20 -     Joy Mckinnon RN 01/22/20 -                OT Recommendation and Plan              Time Calculation:   Time Calculation- OT     Row Name 01/06/21 1504             Time Calculation- OT    Total Timed Code Minutes- OT  25 minute(s)  -BF        User Key  (r) = Recorded By, (t) = Taken By, (c) = Cosigned By    Initials Name Provider Type    BF Martina Feliciano OT Occupational Therapist        Therapy Charges for Today     Code Description Service Date Service Provider Modifiers Qty    [id number]  OT THER PROC EA 15 MIN 1/6/2021 Martina Feliciano OT GO 2               Martina Feliciano OT  1/6/2021

## 2021-01-06 NOTE — PROGRESS NOTES
Patient Identification:  Name:  Ashley Galvez  Age:  75 y.o.  Sex:  female  :  1945  MRN:  [id number]  Visit Number:  [id number]  Primary Care Provider:  Provider, No Known    Length of stay:  6    Subjective/Interval History/Consultants/Procedures     Chief complaint: Follow-up severe sepsis, bacteremia with Enterococcus, UTI with Pseudomonas and Proteus    HPI/Hospital course:    Mrs. Galvez is a 75-year-old  female with PMH significant for diabetes mellitus, lacunar infarct, essential hypertension, and unspecified anemia. She initially presented to the ED of [facility] on 20 with chief complaint of confusion.  She was reportedly previously at [facility] and discharged home wit pneumonia prior to her presentation. On admission, she encephalopathic and admitted to the PCU for further evaluation with KAY and Sepsis 2/2 UTI.     In the interim, one of two blood cultures was positive with ultimately growth of Enterococcus in one of two cultures (2020).  ID was consulted for further IV abx guidance with IV Vancomycin started.  Rocephin was initially continued given concern for underlying UTI.  Urine culture ultimately grew Proteus mirabilis and Pseudomonas aeruginosa.  IV cephalosporin coverage was transitioned to IV Cefepime for further treatment.      Metabolic encephalopathy improved during admission with CT demonstrating possible arachnoid cyst.  MRI was then performed and revealed CSF density process involving the right cerebellar hemisphere which is most probably intra-axial in location and features on the MRI compatible with sequela of remote infarct with extensive encephalomalacia changes.  There is also marked left mastoiditis-itis and a chronic appearing infarct in the right occipital lobe with cortical sclerosis and mild chronic appearing ethmoid sinusitis.    Given debility of nearly 2 years, physical and occupational therapies were consulted during hospitalization.           There was some concern for abnormal EKG with ectopic atrial rhythm with frequent PACs vs. Chaotic atrial rhythm with cardiology consultationanam wallace.  It was recommend she undergo event monitoring at the time of discharge for around two weeks with further outpatient cardiology follow-up.         Subjective:    Mrs. Galvez is noted to be resting in bed and short of breath this morning. She reports she does not feel short of breath and usually feels this way. She does report feeling tired.   She is wheezing in her right lung.  She reports she does not feel as if she is wheezing and is unsure if this is her baseline.      Discussed with AM MIGEL Armstrong with  Review of notes overnight as well.  Tachypnea noted. Acute events overnight. Room location at the time of evaluation was 306A.    Family reported last evening to nursing staff that she has had tachypnea and difficulty breathing since having pneumonia last month.    ----------------------------------------------------------------------------------------------------------------------  Current Hospital Meds:  amLODIPine, 5 mg, Oral, Q24H  aspirin, 81 mg, Oral, Daily  atorvastatin, 20 mg, Oral, Nightly  castor oil-balsam peru, , Topical, Q12H  castor oil-balsam peru, , Topical, Q12H  cefepime, 2 g, Intravenous, Q12H  enoxaparin, 40 mg, Subcutaneous, Daily  insulin aspart, 0-9 Units, Subcutaneous, TID AC  insulin aspart, 5 Units, Subcutaneous, TID With Meals  insulin detemir, 30 Units, Subcutaneous, Nightly  ipratropium-albuterol, 3 mL, Nebulization, 4x Daily - RT  lactobacillus acidophilus, 1 capsule, Oral, Daily  lisinopril-hydroCHLOROthiazide (ZESTORTIC) 20-25 mg combo dose, , Oral, Q24H  methylPREDNISolone sodium succinate, 40 mg, Intravenous, Q12H  metroNIDAZOLE, 500 mg, Intravenous, Q8H  pantoprazole, 40 mg, Oral, Daily  sodium chloride, 3 mL, Intravenous, Q12H  vancomycin, 1,000 mg, Intravenous, Q24H          ----------------------------------------------------------------------------------------------------------------------      Objective     Vital Signs:  Temp:  [97 °F (36.1 °C)-99.5 °F (37.5 °C)] 97 °F (36.1 °C)  Heart Rate:  [] 101  Resp:  [18-30] 24  BP: (103-173)/(59-94) 133/59      01/04/21  0412 01/05/21  0400 01/06/21  0242   Weight: 80.7 kg (178 lb) 81.6 kg (180 lb) 79.8 kg (176 lb)     Body mass index is 31.18 kg/m².    Intake/Output Summary (Last 24 hours) at 1/6/2021 1137  Last data filed at 1/6/2021 1100  Gross per 24 hour   Intake 2706 ml   Output 1600 ml   Net 1106 ml     I/O this shift:  In: 236 [P.O.:236]  Out: 600 [Urine:600]  Diet Soft Texture; Chopped; Thin; Consistent Carbohydrate  ----------------------------------------------------------------------------------------------------------------------    Physical Exam  Vitals signs and nursing note reviewed.   Constitutional:       Appearance: She is well-developed and well-groomed. She is not ill-appearing.      Interventions: Nasal cannula in place.   HENT:      Head: Normocephalic and atraumatic.   Eyes:      Conjunctiva/sclera:      Right eye: Right conjunctiva is not injected.      Left eye: Left conjunctiva is not injected.   Cardiovascular:      Rate and Rhythm: Normal rate and regular rhythm.      Heart sounds: S1 normal and S2 normal.   Pulmonary:      Effort: Tachypnea present. No accessory muscle usage.      Breath sounds: No decreased air movement. Examination of the right-upper field reveals wheezing. Examination of the left-upper field reveals wheezing. Examination of the left-middle field reveals wheezing. Examination of the left-lower field reveals wheezing. Wheezing present.      Comments: Upper expiratory wheezing noted.  Abdominal:      General: Bowel sounds are normal.      Palpations: Abdomen is soft.      Tenderness: There is no abdominal tenderness.   Musculoskeletal:      Right lower leg: No edema.      Left lower leg:  No edema.   Skin:     General: Skin is warm and dry.      Comments: Bilateral lower extremities wrapped.     Neurological:      Mental Status: She is alert.      Comments: Follows commands.  Alert to self, location, and birth date.     Psychiatric:         Attention and Perception: Attention normal.         Mood and Affect: Mood normal.         Nasal cannula in place: Yes       ----------------------------------------------------------------------------------------------------------------------  Tele:         ----------------------------------------------------------------------------------------------------------------------  Results from last 7 days   Lab Units 01/01/21 0347 12/31/20 2009   CK TOTAL U/L  --  92   TROPONIN T ng/mL 0.037* 0.052*   PROBNP pg/mL  --  1,580.0*     Results from last 7 days   Lab Units 01/06/21  0102 01/05/21  0151 01/04/21  0140  01/01/21 0347 12/31/20 2009   CRP mg/dL 7.01* 4.69*  --   --  0.78* 0.75*   LACTATE mmol/L  --   --   --   --  0.9 0.9   WBC 10*3/mm3 9.41 10.03 9.61   < > 12.52* 11.73*   HEMOGLOBIN g/dL 8.7* 8.8* 8.5*   < > 11.3* 11.3*   HEMATOCRIT % 29.9* 28.7* 28.2*   < > 37.0 35.4   MCV fL 107.6* 102.1* 102.9*   < > 103.4* 100.6*   MCHC g/dL 29.1* 30.7* 30.1*   < > 30.5* 31.9   PLATELETS 10*3/mm3 274 240 241   < > 413 449   INR   --   --   --   --   --  1.08    < > = values in this interval not displayed.     Results from last 7 days   Lab Units 01/05/21  2321   PH, ARTERIAL pH units 7.399   PO2 ART mm Hg 68.6*   PCO2, ARTERIAL mm Hg 33.8*   HCO3 ART mmol/L 20.9     Results from last 7 days   Lab Units 01/06/21  0102 01/05/21  0939 01/05/21  0151 01/04/21  0140  01/02/21  0135   SODIUM mmol/L 130* 134* 133* 142   < > 151*   POTASSIUM mmol/L 4.6  --  4.0 3.9   < > 2.9*   MAGNESIUM mg/dL 3.1*  --  1.5*  --   --  1.8   CHLORIDE mmol/L 103  --  107 114*   < > 122*   CO2 mmol/L 17.3*  --  18.6* 18.6*   < > 18.2*   BUN mg/dL 22  --  27* 27*   < > 39*   CREATININE mg/dL 0.98   --  1.05* 0.91   < > 1.18*   EGFR IF NONAFRICN AM mL/min/1.73 55*  --  51* 60*   < > 45*   CALCIUM mg/dL 8.3*  --  7.7* 7.3*   < > 8.9   GLUCOSE mg/dL 377*  --  242* 265*   < > 135*   ALBUMIN g/dL 2.34*  --  2.35* 2.47*   < > 3.10*   BILIRUBIN mg/dL <0.2  --  0.2 0.2   < > 0.2   ALK PHOS U/L 71  --  65 68   < > 96   AST (SGOT) U/L 9  --  7 8   < > 12   ALT (SGPT) U/L 8  --  9 9   < > 9    < > = values in this interval not displayed.   Estimated Creatinine Clearance: 50.4 mL/min (by C-G formula based on SCr of 0.98 mg/dL).  No results found for: AMMONIA  Results from last 7 days   Lab Units 01/06/21  0102   CHOLESTEROL mg/dL 110   TRIGLYCERIDES mg/dL 144   HDL CHOL mg/dL 29*   LDL CHOL mg/dL 56     Blood Culture   Date Value Ref Range Status   01/02/2021 No growth at 4 days  Preliminary   01/02/2021 No growth at 4 days  Preliminary   12/31/2020 No growth at 5 days  Final   12/31/2020 Enterococcus faecalis (C)  Final     Comment:     Infectious disease consultation is highly recommended.     Urine Culture   Date Value Ref Range Status   12/31/2020 >100,000 CFU/mL Pseudomonas aeruginosa (A)  Final   12/31/2020 50,000 CFU/mL Proteus mirabilis (A)  Final     No results found for: WOUNDCX  No results found for: STOOLCX  ----------------------------------------------------------------------------------------------------------------------  Imaging Results (Last 24 Hours)     Procedure Component Value Units Date/Time    XR Chest 1 View [[id number]] Collected: 01/06/21 0153     Updated: 01/06/21 0155    Narrative:      CR Chest 1 Vw    INDICATION:   75-year-old female with shortness of breath today.     COMPARISON:    Chest x-ray and CT chest 12/31/2020    FINDINGS:  Single portable AP view(s) of the chest.    Low lung volumes with bibasilar atelectasis. Small left pleural effusion is not excluded. No pneumothorax. Heart size and mediastinum are within expected limits. Large hiatal hernia.        Impression:        1.  Persistent low lung volumes with bibasilar atelectasis. Small left pleural effusion is not excluded.  2. Large hiatal hernia.    Signer Name: Musa Arevalo MD   Signed: 1/6/2021 1:53 AM   Workstation Name: [id number]    [facility]    US Carotid Bilateral [[id number]] Collected: 01/05/21 1456     Updated: 01/05/21 1500    Narrative:      EXAMINATION: US CAROTID BILATERAL-      Technique: Multiple real-time color Doppler images were acquired of  bilateral carotid arteries.     Stenosis measurements if obtained, were performed by the NASCET or  similar method.        CLINICAL INDICATION:     CVA; N17.9-Acute kidney failure, unspecified     COMPARISON:    None     FINDINGS:        RIGHT:  No significant intimal thickening or plaque is noted. No occlusion.     RIGHT ICA PSV: 143 cm/s  RIGHT ICA EDV: 22.5 cm/s.   Right ICA/CCA Ratio: 0.7  Unable to visualize the right vertebral artery.     LEFT:  Moderate stenosis     LEFT ICA PSV: 178 cm/s  LEFT EDV: 37.2 cm/s.   Left ICA/CCA Ratio: 2.0  Anterograde flow is demonstrated in LEFT vertebral artery.          Impression:      Impression:        1. Moderate stenosis in the left carotid system     This report was finalized on 1/5/2021 2:57 PM by Dr. Serafin Yip MD.           ----------------------------------------------------------------------------------------------------------------------   I have reviewed the above laboratory values for 01/06/21    Assessment/Plan     Active Hospital Problems    Diagnosis POA   • **Sepsis (CMS/[facility]) [A41.9] Yes         ASSESSMENT/PLAN:  · Severe sepsis, present on admission,  With RR>20, HR>90, elevated C-RP, UTI, bacteremia, and metabolic encephalopathy:  -Blood and urine cultures reviewed.   -Continue vital signs per floor protocol.   -Continue monitoring daily CBC & temperature curve.   -Continue IV Vancomycin & IV cefepime as outlined.   -ID input appreciated.     · Enterococcus bacteremia:  -Continue IV  Vancomycin.  -12/31 blood cultures reviewed (1/2 + as outlined).  -1/2/2021 blood cultures reviewed (unremarkable).    -Continue daily CBC & C-RP.   -C-RP is rising today.      · Proteus/Pseudomonas UTI:   -Urine culture reviewed.   -Continue IV Cefepime.   -C-RP is rising.    · Worsening tachypnea overnight:   -CXR without acute infiltrate and with possible left pleural effusion.  -ABG with some hyperventilation.   -Continue HCTZ for now.   -CT chest/CT neck ordered.   -Respiratory PCR ordered for now.     · Uncontrolled hypertension:  -Continue Lisinpril/HCTZ.  -Restart home Norvasc at lower dose of 5mg qd.   -Monitor blood pressures per floor protocol.     · Hyperglycemia in the setting of DM 2:   -Hemoglobin a1c 7.90.   -FSBG ACHS with SSI PRN.  -Levemir increased to 30u qhs.   -Aspart 5u with meals added given poorly controlled glucoses over the past 24 hours.   -Consistent carb modifier added to diet.    · Acute on chronic weakness/debility:  -Continue PT/OT.  -SLP evaluation performed.       · CVA by MRI:   -Continue ASA and statin.   -MRI reviewed.   -US carotid doppler with moderate stenosis in the left carotid system.  -Plan for outpatient event monitoring with outpatient Cardiology follow-up.   -Lipid panel reviewed.       · Grade II diastolic dysfunction:   -Echocardiogram reviewed from 1/1/2021.   -RR increased overnight on 1/6.   -CXR with bibasilar atelectasis and small left pleural effusion.   -ABG reviewed from 1/5/21 @ 2321 with some hyperventilation.     · Hyponatremia with element of superimposed pseudohyponatrmia with sodium correcting to 135.5:   -Treat glucose as outlined.   -Monitor closely on home HCTZ.      · 7mm Noncalcified nodule in RLL:  -CT chest follow-up recommend in 6 months.      · Left adrenal nodule:   -Further outpatient follow-up recommended with adrenal CT protocol or MRI on nonemergent basis recommend.        · KAY vs. KAY on CKD IIIb on admission:   -Creatinine improved from  admission.   -Home ACEI/HCTZ restarted on 1/2/21.     · Metabolic encephalopathy, improved:  -CT head report reviewed.   -MRI report reviewed.   -Mentation improved from time of admission.     · Macrocytosis:  -TSH acceptable on admission.  -Folate pending.  -Vitamin b12 pending.     -----------  -DVT prophylaxis: SQ Lovenox  -GI prophylaxis: PPI, add lactobacillus  -Activity: Up with assist  -Disposition: Remains hospitalized for further IV abx and tehrapies  -Anticipated dispo needs:  Event monitor for 2 weeks.  Outpatient Cards follow-up. Outpatient CT chest follow-up in 6 months. Outpatienet CT adrenal protocol or MRI.    -Diet:   Dietary Orders (From admission, onward)     Start     Ordered    01/06/21 0929  Diet Soft Texture; Chopped; Thin; Consistent Carbohydrate  Diet Effective Now     Question Answer Comment   Diet Texture / Consistency Soft Texture    Select Texture: Chopped    Fluid Consistency Thin    Common Modifiers Consistent Carbohydrate        01/06/21 0929    01/01/21 1800  Dietary Nutrition Supplements Boost Plus (Ensure Enlive, Ensure Plus)  Daily With Breakfast, Lunch & Dinner     Question:  Select Supplement  Answer:  Boost Plus (Ensure Enlive, Ensure Plus)    01/01/21 1347                    The patient is high risk due to the following diagnoses/reasons:  Severe sepsis, bacteremia, UTI, recent pneumonia     Gale Ozuna PA-C  01/06/21  11:37 EST  Pager # [phone]

## 2021-01-06 NOTE — PLAN OF CARE
"Goal Outcome Evaluation:  Patient resting well during shift. Patients breathing labored with accessory muscle use throughout shift.  Patient breath sounds expiratory wheezing. Patient on room air with O2 saturations 90--95%. Patient has no complaints of shortness of breath. Patient states \"I feel pretty much normal.\" Arlyn IBRAHIM aware. See orders. ABG obtained ordered to place on 1 L of O2 via nasal cannula and instruct patient on incentive spirometer use. Will follow plan of care.    "

## 2021-01-06 NOTE — PROGRESS NOTES
LOS: 6 days     Name: Ashley Galvez  Age/Sex: 75 y.o. female  :  1945        PCP: Provider, No Known  REF: Mal Oliver DO    Principal Problem:    Sepsis (CMS/[facility])      Reason for follow-up: History of CVA and Atrial flutter    Subjective       Subjective     Ashley Galvez is a 75 year old female with a past medical history significant for history of CVA, essential hypertension, diabetes mellitus type 2 and anemia. Patient presented to the ED with complaints of confusion. She was found to have sepsis secondary to UTI and enterococcus bacteremia. Cardiology was consulted for possible atrial fibrillation. EKG showed ectopic atrial rhythm with frequent PAC's vs chaotic atrial rhythm. Patient denies any palpitations or chest pain.  is at bedside and denies any history of atrial fibrillation in the past. He reports that a couple years ago the patient started having weakness for unknown reasons and was unable to walk. MRI shows old CVA. She does not take blood thinners at home. No history of coronary artery disease. Echocardiogram showed normal LV function.      Interval History: Patient slightly tachypneic on evaluation.  She denies any chest pain or palpitations.  Noted on telemetry overnight to have an episode of atrial flutter with controlled rate.    Vital Signs  Temp:  [97 °F (36.1 °C)-99.5 °F (37.5 °C)] 97 °F (36.1 °C)  Heart Rate:  [] 101  Resp:  [18-30] 24  BP: (103-173)/(59-94) 133/59     Vital Signs (last 72 hrs)       / 0700  -  / 0659 / 0700  -  / 0659 / 0700  -  06 0659 / 0700  -   1303   Most Recent    Temp (°F) 97.2 -  97.8    98 -  98.8    97.2 -  99.5      97     97 (36.1)    Heart Rate 76 -  112    78 -  101    80 -  115    101 -  105     101    Resp 16 -  20    16 -  22    18 -  30    23 -  24     24    /82 -  145/82    108/84 -  156/81    103/85 -  173/89      133/59     133/59    SpO2 (%) 91 -  100    90 -  100    93 -  100    95 -   96     95        Body mass index is 31.18 kg/m².    Intake/Output Summary (Last 24 hours) at 1/6/2021 1303  Last data filed at 1/6/2021 1100  Gross per 24 hour   Intake 2706 ml   Output 1400 ml   Net 1306 ml     Objective    Objective       Physical Exam:     General Appearance:    Alert, cooperative, in no acute distress   Head:    Normocephalic, without obvious abnormality, atraumatic   Eyes:            Conjunctivae and sclerae normal, no   icterus, no pallor, corneas clear.   Neck:   No adenopathy, supple, trachea midline, no thyromegaly, no   carotid bruit, no JVD   Lungs:    Wheezing, mild tachypnea present    Heart:    Regular rhythm and normal rate, normal S1 and S2, no            murmur, no gallop, no rub, no click   Chest Wall:    No abnormalities observed   Abdomen:     Normal bowel sounds, no masses, no organomegaly, soft        non-tender, non-distended, no guarding, no rebound                tenderness   Extremities:   Moves all extremities well, no edema, bilateral lower extremities wrapped, no cyanosis, no             redness   Pulses:   Pulses palpable and equal bilaterally   Skin:   No bleeding, bruising or rash       Neurologic:   Alert and oriented   I have seen and performed a physical examination today.     Results review       Results Review:   Results from last 7 days   Lab Units 01/06/21  0102 01/05/21  0151 01/04/21  0140 01/03/21  0121 01/02/21  0135 01/01/21  0347 12/31/20 2009   WBC 10*3/mm3 9.41 10.03 9.61 9.66 12.48* 12.52* 11.73*   HEMOGLOBIN g/dL 8.7* 8.8* 8.5* 9.6* 11.0* 11.3* 11.3*   PLATELETS 10*3/mm3 274 240 241 364 406 413 449     Results from last 7 days   Lab Units 01/06/21  0102 01/05/21  0939 01/05/21  0151 01/04/21  0140 01/03/21  0121 01/02/21  1735 01/02/21  0135 01/01/21  0347 12/31/20 2009   SODIUM mmol/L 130* 134* 133* 142 154*  --  151* 153* 147*   POTASSIUM mmol/L 4.6  --  4.0 3.9 4.7 5.0 2.9* 3.2* 3.9   CHLORIDE mmol/L 103  --  107 114* 128*  --  122* 130* 127*      CO2 mmol/L 17.3*  --  18.6* 18.6* 16.8*  --  18.2* 10.7* 7.4*   BUN mg/dL 22  --  27* 27* 32*  --  39* 62* 70*   CREATININE mg/dL 0.98  --  1.05* 0.91 0.95  --  1.18* 1.44* 1.71*   CALCIUM mg/dL 8.3*  --  7.7* 7.3* 8.4*  --  8.9 9.5 9.9   GLUCOSE mg/dL 377*  --  242* 265* 249*  --  135* 103* 112*   ALT (SGPT) U/L 8  --  9 9 10  --  9 9 10   AST (SGOT) U/L 9  --  7 8 18  --  12 14 13     Results from last 7 days   Lab Units 01/01/21  0347 12/31/20 2009   CK TOTAL U/L  --  92   TROPONIN T ng/mL 0.037* 0.052*     Lab Results   Component Value Date    INR 1.08 12/31/2020     Lab Results   Component Value Date    MG 3.1 (H) 01/06/2021    MG 1.5 (L) 01/05/2021    MG 1.8 01/02/2021     Lab Results   Component Value Date    TSH 1.240 12/31/2020    TRIG 144 01/06/2021    HDL 29 (L) 01/06/2021    LDL 56 01/06/2021      Imaging Results (Last 48 Hours)     Procedure Component Value Units Date/Time    XR Chest 1 View [[id number]] Collected: 01/06/21 0153     Updated: 01/06/21 0155    Narrative:      CR Chest 1 Vw    INDICATION:   75-year-old female with shortness of breath today.     COMPARISON:    Chest x-ray and CT chest 12/31/2020    FINDINGS:  Single portable AP view(s) of the chest.    Low lung volumes with bibasilar atelectasis. Small left pleural effusion is not excluded. No pneumothorax. Heart size and mediastinum are within expected limits. Large hiatal hernia.        Impression:        1. Persistent low lung volumes with bibasilar atelectasis. Small left pleural effusion is not excluded.  2. Large hiatal hernia.    Signer Name: Musa Arevalo MD   Signed: 1/6/2021 1:53 AM   Workstation Name: [id number]    [facility]    US Carotid Bilateral [[id number]] Collected: 01/05/21 1456     Updated: 01/05/21 1500    Narrative:      EXAMINATION: US CAROTID BILATERAL-      Technique: Multiple real-time color Doppler images were acquired of  bilateral carotid arteries.     Stenosis measurements if  obtained, were performed by the NASCET or  similar method.        CLINICAL INDICATION:     CVA; N17.9-Acute kidney failure, unspecified     COMPARISON:    None     FINDINGS:        RIGHT:  No significant intimal thickening or plaque is noted. No occlusion.     RIGHT ICA PSV: 143 cm/s  RIGHT ICA EDV: 22.5 cm/s.   Right ICA/CCA Ratio: 0.7  Unable to visualize the right vertebral artery.     LEFT:  Moderate stenosis     LEFT ICA PSV: 178 cm/s  LEFT EDV: 37.2 cm/s.   Left ICA/CCA Ratio: 2.0  Anterograde flow is demonstrated in LEFT vertebral artery.          Impression:      Impression:        1. Moderate stenosis in the left carotid system     This report was finalized on 1/5/2021 2:57 PM by Dr. Serafin Yip MD.       XR Ankle 3+ View Right [[id number]] Collected: 01/04/21 1558     Updated: 01/04/21 1601    Narrative:      EXAM:    XR Right Ankle Complete, 3 or More Views     EXAM DATE:    1/4/2021 3:36 PM     CLINICAL HISTORY:    eval deformity; N17.9-Acute kidney failure, unspecified     TECHNIQUE:    Frontal, lateral and oblique views of the right ankle.     COMPARISON:    No relevant prior studies available.     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  Disuse osteoporosis.  Chronic appearing varus deformity of  the ankle.  No acute appearing displaced fracture or dislocation.    Soft tissues:  Unremarkable.    Vasculature:  Diffuse soft tissue calcifications are noted in either  likely due to a chronic inflammatory process or chronic venous stasis.       Impression:      1.  Advanced hindfoot and midfoot arthrosis and varus positioning of the  ankle.  2.  No acute appearing fracture or dislocation.  3.  Benign-appearing soft tissue calcifications noted diffusely with  considerations above.     This report was finalized on 1/4/2021 3:59 PM by Dr. Sagar Romero MD.           Echo   Results for orders placed during the hospital encounter of 12/31/20   Adult Transthoracic  Echo Complete W/ Cont if Necessary Per Protocol    Narrative · Left ventricular ejection fraction appears to be 61 - 65%. Left   ventricular systolic function is normal.  · Left ventricular diastolic function is consistent with (grade II w/high   LAP) pseudonormalization.  · Mild aortic valve stenosis is present.         I reviewed the patient's new clinical results.    Medication Review:   amLODIPine, 5 mg, Oral, Q24H  aspirin, 81 mg, Oral, Daily  atorvastatin, 20 mg, Oral, Nightly  castor oil-balsam peru, , Topical, Q12H  castor oil-balsam peru, , Topical, Q12H  cefepime, 2 g, Intravenous, Q12H  enoxaparin, 40 mg, Subcutaneous, Daily  insulin aspart, 0-9 Units, Subcutaneous, TID AC  insulin aspart, 5 Units, Subcutaneous, TID With Meals  insulin detemir, 30 Units, Subcutaneous, Nightly  ipratropium-albuterol, 3 mL, Nebulization, 4x Daily - RT  lactobacillus acidophilus, 1 capsule, Oral, Daily  lisinopril-hydroCHLOROthiazide (ZESTORTIC) 20-25 mg combo dose, , Oral, Q24H  methylPREDNISolone sodium succinate, 40 mg, Intravenous, Q12H  metroNIDAZOLE, 500 mg, Intravenous, Q8H  pantoprazole, 40 mg, Oral, Daily  sodium chloride, 3 mL, Intravenous, Q12H  vancomycin, 1,000 mg, Intravenous, Q24H             Assessment      Assessment:  1. Abnormal EKG,  Ectopic atrial rhythm with frequent PAC's vs Chaotic atrial rhythm, telemetry monitor overnight shows paroxysmal atrial flutter with controlled rate, CHADS-VASc score of at least 5  2. Essential hypertension, overall controlled   3. History of CVA  4. Sepsis secondary to UTI and bacteremia     Plan     Recommendations:  1. Atrial flutter  · Noted on telemetry overnight that patient had episode of atrial flutter. Given her high stroke risk and history of prior CVA she will need to be placed on anticoagulation.  Discussed with the patient and her  regarding the cost of Eliquis at $47.  He states that they may have a hard time affording this however he would rather be on  this than Coumadin due to having to have PT/INR checked.  Will discuss with pharmacy about giving patient a 30-day free trial card and possibly having patient submit income for further assistance.  · Rate is overall controlled.  We will continue to monitor.    I discussed the patients findings and my recommendations with patient and family, Dr. Puente and Dr. Hurt.       Katia Lowry, TOMÁS  01/06/21  13:03 EST    Please note that portions of this note were completed with a voice recognition program.

## 2021-01-06 NOTE — PROGRESS NOTES
Discharge Planning Assessment  ANDREW Bansal     Patient Name: Ashley Galvez  MRN: [id number]  Today's Date: 1/6/2021    Admit Date: 12/31/2020    Discharge Needs Assessment    No documentation.       Discharge Plan     Row Name 01/06/21 1421       Plan    Plan SS attempted to speak with pt's spouse, Zack about discharge planning without success. SS was notified by Cardiology PA that spouse may be interested in rehab.  SS will follow.     1523pm SS spoke with pt's spouse about discharge planning. Spouse requested inpt acute rehab. SS notified Physician. SS will continue to follow.           Marcella Bryan

## 2021-01-07 ENCOUNTER — APPOINTMENT (OUTPATIENT)
Dept: GENERAL RADIOLOGY | Facility: HOSPITAL | Age: 76
End: 2021-01-07

## 2021-01-07 ENCOUNTER — APPOINTMENT (OUTPATIENT)
Dept: CT IMAGING | Facility: HOSPITAL | Age: 76
End: 2021-01-07

## 2021-01-07 ENCOUNTER — APPOINTMENT (OUTPATIENT)
Dept: NUCLEAR MEDICINE | Facility: HOSPITAL | Age: 76
End: 2021-01-07

## 2021-01-07 LAB
ALBUMIN SERPL-MCNC: 2.64 G/DL (ref 3.5–5.2)
ALBUMIN/GLOB SERPL: 0.8 G/DL
ALP SERPL-CCNC: 67 U/L (ref 39–117)
ALT SERPL W P-5'-P-CCNC: 7 U/L (ref 1–33)
ANION GAP SERPL CALCULATED.3IONS-SCNC: 8.7 MMOL/L (ref 5–15)
AST SERPL-CCNC: 6 U/L (ref 1–32)
BACTERIA SPEC AEROBE CULT: NORMAL
BACTERIA SPEC AEROBE CULT: NORMAL
BASOPHILS # BLD AUTO: 0.01 10*3/MM3 (ref 0–0.2)
BASOPHILS NFR BLD AUTO: 0.1 % (ref 0–1.5)
BILIRUB SERPL-MCNC: <0.2 MG/DL (ref 0–1.2)
BUN SERPL-MCNC: 29 MG/DL (ref 8–23)
BUN/CREAT SERPL: 28.4 (ref 7–25)
CALCIUM SPEC-SCNC: 8.9 MG/DL (ref 8.6–10.5)
CHLORIDE SERPL-SCNC: 104 MMOL/L (ref 98–107)
CO2 SERPL-SCNC: 19.3 MMOL/L (ref 22–29)
CREAT SERPL-MCNC: 1.02 MG/DL (ref 0.57–1)
CRP SERPL-MCNC: 5.32 MG/DL (ref 0–0.5)
D DIMER PPP FEU-MCNC: 0.79 MCGFEU/ML (ref 0–0.5)
DEPRECATED RDW RBC AUTO: 54.9 FL (ref 37–54)
EOSINOPHIL # BLD AUTO: 0 10*3/MM3 (ref 0–0.4)
EOSINOPHIL NFR BLD AUTO: 0 % (ref 0.3–6.2)
ERYTHROCYTE [DISTWIDTH] IN BLOOD BY AUTOMATED COUNT: 14.6 % (ref 12.3–15.4)
GFR SERPL CREATININE-BSD FRML MDRD: 53 ML/MIN/1.73
GLOBULIN UR ELPH-MCNC: 3.5 GM/DL
GLUCOSE BLDC GLUCOMTR-MCNC: 186 MG/DL (ref 70–130)
GLUCOSE BLDC GLUCOMTR-MCNC: 225 MG/DL (ref 70–130)
GLUCOSE BLDC GLUCOMTR-MCNC: 238 MG/DL (ref 70–130)
GLUCOSE BLDC GLUCOMTR-MCNC: 347 MG/DL (ref 70–130)
GLUCOSE BLDC GLUCOMTR-MCNC: 353 MG/DL (ref 70–130)
GLUCOSE BLDC GLUCOMTR-MCNC: 404 MG/DL (ref 70–130)
GLUCOSE SERPL-MCNC: 364 MG/DL (ref 65–99)
HCT VFR BLD AUTO: 28.8 % (ref 34–46.6)
HGB BLD-MCNC: 8.9 G/DL (ref 12–15.9)
IMM GRANULOCYTES # BLD AUTO: 0.04 10*3/MM3 (ref 0–0.05)
IMM GRANULOCYTES NFR BLD AUTO: 0.5 % (ref 0–0.5)
LYMPHOCYTES # BLD AUTO: 0.57 10*3/MM3 (ref 0.7–3.1)
LYMPHOCYTES NFR BLD AUTO: 6.5 % (ref 19.6–45.3)
MCH RBC QN AUTO: 31.8 PG (ref 26.6–33)
MCHC RBC AUTO-ENTMCNC: 30.9 G/DL (ref 31.5–35.7)
MCV RBC AUTO: 102.9 FL (ref 79–97)
MONOCYTES # BLD AUTO: 0.49 10*3/MM3 (ref 0.1–0.9)
MONOCYTES NFR BLD AUTO: 5.6 % (ref 5–12)
NEUTROPHILS NFR BLD AUTO: 7.63 10*3/MM3 (ref 1.7–7)
NEUTROPHILS NFR BLD AUTO: 87.3 % (ref 42.7–76)
NRBC BLD AUTO-RTO: 0 /100 WBC (ref 0–0.2)
NT-PROBNP SERPL-MCNC: 2050 PG/ML (ref 0–900)
PLATELET # BLD AUTO: 299 10*3/MM3 (ref 140–450)
PMV BLD AUTO: 9.6 FL (ref 6–12)
POTASSIUM SERPL-SCNC: 4.6 MMOL/L (ref 3.5–5.2)
PROT SERPL-MCNC: 6.1 G/DL (ref 6–8.5)
RBC # BLD AUTO: 2.8 10*6/MM3 (ref 3.77–5.28)
SODIUM SERPL-SCNC: 132 MMOL/L (ref 136–145)
TROPONIN T SERPL-MCNC: 0.03 NG/ML (ref 0–0.03)
TROPONIN T SERPL-MCNC: 0.04 NG/ML (ref 0–0.03)
VANCOMYCIN TROUGH SERPL-MCNC: 25.2 MCG/ML (ref 5–20)
WBC # BLD AUTO: 8.74 10*3/MM3 (ref 3.4–10.8)

## 2021-01-07 PROCEDURE — 70490 CT SOFT TISSUE NECK W/O DYE: CPT | Performed by: RADIOLOGY

## 2021-01-07 PROCEDURE — 85025 COMPLETE CBC W/AUTO DIFF WBC: CPT | Performed by: PHYSICIAN ASSISTANT

## 2021-01-07 PROCEDURE — 83880 ASSAY OF NATRIURETIC PEPTIDE: CPT | Performed by: NURSE PRACTITIONER

## 2021-01-07 PROCEDURE — 94799 UNLISTED PULMONARY SVC/PX: CPT

## 2021-01-07 PROCEDURE — 80053 COMPREHEN METABOLIC PANEL: CPT | Performed by: PHYSICIAN ASSISTANT

## 2021-01-07 PROCEDURE — 80202 ASSAY OF VANCOMYCIN: CPT | Performed by: INTERNAL MEDICINE

## 2021-01-07 PROCEDURE — 63710000001 INSULIN ASPART PER 5 UNITS: Performed by: PHYSICIAN ASSISTANT

## 2021-01-07 PROCEDURE — 97110 THERAPEUTIC EXERCISES: CPT | Performed by: OCCUPATIONAL THERAPIST

## 2021-01-07 PROCEDURE — 82962 GLUCOSE BLOOD TEST: CPT

## 2021-01-07 PROCEDURE — 71250 CT THORAX DX C-: CPT

## 2021-01-07 PROCEDURE — 70490 CT SOFT TISSUE NECK W/O DYE: CPT

## 2021-01-07 PROCEDURE — 85379 FIBRIN DEGRADATION QUANT: CPT | Performed by: PHYSICIAN ASSISTANT

## 2021-01-07 PROCEDURE — 63710000001 INSULIN DETEMIR PER 5 UNITS: Performed by: PHYSICIAN ASSISTANT

## 2021-01-07 PROCEDURE — 97110 THERAPEUTIC EXERCISES: CPT

## 2021-01-07 PROCEDURE — A9540 TC99M MAA: HCPCS | Performed by: INTERNAL MEDICINE

## 2021-01-07 PROCEDURE — 25010000002 CEFEPIME PER 500 MG: Performed by: NURSE PRACTITIONER

## 2021-01-07 PROCEDURE — 63710000001 INSULIN ASPART PER 5 UNITS: Performed by: INTERNAL MEDICINE

## 2021-01-07 PROCEDURE — 84484 ASSAY OF TROPONIN QUANT: CPT | Performed by: NURSE PRACTITIONER

## 2021-01-07 PROCEDURE — 78580 LUNG PERFUSION IMAGING: CPT

## 2021-01-07 PROCEDURE — 99232 SBSQ HOSP IP/OBS MODERATE 35: CPT | Performed by: INTERNAL MEDICINE

## 2021-01-07 PROCEDURE — 78580 LUNG PERFUSION IMAGING: CPT | Performed by: RADIOLOGY

## 2021-01-07 PROCEDURE — 74230 X-RAY XM SWLNG FUNCJ C+: CPT | Performed by: RADIOLOGY

## 2021-01-07 PROCEDURE — 92611 MOTION FLUOROSCOPY/SWALLOW: CPT

## 2021-01-07 PROCEDURE — 71250 CT THORAX DX C-: CPT | Performed by: RADIOLOGY

## 2021-01-07 PROCEDURE — 25010000002 CEFEPIME PER 500 MG: Performed by: INTERNAL MEDICINE

## 2021-01-07 PROCEDURE — 99233 SBSQ HOSP IP/OBS HIGH 50: CPT | Performed by: PHYSICIAN ASSISTANT

## 2021-01-07 PROCEDURE — 86140 C-REACTIVE PROTEIN: CPT | Performed by: PHYSICIAN ASSISTANT

## 2021-01-07 PROCEDURE — 97530 THERAPEUTIC ACTIVITIES: CPT

## 2021-01-07 PROCEDURE — 0 TECHNETIUM ALBUMIN AGGREGATED: Performed by: INTERNAL MEDICINE

## 2021-01-07 PROCEDURE — 74230 X-RAY XM SWLNG FUNCJ C+: CPT

## 2021-01-07 RX ORDER — BUMETANIDE 0.25 MG/ML
2 INJECTION INTRAMUSCULAR; INTRAVENOUS ONCE
Status: COMPLETED | OUTPATIENT
Start: 2021-01-07 | End: 2021-01-07

## 2021-01-07 RX ADMIN — INSULIN ASPART 8 UNITS: 100 INJECTION, SOLUTION INTRAVENOUS; SUBCUTANEOUS at 12:49

## 2021-01-07 RX ADMIN — CASTOR OIL AND BALSAM, PERU: 788; 87 OINTMENT TOPICAL at 19:55

## 2021-01-07 RX ADMIN — LISINOPRIL: 10 TABLET ORAL at 09:11

## 2021-01-07 RX ADMIN — AMLODIPINE BESYLATE 5 MG: 5 TABLET ORAL at 09:11

## 2021-01-07 RX ADMIN — CASTOR OIL AND BALSAM, PERU: 788; 87 OINTMENT TOPICAL at 09:15

## 2021-01-07 RX ADMIN — METOPROLOL TARTRATE 25 MG: 25 TABLET, FILM COATED ORAL at 12:49

## 2021-01-07 RX ADMIN — APIXABAN 5 MG: 5 TABLET, FILM COATED ORAL at 09:11

## 2021-01-07 RX ADMIN — CASTOR OIL AND BALSAM, PERU: 788; 87 OINTMENT TOPICAL at 19:56

## 2021-01-07 RX ADMIN — BUMETANIDE 2 MG: 0.25 INJECTION INTRAMUSCULAR; INTRAVENOUS at 16:17

## 2021-01-07 RX ADMIN — CEFEPIME 2 G: 2 INJECTION, POWDER, FOR SOLUTION INTRAVENOUS at 07:29

## 2021-01-07 RX ADMIN — CASTOR OIL AND BALSAM, PERU: 788; 87 OINTMENT TOPICAL at 09:13

## 2021-01-07 RX ADMIN — INSULIN ASPART 12 UNITS: 100 INJECTION, SOLUTION INTRAVENOUS; SUBCUTANEOUS at 07:30

## 2021-01-07 RX ADMIN — IPRATROPIUM BROMIDE AND ALBUTEROL SULFATE 3 ML: .5; 3 SOLUTION RESPIRATORY (INHALATION) at 00:21

## 2021-01-07 RX ADMIN — METOPROLOL TARTRATE 25 MG: 25 TABLET, FILM COATED ORAL at 19:55

## 2021-01-07 RX ADMIN — IPRATROPIUM BROMIDE AND ALBUTEROL SULFATE 3 ML: .5; 3 SOLUTION RESPIRATORY (INHALATION) at 06:41

## 2021-01-07 RX ADMIN — INSULIN ASPART 2 UNITS: 100 INJECTION, SOLUTION INTRAVENOUS; SUBCUTANEOUS at 18:04

## 2021-01-07 RX ADMIN — IPRATROPIUM BROMIDE AND ALBUTEROL SULFATE 3 ML: .5; 3 SOLUTION RESPIRATORY (INHALATION) at 13:43

## 2021-01-07 RX ADMIN — APIXABAN 5 MG: 5 TABLET, FILM COATED ORAL at 19:55

## 2021-01-07 RX ADMIN — INSULIN ASPART 5 UNITS: 100 INJECTION, SOLUTION INTRAVENOUS; SUBCUTANEOUS at 01:24

## 2021-01-07 RX ADMIN — SODIUM CHLORIDE, PRESERVATIVE FREE 3 ML: 5 INJECTION INTRAVENOUS at 09:12

## 2021-01-07 RX ADMIN — INSULIN DETEMIR 10 UNITS: 100 INJECTION, SOLUTION SUBCUTANEOUS at 09:13

## 2021-01-07 RX ADMIN — PANTOPRAZOLE SODIUM 40 MG: 40 TABLET, DELAYED RELEASE ORAL at 09:11

## 2021-01-07 RX ADMIN — INSULIN DETEMIR 33 UNITS: 100 INJECTION, SOLUTION SUBCUTANEOUS at 19:56

## 2021-01-07 RX ADMIN — CEFEPIME 2 G: 2 INJECTION, POWDER, FOR SOLUTION INTRAVENOUS at 19:54

## 2021-01-07 RX ADMIN — Medication 1 CAPSULE: at 09:11

## 2021-01-07 RX ADMIN — INSULIN ASPART 8 UNITS: 100 INJECTION, SOLUTION INTRAVENOUS; SUBCUTANEOUS at 18:05

## 2021-01-07 RX ADMIN — ATORVASTATIN CALCIUM 20 MG: 20 TABLET, FILM COATED ORAL at 19:54

## 2021-01-07 RX ADMIN — METRONIDAZOLE 500 MG: 500 INJECTION, SOLUTION INTRAVENOUS at 03:34

## 2021-01-07 RX ADMIN — IPRATROPIUM BROMIDE AND ALBUTEROL SULFATE 3 ML: .5; 3 SOLUTION RESPIRATORY (INHALATION) at 18:30

## 2021-01-07 RX ADMIN — ASPIRIN 81 MG: 81 TABLET, COATED ORAL at 09:11

## 2021-01-07 RX ADMIN — KIT FOR THE PREPARATION OF TECHNETIUM TC 99M ALBUMIN AGGREGATED 1 DOSE: 2.5 INJECTION, POWDER, FOR SOLUTION INTRAVENOUS at 16:55

## 2021-01-07 RX ADMIN — INSULIN ASPART 8 UNITS: 100 INJECTION, SOLUTION INTRAVENOUS; SUBCUTANEOUS at 07:30

## 2021-01-07 NOTE — NURSING NOTE
Order per Dr Menezes to call results of BNP drawn just before seeing patient at 1050. No results found in the system, no lab order noted after 0600 when lab drawn at 0656. Nurse called Annette in lab and states no order seen in the system. Nurse called Dr Menezes who stated that [facility] placed order so call her. Nurse placed call to [facility] who states she thought she placed order for BNP but only order seen in system is the order to call BNP results, but will use lab drawn at 0656 (resulted at 1109) to treat patient.    Troponin level of 0.035 given to Dr Menezes, states to cont to monitor ordered labs.

## 2021-01-07 NOTE — THERAPY TREATMENT NOTE
Acute Care - Physical Therapy Treatment Note   Rolf     Patient Name: Ashley Galvez  : 1945  MRN: [id number]  Today's Date: 2021   Onset of Illness/Injury or Date of Surgery: 21       PT Assessment (last 12 hours)      PT Evaluation and Treatment     Row Name 21 1100          Physical Therapy Time and Intention    Subjective Information  complains of;weakness;fatigue  -BC     Document Type  therapy note (daily note)  -BC     Mode of Treatment  physical therapy  -BC     Patient Effort  fair  -BC     Row Name 21 1100          General Information    Patient Profile Reviewed  yes  -BC     Onset of Illness/Injury or Date of Surgery  21  -BC     Referring Physician  Dr. Puente  -BC     Risks Reviewed  patient:;LOB;nausea/vomiting;dizziness;increased discomfort;change in vital signs;increased drainage;lines disloged  -BC     Benefits Reviewed  patient:;improve function;increase independence;increase strength;increase balance;decrease pain;decrease risk of DVT;improve skin integrity;increase knowledge  -BC     Row Name 21 1100          Cognition    Affect/Mental Status (Cognitive)  WFL  -BC     Orientation Status (Cognition)  oriented x 3  -BC     Follows Commands (Cognition)  follows one-step commands  -BC     Row Name 21 1100          Bed Mobility    Bed Mobility  bed mobility (all) activities  -BC     Bed Mobility, Safety Issues  cognitive deficits limit understanding;decreased use of arms for pushing/pulling;decreased use of legs for bridging/pushing;impaired trunk control for bed mobility  -BC     Assistive Device (Bed Mobility)  bed rails  -BC     Comment (Bed Mobility)  pt sat on EOB this date with with 3 assisthelp  -BC     Row Name 21 1100          Transfers    Comment (Transfers)  pt unable to transfer  -BC     Row Name 21 1100          Gait/Stairs (Locomotion)    Comment (Gait/Stairs)  Pt unable to ambulate  -BC     Row Name             Wound  01/01/21 0040 Right gluteal Pressure Injury    Wound - Properties Group Placement Date: 01/01/21 -SA Placement Time: 0040 -SA Present on Hospital Admission: Y  -SA Side: Right  -SA Location: gluteal  -SA Primary Wound Type: Pressure inj  -TW Stage, Pressure Injury : Stage 3  -TW    Retired Wound - Properties Group Date first assessed: 01/01/21 -SA Time first assessed: 0040 -SA Present on Hospital Admission: Y  -SA Side: Right  -SA Location: gluteal  -SA Primary Wound Type: Pressure inj  -TW    Row Name             Wound 01/01/21 0042 Left anterior greater trochanter Pressure Injury    Wound - Properties Group Placement Date: 01/01/21 -SA Placement Time: 0042 -SA Present on Hospital Admission: Y  -TW Side: Left  -SA Orientation: anterior  -SA Location: greater trochanter  -SA Primary Wound Type: Pressure inj  -TW Stage, Pressure Injury : Stage 2  -TW    Retired Wound - Properties Group Date first assessed: 01/01/21 -SA Time first assessed: 0042 -SA Present on Hospital Admission: Y  -TW Side: Left  -SA Location: greater trochanter  -SA Primary Wound Type: Pressure inj  -TW    Row Name             Wound 01/01/21 0044 Left lower leg    Wound - Properties Group Placement Date: 01/01/21 -SA Placement Time: 0044 -SA Side: Left  -SA Orientation: lower  -SA Location: leg  -SA    Retired Wound - Properties Group Date first assessed: 01/01/21 -SA Time first assessed: 0044 -SA Side: Left  -SA Location: leg  -SA    Row Name             Wound 01/01/21 0044 Right lower leg    Wound - Properties Group Placement Date: 01/01/21 -SA Placement Time: 0044 -SA Side: Right  -SA Orientation: lower  -SA Location: leg  -SA    Retired Wound - Properties Group Date first assessed: 01/01/21 -SA Time first assessed: 0044 -SA Side: Right  -SA Location: leg  -SA    Row Name             Wound 01/04/21 1953 Right heel Pressure Injury    Wound - Properties Group Placement Date: 01/04/21  -JP Placement Time: 1953 -JP Present on  Hospital Admission: N  -LETA Side: Right  -LETA Location: heel  -LETA Primary Wound Type: Pressure inj  -TW Stage, Pressure Injury : deep tissue injury  -LETA    Retired Wound - Properties Group Date first assessed: 01/04/21  -LETA Time first assessed: 1953  -LETA Present on Hospital Admission: N  -LETA Side: Right  -LETA Location: heel  -LETA Primary Wound Type: Pressure inj  -TW    Row Name 01/07/21 1100          Coping    Observed Emotional State  anxious  -BC     Verbalized Emotional State  acceptance  -BC     Trust Relationship/Rapport  care explained;choices provided;emotional support provided  -BC     Involvement in Care  not present at bedside  -BC     Diversional Activities  television  -BC     Row Name 01/07/21 1100          Plan of Care Review    Plan of Care Reviewed With  patient  -BC     Progress  improving  -BC     Row Name 01/07/21 1100          Positioning and Restraints    Pre-Treatment Position  in bed  -BC     Post Treatment Position  bed  -BC     In Bed  notified nsg;supine;call light within reach;encouraged to call for assist;exit alarm on;side rails up x3  -BC       User Key  (r) = Recorded By, (t) = Taken By, (c) = Cosigned By    Initials Name Provider Type    Dana Pinedo, RN Registered Nurse    Umberto Zelaya, MIGEL Registered Nurse    Michelle Berg PT Physical Therapist    Joy Mckinnon RN Registered Nurse        Physical Therapy Education                 Title: PT OT SLP Therapies (In Progress)     Topic: Physical Therapy (In Progress)     Point: Mobility training (In Progress)     Learning Progress Summary           Patient Acceptance, E, NR by EA at 1/6/2021 2213    Acceptance, E, VU by  at 1/6/2021 0120    Acceptance, E, VU by AM at 1/5/2021 1358    Acceptance, E, NR by BC at 1/5/2021 1043                   Point: Home exercise program (In Progress)     Learning Progress Summary           Patient Acceptance, E, NR by EA at 1/6/2021 2213    Acceptance, E, VU by  at  1/6/2021 0120    Acceptance, E, VU by AM at 1/5/2021 1358    Acceptance, E, NR by BC at 1/5/2021 1043                   Point: Body mechanics (In Progress)     Learning Progress Summary           Patient Acceptance, E, NR by EA at 1/6/2021 2213    Acceptance, E, VU by  at 1/6/2021 0120    Acceptance, E, VU by AM at 1/5/2021 1358    Acceptance, E, NR by BC at 1/5/2021 1043                   Point: Precautions (In Progress)     Learning Progress Summary           Patient Acceptance, E, NR by EA at 1/6/2021 2213    Acceptance, E, VU by  at 1/6/2021 0120    Acceptance, E, VU by AM at 1/5/2021 1358    Acceptance, E, NR by BC at 1/5/2021 1043                               User Key     Initials Effective Dates Name Provider Type Discipline     06/16/16 -  Michelle Stevenson, RN Registered Nurse Nurse    BC 03/14/16 -  Michelle Jenkins PT Physical Therapist PT     09/06/18 -  Collett, Morgan, RN Registered Nurse Nurse     06/24/20 -  Vera Hicks, RN Registered Nurse Nurse              PT Recommendation and Plan     Plan of Care Reviewed With: patient  Progress: improving       Time Calculation:   PT Charges     Row Name 01/07/21 1152             Time Calculation    PT Received On  01/07/21  -BC         Time Calculation- PT    Total Timed Code Minutes- PT  45 minute(s)  -BC         Timed Charges    90830 - PT Therapeutic Exercise Minutes  15  -BC      50914 - PT Therapeutic Activity Minutes  30  -BC        User Key  (r) = Recorded By, (t) = Taken By, (c) = Cosigned By    Initials Name Provider Type    BC Michelle Jenkins PT Physical Therapist        Therapy Charges for Today     Code Description Service Date Service Provider Modifiers Qty    77259421461 HC PT THER PROC EA 15 MIN 1/7/2021 Michelle Jenkins, PT GP 1    36747664379 HC PT THERAPEUTIC ACT EA 15 MIN 1/7/2021 Michelle Jenkins PT GP 2               Michelle Jenkins PT  1/7/2021

## 2021-01-07 NOTE — THERAPY TREATMENT NOTE
Acute Care - Occupational Therapy Treatment Note  ANDREW Bansal     Patient Name: Ashley Galvez  : 1945  MRN: [id number]  Today's Date: 2021  Onset of Illness/Injury or Date of Surgery: 21     Referring Physician: Dr. Puente    Admit Date: 2020       ICD-10-CM ICD-9-CM   1. Acute renal failure, unspecified acute renal failure type (CMS/HCC)  N17.9 584.9     Patient Active Problem List   Diagnosis   • Sepsis (CMS/HCC)     Past Medical History:   Diagnosis Date   • Anemia    • Anterolisthesis     Grade 1 anterolisthesis of L5 on S1 due to bilateral L5 pars defects   • Diverticulosis    • Essential hypertension    • Lacunar infarction (CMS/HCC)    • Type 2 diabetes mellitus (CMS/HCC)      Past Surgical History:   Procedure Laterality Date   • CHOLECYSTECTOMY     • HYSTERECTOMY              OT ASSESSMENT FLOWSHEET (last 12 hours)      OT Evaluation and Treatment     Row Name 21 1512                   OT Time and Intention    Subjective Information  no complaints  -BF        Document Type  therapy note (daily note)  -BF        Mode of Treatment  occupational therapy  -BF        Patient Effort  adequate  -BF           General Information    Patient/Family/Caregiver Comments/Observations   present  -BF        Existing Precautions/Restrictions  fall  -BF        Limitations/Impairments  visual;safety/cognitive;hearing blind left eye, decreased vision right eye  -BF           Cognition    Orientation Status (Cognition)  oriented to;person;place  -BF        Follows Commands (Cognition)  follows one-step commands;verbal cues/prompting required;repetition of directions required;physical/tactile prompts required  -BF           Motor Skills    Motor Skills  therapeutic exercise  -BF        Therapeutic Exercise  other (see comments) CED DUONG theract  -BF           Wound 21 Right gluteal Pressure Injury    Wound - Properties Group Placement Date: 21 Placement Time: 40  Present on Hospital Admission: Y  -SA Side: Right  -SA Location: gluteal  -SA Primary Wound Type: Pressure inj  -TW Stage, Pressure Injury : Stage 3  -TW    Retired Wound - Properties Group Date first assessed: 01/01/21 -SA Time first assessed: 0040 -SA Present on Hospital Admission: Y  -SA Side: Right  -SA Location: gluteal  -SA Primary Wound Type: Pressure inj  -TW       Wound 01/01/21 0042 Left anterior greater trochanter Pressure Injury    Wound - Properties Group Placement Date: 01/01/21 -SA Placement Time: 0042 -SA Present on Hospital Admission: Y  -TW Side: Left  -SA Orientation: anterior  -SA Location: greater trochanter  -SA Primary Wound Type: Pressure inj  -TW Stage, Pressure Injury : Stage 2  -TW    Retired Wound - Properties Group Date first assessed: 01/01/21 -SA Time first assessed: 0042 -SA Present on Hospital Admission: Y  -TW Side: Left  -SA Location: greater trochanter  -SA Primary Wound Type: Pressure inj  -TW       Wound 01/01/21 0044 Left lower leg    Wound - Properties Group Placement Date: 01/01/21 -SA Placement Time: 0044 -SA Side: Left  -SA Orientation: lower  -SA Location: leg  -SA    Retired Wound - Properties Group Date first assessed: 01/01/21 -SA Time first assessed: 0044  -SA Side: Left  -SA Location: leg  -SA       Wound 01/01/21 0044 Right lower leg    Wound - Properties Group Placement Date: 01/01/21 -SA Placement Time: 0044 -SA Side: Right  -SA Orientation: lower  -SA Location: leg  -SA    Retired Wound - Properties Group Date first assessed: 01/01/21 -SA Time first assessed: 0044  -SA Side: Right  -SA Location: leg  -SA       Wound 01/04/21 1953 Right heel Pressure Injury    Wound - Properties Group Placement Date: 01/04/21  -JP Placement Time: 1953  -JP Present on Hospital Admission: N  -LETA Side: Right  -LETA Location: heel  -LETA Primary Wound Type: Pressure inj  -TW Stage, Pressure Injury : deep tissue injury  -LETA    Retired Wound - Properties Group Date first  assessed: 01/04/21  -LETA Time first assessed: 1953  -LETA Present on Hospital Admission: N  -LETA Side: Right  -LETA Location: heel  -LETA Primary Wound Type: Pressure inj  -TW       Positioning and Restraints    In Bed  supine;call light within reach;encouraged to call for assist;with family/caregiver  -BF          User Key  (r) = Recorded By, (t) = Taken By, (c) = Cosigned By    Initials Name Effective Dates    TW Dana Ford, MIGEL 06/16/16 -     Umberto Zelaya, MIGEL 07/31/20 -     BF Martina Feliciano, OT 07/05/20 -     Joy Mckinnon RN 01/22/20 -                OT Recommendation and Plan              Time Calculation:   Time Calculation- OT     Row Name 01/07/21 1516             Time Calculation- OT    Total Timed Code Minutes- OT  25 minute(s)  -BF        User Key  (r) = Recorded By, (t) = Taken By, (c) = Cosigned By    Initials Name Provider Type    BF Martina Feliciano OT Occupational Therapist        Therapy Charges for Today     Code Description Service Date Service Provider Modifiers Qty    39199469280 HC OT THER PROC EA 15 MIN 1/6/2021 Martina Feliciano OT GO 2    64577269536 HC OT THER PROC EA 15 MIN 1/7/2021 Martina Feliciano OT GO 2               Martina Feliciano OT  1/7/2021

## 2021-01-07 NOTE — PROGRESS NOTES
PROGRESS NOTE         Patient Identification:  Name:  Ashley Galvez  Age:  75 y.o.  Sex:  female  :  1945  MRN:  [id number]  Visit Number:  [id number]  Primary Care Provider:  Rony Callaway MD         LOS: 7 days       ----------------------------------------------------------------------------------------------------------------------  Subjective       Chief Complaints:    Altered Mental Status        Interval History:      Patient resting comfortably in bed this morning.  Continues on 1 L nasal cannula with no apparent distress.  CRP improving at 5.32.  Respiratory panel PCR negative.  CT of the chest from 2021 reports stable CT chest with large hiatal hernia andleft lower lobe atelectasis.    Review of Systems:    Constitutional: no fever, chills and night sweats.  Generalized fatigue.    Eyes: no eye drainage, itching or redness.  HEENT: no mouth sores, dysphagia or nose bleed.  Respiratory: no for shortness of breath, cough or production of sputum.  Cardiovascular: no chest pain, no palpitations, no orthopnea.  Gastrointestinal: no nausea, vomiting or diarrhea. No abdominal pain, hematemesis or rectal bleeding.  Genitourinary: no dysuria or polyuria.  Hematologic/lymphatic: no lymph node abnormalities, no easy bruising or easy bleeding.  Musculoskeletal: no muscle or joint pain.  Skin: No rash and no itching.  Neurological: no loss of consciousness, no seizure, no headache.  Behavioral/Psych: no depression or suicidal ideation.  Endocrine: no hot flashes.  Immunologic: negative.    ----------------------------------------------------------------------------------------------------------------------      Objective       [facility] Meds:  amLODIPine, 5 mg, Oral, Q24H  apixaban, 5 mg, Oral, Q12H  aspirin, 81 mg, Oral, Daily  atorvastatin, 20 mg, Oral, Nightly  castor oil-balsam peru, , Topical, Q12H  castor oil-balsam peru, , Topical, Q12H  cefepime, 2 g, Intravenous,  Q12H  insulin aspart, 0-14 Units, Subcutaneous, TID AC  insulin aspart, 8 Units, Subcutaneous, TID With Meals  insulin detemir, 10 Units, Subcutaneous, Daily  insulin detemir, 33 Units, Subcutaneous, Nightly  ipratropium-albuterol, 3 mL, Nebulization, 4x Daily - RT  lactobacillus acidophilus, 1 capsule, Oral, Daily  lisinopril-hydroCHLOROthiazide (ZESTORTIC) 20-25 mg combo dose, , Oral, Q24H  metoprolol tartrate, 25 mg, Oral, Q12H  pantoprazole, 40 mg, Oral, Daily  sodium chloride, 3 mL, Intravenous, Q12H  vancomycin, 1,000 mg, Intravenous, Q24H         ----------------------------------------------------------------------------------------------------------------------    Vital Signs:  Temp:  [97.1 °F (36.2 °C)-98.4 °F (36.9 °C)] 97.7 °F (36.5 °C)  Heart Rate:  [] 109  Resp:  [18-25] 20  BP: (130-159)/(73-88) 136/85  Mean Arterial Pressure (Non-Invasive) for the past 24 hrs (Last 3 readings):   Noninvasive MAP (mmHg)   01/07/21 1100 108   01/07/21 0630 121   01/06/21 1500 104     SpO2 Percentage    01/07/21 0630 01/07/21 0641 01/07/21 1100   SpO2: 95% 97% 97%     SpO2:  [92 %-99 %] 97 %  on  Flow (L/min):  [1] 1;   Device (Oxygen Therapy): nasal cannula    Body mass index is 31.85 kg/m².  Wt Readings from Last 3 Encounters:   01/07/21 81.6 kg (179 lb 12.8 oz)        Intake/Output Summary (Last 24 hours) at 1/7/2021 1150  Last data filed at 1/7/2021 1100  Gross per 24 hour   Intake 1360 ml   Output 1625 ml   Net -265 ml     Diet Soft Texture; Chopped; Thin; Consistent Carbohydrate  ----------------------------------------------------------------------------------------------------------------------      Physical Exam:    Constitutional: Chronically ill-appearing elderly lady.  No respiratory distress. Much more awake and alert today.     HENT:  Head: Normocephalic and atraumatic.  Mouth:  Moist mucous membranes.    Eyes:  Conjunctivae and EOM are normal.  No scleral icterus.  Neck:  Neck supple.  No JVD  present.    Cardiovascular:   Regular rate.  Irregularly irregular rhythm.  No murmur auscultated.  Pulmonary/Chest:  No respiratory distress, no wheezes, no crackles, with normal breath sounds and good air movement.  Abdominal:  Soft.  Bowel sounds are normal.  No distension and no tenderness to palpation.  Musculoskeletal:  No edema, no tenderness, and no deformity.  No swelling or redness of joints.  Ulcerations of pretibial area which do not appear infectious.  Right ankle turns inward.  Neurological:  Alert and oriented to person, place, and time.  No facial droop.  No slurred speech.   Skin:  Skin is warm and dry.  No rash noted.  No pallor.  Onychomycosis.  Psychiatric: Normal mood and affect.  Behavior is normal.  ----------------------------------------------------------------------------------------------------------------------  Results from last 7 days   Lab Units 01/01/21 0347 12/31/20 2009   CK TOTAL U/L  --  92   TROPONIN T ng/mL 0.037* 0.052*     Results from last 7 days   Lab Units 01/07/21  0656 12/31/20 2009   PROBNP pg/mL 2,050.0* 1,580.0*     Results from last 7 days   Lab Units 01/06/21  0102   CHOLESTEROL mg/dL 110   TRIGLYCERIDES mg/dL 144   HDL CHOL mg/dL 29*   LDL CHOL mg/dL 56     Results from last 7 days   Lab Units 01/05/21  2321   PH, ARTERIAL pH units 7.399   PO2 ART mm Hg 68.6*   PCO2, ARTERIAL mm Hg 33.8*   HCO3 ART mmol/L 20.9     Results from last 7 days   Lab Units 01/07/21  0656 01/06/21  0102 01/05/21  0151  01/01/21 0347 12/31/20 2009   CRP mg/dL 5.32* 7.01* 4.69*  --  0.78* 0.75*   LACTATE mmol/L  --   --   --   --  0.9 0.9   WBC 10*3/mm3 8.74 9.41 10.03   < > 12.52* 11.73*   HEMOGLOBIN g/dL 8.9* 8.7* 8.8*   < > 11.3* 11.3*   HEMATOCRIT % 28.8* 29.9* 28.7*   < > 37.0 35.4   MCV fL 102.9* 107.6* 102.1*   < > 103.4* 100.6*   MCHC g/dL 30.9* 29.1* 30.7*   < > 30.5* 31.9   PLATELETS 10*3/mm3 299 274 240   < > 413 449   INR   --   --   --   --   --  1.08    < > = values in  this interval not displayed.     Results from last 7 days   Lab Units 01/07/21  0656 01/06/21  0102 01/05/21  0939 01/05/21  0151  01/02/21  0135   SODIUM mmol/L 132* 130* 134* 133*   < > 151*   POTASSIUM mmol/L 4.6 4.6  --  4.0   < > 2.9*   MAGNESIUM mg/dL  --  3.1*  --  1.5*  --  1.8   CHLORIDE mmol/L 104 103  --  107   < > 122*   CO2 mmol/L 19.3* 17.3*  --  18.6*   < > 18.2*   BUN mg/dL 29* 22  --  27*   < > 39*   CREATININE mg/dL 1.02* 0.98  --  1.05*   < > 1.18*   EGFR IF NONAFRICN AM mL/min/1.73 53* 55*  --  51*   < > 45*   CALCIUM mg/dL 8.9 8.3*  --  7.7*   < > 8.9   GLUCOSE mg/dL 364* 377*  --  242*   < > 135*   ALBUMIN g/dL 2.64* 2.34*  --  2.35*   < > 3.10*   BILIRUBIN mg/dL <0.2 <0.2  --  0.2   < > 0.2   ALK PHOS U/L 67 71  --  65   < > 96   AST (SGOT) U/L 6 9  --  7   < > 12   ALT (SGPT) U/L 7 8  --  9   < > 9    < > = values in this interval not displayed.   Estimated Creatinine Clearance: 49 mL/min (A) (by C-G formula based on SCr of 1.02 mg/dL (H)).  No results found for: AMMONIA    Glucose   Date/Time Value Ref Range Status   01/07/2021 1113 238 (H) 70 - 130 mg/dL Final   01/07/2021 0623 353 (H) 70 - 130 mg/dL Final   01/07/2021 0256 347 (H) 70 - 130 mg/dL Final   01/07/2021 0116 404 (C) 70 - 130 mg/dL Final   01/06/2021 2148 482 (C) 70 - 130 mg/dL Final   01/06/2021 1701 427 (C) 70 - 130 mg/dL Final   01/06/2021 1036 326 (H) 70 - 130 mg/dL Final   01/06/2021 0626 280 (H) 70 - 130 mg/dL Final     Lab Results   Component Value Date    HGBA1C 7.90 (H) 12/31/2020     Lab Results   Component Value Date    TSH 1.240 12/31/2020       Blood Culture   Date Value Ref Range Status   01/02/2021 No growth at 24 hours  Preliminary   01/02/2021 No growth at 24 hours  Preliminary   12/31/2020 No growth at 2 days  Preliminary   12/31/2020 Enterococcus faecalis (C)  Final     Comment:     Infectious disease consultation is highly recommended.     Urine Culture   Date Value Ref Range Status   12/31/2020 Culture in  progress  Preliminary     No results found for: WOUNDCX  No results found for: STOOLCX  No results found for: RESPCX  Pain Management Panel     Pain Management Panel Latest Ref Rng & Units 12/31/2020    AMPHETAMINES SCREEN, URINE Negative Negative    BARBITURATES SCREEN Negative Negative    BENZODIAZEPINE SCREEN, URINE Negative Negative    BUPRENORPHINEUR Negative Negative    COCAINE SCREEN, URINE Negative Negative    METHADONE SCREEN, URINE Negative Negative            ----------------------------------------------------------------------------------------------------------------------  Imaging Results (Last 24 Hours)     Procedure Component Value Units Date/Time    CT CHEST WITHOUT CONTRAST DIAGNOSTIC [[id number]] Collected: 01/07/21 0913     Updated: 01/07/21 0918    Narrative:      EXAM:    CT Chest Without Intravenous Contrast     EXAM DATE:    1/7/2021 8:31 AM     CLINICAL HISTORY:    Abnormal xray - pleural effusion; N17.9-Acute kidney failure,  unspecified     TECHNIQUE:    Axial computed tomography images of the chest without intravenous  contrast.  Sagittal and coronal reformatted images were created and  reviewed.  This CT exam was performed using one or more of the following  dose reduction techniques:  automated exposure control, adjustment of  the mA and/or kV according to patient size, and/or use of iterative  reconstruction technique.     COMPARISON:    No relevant prior studies available.     FINDINGS:    Lungs:  No convincing evidence of lung mass identified.  Stable 7 mm  nodule right lower lobe.    Pleural space:  Unremarkable.  No pneumothorax.  No significant  effusion.    Heart:  Cardiomegaly is noted with severe coronary artery  calcifications.  No significant pericardial effusion.    Mediastinum:  There is a large hiatal hernia with significant portion  of the stomach and specifically the gastric fundus in the left lower  lobe region producing mass effect associated atelectasis in the  left  lower lobe.  No metallic foreign bodies are seen in the thoracic  esophagus.    Bones/joints:  Unremarkable.  No acute fracture.  No dislocation.    Soft tissues:  Unremarkable.    Vasculature:  Aortic calcifications are noted but no evidence of  aneurysm.    Lymph nodes:  Unremarkable.  No enlarged lymph nodes.    Gallbladder and bile ducts:  Cholecystectomy.    Adrenals:  Small left adrenal nodule is stable from the previous exam.    Other findings:  Calcifications in the region of the bong hepatis are  stable from previous.       Impression:      1.  Essentially stable chest CT demonstrating large hiatal hernia and  left lower lobe atelectasis.  2.  No metallic or radiopaque foreign body seen throughout the thoracic  esophagus.  3.  Otherwise stable exam with other nonacute findings described above.  Please refer to prior report for follow-up recommendations of above  findings.     This report was finalized on 1/7/2021 9:16 AM by Dr. Sagar Romero MD.       CT Soft Tissue Neck Without Contrast [[id number]] Collected: 01/07/21 0850     Updated: 01/07/21 0916    Narrative:      EXAM:    CT Neck Without Intravenous Contrast     EXAM DATE:    1/7/2021 8:31 AM     CLINICAL HISTORY:    Foreign body suspected, neck, neg xray     TECHNIQUE:    Axial computed tomography images of the neck without intravenous  contrast.  Sagittal and coronal reformatted images were created and  reviewed.  This CT exam was performed using one or more of the following  dose reduction techniques:  automated exposure control, adjustment of  the mA and/or kV according to patient size, and/or use of iterative  reconstruction technique.     COMPARISON:    No relevant prior studies available.     FINDINGS:    Oropharynx:  5.6 mm radiodense ovoid structure appears to BE situated  within the wall of the right palatine tonsillar fossa at the level of  the vallecula on the right side and could represent markedly dense soft  tissue calcification  but the possibility of foreign body is not excluded  in light of patient's reported history.    Hypopharynx:  Unremarkable.    Larynx:  See below.      Trachea:  Unremarkable.    Retropharyngeal space:  Unremarkable.    Submandibular/parotid glands:  Unremarkable.  Glands are normal in  size.    Thyroid:  Unremarkable.  No enlarged or calcified nodules.    Bones/joints:  Mild degenerative changes cervical spine with facet  arthropathy.  No acute fracture.    Soft tissues:  No metallic foreign body is seen otherwise throughout  the upper esophagus or within the vallecular fossa.    Vasculature:  Arterial vascular calcifications of the carotid vessels  are noted which are tortuous and both course towards the midline  impressing upon the posterior lateral walls of the posterior pharynx.   Atherosclerosis aorta.    Lymph nodes:  No adenopathy is identified.    Lung apices:  Chronic appearing lung changes.       Impression:      1.  5.6 mm either densely calcified structure versus foreign body in the  right palatine fossa pharyngeal wall with considerations as above.  This  does not appear to be in the lumen of the oropharynx.  2.  Other findings above.     This report was finalized on 1/7/2021 9:03 AM by Dr. Sagar Romero MD.             ----------------------------------------------------------------------------------------------------------------------    Assessment/Plan       Assessment/Plan     ASSESSMENT:    1.  Sepsis  2.  Pyelonephritis  3.  Enterococcus bacteremia  4.  Aspiration pneumonia    PLAN:      Patient resting comfortably in bed this morning.  Continues on 1 L nasal cannula with no apparent distress.  CRP improving at 5.32.  Respiratory panel PCR negative.  CT of the chest from 1/7/2021 reports stable CT chest with large hiatal hernia andleft lower lobe atelectasis.    Urinalysis on 12/31/2020 was positive with WBCs too numerous to count.  Urine culture on 1/3/2021 finalized as Pseudomonas aeruginosa  and Proteus mirabilis.      CT chest without contrast on 12/31/2020 showed no evidence of acute pneumonia.  Large hiatal hernia at the left lung base.  Noncalcified nodule in the right lower lobe measuring 7 mm with recommended chest CT follow-up in 6 months.  Left adrenal nodule, most likely a benign adenoma.  This can be further characterized with adrenal protocol CT or MRI on a nonemergent basis if indicated.     CT head without contrast on 12/31/2020 showed no acute intracranial abnormality.  CSF density mass of the right posterior fossa with mild mass-effect on the right cerebellar convexity, most likely an arachnoid cyst with recommendation to evaluate further with MRI without and with contrast.  Presumed chronic microvascular ischemic changes.     CT abdomen pelvis without contrast on 12/31/2020 showed atherosclerotic disease with probable vascular calcifications in both kidneys.  No ureteral stones as seen on either side, and there is no hydronephrosis.  Mild thickening of the urinary bladder wall may be due to incomplete distention or cystitis.  Large amount of stool in the rectal vault.  There is colonic diverticulosis without diverticulitis.  The appendix and small bowel are within normal limits.  Small left adrenal nodule, likely a benign adenoma in the absence of known malignancy.  This could be assessed with nonemergent adrenal protocol MRI or CT if indicated.  Cholecystectomy and hysterectomy.     Blood cultures on 12/31/2020 are so far showing 1 out of 2 with Enterococcus.  BC ID on 12/31/2020 finalized as Enterococcus.  Repeat blood cultures on 1/2/2021 are so far showing no growth.     COVID-19 and flu A/B PCR on 12/31/2020 was negative.     As CT of the chest reports no evidence of pneumonia Flagyl was discontinued.  Recommend to continue vancomycin and cefepime through 1/11/2021 for treatment of bacteremia and UTI.  We will continue to follow closely and adjust antibiotic therapy as  needed.    Code Status:   Code Status and Medical Interventions:   Ordered at: 12/31/20 5026     Level Of Support Discussed With:    Patient     Code Status:    CPR     Medical Interventions (Level of Support Prior to Arrest):    Full     Scribed for Yvan Dempsey MD.    Macie Latham, APRN  01/07/21  11:50 EST    Physician Attestation:    The documentation recorded by the scribe accurately reflects the service I personally performed and the decisions made by me.    Yvan Dempsey MD  Infectious Diseases  01/07/21  11:50 EST

## 2021-01-07 NOTE — PHARMACY PATIENT ASSISTANCE
Pharmacy evaluated co-pay for new medication, Eliquis. According to insurance, the patient will have a $47 co-pay and there is also a free 30 day  trial card profiled. The patient probably won't be eligible for assistance since its the beginning of the year and they have not spent 3% of their income that is a requirement for the program.  Attempting to contact  to see if this co-pay is okay with patient. Will update as information is obtained.       Otis Garcia, Pharmacy Intern

## 2021-01-07 NOTE — MBS/VFSS/FEES
Acute Care - Speech Language Pathology   Swallow Initial Evaluation  [facility]   MODIFIED BARIUM SWALLOW STUDY     Patient Name: Ashley Galvez  : 1945  MRN: [id number]  Today's Date: 2021  Onset of Illness/Injury or Date of Surgery: 21     Referring Physician: Dr. Puente      Admit Date: 2020    Visit Dx:     ICD-10-CM ICD-9-CM   1. Acute renal failure, unspecified acute renal failure type (CMS/HCC)  N17.9 584.9     Patient Active Problem List   Diagnosis   • Sepsis (CMS/HCC)     Past Medical History:   Diagnosis Date   • Anemia    • Anterolisthesis     Grade 1 anterolisthesis of L5 on S1 due to bilateral L5 pars defects   • Diverticulosis    • Essential hypertension    • Lacunar infarction (CMS/HCC)    • Type 2 diabetes mellitus (CMS/HCC)      Past Surgical History:   Procedure Laterality Date   • CHOLECYSTECTOMY     • HYSTERECTOMY         Ashley Galvez  presents to the radiology suite this pm from 3S 3306/1S to participate in an instrumental MBS to evaluate safety/efficacy of swallowing fnx, determine safest/least restrictive diet.    Ms. Galvez is familiar to SLP for initial clinical dysphagia assessment 21 w/ recommendation for puree diet, thin liquids. She was reassessed 21 and upgrade to least restrictive mechanical soft diet, chopped meats, thin liquids. She is accepting this po diet w/o overt s/s aspiration.     Noted per SLP consultation order, request for possible FEES to visualize anatomy 2/2 calcification vs. foreign body of right palatine tonsillar fossa pharyngeal wall vs. calcification as noted on CT soft tissue neck w/o contrast this am. D/w radiologist today's imaging, and per location of density noted on CT, this would not be visible via transnasal FEES. Radiologist indicates direct visualization orally would likely be more efficient. Recommend consideration of pt candidacy for ENT referral to further address this as FEES per SLP is not clinically indicated at this  time.     D/w Gale Ozuna PA-C, pt status via telephone, specifically deferral of FEES at this time w/ verbal understanding. Although Ms. Galvez is accepting her least restrictive po diet w/o overt s/s aspiration, she does request instrumental MBS to objectively evaluate swallowing fnx 2/2 wheezing in her R lung yesterday.         Social History     Socioeconomic History   • Marital status:      Spouse name: Not on file   • Number of children: Not on file   • Years of education: Not on file   • Highest education level: Not on file   Tobacco Use   • Smoking status: Never Smoker   • Smokeless tobacco: Never Used   Substance and Sexual Activity   • Alcohol use: Never     Frequency: Never   • Drug use: Never   • Sexual activity: Defer      EXAM:    CT Chest Without Intravenous Contrast     EXAM DATE:    1/7/2021 8:31 AM     CLINICAL HISTORY:    Abnormal xray - pleural effusion; N17.9-Acute kidney failure,  unspecified     TECHNIQUE:    Axial computed tomography images of the chest without intravenous  contrast.  Sagittal and coronal reformatted images were created and  reviewed.  This CT exam was performed using one or more of the following  dose reduction techniques:  automated exposure control, adjustment of  the mA and/or kV according to patient size, and/or use of iterative  reconstruction technique.     COMPARISON:    No relevant prior studies available.     FINDINGS:    Lungs:  No convincing evidence of lung mass identified.  Stable 7 mm  nodule right lower lobe.    Pleural space:  Unremarkable.  No pneumothorax.  No significant  effusion.    Heart:  Cardiomegaly is noted with severe coronary artery  calcifications.  No significant pericardial effusion.    Mediastinum:  There is a large hiatal hernia with significant portion  of the stomach and specifically the gastric fundus in the left lower  lobe region producing mass effect associated atelectasis in the left  lower lobe.  No metallic foreign bodies  are seen in the thoracic  esophagus.    Bones/joints:  Unremarkable.  No acute fracture.  No dislocation.    Soft tissues:  Unremarkable.    Vasculature:  Aortic calcifications are noted but no evidence of  aneurysm.    Lymph nodes:  Unremarkable.  No enlarged lymph nodes.    Gallbladder and bile ducts:  Cholecystectomy.    Adrenals:  Small left adrenal nodule is stable from the previous exam.    Other findings:  Calcifications in the region of the bong hepatis are  stable from previous.     IMPRESSION:  1.  Essentially stable chest CT demonstrating large hiatal hernia and  left lower lobe atelectasis.  2.  No metallic or radiopaque foreign body seen throughout the thoracic  esophagus.  3.  Otherwise stable exam with other nonacute findings described above.  Please refer to prior report for follow-up recommendations of above  findings.     This report was finalized on 1/7/2021 9:16 AM by Dr. Sagar Romero MD.    Diet Orders (active) (From admission, onward)     Start     Ordered    01/06/21 0929  Diet Soft Texture; Chopped; Thin; Consistent Carbohydrate  Diet Effective Now      01/06/21 0929    01/01/21 1800  Dietary Nutrition Supplements Boost Plus (Ensure Enlive, Ensure Plus)  Daily With Breakfast, Lunch & Dinner      01/01/21 1347              She is observed on 1L O2 via NC w/o complications.     Risks and benefits of the procedure are explained w/ pt verbalizing understanding/agreement to participate. Proceed per protocol.     Pt is positioned upright and centered in a soft strap supportive chair to accept multiple po presentations of solid cracker, puree, honey thick, nectar thick, and thin liquids via spoon, cup and straw, along w/ whole placebo pill in puree. Pt is able to self feed.     All views are from the lateral plane.     Facial/oral structures are symmetrical upon observation w/o lingual deviation upon protrusion. Oral mucosa are moist, pink and clean. Secretions are clear, thin and well  controlled. OROM/WALKER is adequate to imitate oral postures. Gag is not assessed. Volitional cough is adequate in intensity, clear in quality, nonproductive. Vocal quality is adequate in intensity, clear in quality w/ intelligible speech. Pt is a/a and cooperative to particpate. She is oriented to person and place, follows simple directives, and participates in simple conversational exchanges.     Upon po presentations, adequate bolus anticipation w/ good labial seal for bolus clearance via spoon bowl, cup rim stability and suction via straw. Bolus formation, manipulation, and control are adequate w/ mildly increased oral prep time w/ solid, rotary mastication pattern. A-p transit is slightly delayed w/o oral residue. Tongue base retraction and linguavelar seal are adequate w/o premature spillage. No laryngeal penetration or aspiration is evidenced before the swallow.     Pharyngeal swallow is timely w/ adequate hyolaryngeal elevation and epiglottic inversion. Transient laryngeal penetration occurs during the swallow w/ thin liquids via cup and straw, clearing upon completion w/o significant residue. Pharyngeal contraction is adequate w/o significant residue. No laryngeal penetration or aspiration evidenced during or after the swallow.     Partial esophageal sweep reveals no obvious mucosal abnormalities. Motility appears adequate w/o retrograde flow noted to the upper 1/3.      Impression: Per this evaluation, Ms. Galvez presents w/ a trace-mild mastication weakness, wfl pharyngeal swallow w/o evidence of aspiration across this evaluation. Recommend continuing current least restrictive po diet. This evaluation is consistent w/ most recent clinical dysphagia assessment 1/4/21.     EDUCATION  The patient has been educated in the following areas:   Dysphagia (Swallowing Impairment).    SLP Recommendation and Plan  1. Mechanical soft diet, chopped meats, thin liquids.    2. Meds whole in puree/thins.   3. Upright and  centered for all po intake.  4. DANIEL precautions.  5. Oral care protocol.  SLP to sign off at this time.      D/w pt results and recommendations w/ verbal agreement.     Thank you for allowing me to participate in the care of your patient-  Ruth Mccray M.A., CCC-SLP      Time Calculation:       Therapy Charges for Today     Code Description Service Date Service Provider Modifiers Qty    [id number] HC ST MOTION FLUORO EVAL SWALLOW 6 1/7/2021 Ruth Mccray MA,CCC-SLP GN 1        Patient was not wearing a face mask during this therapy encounter. Therapist used appropriate personal protective equipment including mask, eye protection and gloves.  Mask used was standard procedure mask. Appropriate PPE was worn during the entire therapy session. The patient coughed during this evaluation. Therapist was within 6 feet for 15 minutes or more during the evaluation. Hand hygiene was completed before and after therapy session. Patient is not in enhanced droplet precautions.            Ruth Mccray MA,CCC-SLP  1/7/2021

## 2021-01-07 NOTE — PLAN OF CARE
Goal Outcome Evaluation:  Plan of Care Reviewed With: patient  Progress: improving   Pt continues with high blood glucose levels this shift, MD notified and new orders followed with [facility], no s/s of any distress, continues with ABX therapy, will continue with current plan of care

## 2021-01-07 NOTE — PROGRESS NOTES
Patient Identification:  Name:  Ashley Galvez  Age:  75 y.o.  Sex:  female  :  1945  MRN:  [id number]  Visit Number:  [id number]  Primary Care Provider:  Rony Callaway MD    Length of stay:  7    Subjective/Interval History/Consultants/Procedures     Chief complaint: Follow-up severe sepsis, bacteremia with Enterococcus, UTI with Pseudomonas and Proteus    HPI/Hospital course:    Mrs. Galvez is a 75-year-old  female with PMH significant for diabetes mellitus, lacunar infarct, essential hypertension, and unspecified anemia. She initially presented to the ED of [facility] on 20 with chief complaint of confusion.  She was reportedly previously at [facility] and discharged home wit pneumonia prior to her presentation. On admission, she encephalopathic and admitted to the PCU for further evaluation with KAY and Sepsis 2/2 UTI.     In the interim, one of two blood cultures was positive with ultimately growth of Enterococcus in one of two cultures (2020).  ID was consulted for further IV abx guidance with IV Vancomycin started.  Rocephin was initially continued given concern for underlying UTI.  Urine culture ultimately grew Proteus mirabilis and Pseudomonas aeruginosa.  IV cephalosporin coverage was transitioned to IV Cefepime for further treatment.      Metabolic encephalopathy improved during admission with CT demonstrating possible arachnoid cyst.  MRI was then performed and revealed CSF density process involving the right cerebellar hemisphere which is most probably intra-axial in location and features on the MRI compatible with sequela of remote infarct with extensive encephalomalacia changes.  There is also marked left mastoiditis-itis and a chronic appearing infarct in the right occipital lobe with cortical sclerosis and mild chronic appearing ethmoid sinusitis.    Given debility of nearly 2 years, physical and occupational therapies were consulted during  hospitalization.      There was some concern for abnormal EKG with ectopic atrial rhythm with frequent PACs vs. Chaotic atrial rhythm with cardiology consultationanam wallace.  It was recommend she undergo event monitoring at the time of discharge for around two weeks with further outpatient cardiology follow-up.  She later had runs of atrial in the AM of 1/6 with full dose anticoagulation started given concern of underlying arrhythmia as source of possible prior CVA.      Additionally, given worsening dyspnea with right-side wheezing, CT chest and  Neck were ordered on 1/6/21.  Methylprednisolone was started for short duration, but quickly discontinued given difficulty tolerating due to hyperglycemia.          Subjective:    Mrs. Galvez is resting comfortably in bed. She denies dyspnea.  She reports she is feeling somewhat better with improved.  She is about to go for CT scans for further evaluation of lungs and neck.  She denies chest pain.      Following scans, she is examined further with difficulty visualizing oropharynx with frequent gagging.     Discussed with AM MIGEL Armstrong with reports of hyperglycemia overnight as previously reviewed.  She was examined in room 306A with transport present in room during examination.    ----------------------------------------------------------------------------------------------------------------------  Current Hospital Meds:  amLODIPine, 5 mg, Oral, Q24H  apixaban, 5 mg, Oral, Q12H  aspirin, 81 mg, Oral, Daily  atorvastatin, 20 mg, Oral, Nightly  castor oil-balsam peru, , Topical, Q12H  castor oil-balsam peru, , Topical, Q12H  cefepime, 2 g, Intravenous, Q12H  insulin aspart, 0-14 Units, Subcutaneous, TID AC  insulin aspart, 8 Units, Subcutaneous, TID With Meals  insulin detemir, 10 Units, Subcutaneous, Daily  insulin detemir, 33 Units, Subcutaneous, Nightly  ipratropium-albuterol, 3 mL, Nebulization, 4x Daily - RT  lactobacillus acidophilus, 1 capsule, Oral,  Daily  lisinopril-hydroCHLOROthiazide (ZESTORTIC) 20-25 mg combo dose, , Oral, Q24H  metroNIDAZOLE, 500 mg, Intravenous, Q8H  pantoprazole, 40 mg, Oral, Daily  sodium chloride, 3 mL, Intravenous, Q12H  vancomycin, 1,000 mg, Intravenous, Q24H         ----------------------------------------------------------------------------------------------------------------------      Objective     Vital Signs:  Temp:  [97 °F (36.1 °C)-98.4 °F (36.9 °C)] 97.2 °F (36.2 °C)  Heart Rate:  [] 101  Resp:  [18-25] 18  BP: (130-159)/(59-88) 141/86      01/05/21  0400 01/06/21  0242 01/07/21  0300   Weight: 81.6 kg (180 lb) 79.8 kg (176 lb) 81.6 kg (179 lb 12.8 oz)     Body mass index is 31.85 kg/m².    Intake/Output Summary (Last 24 hours) at 1/7/2021 0835  Last data filed at 1/7/2021 0500  Gross per 24 hour   Intake 1596 ml   Output 1625 ml   Net -29 ml     No intake/output data recorded.  Diet Soft Texture; Chopped; Thin; Consistent Carbohydrate  ----------------------------------------------------------------------------------------------------------------------    Physical Exam  Vitals signs and nursing note reviewed.   Constitutional:       Appearance: She is well-developed and well-groomed. She is not ill-appearing.      Interventions: Nasal cannula in place.   HENT:      Head: Normocephalic and atraumatic.      Mouth/Throat:      Lips: Pink.      Mouth: Mucous membranes are moist.      Tongue: No lesions.      Pharynx: No pharyngeal swelling or posterior oropharyngeal erythema.      Comments: Difficulty visualizing tonsils  Eyes:      General: Lids are normal.      Conjunctiva/sclera:      Right eye: Right conjunctiva is not injected.      Left eye: Left conjunctiva is not injected.   Neck:      Musculoskeletal: Normal range of motion.      Thyroid: No thyroid mass.   Cardiovascular:      Rate and Rhythm: Normal rate and regular rhythm.      Heart sounds: S1 normal and S2 normal.   Pulmonary:      Effort: No tachypnea or  accessory muscle usage.      Breath sounds: No decreased air movement. Examination of the right-lower field reveals decreased breath sounds. Examination of the left-lower field reveals decreased breath sounds. Decreased breath sounds present. No wheezing.      Comments: Upper expiratory wheezing and tachypnea is improved on this date.    Abdominal:      General: Bowel sounds are normal.      Palpations: Abdomen is soft.      Tenderness: There is no abdominal tenderness.   Musculoskeletal:      Right lower leg: No edema.      Left lower leg: No edema.   Skin:     General: Skin is warm and dry.      Comments: Bilateral lower extremities wrapped.     Neurological:      Mental Status: She is alert.      Comments: Follows commands.  Alert to self, location, and birth date.  Flat during examination.     Psychiatric:         Attention and Perception: Attention normal.         Mood and Affect: Mood normal. Affect is flat.         Nasal cannula in place: Yes       ----------------------------------------------------------------------------------------------------------------------  Tele:                       ----------------------------------------------------------------------------------------------------------------------  Results from last 7 days   Lab Units 01/01/21 0347 12/31/20 2009   CK TOTAL U/L  --  92   TROPONIN T ng/mL 0.037* 0.052*   PROBNP pg/mL  --  1,580.0*     Results from last 7 days   Lab Units 01/07/21  0656 01/06/21  0102 01/05/21  0151  01/01/21 0347 12/31/20 2009   CRP mg/dL 5.32* 7.01* 4.69*  --  0.78* 0.75*   LACTATE mmol/L  --   --   --   --  0.9 0.9   WBC 10*3/mm3 8.74 9.41 10.03   < > 12.52* 11.73*   HEMOGLOBIN g/dL 8.9* 8.7* 8.8*   < > 11.3* 11.3*   HEMATOCRIT % 28.8* 29.9* 28.7*   < > 37.0 35.4   MCV fL 102.9* 107.6* 102.1*   < > 103.4* 100.6*   MCHC g/dL 30.9* 29.1* 30.7*   < > 30.5* 31.9   PLATELETS 10*3/mm3 299 274 240   < > 413 449   INR   --   --   --   --   --  1.08    < > = values in  this interval not displayed.     Results from last 7 days   Lab Units 01/05/21  2321   PH, ARTERIAL pH units 7.399   PO2 ART mm Hg 68.6*   PCO2, ARTERIAL mm Hg 33.8*   HCO3 ART mmol/L 20.9     Results from last 7 days   Lab Units 01/07/21  0656 01/06/21  0102 01/05/21  0939 01/05/21  0151  01/02/21  0135   SODIUM mmol/L 132* 130* 134* 133*   < > 151*   POTASSIUM mmol/L 4.6 4.6  --  4.0   < > 2.9*   MAGNESIUM mg/dL  --  3.1*  --  1.5*  --  1.8   CHLORIDE mmol/L 104 103  --  107   < > 122*   CO2 mmol/L 19.3* 17.3*  --  18.6*   < > 18.2*   BUN mg/dL 29* 22  --  27*   < > 39*   CREATININE mg/dL 1.02* 0.98  --  1.05*   < > 1.18*   EGFR IF NONAFRICN AM mL/min/1.73 53* 55*  --  51*   < > 45*   CALCIUM mg/dL 8.9 8.3*  --  7.7*   < > 8.9   GLUCOSE mg/dL 364* 377*  --  242*   < > 135*   ALBUMIN g/dL 2.64* 2.34*  --  2.35*   < > 3.10*   BILIRUBIN mg/dL <0.2 <0.2  --  0.2   < > 0.2   ALK PHOS U/L 67 71  --  65   < > 96   AST (SGOT) U/L 6 9  --  7   < > 12   ALT (SGPT) U/L 7 8  --  9   < > 9    < > = values in this interval not displayed.   Estimated Creatinine Clearance: 49 mL/min (A) (by C-G formula based on SCr of 1.02 mg/dL (H)).  No results found for: AMMONIA  Results from last 7 days   Lab Units 01/06/21  0102   CHOLESTEROL mg/dL 110   TRIGLYCERIDES mg/dL 144   HDL CHOL mg/dL 29*   LDL CHOL mg/dL 56     Blood Culture   Date Value Ref Range Status   01/02/2021 No growth at 4 days  Preliminary   01/02/2021 No growth at 4 days  Preliminary   12/31/2020 No growth at 5 days  Final   12/31/2020 Enterococcus faecalis (C)  Final     Comment:     Infectious disease consultation is highly recommended.     Urine Culture   Date Value Ref Range Status   12/31/2020 >100,000 CFU/mL Pseudomonas aeruginosa (A)  Final   12/31/2020 50,000 CFU/mL Proteus mirabilis (A)  Final     No results found for: WOUNDCX  No results found for:  STOOLCX  ----------------------------------------------------------------------------------------------------------------------  Imaging Results (Last 24 Hours)     Procedure Component Value Units Date/Time    CT Soft Tissue Neck Without Contrast [[id number]] Resulted: 01/07/21 0831     Updated: 01/07/21 0831    CT CHEST WITHOUT CONTRAST DIAGNOSTIC [[id number]] Resulted: 01/07/21 0831     Updated: 01/07/21 0831          Microbiology Results (last 10 days)     Procedure Component Value - Date/Time    Respiratory Panel, PCR (WITHOUT COVID) - Swab, Nasopharynx [[id number]]  (Normal) Collected: 01/06/21 1433    Lab Status: Final result Specimen: Swab from Nasopharynx Updated: 01/06/21 1601     ADENOVIRUS, PCR Not Detected     Coronavirus 229E Not Detected     Coronavirus HKU1 Not Detected     Coronavirus NL63 Not Detected     Coronavirus OC43 Not Detected     Human Metapneumovirus Not Detected     Human Rhinovirus/Enterovirus Not Detected     Influenza B PCR Not Detected     Parainfluenza Virus 1 Not Detected     Parainfluenza Virus 2 Not Detected     Parainfluenza Virus 3 Not Detected     Parainfluenza Virus 4 Not Detected     Bordetella pertussis pcr Not Detected     Chlamydophila pneumoniae PCR Not Detected     Mycoplasma pneumo by PCR Not Detected     Influenza A PCR Not Detected     RSV, PCR Not Detected    Narrative:      The coronavirus on the RVP is NOT COVID-19 and is NOT indicative of infection with COVID-19.     Blood Culture - Blood, Arm, Left [[id number]] Collected: 01/02/21 0814    Lab Status: Preliminary result Specimen: Blood from Arm, Left Updated: 01/06/21 0845     Blood Culture No growth at 4 days    Blood Culture - Blood, Hand, Left [[id number]] Collected: 01/02/21 0814    Lab Status: Preliminary result Specimen: Blood from Hand, Left Updated: 01/06/21 0845     Blood Culture No growth at 4 days    COVID-19 and FLU A/B PCR - Swab, Nasopharynx [[id number]]  (Normal) Collected: 12/31/20 7104    Lab Status:  Final result Specimen: Swab from Nasopharynx Updated: 12/31/20 2348     COVID19 Not Detected     Influenza A PCR Not Detected     Influenza B PCR Not Detected    Narrative:      Fact sheet for providers: https://www.fda.gov/media/596788/download    Fact sheet for patients: https://www.fda.gov/media/933865/download    Test performed by PCR.    Urine Culture - Urine, Urine, Catheter [[id number]]  (Abnormal)  (Susceptibility) Collected: 12/31/20 2011    Lab Status: Final result Specimen: Urine, Catheter Updated: 01/03/21 1152     Urine Culture >100,000 CFU/mL Pseudomonas aeruginosa      50,000 CFU/mL Proteus mirabilis    Susceptibility      Pseudomonas aeruginosa     RAVEN     Cefepime Susceptible     Ceftazidime Susceptible     Ciprofloxacin Susceptible     Gentamicin Susceptible     Levofloxacin Susceptible     Piperacillin + Tazobactam Susceptible                Susceptibility      Proteus mirabilis     RAVEN     Ampicillin Susceptible     Ampicillin + Sulbactam Susceptible     Cefazolin Susceptible     Cefepime Susceptible     Ceftazidime Susceptible     Ceftriaxone Susceptible     Gentamicin Susceptible     Levofloxacin Resistant     Nitrofurantoin Resistant     Piperacillin + Tazobactam Susceptible     Tetracycline Resistant     Trimethoprim + Sulfamethoxazole Susceptible                    Blood Culture - Blood, Arm, Right [[id number]] Collected: 12/31/20 2009    Lab Status: Final result Specimen: Blood from Arm, Right Updated: 01/05/21 2030     Blood Culture No growth at 5 days    Blood Culture - Blood, Arm, Left [[id number]]  (Abnormal)  (Susceptibility) Collected: 12/31/20 2009    Lab Status: Final result Specimen: Blood from Arm, Left Updated: 01/03/21 0639     Blood Culture Enterococcus faecalis     Comment: Infectious disease consultation is highly recommended.        Isolated from Aerobic Bottle     Gram Stain Aerobic Bottle Gram positive cocci in pairs and chains    Susceptibility      Enterococcus faecalis      RAVEN     Ampicillin Susceptible     Gentamicin High Level Synergy Susceptible     Vancomycin Susceptible                    Blood Culture ID, PCR - Blood, Arm, Left [[id number]]  (Abnormal) Collected: 12/31/20 2009    Lab Status: Final result Specimen: Blood from Arm, Left Updated: 01/01/21 1411     BCID, PCR Enterococcus spp. Identification by BCID PCR.     BOTTLE TYPE Aerobic Bottle          ----------------------------------------------------------------------------------------------------------------------   I have reviewed the above laboratory values for 01/07/21    Assessment/Plan     Active Hospital Problems    Diagnosis POA   • **Sepsis (CMS/[facility]) [A41.9] Yes         ASSESSMENT/PLAN:  · Severe sepsis, present on admission,  With RR>20, HR>90, elevated C-RP, UTI, bacteremia, and metabolic encephalopathy:  -Blood and urine cultures reviewed.   -Continue vital signs per floor protocol.   -Continue monitoring daily CBC & temperature curve.   -Continue IV Vancomycin & IV cefepime as outlined.   -ID input appreciated.   -IV flagyl added on 1/6 given possible aspiration.   -CT chest ordered with atelectasis from large hiatal hernia.     · Enterococcus bacteremia:  -Continue IV Vancomycin.  -12/31 blood cultures reviewed (1/2 + as outlined).  -1/2/2021 blood cultures reviewed (unremarkable).    -Continue daily CBC & C-RP.   -C-RP is improving.     · Proteus/Pseudomonas UTI:   -Urine culture reviewed.   -Continue IV Cefepime.   -C-RP is rising.    · 5.6mm calficified structure vs. Foreign Body:  -Called Outpatient clinic with no ENT coverage until 1/12/2021  -SLP consult placed for possible FEES    · Worsening tachypnea overnight with question of pneumonia:   -CXR without acute infiltrate and with possible left pleural effusion.  -IV Flagyl added on 1/6 per ID with input appreciated.   -ABG with some hyperventilation.   -Continue HCTZ for now.   -CT chest/CT neck ordered and pending.   -Respiratory PCR unremarkable.       · Runs of atrial flutter:  -[id number] at least 6 with prior CVA, CHF, DM, female, age, HTN.   -Eliquis started per Cardiology.  -Continue cardiac monitoring.   -Given review of telemetry over the past 24 hours with intermittent tachycardia and some runs of PSVT vs. Atrial flutter, low dose Metoprolol 25mg BID has been ordered for further monitoring with hold parameters for hypotension and bradycardia.    -Cardiology recommending ruling out underlying PE. Given KAY on admission, will discuss further modality studies with attending physician prior to ordering.       · Uncontrolled hypertension:  -Continue Lisinpril/HCTZ.  -Restart home Norvasc at lower dose of 5mg qd.on 1/6 with improvement.   -Metoprolol 25mg BID added to assist with rate control, but should also impact blood pressure to some degree.     -Monitor blood pressures per floor protocol.     · Hyperglycemia in the setting of DM 2:   -Hemoglobin a1c 7.90.   -FSBG ACHS with SSI PRN.  -Glucoses worsened on 1/6 after doses of methylprednisolone with steroid ultimately being discontinued on 1/6.    -Anion gap acceptable in spite of intermittently surging glucoses.    -Levemir increased to 33u qhs  -Levemir 10u added to AM.   -Aspart with meals increased to 8u.   -Discussed adjustments with AM RN.   -Consistent carb modifier added to diet.    · Acute on chronic weakness/debility:  -Continue PT/OT.  -SLP evaluation performed.       · CVA by MRI:   -Continue ASA and statin.   -MRI reviewed.   -US carotid doppler with moderate stenosis in the left carotid system.  -Plan for outpatient event monitoring with outpatient Cardiology follow-up.   -Lipid panel reviewed.     · Grade II diastolic dysfunction:   -Echocardiogram reviewed from 1/1/2021.   -CXR with bibasilar atelectasis and small left pleural effusion.   -ABG reviewed from 1/5/21 @ 2321 with some hyperventilation.   -CT chest report reviewed.      · Pseudohyponatrmia with sodium correcting to 137.3:    -Treat glucose as outlined.   -Monitor closely on home HCTZ.    -BMP reviewed.     · 7mm Noncalcified nodule in RLL:  -CT chest follow-up recommend in 6 months.      · Left adrenal nodule:   -Further outpatient follow-up recommended with adrenal CT protocol or MRI on nonemergent basis recommend.     · KAY vs. KAY on CKD IIIb on admission:   -Creatinine improved from admission.   -Home ACEI/HCTZ restarted on 1/2/21.     · Metabolic encephalopathy, improved:  -CT head report reviewed.   -MRI report reviewed.   -Mentation improved from time of admission.     · Chronic macrocytic anemia:  -TSH acceptable on admission.  -Folate acceptable but borderline low.    -Vitamin b12 acceptable.    -Daily CBC reviewed.   -No known overt blood loss.     -----------  -DVT prophylaxis: Eliquis to serve  -GI prophylaxis: PPI/Lactobacillus  -Activity: Up with assist  -Disposition: Remains hospitalized for further IV abx and tehrapies  -Anticipated dispo needs:  Event monitor for 2 weeks.  Outpatient Cards follow-up. Outpatient CT chest follow-up in 6 months. Outpatienet CT adrenal protocol or MRI. SS on board as spouse is now interested in possible rehab.   -Diet:   Dietary Orders (From admission, onward)     Start     Ordered    01/06/21 0929  Diet Soft Texture; Chopped; Thin; Consistent Carbohydrate  Diet Effective Now     Question Answer Comment   Diet Texture / Consistency Soft Texture    Select Texture: Chopped    Fluid Consistency Thin    Common Modifiers Consistent Carbohydrate        01/06/21 0929    01/01/21 1800  Dietary Nutrition Supplements Boost Plus (Ensure Enlive, Ensure Plus)  Daily With Breakfast, Lunch & Dinner     Question:  Select Supplement  Answer:  Boost Plus (Ensure Enlive, Ensure Plus)    01/01/21 1347                    The patient is high risk due to the following diagnoses/reasons:  Severe sepsis, bacteremia, UTI, recent pneumonia     Gale Ozuna PA-C  01/07/21  08:35 EST  Pager #  [phone]

## 2021-01-08 ENCOUNTER — APPOINTMENT (OUTPATIENT)
Dept: ULTRASOUND IMAGING | Facility: HOSPITAL | Age: 76
End: 2021-01-08

## 2021-01-08 ENCOUNTER — APPOINTMENT (OUTPATIENT)
Dept: CT IMAGING | Facility: HOSPITAL | Age: 76
End: 2021-01-08

## 2021-01-08 LAB
A-A DO2: 57.9 MMHG (ref 0–300)
ANION GAP SERPL CALCULATED.3IONS-SCNC: 11.8 MMOL/L (ref 5–15)
ARTERIAL PATENCY WRIST A: POSITIVE
ATMOSPHERIC PRESS: 725 MMHG
BASE EXCESS BLDA CALC-SCNC: 0.8 MMOL/L (ref 0–2)
BASOPHILS # BLD AUTO: 0.03 10*3/MM3 (ref 0–0.2)
BASOPHILS NFR BLD AUTO: 0.2 % (ref 0–1.5)
BDY SITE: ABNORMAL
BODY TEMPERATURE: 0 C
BUN SERPL-MCNC: 51 MG/DL (ref 8–23)
BUN/CREAT SERPL: 42.9 (ref 7–25)
CALCIUM SPEC-SCNC: 9.5 MG/DL (ref 8.6–10.5)
CHLORIDE SERPL-SCNC: 105 MMOL/L (ref 98–107)
CO2 BLDA-SCNC: 26.9 MMOL/L (ref 22–33)
CO2 SERPL-SCNC: 17.2 MMOL/L (ref 22–29)
COHGB MFR BLD: 1.1 % (ref 0–5)
CREAT SERPL-MCNC: 1.19 MG/DL (ref 0.57–1)
CRP SERPL-MCNC: 2.94 MG/DL (ref 0–0.5)
DEPRECATED RDW RBC AUTO: 57.2 FL (ref 37–54)
EOSINOPHIL # BLD AUTO: 0.06 10*3/MM3 (ref 0–0.4)
EOSINOPHIL NFR BLD AUTO: 0.5 % (ref 0.3–6.2)
ERYTHROCYTE [DISTWIDTH] IN BLOOD BY AUTOMATED COUNT: 14.8 % (ref 12.3–15.4)
GFR SERPL CREATININE-BSD FRML MDRD: 44 ML/MIN/1.73
GLUCOSE BLDC GLUCOMTR-MCNC: 105 MG/DL (ref 70–130)
GLUCOSE BLDC GLUCOMTR-MCNC: 117 MG/DL (ref 70–130)
GLUCOSE BLDC GLUCOMTR-MCNC: 136 MG/DL (ref 70–130)
GLUCOSE BLDC GLUCOMTR-MCNC: 156 MG/DL (ref 70–130)
GLUCOSE BLDC GLUCOMTR-MCNC: 195 MG/DL (ref 70–130)
GLUCOSE BLDC GLUCOMTR-MCNC: 219 MG/DL (ref 70–130)
GLUCOSE BLDC GLUCOMTR-MCNC: 315 MG/DL (ref 70–130)
GLUCOSE BLDC GLUCOMTR-MCNC: 359 MG/DL (ref 70–130)
GLUCOSE BLDC GLUCOMTR-MCNC: 91 MG/DL (ref 70–130)
GLUCOSE SERPL-MCNC: 223 MG/DL (ref 65–99)
HCO3 BLDA-SCNC: 25.6 MMOL/L (ref 20–26)
HCT VFR BLD AUTO: 32.9 % (ref 34–46.6)
HCT VFR BLD CALC: 30 % (ref 38–51)
HGB BLD-MCNC: 9.7 G/DL (ref 12–15.9)
HGB BLDA-MCNC: 9.8 G/DL (ref 13.5–17.5)
IMM GRANULOCYTES # BLD AUTO: 0.06 10*3/MM3 (ref 0–0.05)
IMM GRANULOCYTES NFR BLD AUTO: 0.5 % (ref 0–0.5)
INHALED O2 CONCENTRATION: 28 %
LYMPHOCYTES # BLD AUTO: 1.53 10*3/MM3 (ref 0.7–3.1)
LYMPHOCYTES NFR BLD AUTO: 12.3 % (ref 19.6–45.3)
Lab: ABNORMAL
MCH RBC QN AUTO: 31.1 PG (ref 26.6–33)
MCHC RBC AUTO-ENTMCNC: 29.5 G/DL (ref 31.5–35.7)
MCV RBC AUTO: 105.4 FL (ref 79–97)
METHGB BLD QL: <1 % (ref 0–3)
MODALITY: ABNORMAL
MONOCYTES # BLD AUTO: 1.04 10*3/MM3 (ref 0.1–0.9)
MONOCYTES NFR BLD AUTO: 8.4 % (ref 5–12)
NEUTROPHILS NFR BLD AUTO: 78.1 % (ref 42.7–76)
NEUTROPHILS NFR BLD AUTO: 9.71 10*3/MM3 (ref 1.7–7)
NOTE: ABNORMAL
NRBC BLD AUTO-RTO: 0 /100 WBC (ref 0–0.2)
OXYHGB MFR BLDV: 95.9 % (ref 94–99)
PCO2 BLDA: 41 MM HG (ref 35–45)
PCO2 TEMP ADJ BLD: ABNORMAL MM[HG]
PH BLDA: 7.4 PH UNITS (ref 7.35–7.45)
PH, TEMP CORRECTED: ABNORMAL
PLATELET # BLD AUTO: 324 10*3/MM3 (ref 140–450)
PMV BLD AUTO: 10.4 FL (ref 6–12)
PO2 BLDA: 86.1 MM HG (ref 83–108)
PO2 TEMP ADJ BLD: ABNORMAL MM[HG]
POTASSIUM SERPL-SCNC: 4.8 MMOL/L (ref 3.5–5.2)
RBC # BLD AUTO: 3.12 10*6/MM3 (ref 3.77–5.28)
SAO2 % BLDCOA: 97.3 % (ref 94–99)
SODIUM SERPL-SCNC: 134 MMOL/L (ref 136–145)
VENTILATOR MODE: ABNORMAL
WBC # BLD AUTO: 12.43 10*3/MM3 (ref 3.4–10.8)

## 2021-01-08 PROCEDURE — 82805 BLOOD GASES W/O2 SATURATION: CPT

## 2021-01-08 PROCEDURE — 83050 HGB METHEMOGLOBIN QUAN: CPT

## 2021-01-08 PROCEDURE — 97535 SELF CARE MNGMENT TRAINING: CPT

## 2021-01-08 PROCEDURE — 25010000002 CEFEPIME PER 500 MG: Performed by: NURSE PRACTITIONER

## 2021-01-08 PROCEDURE — 94799 UNLISTED PULMONARY SVC/PX: CPT

## 2021-01-08 PROCEDURE — 25010000002 VANCOMYCIN: Performed by: INTERNAL MEDICINE

## 2021-01-08 PROCEDURE — 82375 ASSAY CARBOXYHB QUANT: CPT

## 2021-01-08 PROCEDURE — 80048 BASIC METABOLIC PNL TOTAL CA: CPT | Performed by: PHYSICIAN ASSISTANT

## 2021-01-08 PROCEDURE — 99231 SBSQ HOSP IP/OBS SF/LOW 25: CPT | Performed by: INTERNAL MEDICINE

## 2021-01-08 PROCEDURE — 63710000001 INSULIN ASPART PER 5 UNITS: Performed by: INTERNAL MEDICINE

## 2021-01-08 PROCEDURE — 99233 SBSQ HOSP IP/OBS HIGH 50: CPT | Performed by: PHYSICIAN ASSISTANT

## 2021-01-08 PROCEDURE — 82962 GLUCOSE BLOOD TEST: CPT

## 2021-01-08 PROCEDURE — 93970 EXTREMITY STUDY: CPT

## 2021-01-08 PROCEDURE — 85025 COMPLETE CBC W/AUTO DIFF WBC: CPT | Performed by: PHYSICIAN ASSISTANT

## 2021-01-08 PROCEDURE — 86140 C-REACTIVE PROTEIN: CPT | Performed by: NURSE PRACTITIONER

## 2021-01-08 PROCEDURE — 97530 THERAPEUTIC ACTIVITIES: CPT

## 2021-01-08 PROCEDURE — 63710000001 INSULIN DETEMIR PER 5 UNITS: Performed by: PHYSICIAN ASSISTANT

## 2021-01-08 PROCEDURE — 63710000001 INSULIN ASPART PER 5 UNITS: Performed by: PHYSICIAN ASSISTANT

## 2021-01-08 PROCEDURE — 93970 EXTREMITY STUDY: CPT | Performed by: RADIOLOGY

## 2021-01-08 PROCEDURE — 36600 WITHDRAWAL OF ARTERIAL BLOOD: CPT

## 2021-01-08 RX ORDER — METOPROLOL TARTRATE 50 MG/1
50 TABLET, FILM COATED ORAL EVERY 12 HOURS SCHEDULED
Status: DISCONTINUED | OUTPATIENT
Start: 2021-01-08 | End: 2021-01-10

## 2021-01-08 RX ORDER — LISINOPRIL 10 MG/1
20 TABLET ORAL
Status: DISCONTINUED | OUTPATIENT
Start: 2021-01-09 | End: 2021-01-11

## 2021-01-08 RX ADMIN — VANCOMYCIN HYDROCHLORIDE 750 MG: 1 INJECTION, POWDER, LYOPHILIZED, FOR SOLUTION INTRAVENOUS at 09:51

## 2021-01-08 RX ADMIN — ATORVASTATIN CALCIUM 20 MG: 20 TABLET, FILM COATED ORAL at 20:19

## 2021-01-08 RX ADMIN — APIXABAN 5 MG: 5 TABLET, FILM COATED ORAL at 08:43

## 2021-01-08 RX ADMIN — PANTOPRAZOLE SODIUM 40 MG: 40 TABLET, DELAYED RELEASE ORAL at 08:44

## 2021-01-08 RX ADMIN — IPRATROPIUM BROMIDE AND ALBUTEROL SULFATE 3 ML: .5; 3 SOLUTION RESPIRATORY (INHALATION) at 13:02

## 2021-01-08 RX ADMIN — INSULIN ASPART 3 UNITS: 100 INJECTION, SOLUTION INTRAVENOUS; SUBCUTANEOUS at 08:45

## 2021-01-08 RX ADMIN — INSULIN ASPART 0 UNITS: 100 INJECTION, SOLUTION INTRAVENOUS; SUBCUTANEOUS at 11:28

## 2021-01-08 RX ADMIN — CEFEPIME 2 G: 2 INJECTION, POWDER, FOR SOLUTION INTRAVENOUS at 20:19

## 2021-01-08 RX ADMIN — LISINOPRIL: 10 TABLET ORAL at 08:43

## 2021-01-08 RX ADMIN — IPRATROPIUM BROMIDE AND ALBUTEROL SULFATE 3 ML: .5; 3 SOLUTION RESPIRATORY (INHALATION) at 00:29

## 2021-01-08 RX ADMIN — CASTOR OIL AND BALSAM, PERU: 788; 87 OINTMENT TOPICAL at 08:46

## 2021-01-08 RX ADMIN — METOPROLOL TARTRATE 50 MG: 50 TABLET, FILM COATED ORAL at 20:19

## 2021-01-08 RX ADMIN — CASTOR OIL AND BALSAM, PERU: 788; 87 OINTMENT TOPICAL at 08:44

## 2021-01-08 RX ADMIN — INSULIN ASPART 8 UNITS: 100 INJECTION, SOLUTION INTRAVENOUS; SUBCUTANEOUS at 08:45

## 2021-01-08 RX ADMIN — INSULIN DETEMIR 15 UNITS: 100 INJECTION, SOLUTION SUBCUTANEOUS at 23:10

## 2021-01-08 RX ADMIN — IPRATROPIUM BROMIDE AND ALBUTEROL SULFATE 3 ML: .5; 3 SOLUTION RESPIRATORY (INHALATION) at 18:33

## 2021-01-08 RX ADMIN — DEXTROSE MONOHYDRATE 25 G: 500 INJECTION PARENTERAL at 14:53

## 2021-01-08 RX ADMIN — Medication 1 CAPSULE: at 08:44

## 2021-01-08 RX ADMIN — ASPIRIN 81 MG: 81 TABLET, COATED ORAL at 08:44

## 2021-01-08 RX ADMIN — SODIUM CHLORIDE, PRESERVATIVE FREE 3 ML: 5 INJECTION INTRAVENOUS at 08:43

## 2021-01-08 RX ADMIN — AMLODIPINE BESYLATE 5 MG: 5 TABLET ORAL at 08:43

## 2021-01-08 RX ADMIN — METOPROLOL TARTRATE 25 MG: 25 TABLET, FILM COATED ORAL at 08:44

## 2021-01-08 RX ADMIN — CEFEPIME 2 G: 2 INJECTION, POWDER, FOR SOLUTION INTRAVENOUS at 08:43

## 2021-01-08 RX ADMIN — IPRATROPIUM BROMIDE AND ALBUTEROL SULFATE 3 ML: .5; 3 SOLUTION RESPIRATORY (INHALATION) at 06:36

## 2021-01-08 RX ADMIN — INSULIN DETEMIR 10 UNITS: 100 INJECTION, SOLUTION SUBCUTANEOUS at 08:43

## 2021-01-08 RX ADMIN — INSULIN ASPART 10 UNITS: 100 INJECTION, SOLUTION INTRAVENOUS; SUBCUTANEOUS at 11:29

## 2021-01-08 RX ADMIN — CASTOR OIL AND BALSAM, PERU: 788; 87 OINTMENT TOPICAL at 20:19

## 2021-01-08 RX ADMIN — APIXABAN 5 MG: 5 TABLET, FILM COATED ORAL at 20:19

## 2021-01-08 NOTE — PROGRESS NOTES
continues on day 9 of vancomycin 750 mg IV q24h for her bacteremia.  A 23 hour post infusion level was reported as 25.2 mg/L.  The AUC is calculated at 702 with a trough level of 24.3 on this regimen. This is more than desired. Will decrease the dosage to 750 mg IV q24h to target an AUC of 400-600 mg/L.hr.  Will obtain a repeat level in a few days to guide further dosing.

## 2021-01-08 NOTE — PROGRESS NOTES
Patient Identification:  Name:  Ashley Galvez  Age:  75 y.o.  Sex:  female  :  1945  MRN:  [id number]  Visit Number:  [id number]  Primary Care Provider:  Rony Callaway MD    Length of stay:  8    Subjective/Interval History/Consultants/Procedures     Chief complaint: Leukocytosis; Follow-up severe sepsis, bacteremia with Enterococcus, UTI with Pseudomonas and Proteus    HPI/Hospital course:    Mrs. Galvez is a 75-year-old  female with PMH significant for diabetes mellitus, lacunar infarct, essential hypertension, and unspecified anemia. She initially presented to the ED of [facility] on 20 with chief complaint of confusion.  She was reportedly previously at [facility] and discharged home wit pneumonia prior to her presentation. On admission, she encephalopathic and admitted to the PCU for further evaluation with KAY and Sepsis 2/2 UTI.     In the interim, one of two blood cultures was positive with ultimately growth of Enterococcus in one of two cultures (2020).  ID was consulted for further IV abx guidance with IV Vancomycin started.  Rocephin was initially continued given concern for underlying UTI.  Urine culture ultimately grew Proteus mirabilis and Pseudomonas aeruginosa.  IV cephalosporin coverage was transitioned to IV Cefepime for further treatment.      Metabolic encephalopathy improved during admission with CT demonstrating possible arachnoid cyst.  MRI was then performed and revealed CSF density process involving the right cerebellar hemisphere which is most probably intra-axial in location and features on the MRI compatible with sequela of remote infarct with extensive encephalomalacia changes.  There is also marked left mastoiditis-itis and a chronic appearing infarct in the right occipital lobe with cortical sclerosis and mild chronic appearing ethmoid sinusitis.    Given debility of nearly 2 years, physical and occupational therapies were consulted during  hospitalization.      There was some concern for abnormal EKG with ectopic atrial rhythm with frequent PACs vs. Chaotic atrial rhythm with cardiology consultationanam wallace.  It was recommend she undergo event monitoring at the time of discharge for around two weeks with further outpatient cardiology follow-up.  She later had runs of atrial in the AM of 1/6 with full dose anticoagulation started given concern of underlying arrhythmia as source of possible prior CVA.      Additionally, given worsening dyspnea with right-side wheezing, CT chest and  Neck were ordered on 1/6/21.  Methylprednisolone was started for short duration, but quickly discontinued given difficulty tolerating due to hyperglycemia.  CT neck revealed calcification vs. Possible foreign body.      VQ scan and venous dopplers were ordered on 1/7 to rule out PE and US venous doppler. VQ revealed low probability and US doppler without evidence of known DVT.      Subjective:    Mrs. Galvez is resting in bed. She reports she is tired this morning. She is fatigued and has recently been readjusted by staff in the bed.      Discussed with AM MIGEL Mackay without known acute events overnight.      ----------------------------------------------------------------------------------------------------------------------  Current Hospital Meds:  amLODIPine, 5 mg, Oral, Q24H  apixaban, 5 mg, Oral, Q12H  aspirin, 81 mg, Oral, Daily  atorvastatin, 20 mg, Oral, Nightly  castor oil-balsam peru, , Topical, Q12H  castor oil-balsam peru, , Topical, Q12H  cefepime, 2 g, Intravenous, Q12H  insulin aspart, 0-14 Units, Subcutaneous, TID AC  insulin aspart, 10 Units, Subcutaneous, TID With Meals  insulin detemir, 10 Units, Subcutaneous, Daily  insulin detemir, 35 Units, Subcutaneous, Nightly  ipratropium-albuterol, 3 mL, Nebulization, 4x Daily - RT  lactobacillus acidophilus, 1 capsule, Oral, Daily  lisinopril, 20 mg, Oral, Q24H  metoprolol tartrate, 25 mg, Oral, Q12H  pantoprazole, 40  mg, Oral, Daily  sodium chloride, 3 mL, Intravenous, Q12H  vancomycin, 750 mg, Intravenous, Q24H         ----------------------------------------------------------------------------------------------------------------------      Objective     Vital Signs:  Temp:  [97.1 °F (36.2 °C)-98.1 °F (36.7 °C)] 98.1 °F (36.7 °C)  Heart Rate:  [] 86  Resp:  [18-21] 18  BP: (130-161)/(70-85) 130/78      01/06/21  0242 01/07/21  0300 01/08/21  0300   Weight: 79.8 kg (176 lb) 81.6 kg (179 lb 12.8 oz) 80.4 kg (177 lb 3.2 oz)     Body mass index is 31.39 kg/m².    Intake/Output Summary (Last 24 hours) at 1/8/2021 0924  Last data filed at 1/8/2021 0900  Gross per 24 hour   Intake 900 ml   Output 2450 ml   Net -1550 ml     I/O this shift:  In: 60 [P.O.:60]  Out: -   Diet Soft Texture; Chopped; Thin; Consistent Carbohydrate  ----------------------------------------------------------------------------------------------------------------------    Physical Exam  Vitals signs and nursing note reviewed.   Constitutional:       Appearance: She is well-developed and well-groomed. She is not ill-appearing.      Interventions: Nasal cannula in place.   HENT:      Head: Normocephalic and atraumatic.      Mouth/Throat:      Lips: Pink.      Mouth: Mucous membranes are moist.      Dentition: Abnormal dentition. Dental caries present.      Tongue: No lesions.      Pharynx: No pharyngeal swelling or posterior oropharyngeal erythema.      Comments: Difficulty visualizing tonsils  Eyes:      General: Lids are normal.      Conjunctiva/sclera:      Right eye: Right conjunctiva is not injected.      Left eye: Left conjunctiva is not injected.   Neck:      Musculoskeletal: Normal range of motion.      Thyroid: No thyroid mass.   Cardiovascular:      Rate and Rhythm: Normal rate and regular rhythm.      Heart sounds: S1 normal and S2 normal.   Pulmonary:      Effort: No tachypnea, bradypnea, accessory muscle usage, prolonged expiration, respiratory  distress or retractions.      Breath sounds: No decreased air movement. Examination of the right-lower field reveals decreased breath sounds. Examination of the left-lower field reveals decreased breath sounds. Decreased breath sounds present. No wheezing, rhonchi or rales.   Abdominal:      General: Bowel sounds are normal.      Palpations: Abdomen is soft.      Tenderness: There is no abdominal tenderness.   Musculoskeletal:      Right lower leg: No edema.      Left lower leg: No edema.   Skin:     General: Skin is warm and dry.      Comments: Bilateral lower extremities wrapped.     Neurological:      Mental Status: She is alert.      Comments: Follows commands.  Alert to self, location, and birth date.  Flat during examination.     Psychiatric:         Attention and Perception: Attention normal.         Mood and Affect: Mood normal. Affect is flat.           Nasal cannula in place: Yes       ----------------------------------------------------------------------------------------------------------------------  Tele:                       ----------------------------------------------------------------------------------------------------------------------  Results from last 7 days   Lab Units 01/07/21  1222 01/07/21  1124 01/07/21  0656   TROPONIN T ng/mL 0.033* 0.035*  --    PROBNP pg/mL  --   --  2,050.0*     Results from last 7 days   Lab Units 01/08/21  0012 01/07/21  0656 01/06/21  0102   CRP mg/dL 2.94* 5.32* 7.01*   WBC 10*3/mm3 12.43* 8.74 9.41   HEMOGLOBIN g/dL 9.7* 8.9* 8.7*   HEMATOCRIT % 32.9* 28.8* 29.9*   MCV fL 105.4* 102.9* 107.6*   MCHC g/dL 29.5* 30.9* 29.1*   PLATELETS 10*3/mm3 324 299 274     Results from last 7 days   Lab Units 01/05/21  2321   PH, ARTERIAL pH units 7.399   PO2 ART mm Hg 68.6*   PCO2, ARTERIAL mm Hg 33.8*   HCO3 ART mmol/L 20.9     Results from last 7 days   Lab Units 01/08/21  0012 01/07/21  0656 01/06/21  0102  01/05/21  0151  01/02/21  0135   SODIUM mmol/L 134* 132* 130*    < > 133*   < > 151*   POTASSIUM mmol/L 4.8 4.6 4.6  --  4.0   < > 2.9*   MAGNESIUM mg/dL  --   --  3.1*  --  1.5*  --  1.8   CHLORIDE mmol/L 105 104 103  --  107   < > 122*   CO2 mmol/L 17.2* 19.3* 17.3*  --  18.6*   < > 18.2*   BUN mg/dL 51* 29* 22  --  27*   < > 39*   CREATININE mg/dL 1.19* 1.02* 0.98  --  1.05*   < > 1.18*   EGFR IF NONAFRICN AM mL/min/1.73 44* 53* 55*  --  51*   < > 45*   CALCIUM mg/dL 9.5 8.9 8.3*  --  7.7*   < > 8.9   GLUCOSE mg/dL 223* 364* 377*  --  242*   < > 135*   ALBUMIN g/dL  --  2.64* 2.34*  --  2.35*   < > 3.10*   BILIRUBIN mg/dL  --  <0.2 <0.2  --  0.2   < > 0.2   ALK PHOS U/L  --  67 71  --  65   < > 96   AST (SGOT) U/L  --  6 9  --  7   < > 12   ALT (SGPT) U/L  --  7 8  --  9   < > 9    < > = values in this interval not displayed.   Estimated Creatinine Clearance: 41.6 mL/min (A) (by C-G formula based on SCr of 1.19 mg/dL (H)).  No results found for: AMMONIA  Results from last 7 days   Lab Units 01/06/21  0102   CHOLESTEROL mg/dL 110   TRIGLYCERIDES mg/dL 144   HDL CHOL mg/dL 29*   LDL CHOL mg/dL 56     Blood Culture   Date Value Ref Range Status   01/02/2021 No growth at 4 days  Preliminary   01/02/2021 No growth at 4 days  Preliminary   12/31/2020 No growth at 5 days  Final   12/31/2020 Enterococcus faecalis (C)  Final     Comment:     Infectious disease consultation is highly recommended.     Urine Culture   Date Value Ref Range Status   12/31/2020 >100,000 CFU/mL Pseudomonas aeruginosa (A)  Final   12/31/2020 50,000 CFU/mL Proteus mirabilis (A)  Final     No results found for: WOUNDCX  No results found for: STOOLCX  ----------------------------------------------------------------------------------------------------------------------  Imaging Results (Last 24 Hours)     Procedure Component Value Units Date/Time    US Venous Doppler Lower Extremity Bilateral (duplex) [[id number]] Collected: 01/08/21 0850     Updated: 01/08/21 0911    Narrative:      US VENOUS DOPPLER LOWER  EXTREMITY BILATERAL (DUPLEX)-     CLINICAL INDICATION: Rule out DVT; N17.9-Acute kidney failure,  unspecified        COMPARISON: None available.      TECHNIQUE: Color Doppler imaging was used with compression and  augmentation to evaluate the lower extremity deep venous system.     FINDINGS:   There is patent spontaneous flow from the common femoral vein through  the posterior tibial veins. With the exception of the right posterior  tibial vein which was unable to be imaged.  There was no internal clot or area of noncompressibility.  Normal augmentation was elicited where applicable.       Impression:      No DVT in the lower extremities on today's exam.      This report was finalized on 1/8/2021 9:08 AM by Dr. Serafin Yip MD.       NM Lung Scan Perfusion Particulate [[id number]] Collected: 01/07/21 1724     Updated: 01/07/21 1726    Narrative:      EXAMINATION: NM LUNG SCAN PERFUSION PARTICULATE-      CLINICAL INDICATION: SOB and Pain      TECHNIQUE:  4.4 mCi of Tc-99m MAA were administered IV for a perfusion  lung scan. Ventilation could not be performed.     COMPARISON: Chest CT performed on same day.     FINDINGS: Multiple perfusion images demonstrate a relatively homogeneous  pattern of pulmonary capillary tracer distribution throughout the lungs.   Specifically, there are no segmental or sub-segmental defects to  suggest pulmonary embolism.  Attenuation defect left lung base  corresponds to large hiatal hernia on CT.       Impression:      Low suspicion of PE.      This report was finalized on 1/7/2021 5:24 PM by Dr. Sagar Romero MD.       FL Video Swallow Single Contrast [[id number]] Collected: 01/07/21 1556     Updated: 01/07/21 1558    Narrative:      EXAMINATION: FL VIDEO SWALLOW SINGLE-CONTRAST-      CLINICAL INDICATION:     aspiration; N17.9-Acute kidney failure,  unspecified     TECHNIQUE: Modified barium swallow was performed utilizing video  fluoroscopy in conjunction with the Speech Pathology team.  The patient  ingested multiple barium media including thin barium, nectar, and honey  liquid as well as barium pudding and a barium pudding coated cracker.      FLUOROSCOPY TIME: 0.8 minutes     COMPARISON: NONE      FINDINGS:   Premature loss is noted with vallecular pooling.  Transient laryngeal penetration noted with thin liquids when presented  with cup and straw. No aspiration episodes identified.          Impression:      1.  No aspiration.  2. Please see dysphasia notes for further details.     This report was finalized on 1/7/2021 3:56 PM by Dr. Sagar Romero MD.             Microbiology Results (last 10 days)     Procedure Component Value - Date/Time    Respiratory Panel, PCR (WITHOUT COVID) - Swab, Nasopharynx [[id number]]  (Normal) Collected: 01/06/21 1433    Lab Status: Final result Specimen: Swab from Nasopharynx Updated: 01/06/21 1601     ADENOVIRUS, PCR Not Detected     Coronavirus 229E Not Detected     Coronavirus HKU1 Not Detected     Coronavirus NL63 Not Detected     Coronavirus OC43 Not Detected     Human Metapneumovirus Not Detected     Human Rhinovirus/Enterovirus Not Detected     Influenza B PCR Not Detected     Parainfluenza Virus 1 Not Detected     Parainfluenza Virus 2 Not Detected     Parainfluenza Virus 3 Not Detected     Parainfluenza Virus 4 Not Detected     Bordetella pertussis pcr Not Detected     Chlamydophila pneumoniae PCR Not Detected     Mycoplasma pneumo by PCR Not Detected     Influenza A PCR Not Detected     RSV, PCR Not Detected    Narrative:      The coronavirus on the RVP is NOT COVID-19 and is NOT indicative of infection with COVID-19.     Blood Culture - Blood, Arm, Left [[id number]] Collected: 01/02/21 0814    Lab Status: Final result Specimen: Blood from Arm, Left Updated: 01/07/21 0845     Blood Culture No growth at 5 days    Blood Culture - Blood, Hand, Left [[id number]] Collected: 01/02/21 0814    Lab Status: Final result Specimen: Blood from Hand, Left Updated:  01/07/21 0845     Blood Culture No growth at 5 days    COVID-19 and FLU A/B PCR - Swab, Nasopharynx [[id number]]  (Normal) Collected: 12/31/20 2323    Lab Status: Final result Specimen: Swab from Nasopharynx Updated: 12/31/20 2348     COVID19 Not Detected     Influenza A PCR Not Detected     Influenza B PCR Not Detected    Narrative:      Fact sheet for providers: https://www.fda.gov/media/795886/download    Fact sheet for patients: https://www.fda.gov/media/783182/download    Test performed by PCR.    Urine Culture - Urine, Urine, Catheter [[id number]]  (Abnormal)  (Susceptibility) Collected: 12/31/20 2011    Lab Status: Final result Specimen: Urine, Catheter Updated: 01/03/21 1152     Urine Culture >100,000 CFU/mL Pseudomonas aeruginosa      50,000 CFU/mL Proteus mirabilis    Susceptibility      Pseudomonas aeruginosa     RAVEN     Cefepime Susceptible     Ceftazidime Susceptible     Ciprofloxacin Susceptible     Gentamicin Susceptible     Levofloxacin Susceptible     Piperacillin + Tazobactam Susceptible                Susceptibility      Proteus mirabilis     RAVEN     Ampicillin Susceptible     Ampicillin + Sulbactam Susceptible     Cefazolin Susceptible     Cefepime Susceptible     Ceftazidime Susceptible     Ceftriaxone Susceptible     Gentamicin Susceptible     Levofloxacin Resistant     Nitrofurantoin Resistant     Piperacillin + Tazobactam Susceptible     Tetracycline Resistant     Trimethoprim + Sulfamethoxazole Susceptible                    Blood Culture - Blood, Arm, Right [[id number]] Collected: 12/31/20 2009    Lab Status: Final result Specimen: Blood from Arm, Right Updated: 01/05/21 2030     Blood Culture No growth at 5 days    Blood Culture - Blood, Arm, Left [[id number]]  (Abnormal)  (Susceptibility) Collected: 12/31/20 2009    Lab Status: Final result Specimen: Blood from Arm, Left Updated: 01/03/21 0639     Blood Culture Enterococcus faecalis     Comment: Infectious disease consultation is highly  recommended.        Isolated from Aerobic Bottle     Gram Stain Aerobic Bottle Gram positive cocci in pairs and chains    Susceptibility      Enterococcus faecalis     RAVEN     Ampicillin Susceptible     Gentamicin High Level Synergy Susceptible     Vancomycin Susceptible                    Blood Culture ID, PCR - Blood, Arm, Left [[id number]]  (Abnormal) Collected: 12/31/20 2009    Lab Status: Final result Specimen: Blood from Arm, Left Updated: 01/01/21 1411     BCID, PCR Enterococcus spp. Identification by BCID PCR.     BOTTLE TYPE Aerobic Bottle          ----------------------------------------------------------------------------------------------------------------------   I have reviewed the above laboratory values for 01/08/21    Assessment/Plan     Active Hospital Problems    Diagnosis POA   • **Sepsis (CMS/[facility]) [A41.9] Yes         ASSESSMENT/PLAN:  · Severe sepsis, present on admission,  With RR>20, HR>90, elevated C-RP, UTI, bacteremia, and metabolic encephalopathy:  -Blood and urine cultures reviewed.   -Continue vital signs per floor protocol.   -Continue monitoring daily CBC & temperature curve.   -Continue IV Vancomycin & IV cefepime as outlined.   -ID input appreciated.   -IV flagyl added on 1/6 given possible aspiration.   -CT chest ordered with atelectasis from large hiatal hernia.     · Enterococcus bacteremia:  -Continue IV Vancomycin.  -12/31 blood cultures reviewed (1/2 + as outlined).  -1/2/2021 blood cultures reviewed (unremarkable).    -Continue daily CBC & C-RP.   -C-RP is improving.   -WBC slightly increased on 1/8 but likely 2/2 recent steroids.      · Proteus/Pseudomonas UTI:   -Urine culture reviewed.   -Continue IV Cefepime.   -C-RP is improving    · Leukocytosis:  -Likely reactive to recent steroids.  Continue to follow.   -Afebrile with improved heart rates.   -C-RP is improving.     · 5.6mm calficified structure vs. Foreign Body:  -Called Outpatient clinic with no ENT coverage until  1/12/2021  -SLP consult placed for possible FEES    · Worsening tachypnea overnight with question of pneumonia:   -CXR without acute infiltrate and with possible left pleural effusion.  -IV Flagyl added on 1/6 per ID with input appreciated.   -ABG with some hyperventilation.   -Continue HCTZ for now.   -CT chest/CT neck ordered and pending.   -Respiratory PCR unremarkable.      · Runs of atrial flutter:  -[id number] at least 6 with prior CVA, CHF, DM, female, age, HTN.   -Eliquis started per Cardiology.  -Continue cardiac monitoring.   -VQ with low probability.    -US venous doppler without evidence of known DVT.    -Metoprolol started on 1/7 and further increased on 1/8.          · Uncontrolled hypertension:  -Continue Lisinopril with hctz discontinued.   -Restart home Norvasc at lower dose of 5mg qd.on 1/6 with improvement.   -Metoprolol 25mg BID added 1/7 to assist with rate control, but should also impact blood pressure to some degree. Increased to 50mg BID on 1/8 when HCTZ discontinued.       -Monitor blood pressures per floor protocol       · Hyperglycemia in the setting of DM 2:   -Hemoglobin a1c 7.90.   -FSBG ACHS with SSI PRN.  -Glucoses worsened on 1/6 after doses of methylprednisolone with steroid ultimately being discontinued on 1/6.    -Anion gap acceptable in spite of intermittently surging glucoses.    -Levemir increased to 35u qhs  -Levemir 10u added to AM.   -Aspart with meals increased to 10u.   -Discussed adjustments with AM RN.   -Consistent carb modifier added to diet.    · Acute on chronic weakness/debility:  -Continue PT/OT.  -SLP evaluation performed.       · CVA by MRI:   -Continue ASA and statin.   -MRI reviewed.   -US carotid doppler with moderate stenosis in the left carotid system.  -Plan for outpatient event monitoring with outpatient Cardiology follow-up.   -Lipid panel reviewed.     · Grade II diastolic dysfunction:   -Echocardiogram reviewed from 1/1/2021.   -CXR with bibasilar  atelectasis and small left pleural effusion.   -ABG reviewed from 1/5/21 @ 2321 with some hyperventilation.   -CT chest report reviewed.      · Pseudohyponatrmia with sodium correcting to 137.3:   -Treat glucose as outlined.   -Monitor closely on home HCTZ.    -BMP reviewed.     · 7mm Noncalcified nodule in RLL:  -CT chest follow-up recommend in 6 months.      · Left adrenal nodule:   -Further outpatient follow-up recommended with adrenal CT protocol or MRI on nonemergent basis recommend.     · KAY vs. KAY on CKD IIIb on admission:   -Creatinine 1.19 on 1/8/21.     -Home ACEI/HCTZ restarted on 1/2/21.   -HCTZ component discontinued on 1/8 with rising creatinine.  -Lisinopril 20mg component added alone with HCTZ discontinued.      · Metabolic encephalopathy, improved:  -CT head report reviewed.   -MRI report reviewed.   -Mentation improved from time of admission.     · Chronic macrocytic anemia:  -TSH acceptable on admission.  -Folate acceptable but borderline low.    -Vitamin b12 acceptable.    -Daily CBC reviewed.   -No known overt blood loss.     -----------  -DVT prophylaxis: Eliquis to serve  -GI prophylaxis: PPI/Lactobacillus  -Activity: Up with assist  -Disposition: Remains hospitalized for further IV abx and tehrapies  -Anticipated dispo needs:  Event monitor for 2 weeks.  Outpatient Cards follow-up. Outpatient CT chest follow-up in 6 months. Outpatienet CT adrenal protocol or MRI. SS on board as spouse is now interested in possible rehab.   -Diet:   Dietary Orders (From admission, onward)     Start     Ordered    01/06/21 0929  Diet Soft Texture; Chopped; Thin; Consistent Carbohydrate  Diet Effective Now     Question Answer Comment   Diet Texture / Consistency Soft Texture    Select Texture: Chopped    Fluid Consistency Thin    Common Modifiers Consistent Carbohydrate        01/06/21 0929    01/01/21 1800  Dietary Nutrition Supplements Boost Plus (Ensure Enlive, Ensure Plus)  Daily With Breakfast, Lunch &  Dinner     Question:  Select Supplement  Answer:  Boost Plus (Ensure Enlive, Ensure Plus)    01/01/21 9406                    The patient is high risk due to the following diagnoses/reasons:  Severe sepsis, bacteremia, UTI, recent pneumonia     Gale Ozuna PA-C  01/08/21  09:24 EST  Pager # [phone]

## 2021-01-08 NOTE — PLAN OF CARE
Goal Outcome Evaluation:  Plan of Care Reviewed With: patient  Progress: improving   Patient rersting in bed, alert, NAD noted. Patient noted with new orders this shift r/t elevated D Dimer and tachypnea. Cont POC.

## 2021-01-08 NOTE — PROGRESS NOTES
Discharge Planning Assessment  ANDREW Bansal     Patient Name: Ashley Galvez  MRN: [id number]  Today's Date: 1/8/2021    Admit Date: 12/31/2020    Discharge Needs Assessment    No documentation.       Discharge Plan     Row Name 01/08/21 1425       Plan    Plan Pt was admitted on 12/31/20. Pt's spouse requests inpt acute rehab. SS will follow.        Marcella Bryan

## 2021-01-08 NOTE — THERAPY TREATMENT NOTE
Acute Care - Occupational Therapy Treatment Note   [place]     Patient Name: Ashley Galvez  : 1945  MRN: [id number]  Today's Date: 2021  Onset of Illness/Injury or Date of Surgery: 20     Referring Physician: Dr. Puente    Admit Date: 2020       ICD-10-CM ICD-9-CM   1. Acute renal failure, unspecified acute renal failure type (CMS/HCC)  N17.9 584.9     Patient Active Problem List   Diagnosis   • Sepsis (CMS/HCC)     Past Medical History:   Diagnosis Date   • Anemia    • Anterolisthesis     Grade 1 anterolisthesis of L5 on S1 due to bilateral L5 pars defects   • Diverticulosis    • Essential hypertension    • Lacunar infarction (CMS/HCC)    • Type 2 diabetes mellitus (CMS/HCC)      Past Surgical History:   Procedure Laterality Date   • CHOLECYSTECTOMY     • HYSTERECTOMY              OT ASSESSMENT FLOWSHEET (last 12 hours)      OT Evaluation and Treatment     Row Name 21 1522                   OT Time and Intention    Subjective Information  no complaints  -LM        Document Type  therapy note (daily note)  -LM        Mode of Treatment  occupational therapy  -LM        Patient Effort  poor  -LM        Comment  Patient seen this date for adl retraining/education and fxl mobility.  Decreased alertness noted, decreased verbal communication.  Unable to sit EOB this date.  Total assist x 3 for bed mobility.  Notified physician of alertness changes.    -LM           Cognition    Affect/Mental Status (Cognitive)  low arousal/lethargic  -LM        Orientation Status (Cognition)  unable/difficult to assess  -LM        Follows Commands (Cognition)  unable/difficult to assess  -LM        Safety Deficit (Cognitive)  severe deficit;moderate deficit;ability to follow commands  -LM           Wound 210 Right gluteal Pressure Injury    Wound - Properties Group Placement Date: 21  -SA Placement Time: 40 Present on Hospital Admission: Y  -SA Side: Right  -SA Location: gluteal  -SA  Primary Wound Type: Pressure inj  -TW Stage, Pressure Injury : Stage 3  -TW    Retired Wound - Properties Group Date first assessed: 01/01/21 -SA Time first assessed: 0040 -SA Present on Hospital Admission: Y  -SA Side: Right  -SA Location: gluteal  -SA Primary Wound Type: Pressure inj  -TW       Wound 01/01/21 0042 Left anterior greater trochanter Pressure Injury    Wound - Properties Group Placement Date: 01/01/21 -SA Placement Time: 0042 -SA Present on Hospital Admission: Y  -TW Side: Left  -SA Orientation: anterior  -SA Location: greater trochanter  -SA Primary Wound Type: Pressure inj  -TW Stage, Pressure Injury : Stage 2  -TW    Retired Wound - Properties Group Date first assessed: 01/01/21 -SA Time first assessed: 0042 -SA Present on Hospital Admission: Y  -TW Side: Left  -SA Location: greater trochanter  -SA Primary Wound Type: Pressure inj  -TW       Wound 01/01/21 0044 Left lower leg    Wound - Properties Group Placement Date: 01/01/21 -SA Placement Time: 0044 -SA Side: Left  -SA Orientation: lower  -SA Location: leg  -SA    Retired Wound - Properties Group Date first assessed: 01/01/21 -SA Time first assessed: 0044 -SA Side: Left  -SA Location: leg  -SA       Wound 01/01/21 0044 Right lower leg    Wound - Properties Group Placement Date: 01/01/21 -SA Placement Time: 0044 -SA Side: Right  -SA Orientation: lower  -SA Location: leg  -SA    Retired Wound - Properties Group Date first assessed: 01/01/21 -SA Time first assessed: 0044 -SA Side: Right  -SA Location: leg  -SA       Wound 01/04/21 1953 Right heel Pressure Injury    Wound - Properties Group Placement Date: 01/04/21  -JP Placement Time: 1953 -JP Present on Hospital Admission: N  -LETA Side: Right  -LETA Location: heel  -LETA Primary Wound Type: Pressure inj  -TW Stage, Pressure Injury : deep tissue injury  -LETA    Retired Wound - Properties Group Date first assessed: 01/04/21  -JP Time first assessed: 1953 -JP Present on Hospital Admission:  N  -LETA Side: Right  -LETA Location: heel  -LETA Primary Wound Type: Pressure inj  -TW       Positioning and Restraints    Post Treatment Position  bed  -LM        In Bed  call light within reach;encouraged to call for assist  -LM          User Key  (r) = Recorded By, (t) = Taken By, (c) = Cosigned By    Initials Name Effective Dates    TW Dana Ford, MIGEL 06/16/16 -     Umberto Zelaya, MIGEL 07/31/20 -     LM Sheron Finley OT 03/14/16 -     Joy Mckinnon RN 01/22/20 -                OT Recommendation and Plan     Plan of Care Review  Plan of Care Reviewed With: patient  Plan of Care Reviewed With: patient        Time Calculation:     Therapy Charges for Today     Code Description Service Date Service Provider Modifiers Qty    [id number] HC OT SELF CARE/MGMT/TRAIN EA 15 MIN 1/8/2021 Sheron Finley OT GO 2               Sheron Filney OT  1/8/2021

## 2021-01-08 NOTE — PLAN OF CARE
Goal Outcome Evaluation:  Plan of Care Reviewed With: patient  Progress: improving  Pt resting in bed. Pt continues to have expiratory wheezes and is SOA with very minimal exertion. Pt remains on 2L NC with 02 saturations in 90's. Pt appears somewhat oriented but is slow to respond to my questions when asked. Will continue to follow plan of care.

## 2021-01-08 NOTE — THERAPY TREATMENT NOTE
Acute Care - Physical Therapy Treatment Note   [place]     Patient Name: Ashley Galvez  : 1945  MRN: [id number]  Today's Date: 2021   Onset of Illness/Injury or Date of Surgery: 20       PT Assessment (last 12 hours)      PT Evaluation and Treatment     Row Name 21 1400          Physical Therapy Time and Intention    Subjective Information  no complaints  -BC     Document Type  therapy note (daily note)  -BC     Mode of Treatment  physical therapy  -BC     Patient Effort  fair  -BC     Row Name 21 1400          General Information    Patient Profile Reviewed  yes  -BC     Onset of Illness/Injury or Date of Surgery  20  -BC     Referring Physician  Dr. Puente  -BC     Patient Observations  agree to therapy;poorly cooperative  -BC     Risks Reviewed  patient:;LOB;nausea/vomiting;dizziness;increased discomfort;change in vital signs;increased drainage;lines disloged  -BC     Benefits Reviewed  patient:;improve function;increase independence;increase strength;increase balance;decrease pain;decrease risk of DVT;improve skin integrity;increase knowledge  -BC     Row Name 21 1400          Living Environment    Current Living Arrangements  home/apartment/condo  -BC     Lives With  spouse  -BC     Row Name 21 1400          Cognition    Affect/Mental Status (Cognitive)  anxious;confused  -BC     Behavioral Issues (Cognitive)  withdrawn  -BC     Row Name 21 1302          Bed Mobility    Bed Mobility  bed mobility (all) activities  -BC     All Activities, [place] (Bed Mobility)  maximum assist (25% patient effort) pt is dependent for all activities  -BC     Bed Mobility, Safety Issues  cognitive deficits limit understanding;decreased use of legs for bridging/pushing;decreased use of arms for pushing/pulling;impaired trunk control for bed mobility  -BC     Assistive Device (Bed Mobility)  bed rails  -BC     Row Name 21 1302          Transfers    Comment (Transfers)   pt unable to transfer  -BC     Row Name 01/08/21 1302          Gait/Stairs (Locomotion)    Comment (Gait/Stairs)  pt is bed ridden  -BC     Row Name             Wound 01/01/21 0040 Right gluteal Pressure Injury    Wound - Properties Group Placement Date: 01/01/21 -SA Placement Time: 0040 -SA Present on Hospital Admission: Y  -SA Side: Right  -SA Location: gluteal  -SA Primary Wound Type: Pressure inj  -TW Stage, Pressure Injury : Stage 3  -TW    Retired Wound - Properties Group Date first assessed: 01/01/21 -SA Time first assessed: 0040 -SA Present on Hospital Admission: Y  -SA Side: Right  -SA Location: gluteal  -SA Primary Wound Type: Pressure inj  -TW    Row Name             Wound 01/01/21 0042 Left anterior greater trochanter Pressure Injury    Wound - Properties Group Placement Date: 01/01/21 -SA Placement Time: 0042 -SA Present on Hospital Admission: Y  -TW Side: Left  -SA Orientation: anterior  -SA Location: greater trochanter  -SA Primary Wound Type: Pressure inj  -TW Stage, Pressure Injury : Stage 2  -TW    Retired Wound - Properties Group Date first assessed: 01/01/21 -SA Time first assessed: 0042 -SA Present on Hospital Admission: Y  -TW Side: Left  -SA Location: greater trochanter  -SA Primary Wound Type: Pressure inj  -TW    Row Name             Wound 01/01/21 0044 Left lower leg    Wound - Properties Group Placement Date: 01/01/21 -SA Placement Time: 0044 -SA Side: Left  -SA Orientation: lower  -SA Location: leg  -SA    Retired Wound - Properties Group Date first assessed: 01/01/21 -SA Time first assessed: 0044  -SA Side: Left  -SA Location: leg  -SA    Row Name             Wound 01/01/21 0044 Right lower leg    Wound - Properties Group Placement Date: 01/01/21 -SA Placement Time: 0044 -SA Side: Right  -SA Orientation: lower  -SA Location: leg  -SA    Retired Wound - Properties Group Date first assessed: 01/01/21 -SA Time first assessed: 0044 -SA Side: Right  -SA Location: leg  -SA     Row Name             Wound 01/04/21 1953 Right heel Pressure Injury    Wound - Properties Group Placement Date: 01/04/21  -LETA Placement Time: 1953  -JP Present on Hospital Admission: N  -LTEA Side: Right  -LETA Location: heel  -LETA Primary Wound Type: Pressure inj  -TW Stage, Pressure Injury : deep tissue injury  -LETA    Retired Wound - Properties Group Date first assessed: 01/04/21  -LETA Time first assessed: 1953  -JP Present on Hospital Admission: N  -LETA Side: Right  -LETA Location: heel  -LETA Primary Wound Type: Pressure inj  -TW    Row Name 01/08/21 1302          Physical Therapy Goals    Bed Mobility Goal Selection (PT)  bed mobility, PT goal 1  -BC     Row Name 01/08/21 1302          Bed Mobility Goal 1 (PT)    Activity/Assistive Device (Bed Mobility Goal 1, PT)  bed mobility activities, all  -BC     [place] Level/Cues Needed (Bed Mobility Goal 1, PT)  moderate assist (50-74% patient effort)  -BC     Time Frame (Bed Mobility Goal 1, PT)  by discharge  -BC     Row Name 01/08/21 1302          Positioning and Restraints    Pre-Treatment Position  in bed  -BC     Post Treatment Position  bed  -BC     In Bed  notified nsg;supine;call light within reach;encouraged to call for assist;side rails up x3  -BC       User Key  (r) = Recorded By, (t) = Taken By, (c) = Cosigned By    Initials Name Provider Type    Dana Pinedo, RN Registered Nurse    Umberto Zelaya RN Registered Nurse    Michelle Berg PT Physical Therapist    Joy Mckinnon RN Registered Nurse        Physical Therapy Education                 Title: PT OT SLP Therapies (In Progress)     Topic: Physical Therapy (In Progress)     Point: Mobility training (In Progress)     Learning Progress Summary           Patient Acceptance, E, NR by BC at 1/8/2021 1508    Acceptance, E,TB, NL,NR by KM at 1/8/2021 0927    Acceptance, E, NR by EA at 1/6/2021 2213    Acceptance, E, VU by MC at 1/6/2021 0120    Acceptance, E, VU by AM at 1/5/2021  1358    Acceptance, E, NR by BC at 1/5/2021 1043                   Point: Home exercise program (In Progress)     Learning Progress Summary           Patient Acceptance, E, NR by BC at 1/8/2021 1508    Acceptance, E,TB, NL,NR by KM at 1/8/2021 0927    Acceptance, E, NR by EA at 1/6/2021 2213    Acceptance, E, VU by  at 1/6/2021 0120    Acceptance, E, VU by AM at 1/5/2021 1358    Acceptance, E, NR by BC at 1/5/2021 1043                   Point: Body mechanics (In Progress)     Learning Progress Summary           Patient Acceptance, E, NR by BC at 1/8/2021 1508    Acceptance, E,TB, NL,NR by KM at 1/8/2021 0927    Acceptance, E, NR by EA at 1/6/2021 2213    Acceptance, E, VU by  at 1/6/2021 0120    Acceptance, E, VU by AM at 1/5/2021 1358    Acceptance, E, NR by BC at 1/5/2021 1043                   Point: Precautions (In Progress)     Learning Progress Summary           Patient Acceptance, E, NR by BC at 1/8/2021 1508    Acceptance, E,TB, NL,NR by KM at 1/8/2021 0927    Acceptance, E, NR by EA at 1/6/2021 2213    Acceptance, E, VU by  at 1/6/2021 0120    Acceptance, E, VU by AM at 1/5/2021 1358    Acceptance, E, NR by BC at 1/5/2021 1043                               User Key     Initials Effective Dates Name Provider Type Discipline     06/16/16 -  Thompson Brennan, RN Registered Nurse Nurse     06/16/16 -  Michelle Stevenson, RN Registered Nurse Nurse    BC 03/14/16 -  Michelle Jenkins PT Physical Therapist PT     09/06/18 -  Collett, Morgan, RN Registered Nurse Nurse     06/24/20 -  Vera Hicks, RN Registered Nurse Nurse              PT Recommendation and Plan     Plan of Care Reviewed With: patient  Progress: improving       Time Calculation:   PT Charges     Row Name 01/08/21 1508             Time Calculation    PT Received On  01/08/21  -BC         Time Calculation- PT    Total Timed Code Minutes- PT  30 minute(s)  -BC         Timed Charges    62764 - PT Therapeutic Activity Minutes   30  -BC        User Key  (r) = Recorded By, (t) = Taken By, (c) = Cosigned By    Initials Name Provider Type    BC Michelle Jenkins, PT Physical Therapist        Therapy Charges for Today     Code Description Service Date Service Provider Modifiers Qty    77532006068  PT THER PROC EA 15 MIN 1/7/2021 Michelle Jenkins, PT GP 1    [id number]  PT THERAPEUTIC ACT EA 15 MIN 1/7/2021 Michelle Jenkins, PT GP 2    [id number]  PT THERAPEUTIC ACT EA 15 MIN 1/8/2021 Michelle Jenkins, PT GP 2               Michelle Jenkins PT  1/8/2021

## 2021-01-08 NOTE — PROGRESS NOTES
LOS: 8 days     Name: Ashley Galvez  Age/Sex: 75 y.o. female  :  1945        PCP: Rony Callaway MD  REF: Mal Oliver DO    Principal Problem:    Sepsis (CMS/Formerly [facility])      Reason for follow-up: Atrial flutter    Subjective       Subjective     Ashley Galvez is a 75 year old female with a past medical history significant for history of CVA, essential hypertension, diabetes mellitus type 2 and anemia. Patient presented to the ED with complaints of confusion. She was found to have sepsis secondary to UTI and enterococcus bacteremia. Cardiology was consulted for possible atrial fibrillation. EKG showed ectopic atrial rhythm with frequent PAC's vs chaotic atrial rhythm. Patient denies any palpitations or chest pain.  is at bedside and denies any history of atrial fibrillation in the past. He reports that a couple years ago the patient started having weakness for unknown reasons and was unable to walk. MRI shows old CVA. She does not take blood thinners at home. No history of coronary artery disease. Echocardiogram showed normal LV function.     Interval History: Patient is -1600 ml of urine output post IV bumex. Vitals signs stable. She is drowsy but arousable on exam today. Heart rate stable. Remains in normal sinus rhythm. breathing appears improved today.       Vital Signs  Temp:  [97.1 °F (36.2 °C)-98.1 °F (36.7 °C)] 98.1 °F (36.7 °C)  Heart Rate:  [] 86  Resp:  [18-21] 18  BP: (130-161)/(70-85) 130/78     Vital Signs (last 72 hrs)        0700  -  / 0659 / 0700  -   0659  07  -   0659  0700  -   0838   Most Recent    Temp (°F) 97.2 -  99.5    97 -  98.4    97.1 -  98.1       98.1 (36.7)    Heart Rate 80 -  115    81 -  125    83 -  114       86    Resp 18 -  30    18 -  25    18 -  21       18    /85 -  173/89    130/83 -  159/88    130/78 -  161/70       130/78    SpO2 (%) 93 -  100    92 -  99    96 -  99       99        Body mass  index is 31.39 kg/m².    Intake/Output Summary (Last 24 hours) at 1/8/2021 0838  Last data filed at 1/8/2021 0300  Gross per 24 hour   Intake 840 ml   Output 2450 ml   Net -1610 ml     Objective    Objective       Physical Exam:     General Appearance:    Drowsy but arousable, cooperative, in no acute distress   Head:    Normocephalic, without obvious abnormality, atraumatic   Eyes:            Conjunctivae and sclerae normal, no   icterus, no pallor, corneas clear.   Neck:   No adenopathy, supple, trachea midline, no thyromegaly, no   carotid bruit, no JVD   Lungs:     Clear to auscultation,respirations regular, even and                  unlabored    Heart:    Regular rhythm and normal rate, normal S1 and S2, no            murmur, no gallop, no rub, no click   Chest Wall:    No abnormalities observed   Abdomen:     Normal bowel sounds, no masses, no organomegaly, soft        non-tender, non-distended, no guarding, no rebound                tenderness   Extremities:   Moves all extremities well, no edema, no cyanosis, no             redness   Pulses:   Pulses palpable and equal bilaterally   Skin:   No bleeding, bruising or rash       Neurologic:   Drowsy and oriented    I have seen and performed a physical examination today.     Results review       Results Review:   Results from last 7 days   Lab Units 01/08/21  0012 01/07/21  0656 01/06/21  0102 01/05/21  0151 01/04/21  0140 01/03/21  0121 01/02/21  0135   WBC 10*3/mm3 12.43* 8.74 9.41 10.03 9.61 9.66 12.48*   HEMOGLOBIN g/dL 9.7* 8.9* 8.7* 8.8* 8.5* 9.6* 11.0*   PLATELETS 10*3/mm3 324 299 274 240 241 364 406     Results from last 7 days   Lab Units 01/08/21  0012 01/07/21  0656 01/06/21  0102 01/05/21  0939 01/05/21  0151 01/04/21  0140 01/03/21  0121 01/02/21  1735 01/02/21  0135   SODIUM mmol/L 134* 132* 130* 134* 133* 142 154*  --  151*   POTASSIUM mmol/L 4.8 4.6 4.6  --  4.0 3.9 4.7 5.0 2.9*   CHLORIDE mmol/L 105 104 103  --  107 114* 128*  --  122*   CO2  mmol/L 17.2* 19.3* 17.3*  --  18.6* 18.6* 16.8*  --  18.2*   BUN mg/dL 51* 29* 22  --  27* 27* 32*  --  39*   CREATININE mg/dL 1.19* 1.02* 0.98  --  1.05* 0.91 0.95  --  1.18*   CALCIUM mg/dL 9.5 8.9 8.3*  --  7.7* 7.3* 8.4*  --  8.9   GLUCOSE mg/dL 223* 364* 377*  --  242* 265* 249*  --  135*   ALT (SGPT) U/L  --  7 8  --  9 9 10  --  9   AST (SGOT) U/L  --  6 9  --  7 8 18  --  12     Results from last 7 days   Lab Units 01/07/21  1222 01/07/21  1124   TROPONIN T ng/mL 0.033* 0.035*     Lab Results   Component Value Date    INR 1.08 12/31/2020     Lab Results   Component Value Date    MG 3.1 (H) 01/06/2021    MG 1.5 (L) 01/05/2021    MG 1.8 01/02/2021     Lab Results   Component Value Date    TSH 1.240 12/31/2020    TRIG 144 01/06/2021    HDL 29 (L) 01/06/2021    LDL 56 01/06/2021      Imaging Results (Last 48 Hours)     Procedure Component Value Units Date/Time    US Venous Doppler Lower Extremity Bilateral (duplex) [[id number]] Resulted: 01/08/21 0752     Updated: 01/08/21 0812    NM Lung Scan Perfusion Particulate [[id number]] Collected: 01/07/21 1724     Updated: 01/07/21 1726    Narrative:      EXAMINATION: NM LUNG SCAN PERFUSION PARTICULATE-      CLINICAL INDICATION: SOB and Pain      TECHNIQUE:  4.4 mCi of Tc-99m MAA were administered IV for a perfusion  lung scan. Ventilation could not be performed.     COMPARISON: Chest CT performed on same day.     FINDINGS: Multiple perfusion images demonstrate a relatively homogeneous  pattern of pulmonary capillary tracer distribution throughout the lungs.   Specifically, there are no segmental or sub-segmental defects to  suggest pulmonary embolism.  Attenuation defect left lung base  corresponds to large hiatal hernia on CT.       Impression:      Low suspicion of PE.      This report was finalized on 1/7/2021 5:24 PM by Dr. Sagar Romero MD.       FL Video Swallow Single Contrast [[id number]] Collected: 01/07/21 1556     Updated: 01/07/21 1558    Narrative:       EXAMINATION: FL VIDEO SWALLOW SINGLE-CONTRAST-      CLINICAL INDICATION:     aspiration; N17.9-Acute kidney failure,  unspecified     TECHNIQUE: Modified barium swallow was performed utilizing video  fluoroscopy in conjunction with the Speech Pathology team. The patient  ingested multiple barium media including thin barium, nectar, and honey  liquid as well as barium pudding and a barium pudding coated cracker.      FLUOROSCOPY TIME: 0.8 minutes     COMPARISON: NONE      FINDINGS:   Premature loss is noted with vallecular pooling.  Transient laryngeal penetration noted with thin liquids when presented  with cup and straw. No aspiration episodes identified.          Impression:      1.  No aspiration.  2. Please see dysphasia notes for further details.     This report was finalized on 1/7/2021 3:56 PM by Dr. Sagar Romero MD.       CT CHEST WITHOUT CONTRAST DIAGNOSTIC [[id number]] Collected: 01/07/21 0913     Updated: 01/07/21 0918    Narrative:      EXAM:    CT Chest Without Intravenous Contrast     EXAM DATE:    1/7/2021 8:31 AM     CLINICAL HISTORY:    Abnormal xray - pleural effusion; N17.9-Acute kidney failure,  unspecified     TECHNIQUE:    Axial computed tomography images of the chest without intravenous  contrast.  Sagittal and coronal reformatted images were created and  reviewed.  This CT exam was performed using one or more of the following  dose reduction techniques:  automated exposure control, adjustment of  the mA and/or kV according to patient size, and/or use of iterative  reconstruction technique.     COMPARISON:    No relevant prior studies available.     FINDINGS:    Lungs:  No convincing evidence of lung mass identified.  Stable 7 mm  nodule right lower lobe.    Pleural space:  Unremarkable.  No pneumothorax.  No significant  effusion.    Heart:  Cardiomegaly is noted with severe coronary artery  calcifications.  No significant pericardial effusion.    Mediastinum:  There is a large hiatal hernia  with significant portion  of the stomach and specifically the gastric fundus in the left lower  lobe region producing mass effect associated atelectasis in the left  lower lobe.  No metallic foreign bodies are seen in the thoracic  esophagus.    Bones/joints:  Unremarkable.  No acute fracture.  No dislocation.    Soft tissues:  Unremarkable.    Vasculature:  Aortic calcifications are noted but no evidence of  aneurysm.    Lymph nodes:  Unremarkable.  No enlarged lymph nodes.    Gallbladder and bile ducts:  Cholecystectomy.    Adrenals:  Small left adrenal nodule is stable from the previous exam.    Other findings:  Calcifications in the region of the bong hepatis are  stable from previous.       Impression:      1.  Essentially stable chest CT demonstrating large hiatal hernia and  left lower lobe atelectasis.  2.  No metallic or radiopaque foreign body seen throughout the thoracic  esophagus.  3.  Otherwise stable exam with other nonacute findings described above.  Please refer to prior report for follow-up recommendations of above  findings.     This report was finalized on 1/7/2021 9:16 AM by Dr. Sagar Romero MD.       CT Soft Tissue Neck Without Contrast [[id number]] Collected: 01/07/21 0850     Updated: 01/07/21 0916    Narrative:      EXAM:    CT Neck Without Intravenous Contrast     EXAM DATE:    1/7/2021 8:31 AM     CLINICAL HISTORY:    Foreign body suspected, neck, neg xray     TECHNIQUE:    Axial computed tomography images of the neck without intravenous  contrast.  Sagittal and coronal reformatted images were created and  reviewed.  This CT exam was performed using one or more of the following  dose reduction techniques:  automated exposure control, adjustment of  the mA and/or kV according to patient size, and/or use of iterative  reconstruction technique.     COMPARISON:    No relevant prior studies available.     FINDINGS:    Oropharynx:  5.6 mm radiodense ovoid structure appears to BE  situated  within the wall of the right palatine tonsillar fossa at the level of  the vallecula on the right side and could represent markedly dense soft  tissue calcification but the possibility of foreign body is not excluded  in light of patient's reported history.    Hypopharynx:  Unremarkable.    Larynx:  See below.      Trachea:  Unremarkable.    Retropharyngeal space:  Unremarkable.    Submandibular/parotid glands:  Unremarkable.  Glands are normal in  size.    Thyroid:  Unremarkable.  No enlarged or calcified nodules.    Bones/joints:  Mild degenerative changes cervical spine with facet  arthropathy.  No acute fracture.    Soft tissues:  No metallic foreign body is seen otherwise throughout  the upper esophagus or within the vallecular fossa.    Vasculature:  Arterial vascular calcifications of the carotid vessels  are noted which are tortuous and both course towards the midline  impressing upon the posterior lateral walls of the posterior pharynx.   Atherosclerosis aorta.    Lymph nodes:  No adenopathy is identified.    Lung apices:  Chronic appearing lung changes.       Impression:      1.  5.6 mm either densely calcified structure versus foreign body in the  right palatine fossa pharyngeal wall with considerations as above.  This  does not appear to be in the lumen of the oropharynx.  2.  Other findings above.     This report was finalized on 1/7/2021 9:03 AM by Dr. Sagar Romero MD.           Echo   Results for orders placed during the hospital encounter of 12/31/20   Adult Transthoracic Echo Complete W/ Cont if Necessary Per Protocol    Narrative · Left ventricular ejection fraction appears to be 61 - 65%. Left   ventricular systolic function is normal.  · Left ventricular diastolic function is consistent with (grade II w/high   LAP) pseudonormalization.  · Mild aortic valve stenosis is present.           I reviewed the patient's new clinical results.    Telemetry: Sinus tachycardia 100-110 bpm         Medication Review:   amLODIPine, 5 mg, Oral, Q24H  apixaban, 5 mg, Oral, Q12H  aspirin, 81 mg, Oral, Daily  atorvastatin, 20 mg, Oral, Nightly  castor oil-balsam peru, , Topical, Q12H  castor oil-balsam peru, , Topical, Q12H  cefepime, 2 g, Intravenous, Q12H  insulin aspart, 0-14 Units, Subcutaneous, TID AC  insulin aspart, 8 Units, Subcutaneous, TID With Meals  insulin detemir, 10 Units, Subcutaneous, Daily  insulin detemir, 33 Units, Subcutaneous, Nightly  ipratropium-albuterol, 3 mL, Nebulization, 4x Daily - RT  lactobacillus acidophilus, 1 capsule, Oral, Daily  lisinopril-hydroCHLOROthiazide (ZESTORTIC) 20-25 mg combo dose, , Oral, Q24H  metoprolol tartrate, 25 mg, Oral, Q12H  pantoprazole, 40 mg, Oral, Daily  sodium chloride, 3 mL, Intravenous, Q12H  vancomycin, 750 mg, Intravenous, Q24H             Assessment      Assessment:  1. Paroxysmal atrial flutter/fibrillation, CHADS-VASc score of at least 5, on eliquis, currently sinus rhythm/sinus tachycardia   2. Acute diastolic heart failure.  3. Essential hypertension, overall controlled   4. History of CVA  5. Sepsis secondary to UTI and bacteremia.    Plan     Recommendations:  1. Since her kidney function has significantly worsened since yesterday, will hold the diuretics for now and perhaps add p.o. diuretic from tomorrow if the kidney function starts to improve.  2. Continue with Eliquis for stroke prevention.    I discussed the patients findings and my recommendations with patient and family      Katia TOMÁS Lowry , acting as scribe for Dr. Brandon Menezes MD FACC  01/08/21  08:38 EST     I, Brandon Menezes MD, [facility], personally performed the services described in this documentation as documented by the above named individual under my supervision and made necessary changes and the note is both accurate and complete.     Brandon Menezes MD, [facility]  1/8/2021    Please note that portions of this note were completed with a voice recognition program.

## 2021-01-08 NOTE — PROGRESS NOTES
PROGRESS NOTE         Patient Identification:  Name:  Ashley Galvez  Age:  75 y.o.  Sex:  female  :  1945  MRN:  [id number]  Visit Number:  [id number]  Primary Care Provider:  Rony Callaway MD         LOS: 8 days       ----------------------------------------------------------------------------------------------------------------------  Subjective       Chief Complaints:    Altered Mental Status        Interval History:      Patient resting in bed.  Currently on 2 L nasal cannula with no apparent distress.  WBC elevated at 12.43.  CRP improving at 2.94.  Afebrile, no diarrhea.    Review of Systems:    Constitutional: no fever, chills and night sweats.  Generalized fatigue.    Eyes: no eye drainage, itching or redness.  HEENT: no mouth sores, dysphagia or nose bleed.  Respiratory: no for shortness of breath, cough or production of sputum.  Cardiovascular: no chest pain, no palpitations, no orthopnea.  Gastrointestinal: no nausea, vomiting or diarrhea. No abdominal pain, hematemesis or rectal bleeding.  Genitourinary: no dysuria or polyuria.  Hematologic/lymphatic: no lymph node abnormalities, no easy bruising or easy bleeding.  Musculoskeletal: no muscle or joint pain.  Skin: No rash and no itching.  Neurological: no loss of consciousness, no seizure, no headache.  Behavioral/Psych: no depression or suicidal ideation.  Endocrine: no hot flashes.  Immunologic: negative.    ----------------------------------------------------------------------------------------------------------------------      Objective       Current Hospital Meds:  amLODIPine, 5 mg, Oral, Q24H  apixaban, 5 mg, Oral, Q12H  aspirin, 81 mg, Oral, Daily  atorvastatin, 20 mg, Oral, Nightly  castor oil-balsam peru, , Topical, Q12H  castor oil-balsam peru, , Topical, Q12H  cefepime, 2 g, Intravenous, Q12H  insulin aspart, 0-14 Units, Subcutaneous, TID AC  insulin aspart, 10 Units, Subcutaneous, TID With Meals  insulin detemir,  10 Units, Subcutaneous, Daily  insulin detemir, 35 Units, Subcutaneous, Nightly  ipratropium-albuterol, 3 mL, Nebulization, 4x Daily - RT  lactobacillus acidophilus, 1 capsule, Oral, Daily  [START ON 1/9/2021] lisinopril, 20 mg, Oral, Q24H  metoprolol tartrate, 25 mg, Oral, Q12H  pantoprazole, 40 mg, Oral, Daily  sodium chloride, 3 mL, Intravenous, Q12H  vancomycin, 750 mg, Intravenous, Q24H         ----------------------------------------------------------------------------------------------------------------------    Vital Signs:  Temp:  [97.1 °F (36.2 °C)-98.1 °F (36.7 °C)] 98.1 °F (36.7 °C)  Heart Rate:  [] 86  Resp:  [18-21] 18  BP: (130-161)/(70-85) 130/78  Mean Arterial Pressure (Non-Invasive) for the past 24 hrs (Last 3 readings):   Noninvasive MAP (mmHg)   01/08/21 0633 100   01/07/21 1600 92   01/07/21 1100 108     SpO2 Percentage    01/08/21 0633 01/08/21 0636 01/08/21 0647   SpO2: 97% 97% 99%     SpO2:  [96 %-99 %] 99 %  on  Flow (L/min):  [1-2] 2;   Device (Oxygen Therapy): nasal cannula    Body mass index is 31.39 kg/m².  Wt Readings from Last 3 Encounters:   01/08/21 80.4 kg (177 lb 3.2 oz)        Intake/Output Summary (Last 24 hours) at 1/8/2021 0958  Last data filed at 1/8/2021 0900  Gross per 24 hour   Intake 900 ml   Output 2450 ml   Net -1550 ml     Diet Soft Texture; Chopped; Thin; Consistent Carbohydrate  ----------------------------------------------------------------------------------------------------------------------      Physical Exam:    Constitutional: Chronically ill-appearing elderly lady.  No respiratory distress. Much more awake and alert today.     HENT:  Head: Normocephalic and atraumatic.  Mouth:  Moist mucous membranes.    Eyes:  Conjunctivae and EOM are normal.  No scleral icterus.  Neck:  Neck supple.  No JVD present.    Cardiovascular:   Regular rate.  Irregularly irregular rhythm.  No murmur auscultated.  Pulmonary/Chest:  No respiratory distress, no wheezes, no  crackles, with normal breath sounds and good air movement.  Abdominal:  Soft.  Bowel sounds are normal.  No distension and no tenderness to palpation.  Musculoskeletal:  No edema, no tenderness, and no deformity.  No swelling or redness of joints.  Ulcerations of pretibial area which do not appear infectious.  Right ankle turns inward.  Neurological:  Alert and oriented to person, place, and time.  No facial droop.  No slurred speech.   Skin:  Skin is warm and dry.  No rash noted.  No pallor.  Onychomycosis.  Psychiatric: Normal mood and affect.  Behavior is normal.  ----------------------------------------------------------------------------------------------------------------------  Results from last 7 days   Lab Units 01/07/21  1222 01/07/21  1124   TROPONIN T ng/mL 0.033* 0.035*     Results from last 7 days   Lab Units 01/07/21  0656   PROBNP pg/mL 2,050.0*     Results from last 7 days   Lab Units 01/06/21  0102   CHOLESTEROL mg/dL 110   TRIGLYCERIDES mg/dL 144   HDL CHOL mg/dL 29*   LDL CHOL mg/dL 56     Results from last 7 days   Lab Units 01/05/21  2321   PH, ARTERIAL pH units 7.399   PO2 ART mm Hg 68.6*   PCO2, ARTERIAL mm Hg 33.8*   HCO3 ART mmol/L 20.9     Results from last 7 days   Lab Units 01/08/21  0012 01/07/21  0656 01/06/21  0102   CRP mg/dL 2.94* 5.32* 7.01*   WBC 10*3/mm3 12.43* 8.74 9.41   HEMOGLOBIN g/dL 9.7* 8.9* 8.7*   HEMATOCRIT % 32.9* 28.8* 29.9*   MCV fL 105.4* 102.9* 107.6*   MCHC g/dL 29.5* 30.9* 29.1*   PLATELETS 10*3/mm3 324 299 274     Results from last 7 days   Lab Units 01/08/21  0012 01/07/21  0656 01/06/21  0102  01/05/21  0151  01/02/21  0135   SODIUM mmol/L 134* 132* 130*   < > 133*   < > 151*   POTASSIUM mmol/L 4.8 4.6 4.6  --  4.0   < > 2.9*   MAGNESIUM mg/dL  --   --  3.1*  --  1.5*  --  1.8   CHLORIDE mmol/L 105 104 103  --  107   < > 122*   CO2 mmol/L 17.2* 19.3* 17.3*  --  18.6*   < > 18.2*   BUN mg/dL 51* 29* 22  --  27*   < > 39*   CREATININE mg/dL 1.19* 1.02* 0.98  --   1.05*   < > 1.18*   EGFR IF NONAFRICN AM mL/min/1.73 44* 53* 55*  --  51*   < > 45*   CALCIUM mg/dL 9.5 8.9 8.3*  --  7.7*   < > 8.9   GLUCOSE mg/dL 223* 364* 377*  --  242*   < > 135*   ALBUMIN g/dL  --  2.64* 2.34*  --  2.35*   < > 3.10*   BILIRUBIN mg/dL  --  <0.2 <0.2  --  0.2   < > 0.2   ALK PHOS U/L  --  67 71  --  65   < > 96   AST (SGOT) U/L  --  6 9  --  7   < > 12   ALT (SGPT) U/L  --  7 8  --  9   < > 9    < > = values in this interval not displayed.   Estimated Creatinine Clearance: 41.6 mL/min (A) (by C-G formula based on SCr of 1.19 mg/dL (H)).  No results found for: AMMONIA    Glucose   Date/Time Value Ref Range Status   01/08/2021 0634 195 (H) 70 - 130 mg/dL Final   01/08/2021 0047 156 (H) 70 - 130 mg/dL Final   01/07/2021 1928 225 (H) 70 - 130 mg/dL Final   01/07/2021 1605 186 (H) 70 - 130 mg/dL Final   01/07/2021 1113 238 (H) 70 - 130 mg/dL Final   01/07/2021 0623 353 (H) 70 - 130 mg/dL Final   01/07/2021 0256 347 (H) 70 - 130 mg/dL Final   01/07/2021 0116 404 (C) 70 - 130 mg/dL Final     Lab Results   Component Value Date    HGBA1C 7.90 (H) 12/31/2020     Lab Results   Component Value Date    TSH 1.240 12/31/2020       Blood Culture   Date Value Ref Range Status   01/02/2021 No growth at 24 hours  Preliminary   01/02/2021 No growth at 24 hours  Preliminary   12/31/2020 No growth at 2 days  Preliminary   12/31/2020 Enterococcus faecalis (C)  Final     Comment:     Infectious disease consultation is highly recommended.     Urine Culture   Date Value Ref Range Status   12/31/2020 Culture in progress  Preliminary     No results found for: WOUNDCX  No results found for: STOOLCX  No results found for: RESPCX  Pain Management Panel     Pain Management Panel Latest Ref Rng & Units 12/31/2020    AMPHETAMINES SCREEN, URINE Negative Negative    BARBITURATES SCREEN Negative Negative    BENZODIAZEPINE SCREEN, URINE Negative Negative    BUPRENORPHINEUR Negative Negative    COCAINE SCREEN, URINE Negative  Negative    METHADONE SCREEN, URINE Negative Negative            ----------------------------------------------------------------------------------------------------------------------  Imaging Results (Last 24 Hours)     Procedure Component Value Units Date/Time    US Venous Doppler Lower Extremity Bilateral (duplex) [[id number]] Collected: 01/08/21 0850     Updated: 01/08/21 0911    Narrative:      US VENOUS DOPPLER LOWER EXTREMITY BILATERAL (DUPLEX)-     CLINICAL INDICATION: Rule out DVT; N17.9-Acute kidney failure,  unspecified        COMPARISON: None available.      TECHNIQUE: Color Doppler imaging was used with compression and  augmentation to evaluate the lower extremity deep venous system.     FINDINGS:   There is patent spontaneous flow from the common femoral vein through  the posterior tibial veins. With the exception of the right posterior  tibial vein which was unable to be imaged.  There was no internal clot or area of noncompressibility.  Normal augmentation was elicited where applicable.       Impression:      No DVT in the lower extremities on today's exam.      This report was finalized on 1/8/2021 9:08 AM by Dr. Serafin Yip MD.       NM Lung Scan Perfusion Particulate [[id number]] Collected: 01/07/21 1724     Updated: 01/07/21 1726    Narrative:      EXAMINATION: NM LUNG SCAN PERFUSION PARTICULATE-      CLINICAL INDICATION: SOB and Pain      TECHNIQUE:  4.4 mCi of Tc-99m MAA were administered IV for a perfusion  lung scan. Ventilation could not be performed.     COMPARISON: Chest CT performed on same day.     FINDINGS: Multiple perfusion images demonstrate a relatively homogeneous  pattern of pulmonary capillary tracer distribution throughout the lungs.   Specifically, there are no segmental or sub-segmental defects to  suggest pulmonary embolism.  Attenuation defect left lung base  corresponds to large hiatal hernia on CT.       Impression:      Low suspicion of PE.      This report was finalized  on 1/7/2021 5:24 PM by Dr. Sagar Romero MD.       FL Video Swallow Single Contrast [[id number]] Collected: 01/07/21 1556     Updated: 01/07/21 1558    Narrative:      EXAMINATION: FL VIDEO SWALLOW SINGLE-CONTRAST-      CLINICAL INDICATION:     aspiration; N17.9-Acute kidney failure,  unspecified     TECHNIQUE: Modified barium swallow was performed utilizing video  fluoroscopy in conjunction with the Speech Pathology team. The patient  ingested multiple barium media including thin barium, nectar, and honey  liquid as well as barium pudding and a barium pudding coated cracker.      FLUOROSCOPY TIME: 0.8 minutes     COMPARISON: NONE      FINDINGS:   Premature loss is noted with vallecular pooling.  Transient laryngeal penetration noted with thin liquids when presented  with cup and straw. No aspiration episodes identified.          Impression:      1.  No aspiration.  2. Please see dysphasia notes for further details.     This report was finalized on 1/7/2021 3:56 PM by Dr. Sagar Romero MD.             ----------------------------------------------------------------------------------------------------------------------    Assessment/Plan       Assessment/Plan     ASSESSMENT:    1.  Sepsis  2.  Pyelonephritis  3.  Enterococcus bacteremia  4.  Aspiration pneumonia    PLAN:    Patient resting in bed.  Currently on 2 L nasal cannula with no apparent distress.  WBC elevated at 12.43.  CRP improving at 2.94.  Afebrile, no diarrhea.    Respiratory panel PCR negative.  CT of the chest from 1/7/2021 reports stable CT chest with large hiatal hernia andleft lower lobe atelectasis.    Urinalysis on 12/31/2020 was positive with WBCs too numerous to count.  Urine culture on 1/3/2021 finalized as Pseudomonas aeruginosa and Proteus mirabilis.      CT head without contrast on 12/31/2020 showed no acute intracranial abnormality.  CSF density mass of the right posterior fossa with mild mass-effect on the right cerebellar convexity,  most likely an arachnoid cyst with recommendation to evaluate further with MRI without and with contrast.  Presumed chronic microvascular ischemic changes.     CT abdomen pelvis without contrast on 12/31/2020 showed atherosclerotic disease with probable vascular calcifications in both kidneys.  No ureteral stones as seen on either side, and there is no hydronephrosis.  Mild thickening of the urinary bladder wall may be due to incomplete distention or cystitis.  Large amount of stool in the rectal vault.  There is colonic diverticulosis without diverticulitis.  The appendix and small bowel are within normal limits.  Small left adrenal nodule, likely a benign adenoma in the absence of known malignancy.  This could be assessed with nonemergent adrenal protocol MRI or CT if indicated.  Cholecystectomy and hysterectomy.     Blood cultures on 12/31/2020 are so far showing 1 out of 2 with Enterococcus.  BC ID on 12/31/2020 finalized as Enterococcus.  Repeat blood cultures on 1/2/2021 are so far showing no growth.     COVID-19 and flu A/B PCR on 12/31/2020 was negative.     Recommend to continue vancomycin and cefepime through 1/11/2021 for treatment of bacteremia and UTI.  We will continue to follow closely and adjust antibiotic therapy as needed.    Code Status:   Code Status and Medical Interventions:   Ordered at: 12/31/20 9939     Level Of Support Discussed With:    Patient     Code Status:    CPR     Medical Interventions (Level of Support Prior to Arrest):    Full     Scribed for Yvan Dempsey MD.    Macie Latham, TOMÁS  01/08/21  09:58 EST    Physician Attestation:    The documentation recorded by the scribe accurately reflects the service I personally performed and the decisions made by me.    Yvan Dempsey MD  Infectious Diseases  01/08/21  09:58 EST\

## 2021-01-08 NOTE — PLAN OF CARE
Problem: Fall Injury Risk  Goal: Absence of Fall and Fall-Related Injury  Intervention: Identify and Manage Contributors to Fall Injury Risk  Recent Flowsheet Documentation  Taken 1/8/2021 0923 by Thompson Brennan, RN  Medication Review/Management:   medications reviewed   high-risk medications identified  Intervention: Promote Injury-Free Environment  Recent Flowsheet Documentation  Taken 1/8/2021 0923 by Thompson Brennan, RN  Safety Promotion/Fall Prevention:   activity supervised   nonskid shoes/slippers when out of bed   safety round/check completed   Goal Outcome Evaluation:  Plan of Care Reviewed With: patient  Progress: improving         BG dropped, providers aware.  Encouraged PO intake.  Patient reports feeling better after amp,  at bed side.  NO other acute events.

## 2021-01-09 ENCOUNTER — APPOINTMENT (OUTPATIENT)
Dept: CT IMAGING | Facility: HOSPITAL | Age: 76
End: 2021-01-09

## 2021-01-09 LAB
ANION GAP SERPL CALCULATED.3IONS-SCNC: 10.4 MMOL/L (ref 5–15)
BASOPHILS # BLD AUTO: 0.07 10*3/MM3 (ref 0–0.2)
BASOPHILS NFR BLD AUTO: 0.7 % (ref 0–1.5)
BUN SERPL-MCNC: 34 MG/DL (ref 8–23)
BUN/CREAT SERPL: 34.7 (ref 7–25)
CALCIUM SPEC-SCNC: 9.3 MG/DL (ref 8.6–10.5)
CHLORIDE SERPL-SCNC: 106 MMOL/L (ref 98–107)
CO2 SERPL-SCNC: 22.6 MMOL/L (ref 22–29)
CORTIS SERPL-MCNC: 16.79 MCG/DL
CREAT SERPL-MCNC: 0.98 MG/DL (ref 0.57–1)
CRP SERPL-MCNC: 0.81 MG/DL (ref 0–0.5)
DEPRECATED RDW RBC AUTO: 53.4 FL (ref 37–54)
EOSINOPHIL # BLD AUTO: 0.37 10*3/MM3 (ref 0–0.4)
EOSINOPHIL NFR BLD AUTO: 3.8 % (ref 0.3–6.2)
ERYTHROCYTE [DISTWIDTH] IN BLOOD BY AUTOMATED COUNT: 14.3 % (ref 12.3–15.4)
GFR SERPL CREATININE-BSD FRML MDRD: 55 ML/MIN/1.73
GLUCOSE BLDC GLUCOMTR-MCNC: 124 MG/DL (ref 70–130)
GLUCOSE BLDC GLUCOMTR-MCNC: 129 MG/DL (ref 70–130)
GLUCOSE BLDC GLUCOMTR-MCNC: 142 MG/DL (ref 70–130)
GLUCOSE BLDC GLUCOMTR-MCNC: 173 MG/DL (ref 70–130)
GLUCOSE BLDC GLUCOMTR-MCNC: 221 MG/DL (ref 70–130)
GLUCOSE BLDC GLUCOMTR-MCNC: 240 MG/DL (ref 70–130)
GLUCOSE BLDC GLUCOMTR-MCNC: 248 MG/DL (ref 70–130)
GLUCOSE BLDC GLUCOMTR-MCNC: 253 MG/DL (ref 70–130)
GLUCOSE BLDC GLUCOMTR-MCNC: 272 MG/DL (ref 70–130)
GLUCOSE BLDC GLUCOMTR-MCNC: 296 MG/DL (ref 70–130)
GLUCOSE BLDC GLUCOMTR-MCNC: 34 MG/DL (ref 70–130)
GLUCOSE BLDC GLUCOMTR-MCNC: 66 MG/DL (ref 70–130)
GLUCOSE BLDC GLUCOMTR-MCNC: 74 MG/DL (ref 70–130)
GLUCOSE BLDC GLUCOMTR-MCNC: 79 MG/DL (ref 70–130)
GLUCOSE SERPL-MCNC: 236 MG/DL (ref 65–99)
HCT VFR BLD AUTO: 34.5 % (ref 34–46.6)
HGB BLD-MCNC: 10.4 G/DL (ref 12–15.9)
IMM GRANULOCYTES # BLD AUTO: 0.09 10*3/MM3 (ref 0–0.05)
IMM GRANULOCYTES NFR BLD AUTO: 0.9 % (ref 0–0.5)
LYMPHOCYTES # BLD AUTO: 1.65 10*3/MM3 (ref 0.7–3.1)
LYMPHOCYTES NFR BLD AUTO: 16.8 % (ref 19.6–45.3)
MCH RBC QN AUTO: 30.9 PG (ref 26.6–33)
MCHC RBC AUTO-ENTMCNC: 30.1 G/DL (ref 31.5–35.7)
MCV RBC AUTO: 102.4 FL (ref 79–97)
MONOCYTES # BLD AUTO: 1.01 10*3/MM3 (ref 0.1–0.9)
MONOCYTES NFR BLD AUTO: 10.3 % (ref 5–12)
NEUTROPHILS NFR BLD AUTO: 6.66 10*3/MM3 (ref 1.7–7)
NEUTROPHILS NFR BLD AUTO: 67.5 % (ref 42.7–76)
NRBC BLD AUTO-RTO: 0 /100 WBC (ref 0–0.2)
PLATELET # BLD AUTO: 403 10*3/MM3 (ref 140–450)
PMV BLD AUTO: 9.5 FL (ref 6–12)
POTASSIUM SERPL-SCNC: 4.8 MMOL/L (ref 3.5–5.2)
RBC # BLD AUTO: 3.37 10*6/MM3 (ref 3.77–5.28)
SODIUM SERPL-SCNC: 139 MMOL/L (ref 136–145)
WBC # BLD AUTO: 9.85 10*3/MM3 (ref 3.4–10.8)

## 2021-01-09 PROCEDURE — 82533 TOTAL CORTISOL: CPT | Performed by: INTERNAL MEDICINE

## 2021-01-09 PROCEDURE — 25010000002 VANCOMYCIN: Performed by: INTERNAL MEDICINE

## 2021-01-09 PROCEDURE — 63710000001 INSULIN ASPART PER 5 UNITS: Performed by: PHYSICIAN ASSISTANT

## 2021-01-09 PROCEDURE — 94799 UNLISTED PULMONARY SVC/PX: CPT

## 2021-01-09 PROCEDURE — 99233 SBSQ HOSP IP/OBS HIGH 50: CPT | Performed by: PHYSICIAN ASSISTANT

## 2021-01-09 PROCEDURE — 80048 BASIC METABOLIC PNL TOTAL CA: CPT | Performed by: PHYSICIAN ASSISTANT

## 2021-01-09 PROCEDURE — 25010000002 CEFEPIME PER 500 MG: Performed by: NURSE PRACTITIONER

## 2021-01-09 PROCEDURE — 82962 GLUCOSE BLOOD TEST: CPT

## 2021-01-09 PROCEDURE — 70450 CT HEAD/BRAIN W/O DYE: CPT

## 2021-01-09 PROCEDURE — 86140 C-REACTIVE PROTEIN: CPT | Performed by: NURSE PRACTITIONER

## 2021-01-09 PROCEDURE — 70450 CT HEAD/BRAIN W/O DYE: CPT | Performed by: RADIOLOGY

## 2021-01-09 PROCEDURE — 63710000001 INSULIN ASPART PER 5 UNITS: Performed by: INTERNAL MEDICINE

## 2021-01-09 PROCEDURE — 85025 COMPLETE CBC W/AUTO DIFF WBC: CPT | Performed by: PHYSICIAN ASSISTANT

## 2021-01-09 PROCEDURE — 99232 SBSQ HOSP IP/OBS MODERATE 35: CPT | Performed by: NURSE PRACTITIONER

## 2021-01-09 PROCEDURE — 25010000002 PIPERACILLIN SOD-TAZOBACTAM PER 1 G: Performed by: NURSE PRACTITIONER

## 2021-01-09 RX ORDER — FOLIC ACID 1 MG/1
1 TABLET ORAL DAILY
Status: DISCONTINUED | OUTPATIENT
Start: 2021-01-09 | End: 2021-01-14 | Stop reason: HOSPADM

## 2021-01-09 RX ORDER — BUMETANIDE 1 MG/1
1 TABLET ORAL DAILY
Status: DISCONTINUED | OUTPATIENT
Start: 2021-01-09 | End: 2021-01-14 | Stop reason: HOSPADM

## 2021-01-09 RX ORDER — DEXTROSE AND SODIUM CHLORIDE 5; .45 G/100ML; G/100ML
100 INJECTION, SOLUTION INTRAVENOUS CONTINUOUS
Status: DISCONTINUED | OUTPATIENT
Start: 2021-01-09 | End: 2021-01-10

## 2021-01-09 RX ADMIN — METOPROLOL TARTRATE 50 MG: 50 TABLET, FILM COATED ORAL at 09:42

## 2021-01-09 RX ADMIN — BUMETANIDE 1 MG: 1 TABLET ORAL at 17:50

## 2021-01-09 RX ADMIN — LISINOPRIL 20 MG: 10 TABLET ORAL at 09:42

## 2021-01-09 RX ADMIN — DEXTROSE MONOHYDRATE 25 G: 500 INJECTION PARENTERAL at 16:47

## 2021-01-09 RX ADMIN — APIXABAN 5 MG: 5 TABLET, FILM COATED ORAL at 09:42

## 2021-01-09 RX ADMIN — AMLODIPINE BESYLATE 5 MG: 5 TABLET ORAL at 09:42

## 2021-01-09 RX ADMIN — INSULIN ASPART 8 UNITS: 100 INJECTION, SOLUTION INTRAVENOUS; SUBCUTANEOUS at 12:58

## 2021-01-09 RX ADMIN — INSULIN ASPART 5 UNITS: 100 INJECTION, SOLUTION INTRAVENOUS; SUBCUTANEOUS at 09:44

## 2021-01-09 RX ADMIN — DEXTROSE MONOHYDRATE 25 G: 500 INJECTION PARENTERAL at 15:41

## 2021-01-09 RX ADMIN — PANTOPRAZOLE SODIUM 40 MG: 40 TABLET, DELAYED RELEASE ORAL at 09:42

## 2021-01-09 RX ADMIN — APIXABAN 5 MG: 5 TABLET, FILM COATED ORAL at 21:33

## 2021-01-09 RX ADMIN — DEXTROSE MONOHYDRATE 25 G: 500 INJECTION PARENTERAL at 14:53

## 2021-01-09 RX ADMIN — CASTOR OIL AND BALSAM, PERU: 788; 87 OINTMENT TOPICAL at 12:58

## 2021-01-09 RX ADMIN — IPRATROPIUM BROMIDE AND ALBUTEROL SULFATE 3 ML: .5; 3 SOLUTION RESPIRATORY (INHALATION) at 12:49

## 2021-01-09 RX ADMIN — METOPROLOL TARTRATE 50 MG: 50 TABLET, FILM COATED ORAL at 21:33

## 2021-01-09 RX ADMIN — ATORVASTATIN CALCIUM 20 MG: 20 TABLET, FILM COATED ORAL at 21:33

## 2021-01-09 RX ADMIN — INSULIN ASPART 8 UNITS: 100 INJECTION, SOLUTION INTRAVENOUS; SUBCUTANEOUS at 09:43

## 2021-01-09 RX ADMIN — CASTOR OIL AND BALSAM, PERU: 788; 87 OINTMENT TOPICAL at 21:32

## 2021-01-09 RX ADMIN — SODIUM CHLORIDE, PRESERVATIVE FREE 3 ML: 5 INJECTION INTRAVENOUS at 21:32

## 2021-01-09 RX ADMIN — ASPIRIN 81 MG: 81 TABLET, COATED ORAL at 09:42

## 2021-01-09 RX ADMIN — VANCOMYCIN HYDROCHLORIDE 750 MG: 1 INJECTION, POWDER, LYOPHILIZED, FOR SOLUTION INTRAVENOUS at 12:57

## 2021-01-09 RX ADMIN — CEFEPIME 2 G: 2 INJECTION, POWDER, FOR SOLUTION INTRAVENOUS at 09:42

## 2021-01-09 RX ADMIN — FOLIC ACID 1 MG: 1 TABLET ORAL at 17:50

## 2021-01-09 RX ADMIN — IPRATROPIUM BROMIDE AND ALBUTEROL SULFATE 3 ML: .5; 3 SOLUTION RESPIRATORY (INHALATION) at 18:29

## 2021-01-09 RX ADMIN — SODIUM CHLORIDE, PRESERVATIVE FREE 3 ML: 5 INJECTION INTRAVENOUS at 09:43

## 2021-01-09 RX ADMIN — IPRATROPIUM BROMIDE AND ALBUTEROL SULFATE 3 ML: .5; 3 SOLUTION RESPIRATORY (INHALATION) at 06:23

## 2021-01-09 RX ADMIN — Medication 1 CAPSULE: at 09:42

## 2021-01-09 RX ADMIN — CASTOR OIL AND BALSAM, PERU: 788; 87 OINTMENT TOPICAL at 21:33

## 2021-01-09 RX ADMIN — INSULIN ASPART 8 UNITS: 100 INJECTION, SOLUTION INTRAVENOUS; SUBCUTANEOUS at 12:59

## 2021-01-09 RX ADMIN — DEXTROSE AND SODIUM CHLORIDE 100 ML/HR: 5; 450 INJECTION, SOLUTION INTRAVENOUS at 16:59

## 2021-01-09 RX ADMIN — DEXTROSE MONOHYDRATE 25 G: 500 INJECTION PARENTERAL at 13:19

## 2021-01-09 RX ADMIN — IPRATROPIUM BROMIDE AND ALBUTEROL SULFATE 3 ML: .5; 3 SOLUTION RESPIRATORY (INHALATION) at 00:42

## 2021-01-09 RX ADMIN — PIPERACILLIN SODIUM AND TAZOBACTAM SODIUM 3.38 G: 3; .375 INJECTION, POWDER, LYOPHILIZED, FOR SOLUTION INTRAVENOUS at 21:19

## 2021-01-09 NOTE — PLAN OF CARE
Pt has rested throughout the shift. Pt does appear somewhat more confused and is slow to respond to my questions at times. Pt's glucoses have been elevated in the 300's but did have an episode of hypoglycemia earlier on previous shift. Spoke with PATRICE Hawkins and she only requested we do half the night-time dose of levemir(15 units) and monitor accordingly.  Encouraging interactions but pt appears agitated easily, especially during turns/wound care. Will continue to follow plan of care.

## 2021-01-09 NOTE — PROGRESS NOTES
PROGRESS NOTE         Patient Identification:  Name:  Ashley Galvez  Age:  75 y.o.  Sex:  female  :  1945  MRN:  [id number]  Visit Number:  [id number]  Primary Care Provider:  Rony Callaway MD         LOS: 9 days       ----------------------------------------------------------------------------------------------------------------------  Subjective       Chief Complaints:    Altered Mental Status        Interval History:      Patient resting comfortably in bed.  Currently on 2 L nasal cannula with no apparent distress.  WBC normal.  CRP improving at 0.81.  Afebrile, no diarrhea.     Review of Systems:    Constitutional: no fever, chills and night sweats.  Generalized fatigue.    Eyes: no eye drainage, itching or redness.  HEENT: no mouth sores, dysphagia or nose bleed.  Respiratory: no for shortness of breath, cough or production of sputum.  Cardiovascular: no chest pain, no palpitations, no orthopnea.  Gastrointestinal: no nausea, vomiting or diarrhea. No abdominal pain, hematemesis or rectal bleeding.  Genitourinary: no dysuria or polyuria.  Hematologic/lymphatic: no lymph node abnormalities, no easy bruising or easy bleeding.  Musculoskeletal: no muscle or joint pain.  Skin: No rash and no itching.  Neurological: no loss of consciousness, no seizure, no headache.  Behavioral/Psych: no depression or suicidal ideation.  Endocrine: no hot flashes.  Immunologic: negative.    ----------------------------------------------------------------------------------------------------------------------      Objective       Current Hospital Meds:  amLODIPine, 5 mg, Oral, Q24H  apixaban, 5 mg, Oral, Q12H  aspirin, 81 mg, Oral, Daily  atorvastatin, 20 mg, Oral, Nightly  castor oil-balsam peru, , Topical, Q12H  castor oil-balsam peru, , Topical, Q12H  cefepime, 2 g, Intravenous, Q12H  insulin aspart, 0-14 Units, Subcutaneous, TID AC  insulin aspart, 8 Units, Subcutaneous, TID With Meals  insulin  detemir, 30 Units, Subcutaneous, Nightly  ipratropium-albuterol, 3 mL, Nebulization, 4x Daily - RT  lactobacillus acidophilus, 1 capsule, Oral, Daily  lisinopril, 20 mg, Oral, Q24H  metoprolol tartrate, 50 mg, Oral, Q12H  pantoprazole, 40 mg, Oral, Daily  sodium chloride, 3 mL, Intravenous, Q12H  vancomycin, 750 mg, Intravenous, Q24H         ----------------------------------------------------------------------------------------------------------------------    Vital Signs:  Temp:  [97.4 °F (36.3 °C)-98 °F (36.7 °C)] 97.8 °F (36.6 °C)  Heart Rate:  [] 90  Resp:  [18-20] 18  BP: (129-154)/(63-92) 129/77  Mean Arterial Pressure (Non-Invasive) for the past 24 hrs (Last 3 readings):   Noninvasive MAP (mmHg)   01/09/21 1025 100   01/09/21 0628 128   01/09/21 0300 112     SpO2 Percentage    01/09/21 0628 01/09/21 0633 01/09/21 1025   SpO2: 97% 98% 97%     SpO2:  [96 %-98 %] 97 %  on  Flow (L/min):  [2] 2;   Device (Oxygen Therapy): nasal cannula    Body mass index is 30.73 kg/m².  Wt Readings from Last 3 Encounters:   01/09/21 78.7 kg (173 lb 8 oz)        Intake/Output Summary (Last 24 hours) at 1/9/2021 1220  Last data filed at 1/9/2021 0900  Gross per 24 hour   Intake 620 ml   Output 300 ml   Net 320 ml     Diet Soft Texture; Chopped; Thin; Consistent Carbohydrate  ----------------------------------------------------------------------------------------------------------------------      Physical Exam:    Constitutional: Chronically ill-appearing elderly lady.  No respiratory distress. Much more awake and alert today.     HENT:  Head: Normocephalic and atraumatic.  Mouth:  Moist mucous membranes.    Eyes:  Conjunctivae and EOM are normal.  No scleral icterus.  Neck:  Neck supple.  No JVD present.    Cardiovascular:   Regular rate.  Irregularly irregular rhythm.  No murmur auscultated.  Pulmonary/Chest:  No respiratory distress, no wheezes, no crackles, with normal breath sounds and good air movement.  Abdominal:   Soft.  Bowel sounds are normal.  No distension and no tenderness to palpation.  Musculoskeletal:  No edema, no tenderness, and no deformity.  No swelling or redness of joints.  Ulcerations of pretibial area which do not appear infectious.  Right ankle turns inward.  Neurological:  Alert and oriented to person, place, and time.  No facial droop.  No slurred speech.   Skin:  Skin is warm and dry.  No rash noted.  No pallor.  Onychomycosis.  Psychiatric: Normal mood and affect.  Behavior is normal.  ----------------------------------------------------------------------------------------------------------------------  Results from last 7 days   Lab Units 01/07/21  1222 01/07/21  1124   TROPONIN T ng/mL 0.033* 0.035*     Results from last 7 days   Lab Units 01/07/21  0656   PROBNP pg/mL 2,050.0*     Results from last 7 days   Lab Units 01/06/21  0102   CHOLESTEROL mg/dL 110   TRIGLYCERIDES mg/dL 144   HDL CHOL mg/dL 29*   LDL CHOL mg/dL 56     Results from last 7 days   Lab Units 01/08/21  1516   PH, ARTERIAL pH units 7.404   PO2 ART mm Hg 86.1   PCO2, ARTERIAL mm Hg 41.0   HCO3 ART mmol/L 25.6     Results from last 7 days   Lab Units 01/09/21  0443 01/08/21  0012 01/07/21  0656   CRP mg/dL 0.81* 2.94* 5.32*   WBC 10*3/mm3 9.85 12.43* 8.74   HEMOGLOBIN g/dL 10.4* 9.7* 8.9*   HEMATOCRIT % 34.5 32.9* 28.8*   MCV fL 102.4* 105.4* 102.9*   MCHC g/dL 30.1* 29.5* 30.9*   PLATELETS 10*3/mm3 403 324 299     Results from last 7 days   Lab Units 01/09/21  0443 01/08/21  0012 01/07/21  0656 01/06/21  0102  01/05/21  0151   SODIUM mmol/L 139 134* 132* 130*   < > 133*   POTASSIUM mmol/L 4.8 4.8 4.6 4.6  --  4.0   MAGNESIUM mg/dL  --   --   --  3.1*  --  1.5*   CHLORIDE mmol/L 106 105 104 103  --  107   CO2 mmol/L 22.6 17.2* 19.3* 17.3*  --  18.6*   BUN mg/dL 34* 51* 29* 22  --  27*   CREATININE mg/dL 0.98 1.19* 1.02* 0.98  --  1.05*   EGFR IF NONAFRICN AM mL/min/1.73 55* 44* 53* 55*  --  51*   CALCIUM mg/dL 9.3 9.5 8.9 8.3*  --  7.7*    GLUCOSE mg/dL 236* 223* 364* 377*  --  242*   ALBUMIN g/dL  --   --  2.64* 2.34*  --  2.35*   BILIRUBIN mg/dL  --   --  <0.2 <0.2  --  0.2   ALK PHOS U/L  --   --  67 71  --  65   AST (SGOT) U/L  --   --  6 9  --  7   ALT (SGPT) U/L  --   --  7 8  --  9    < > = values in this interval not displayed.   Estimated Creatinine Clearance: 50 mL/min (by C-G formula based on SCr of 0.98 mg/dL).  No results found for: AMMONIA    Glucose   Date/Time Value Ref Range Status   01/09/2021 1024 272 (H) 70 - 130 mg/dL Final   01/09/2021 0630 248 (H) 70 - 130 mg/dL Final   01/09/2021 0209 253 (H) 70 - 130 mg/dL Final   01/08/2021 2229 315 (H) 70 - 130 mg/dL Final   01/08/2021 1925 359 (H) 70 - 130 mg/dL Final   01/08/2021 1656 105 70 - 130 mg/dL Final   01/08/2021 1541 117 70 - 130 mg/dL Final   01/08/2021 1512 136 (H) 70 - 130 mg/dL Final     Lab Results   Component Value Date    HGBA1C 7.90 (H) 12/31/2020     Lab Results   Component Value Date    TSH 1.240 12/31/2020       Blood Culture   Date Value Ref Range Status   01/02/2021 No growth at 24 hours  Preliminary   01/02/2021 No growth at 24 hours  Preliminary   12/31/2020 No growth at 2 days  Preliminary   12/31/2020 Enterococcus faecalis (C)  Final     Comment:     Infectious disease consultation is highly recommended.     Urine Culture   Date Value Ref Range Status   12/31/2020 Culture in progress  Preliminary     No results found for: WOUNDCX  No results found for: STOOLCX  No results found for: RESPCX  Pain Management Panel     Pain Management Panel Latest Ref Rng & Units 12/31/2020    AMPHETAMINES SCREEN, URINE Negative Negative    BARBITURATES SCREEN Negative Negative    BENZODIAZEPINE SCREEN, URINE Negative Negative    BUPRENORPHINEUR Negative Negative    COCAINE SCREEN, URINE Negative Negative    METHADONE SCREEN, URINE Negative Negative             ----------------------------------------------------------------------------------------------------------------------  Imaging Results (Last 24 Hours)     ** No results found for the last 24 hours. **          ----------------------------------------------------------------------------------------------------------------------    Assessment/Plan       Assessment/Plan     ASSESSMENT:    1.  Sepsis  2.  Pyelonephritis  3.  Enterococcus bacteremia  4.  Aspiration pneumonia    PLAN:    Patient resting comfortably in bed.  Currently on 2 L nasal cannula with no apparent distress.  WBC normal.  CRP improving at 0.81.  Afebrile, no diarrhea.    Respiratory panel PCR negative.  CT of the chest from 1/7/2021 reports stable CT chest with large hiatal hernia andleft lower lobe atelectasis.    Urinalysis on 12/31/2020 was positive with WBCs too numerous to count.  Urine culture on 1/3/2021 finalized as Pseudomonas aeruginosa and Proteus mirabilis.      CT head without contrast on 12/31/2020 showed no acute intracranial abnormality.  CSF density mass of the right posterior fossa with mild mass-effect on the right cerebellar convexity, most likely an arachnoid cyst with recommendation to evaluate further with MRI without and with contrast.  Presumed chronic microvascular ischemic changes.     CT abdomen pelvis without contrast on 12/31/2020 showed atherosclerotic disease with probable vascular calcifications in both kidneys.  No ureteral stones as seen on either side, and there is no hydronephrosis.  Mild thickening of the urinary bladder wall may be due to incomplete distention or cystitis.  Large amount of stool in the rectal vault.  There is colonic diverticulosis without diverticulitis.  The appendix and small bowel are within normal limits.  Small left adrenal nodule, likely a benign adenoma in the absence of known malignancy.  This could be assessed with nonemergent adrenal protocol MRI or CT if indicated.   Cholecystectomy and hysterectomy.     Blood cultures on 12/31/2020 are so far showing 1 out of 2 with Enterococcus.  BC ID on 12/31/2020 finalized as Enterococcus.  Repeat blood cultures on 1/2/2021 are so far showing no growth.     COVID-19 and flu A/B PCR on 12/31/2020 was negative.     Patient has developed myoclonic jerks, concerning while on Cefepime therapy. Case discussed with primary team.  For now cefepime was changed to Zosyn 3.375 g IV every 8 hours to continue with vancomycin through 1/11/2021.  We will continue to follow closely and adjust antibiotic therapy as needed.    Code Status:   Code Status and Medical Interventions:   Ordered at: 12/31/20 6627     Level Of Support Discussed With:    Patient     Code Status:    CPR     Medical Interventions (Level of Support Prior to Arrest):    Full         TOMÁS Galaviz  01/09/21  12:20 EST

## 2021-01-09 NOTE — PROGRESS NOTES
LOS: 9 days     Name: Ashley Galvez  Age/Sex: 75 y.o. female  :  1945        PCP: Rony Callaway MD    Principal Problem:    Sepsis (CMS/[facility])      Admission Information: Ashley Galvez is a 75 y.o. female with a past medical history significant for CVA, essential hypertension, diabetes mellitus type 2 and anemia. Patient presented to the ED with complaints of confusion. She was found to have sepsis secondary to UTI and enterococcus bacteremia. Cardiology was consulted for possible atrial fibrillation. EKG showed ectopic atrial rhythm with frequent PAC's vs chaotic atrial rhythm. Patient denies any palpitations or chest pain.  is at bedside and denies any history of atrial fibrillation in the past. He reports that a couple years ago the patient started having weakness for unknown reasons and was unable to walk. MRI shows old CVA. She does not take blood thinners at home. No history of coronary artery disease. Echocardiogram showed normal LV function.     Chief Complaint: Follow-up atrial flutter, heart failure    Interval history: Patient now in sinus rhythm.  Patient was seen and examined.  She was at resting comfortably when I entered the room.  She denies any shortness of breath or chest pain.    Subjective     Vital Signs  Vital Signs (last 72 hrs)        0700  -   0659  07  -   0659 700  -   0659  07  -   1404   Most Recent    Temp (°F) 97 -  98.4    97.1 -  98.1    97.4 -  98.2      97.8     97.8 (36.6)    Heart Rate 81 -  125    83 -  114    70 -  104    87 -  90     87    Resp 18 -  25    18 -  21    18 -  20      18     18    /83 -  159/88    130/78 -  161/70    130/63 -  154/75      129/77     129/77    SpO2 (%) 92 -  99    96 -  99    96 -  98    96 -  97     96        Temp:  [97.4 °F (36.3 °C)-98 °F (36.7 °C)] 97.8 °F (36.6 °C)  Heart Rate:  [] 87  Resp:  [18-20] 18  BP: (129-154)/(63-92) 129/77  Body mass index is 30.73  kg/m².      Intake/Output Summary (Last 24 hours) at 1/9/2021 1404  Last data filed at 1/9/2021 1300  Gross per 24 hour   Intake 620 ml   Output 300 ml   Net 320 ml       Constitutional:       Appearance: Normal appearance. Well-developed.   HENT:      Head: Normocephalic and atraumatic.   Pulmonary:      Effort: Pulmonary effort is normal. No respiratory distress.      Breath sounds: No decreased breath sounds. Wheezing present. No rhonchi. No rales.   Cardiovascular:      Normal rate. Regular rhythm.      Comments: Minimal lower extremity edema  Skin:     General: Skin is warm and dry.   Psychiatric:         Behavior: Behavior is cooperative.         Telemetry: Sinus 60s and 70s     Results Review:     Results from last 7 days   Lab Units 01/09/21  0443 01/08/21  0012 01/07/21  0656 01/06/21  0102 01/05/21  0151 01/04/21  0140 01/03/21  0121   WBC 10*3/mm3 9.85 12.43* 8.74 9.41 10.03 9.61 9.66   HEMOGLOBIN g/dL 10.4* 9.7* 8.9* 8.7* 8.8* 8.5* 9.6*   PLATELETS 10*3/mm3 403 324 299 274 240 241 364     Results from last 7 days   Lab Units 01/09/21  0443 01/08/21  0012 01/07/21  0656 01/06/21  0102 01/05/21  0939 01/05/21  0151 01/04/21  0140 01/03/21  0121   SODIUM mmol/L 139 134* 132* 130* 134* 133* 142 154*   POTASSIUM mmol/L 4.8 4.8 4.6 4.6  --  4.0 3.9 4.7   CHLORIDE mmol/L 106 105 104 103  --  107 114* 128*   CO2 mmol/L 22.6 17.2* 19.3* 17.3*  --  18.6* 18.6* 16.8*   BUN mg/dL 34* 51* 29* 22  --  27* 27* 32*   CREATININE mg/dL 0.98 1.19* 1.02* 0.98  --  1.05* 0.91 0.95   CALCIUM mg/dL 9.3 9.5 8.9 8.3*  --  7.7* 7.3* 8.4*   GLUCOSE mg/dL 236* 223* 364* 377*  --  242* 265* 249*     Results from last 7 days   Lab Units 01/07/21  1222 01/07/21  1124   TROPONIN T ng/mL 0.033* 0.035*             I reviewed the patient's new clinical results.  I reviewed the patient's new imaging results and agree with the interpretation.  I personally viewed and interpreted the patient's EKG/Telemetry data      Medication Review:      amLODIPine, 5 mg, Oral, Q24H  apixaban, 5 mg, Oral, Q12H  aspirin, 81 mg, Oral, Daily  atorvastatin, 20 mg, Oral, Nightly  castor oil-balsam peru, , Topical, Q12H  castor oil-balsam peru, , Topical, Q12H  insulin aspart, 0-14 Units, Subcutaneous, TID AC  insulin aspart, 8 Units, Subcutaneous, TID With Meals  insulin detemir, 30 Units, Subcutaneous, Nightly  ipratropium-albuterol, 3 mL, Nebulization, 4x Daily - RT  lactobacillus acidophilus, 1 capsule, Oral, Daily  lisinopril, 20 mg, Oral, Q24H  metoprolol tartrate, 50 mg, Oral, Q12H  pantoprazole, 40 mg, Oral, Daily  piperacillin-tazobactam, 3.375 g, Intravenous, Once  piperacillin-tazobactam, 3.375 g, Intravenous, Q8H  sodium chloride, 3 mL, Intravenous, Q12H  vancomycin, 750 mg, Intravenous, Q24H           Assessment:  1. Paroxysmal atrial flutter/fibrillation, [id number] score of at least 5, on Eliquis, currently sinus rhythm  2. Acute diastolic heart failure  3. Essential hypertension, overall controlled  4. History of CVA  5. Sepsis secondary to UTI and bacteremia      Recommendations:  1. Patient continues in normal sinus rhythm.  Continue Eliquis for stroke prophylaxis.  Metoprolol for rate control.  2. Kidney function has improved.  We will add oral Bumex.      I discussed the patients findings and my recommendations with patient and family        Alina Hernandez, TOMÁS  01/09/21  14:04 EST    Addendum:

## 2021-01-09 NOTE — PHARMACY PATIENT ASSISTANCE
Follow up to evaluation of new medication Eliquis for patient for affordability. The patients spouse has been contacted and reported that a $47 co-pay is affordable.      Thank you,    Laura Hamlin. Sammie, PharmD  01/09/21  09:58 EST

## 2021-01-09 NOTE — PLAN OF CARE
Goal Outcome Evaluation:  Plan of Care Reviewed With: patient  Progress: improving    Patient has been complaint with all medications are care today. Patient continues to speak few words. Nursing staff engaged in conversations with patient during shift but patient did not respond. Patient's glucose has been very hard to manage today. MD made aware and has been at bedside several times during shift. 4 amps of D50W were pushed and patient is now receiving a D5% & 0.45% infusion. Patient's last glucose check was 129. Will continue to monitor hourly as ordered. Patient is still on 2 liters, saturations are maintaining above 93%. Patient's appetite has been poor during shift. She ate less than 20% of dinner, staff assisted and encouraged patient without success. No other complaints or concerns at this time. Will continue to monitor and follow plan of care.

## 2021-01-09 NOTE — PROGRESS NOTES
Patient has continued to have some difficulties with hypoglycemia, I am checking a cortisol level, if under 18 we will do a full cosyntropin test, I have stopped all her insulin, I asked nursing to call if glucose greater than 350, she has been started on a dextrose drip, will continue to follow closely.

## 2021-01-09 NOTE — PROGRESS NOTES
Patient Identification:  Name:  Ashley Galvez  Age:  75 y.o.  Sex:  female  :  1945  MRN:  [id number]  Visit Number:  [id number]  Primary Care Provider:  Rony Callaway MD    Length of stay:  9    Subjective/Interval History/Consultants/Procedures     Chief complaint: Leukocytosis; Follow-up severe sepsis, bacteremia with Enterococcus, UTI with Pseudomonas and Proteus    HPI/Hospital course:    Mrs. Galvez is a 75-year-old  female with PMH significant for diabetes mellitus, lacunar infarct, essential hypertension, and unspecified anemia. She initially presented to the ED of [facility] on 20 with chief complaint of confusion.  She was reportedly previously at [facility] and discharged home wit pneumonia prior to her presentation. On admission, she encephalopathic and admitted to the PCU for further evaluation with KAY and Sepsis 2/2 UTI.     In the interim, one of two blood cultures was positive with ultimately growth of Enterococcus in one of two cultures (2020).  ID was consulted for further IV abx guidance with IV Vancomycin started.  Rocephin was initially continued given concern for underlying UTI.  Urine culture ultimately grew Proteus mirabilis and Pseudomonas aeruginosa.  IV cephalosporin coverage was transitioned to IV Cefepime for further treatment.      Metabolic encephalopathy improved during admission with CT demonstrating possible arachnoid cyst.  MRI was then performed and revealed CSF density process involving the right cerebellar hemisphere which is most probably intra-axial in location and features on the MRI compatible with sequela of remote infarct with extensive encephalomalacia changes.  There is also marked left mastoiditis-itis and a chronic appearing infarct in the right occipital lobe with cortical sclerosis and mild chronic appearing ethmoid sinusitis.    Given debility of nearly 2 years, physical and occupational therapies were consulted during  hospitalization.      There was some concern for abnormal EKG with ectopic atrial rhythm with frequent PACs vs. Chaotic atrial rhythm with cardiology consultationp [place].  It was recommend she undergo event monitoring at the time of discharge for around two weeks with further outpatient cardiology follow-up.  She later had runs of atrial in the AM of 1/6 with full dose anticoagulation started given concern of underlying arrhythmia as source of possible prior CVA.      Additionally, given worsening dyspnea with right-side wheezing, CT chest and  Neck were ordered on 1/6/21.  Methylprednisolone was started for short duration, but quickly discontinued given difficulty tolerating due to hyperglycemia.  CT neck revealed calcification vs. Possible foreign body.      VQ scan and venous dopplers were ordered on 1/7 to rule out PE and US venous doppler. VQ revealed low probability and US doppler without evidence of known DVT.      On 1/8/2021, she did develop some worsening lethargy.  She was found to have hypoglycemia with glucose in the 30s after several days of hyperglycemia requiring upward titration of insulin.      Subjective:    Mrs. Galvez is resting in bed without complaints. She reports she does not feel well today.      Discussed with AM MIGEL Jacome without known acute events overnight.         ----------------------------------------------------------------------------------------------------------------------  Current [facility] Meds:  amLODIPine, 5 mg, Oral, Q24H  apixaban, 5 mg, Oral, Q12H  aspirin, 81 mg, Oral, Daily  atorvastatin, 20 mg, Oral, Nightly  castor oil-balsam peru, , Topical, Q12H  castor oil-balsam peru, , Topical, Q12H  cefepime, 2 g, Intravenous, Q12H  insulin aspart, 0-14 Units, Subcutaneous, TID AC  insulin aspart, 8 Units, Subcutaneous, TID With Meals  insulin detemir, 30 Units, Subcutaneous, Nightly  ipratropium-albuterol, 3 mL, Nebulization, 4x Daily - RT  lactobacillus acidophilus, 1 capsule,  Oral, Daily  lisinopril, 20 mg, Oral, Q24H  metoprolol tartrate, 50 mg, Oral, Q12H  pantoprazole, 40 mg, Oral, Daily  sodium chloride, 3 mL, Intravenous, Q12H  vancomycin, 750 mg, Intravenous, Q24H         ----------------------------------------------------------------------------------------------------------------------      Objective     Vital Signs:  Temp:  [97.4 °F (36.3 °C)-98.2 °F (36.8 °C)] 97.8 °F (36.6 °C)  Heart Rate:  [] 90  Resp:  [18-20] 18  BP: (129-154)/(63-92) 129/77      01/07/21  0300 01/08/21  0300 01/09/21  0508   Weight: 81.6 kg (179 lb 12.8 oz) 80.4 kg (177 lb 3.2 oz) 78.7 kg (173 lb 8 oz)     Body mass index is 30.73 kg/m².    Intake/Output Summary (Last 24 hours) at 1/9/2021 1048  Last data filed at 1/9/2021 0900  Gross per 24 hour   Intake 620 ml   Output 300 ml   Net 320 ml     I/O this shift:  In: 20 [P.O.:20]  Out: -   Diet Soft Texture; Chopped; Thin; Consistent Carbohydrate  ----------------------------------------------------------------------------------------------------------------------    Physical Exam  Vitals signs and nursing note reviewed.   Constitutional:       Appearance: She is well-developed and well-groomed. She is not ill-appearing.      Interventions: She is not intubated.Nasal cannula in place.   HENT:      Head: Normocephalic and atraumatic.      Mouth/Throat:      Lips: Pink.      Mouth: Mucous membranes are moist.      Dentition: Abnormal dentition. Dental caries present.      Tongue: No lesions.      Pharynx: No pharyngeal swelling or posterior oropharyngeal erythema.      Comments: Difficulty visualizing tonsils  Eyes:      General: Lids are normal.      Conjunctiva/sclera:      Right eye: Right conjunctiva is not injected.      Left eye: Left conjunctiva is not injected.   Neck:      Musculoskeletal: Normal range of motion.      Thyroid: No thyroid mass.   Cardiovascular:      Rate and Rhythm: Normal rate and regular rhythm.      Heart sounds: S1 normal  and S2 normal.   Pulmonary:      Effort: No tachypnea, bradypnea, accessory muscle usage, prolonged expiration, respiratory distress or retractions. She is not intubated.      Breath sounds: No decreased air movement. Examination of the right-lower field reveals decreased breath sounds. Examination of the left-lower field reveals decreased breath sounds. Decreased breath sounds present. No wheezing, rhonchi or rales.   Abdominal:      General: Bowel sounds are normal.      Palpations: Abdomen is soft.      Tenderness: There is no abdominal tenderness.   Musculoskeletal:      Right lower leg: No edema.      Left lower leg: No edema.   Skin:     General: Skin is warm and dry.      Comments: Bilateral lower extremities wrapped.     Neurological:      Mental Status: She is alert.      Comments: Alert. Follows commands.  Reports she is fine.     Psychiatric:         Attention and Perception: Attention normal.         Mood and Affect: Mood normal. Affect is flat.               Nasal cannula in place:Yes       ----------------------------------------------------------------------------------------------------------------------  Tele:                       ----------------------------------------------------------------------------------------------------------------------  Results from last 7 days   Lab Units 01/07/21  1222 01/07/21  1124 01/07/21  0656   TROPONIN T ng/mL 0.033* 0.035*  --    PROBNP pg/mL  --   --  2,050.0*     Results from last 7 days   Lab Units 01/09/21  0443 01/08/21  0012 01/07/21  0656   CRP mg/dL 0.81* 2.94* 5.32*   WBC 10*3/mm3 9.85 12.43* 8.74   HEMOGLOBIN g/dL 10.4* 9.7* 8.9*   HEMATOCRIT % 34.5 32.9* 28.8*   MCV fL 102.4* 105.4* 102.9*   MCHC g/dL 30.1* 29.5* 30.9*   PLATELETS 10*3/mm3 403 324 299     Results from last 7 days   Lab Units 01/08/21  1516   PH, ARTERIAL pH units 7.404   PO2 ART mm Hg 86.1   PCO2, ARTERIAL mm Hg 41.0   HCO3 ART mmol/L 25.6     Results from last 7 days   Lab Units  01/09/21  0443 01/08/21  0012 01/07/21  0656 01/06/21  0102  01/05/21  0151   SODIUM mmol/L 139 134* 132* 130*   < > 133*   POTASSIUM mmol/L 4.8 4.8 4.6 4.6  --  4.0   MAGNESIUM mg/dL  --   --   --  3.1*  --  1.5*   CHLORIDE mmol/L 106 105 104 103  --  107   CO2 mmol/L 22.6 17.2* 19.3* 17.3*  --  18.6*   BUN mg/dL 34* 51* 29* 22  --  27*   CREATININE mg/dL 0.98 1.19* 1.02* 0.98  --  1.05*   EGFR IF NONAFRICN AM mL/min/1.73 55* 44* 53* 55*  --  51*   CALCIUM mg/dL 9.3 9.5 8.9 8.3*  --  7.7*   GLUCOSE mg/dL 236* 223* 364* 377*  --  242*   ALBUMIN g/dL  --   --  2.64* 2.34*  --  2.35*   BILIRUBIN mg/dL  --   --  <0.2 <0.2  --  0.2   ALK PHOS U/L  --   --  67 71  --  65   AST (SGOT) U/L  --   --  6 9  --  7   ALT (SGPT) U/L  --   --  7 8  --  9    < > = values in this interval not displayed.   Estimated Creatinine Clearance: 50 mL/min (by C-G formula based on SCr of 0.98 mg/dL).  No results found for: AMMONIA  Results from last 7 days   Lab Units 01/06/21  0102   CHOLESTEROL mg/dL 110   TRIGLYCERIDES mg/dL 144   HDL CHOL mg/dL 29*   LDL CHOL mg/dL 56     Blood Culture   Date Value Ref Range Status   01/02/2021 No growth at 4 days  Preliminary   01/02/2021 No growth at 4 days  Preliminary   12/31/2020 No growth at 5 days  Final   12/31/2020 Enterococcus faecalis (C)  Final     Comment:     Infectious disease consultation is highly recommended.     Urine Culture   Date Value Ref Range Status   12/31/2020 >100,000 CFU/mL Pseudomonas aeruginosa (A)  Final   12/31/2020 50,000 CFU/mL Proteus mirabilis (A)  Final     No results found for: WOUNDCX  No results found for: STOOLCX  ----------------------------------------------------------------------------------------------------------------------  Imaging Results (Last 24 Hours)     ** No results found for the last 24 hours. **          Microbiology Results (last 10 days)     Procedure Component Value - Date/Time    Respiratory Panel, PCR (WITHOUT COVID) - Swab, Nasopharynx  [[id number]]  (Normal) Collected: 01/06/21 1433    Lab Status: Final result Specimen: Swab from Nasopharynx Updated: 01/06/21 1601     ADENOVIRUS, PCR Not Detected     Coronavirus 229E Not Detected     Coronavirus HKU1 Not Detected     Coronavirus NL63 Not Detected     Coronavirus OC43 Not Detected     Human Metapneumovirus Not Detected     Human Rhinovirus/Enterovirus Not Detected     Influenza B PCR Not Detected     Parainfluenza Virus 1 Not Detected     Parainfluenza Virus 2 Not Detected     Parainfluenza Virus 3 Not Detected     Parainfluenza Virus 4 Not Detected     Bordetella pertussis pcr Not Detected     Chlamydophila pneumoniae PCR Not Detected     Mycoplasma pneumo by PCR Not Detected     Influenza A PCR Not Detected     RSV, PCR Not Detected    Narrative:      The coronavirus on the RVP is NOT COVID-19 and is NOT indicative of infection with COVID-19.     Blood Culture - Blood, Arm, Left [[id number]] Collected: 01/02/21 0814    Lab Status: Final result Specimen: Blood from Arm, Left Updated: 01/07/21 0845     Blood Culture No growth at 5 days    Blood Culture - Blood, Hand, Left [[id number]] Collected: 01/02/21 0814    Lab Status: Final result Specimen: Blood from Hand, Left Updated: 01/07/21 0845     Blood Culture No growth at 5 days    COVID-19 and FLU A/B PCR - Swab, Nasopharynx [[id number]]  (Normal) Collected: 12/31/20 2323    Lab Status: Final result Specimen: Swab from Nasopharynx Updated: 12/31/20 2348     COVID19 Not Detected     Influenza A PCR Not Detected     Influenza B PCR Not Detected    Narrative:      Fact sheet for providers: https://www.fda.gov/media/934053/download    Fact sheet for patients: https://www.fda.gov/media/836148/download    Test performed by PCR.    Urine Culture - Urine, Urine, Catheter [[id number]]  (Abnormal)  (Susceptibility) Collected: 12/31/20 2011    Lab Status: Final result Specimen: Urine, Catheter Updated: 01/03/21 1152     Urine Culture >100,000 CFU/mL Pseudomonas  aeruginosa      50,000 CFU/mL Proteus mirabilis    Susceptibility      Pseudomonas aeruginosa     RAVEN     Cefepime Susceptible     Ceftazidime Susceptible     Ciprofloxacin Susceptible     Gentamicin Susceptible     Levofloxacin Susceptible     Piperacillin + Tazobactam Susceptible                Susceptibility      Proteus mirabilis     RAVEN     Ampicillin Susceptible     Ampicillin + Sulbactam Susceptible     Cefazolin Susceptible     Cefepime Susceptible     Ceftazidime Susceptible     Ceftriaxone Susceptible     Gentamicin Susceptible     Levofloxacin Resistant     Nitrofurantoin Resistant     Piperacillin + Tazobactam Susceptible     Tetracycline Resistant     Trimethoprim + Sulfamethoxazole Susceptible                    Blood Culture - Blood, Arm, Right [[id number]] Collected: 12/31/20 2009    Lab Status: Final result Specimen: Blood from Arm, Right Updated: 01/05/21 2030     Blood Culture No growth at 5 days    Blood Culture - Blood, Arm, Left [[id number]]  (Abnormal)  (Susceptibility) Collected: 12/31/20 2009    Lab Status: Final result Specimen: Blood from Arm, Left Updated: 01/03/21 0639     Blood Culture Enterococcus faecalis     Comment: Infectious disease consultation is highly recommended.        Isolated from Aerobic Bottle     Gram Stain Aerobic Bottle Gram positive cocci in pairs and chains    Susceptibility      Enterococcus faecalis     RAVEN     Ampicillin Susceptible     Gentamicin High Level Synergy Susceptible     Vancomycin Susceptible                    Blood Culture ID, PCR - Blood, Arm, Left [[id number]]  (Abnormal) Collected: 12/31/20 2009    Lab Status: Final result Specimen: Blood from Arm, Left Updated: 01/01/21 1411     BCID, PCR Enterococcus spp. Identification by BCID PCR.     BOTTLE TYPE Aerobic Bottle          ----------------------------------------------------------------------------------------------------------------------   I have reviewed the above laboratory values for  01/09/21    Assessment/Plan     Active Hospital Problems    Diagnosis POA   • **Sepsis (CMS/[facility]) [A41.9] Yes         ASSESSMENT/PLAN:  · Severe sepsis, present on admission,  With RR>20, HR>90, elevated C-RP, UTI, bacteremia, and metabolic encephalopathy:  -Blood and urine cultures reviewed.   -Continue vital signs per floor protocol.   -Continue monitoring daily CBC & temperature curve.   -Continue IV Vancomycin & IV cefepime as outlined.   -ID input appreciated.   -IV flagyl added on 1/6 given possible aspiration.   -CT chest ordered with atelectasis from large hiatal hernia.     · Enterococcus bacteremia:  -Continue IV Vancomycin.  -12/31 blood cultures reviewed (1/2 + as outlined).  -1/2/2021 blood cultures reviewed (unremarkable).    -Continue daily CBC & C-RP.   -C-RP is improving.   -WBC slightly increased on 1/8 but likely 2/2 recent steroids.      · Proteus/Pseudomonas UTI:   -Urine culture reviewed.   -Continue IV Cefepime.   -C-RP is improving    · Leukocytosis:  -Likely reactive to recent steroids.  Continue to follow.   -Afebrile with improved heart rates.   -C-RP is improving.     · 5.6mm calficified structure vs. Foreign Body:  -Called Outpatient clinic with no ENT coverage until 1/12/2021  -SLP consult placed for possible FEES    · Worsening tachypnea overnight with question of pneumonia:   -CXR without acute infiltrate and with possible left pleural effusion.  -IV Flagyl added on 1/6 per ID with input appreciated.   -ABG with some hyperventilation.   -Continue HCTZ for now.   -CT chest/CT neck ordered and pending.   -Respiratory PCR unremarkable.      · Runs of atrial flutter:  -[id number] at least 6 with prior CVA, CHF, DM, female, age, HTN.   -Eliquis started per Cardiology.  -Continue cardiac monitoring.   -VQ with low probability.    -US venous doppler without evidence of known DVT.    -Metoprolol started on 1/7 and further increased on 1/8.            · Uncontrolled hypertension:  -Continue  Lisinopril with hctz discontinued.   -Restarted home Norvasc at lower dose of 5mg qd.on 1/6 with improvement.   -Metoprolol 25mg BID added 1/7 to assist with rate control, but should also impact blood pressure to some degree. Increased to 50mg BID on 1/8 when HCTZ discontinued.       -Monitor blood pressures per floor protocol       · Hypoglycemia on 1/9/21 with prior Hyperglycemia in the setting of DM 2:   -Hemoglobin a1c 7.90.   -FSBG ACHS with SSI PRN.  -Given variations in glucoses and ongoing hypoglycemia after days of hyperglycemia, basal insulin was stopped.  Meal time insulin was stopped.    -Sliding scale insulin was decreased to low dose.   -Hypoglycemic protocol remained in place.    -If low glucoses persist, will evaluate fluids and consider possible d5.      · Acute on chronic weakness/debility:  -Continue PT/OT.  -SLP evaluation performed.       · CVA by MRI:   -Continue ASA and statin.   -MRI reviewed.   -US carotid doppler with moderate stenosis in the left carotid system.  -Plan for outpatient event monitoring with outpatient Cardiology follow-up.   -Lipid panel reviewed.     · Grade II diastolic dysfunction:   -Echocardiogram reviewed from 1/1/2021.   -CXR with bibasilar atelectasis and small left pleural effusion.   -ABG reviewed from 1/5/21 @ 2321 with some hyperventilation.   -CT chest report reviewed.      · Pseudohyponatrmia with sodium correcting to 137.3:   -Treat glucose as outlined.   -Home HCTZ stopped given concern for development of possible intravascular depletion with worsening renal function on 1/8 with improvement after stopping HCTZ.    -BMP reviewed.     · 7mm Noncalcified nodule in RLL:  -CT chest follow-up recommend in 6 months.      · Left adrenal nodule:   -Further outpatient follow-up recommended with adrenal CT protocol or MRI on nonemergent basis recommend.     · KAY vs. KAY on CKD IIIb on admission:   -Creatinine 1.19 on 1/8/21.     -Home ACEI/HCTZ restarted on 1/2/21.    -HCTZ component discontinued on 1/8 with rising creatinine with improvement in renal function on 1/9.   -Lisinopril 20mg component added alone with HCTZ discontinued.      · Metabolic encephalopathy, improved:  -CT head report reviewed.   -MRI report reviewed.   -Mentation improved from time of admission.     · Chronic macrocytic anemia:  -TSH acceptable on admission.  -Folate acceptable but borderline low.    -Vitamin b12 acceptable.    -Daily CBC reviewed.   -No known overt blood loss.     -----------  -DVT prophylaxis: Eliquis to serve  -GI prophylaxis: PPI/Lactobacillus  -Activity: Up with assist  -Disposition: Remains hospitalized for further IV abx and tehrapies  -Anticipated dispo needs:  Event monitor for 2 weeks.  Outpatient Cards follow-up. Outpatient CT chest follow-up in 6 months. Outpatienet CT adrenal protocol or MRI. SS on board as spouse is now interested in possible rehab.   -Diet:   Dietary Orders (From admission, onward)     Start     Ordered    01/06/21 0929  Diet Soft Texture; Chopped; Thin; Consistent Carbohydrate  Diet Effective Now     Question Answer Comment   Diet Texture / Consistency Soft Texture    Select Texture: Chopped    Fluid Consistency Thin    Common Modifiers Consistent Carbohydrate        01/06/21 0929 01/01/21 1800  Dietary Nutrition Supplements Boost Plus (Ensure Enlive, Ensure Plus)  Daily With Breakfast, Lunch & Dinner     Question:  Select Supplement  Answer:  Boost Plus (Ensure Enlive, Ensure Plus)    01/01/21 1597                    The patient is high risk due to the following diagnoses/reasons:  Severe sepsis, bacteremia, UTI, recent pneumonia     Gale Ozuna PA-C  01/09/21  10:48 EST  Pager # [phone]

## 2021-01-10 LAB
ANION GAP SERPL CALCULATED.3IONS-SCNC: 12.5 MMOL/L (ref 5–15)
BASOPHILS # BLD AUTO: 0.05 10*3/MM3 (ref 0–0.2)
BASOPHILS NFR BLD AUTO: 0.5 % (ref 0–1.5)
BUN SERPL-MCNC: 30 MG/DL (ref 8–23)
BUN/CREAT SERPL: 32.6 (ref 7–25)
CALCIUM SPEC-SCNC: 9.1 MG/DL (ref 8.6–10.5)
CHLORIDE SERPL-SCNC: 107 MMOL/L (ref 98–107)
CO2 SERPL-SCNC: 21.5 MMOL/L (ref 22–29)
CORTIS SERPL-MCNC: 16.28 MCG/DL
CORTIS SERPL-MCNC: 26.39 MCG/DL
CORTIS SERPL-MCNC: 31.03 MCG/DL
CREAT SERPL-MCNC: 0.92 MG/DL (ref 0.57–1)
CRP SERPL-MCNC: 0.59 MG/DL (ref 0–0.5)
DEPRECATED RDW RBC AUTO: 56.4 FL (ref 37–54)
EOSINOPHIL # BLD AUTO: 0.41 10*3/MM3 (ref 0–0.4)
EOSINOPHIL NFR BLD AUTO: 4.4 % (ref 0.3–6.2)
ERYTHROCYTE [DISTWIDTH] IN BLOOD BY AUTOMATED COUNT: 14.3 % (ref 12.3–15.4)
GFR SERPL CREATININE-BSD FRML MDRD: 60 ML/MIN/1.73
GLUCOSE BLDC GLUCOMTR-MCNC: 132 MG/DL (ref 70–130)
GLUCOSE BLDC GLUCOMTR-MCNC: 276 MG/DL (ref 70–130)
GLUCOSE BLDC GLUCOMTR-MCNC: 342 MG/DL (ref 70–130)
GLUCOSE BLDC GLUCOMTR-MCNC: 361 MG/DL (ref 70–130)
GLUCOSE BLDC GLUCOMTR-MCNC: 388 MG/DL (ref 70–130)
GLUCOSE BLDC GLUCOMTR-MCNC: 398 MG/DL (ref 70–130)
GLUCOSE BLDC GLUCOMTR-MCNC: 404 MG/DL (ref 70–130)
GLUCOSE BLDC GLUCOMTR-MCNC: 404 MG/DL (ref 70–130)
GLUCOSE BLDC GLUCOMTR-MCNC: 464 MG/DL (ref 70–130)
GLUCOSE SERPL-MCNC: 94 MG/DL (ref 65–99)
HCT VFR BLD AUTO: 34.5 % (ref 34–46.6)
HGB BLD-MCNC: 10 G/DL (ref 12–15.9)
IMM GRANULOCYTES # BLD AUTO: 0.03 10*3/MM3 (ref 0–0.05)
IMM GRANULOCYTES NFR BLD AUTO: 0.3 % (ref 0–0.5)
LYMPHOCYTES # BLD AUTO: 2.08 10*3/MM3 (ref 0.7–3.1)
LYMPHOCYTES NFR BLD AUTO: 22.2 % (ref 19.6–45.3)
MCH RBC QN AUTO: 31 PG (ref 26.6–33)
MCHC RBC AUTO-ENTMCNC: 29 G/DL (ref 31.5–35.7)
MCV RBC AUTO: 106.8 FL (ref 79–97)
MONOCYTES # BLD AUTO: 1.15 10*3/MM3 (ref 0.1–0.9)
MONOCYTES NFR BLD AUTO: 12.2 % (ref 5–12)
NEUTROPHILS NFR BLD AUTO: 5.67 10*3/MM3 (ref 1.7–7)
NEUTROPHILS NFR BLD AUTO: 60.4 % (ref 42.7–76)
NRBC BLD AUTO-RTO: 0 /100 WBC (ref 0–0.2)
PLATELET # BLD AUTO: 391 10*3/MM3 (ref 140–450)
PMV BLD AUTO: 9.7 FL (ref 6–12)
POTASSIUM SERPL-SCNC: 4.6 MMOL/L (ref 3.5–5.2)
RBC # BLD AUTO: 3.23 10*6/MM3 (ref 3.77–5.28)
SODIUM SERPL-SCNC: 141 MMOL/L (ref 136–145)
WBC # BLD AUTO: 9.39 10*3/MM3 (ref 3.4–10.8)

## 2021-01-10 PROCEDURE — 99232 SBSQ HOSP IP/OBS MODERATE 35: CPT | Performed by: INTERNAL MEDICINE

## 2021-01-10 PROCEDURE — 99232 SBSQ HOSP IP/OBS MODERATE 35: CPT | Performed by: NURSE PRACTITIONER

## 2021-01-10 PROCEDURE — 86140 C-REACTIVE PROTEIN: CPT | Performed by: NURSE PRACTITIONER

## 2021-01-10 PROCEDURE — 80048 BASIC METABOLIC PNL TOTAL CA: CPT | Performed by: PHYSICIAN ASSISTANT

## 2021-01-10 PROCEDURE — 94799 UNLISTED PULMONARY SVC/PX: CPT

## 2021-01-10 PROCEDURE — 25010000002 PIPERACILLIN SOD-TAZOBACTAM PER 1 G: Performed by: NURSE PRACTITIONER

## 2021-01-10 PROCEDURE — 85025 COMPLETE CBC W/AUTO DIFF WBC: CPT | Performed by: PHYSICIAN ASSISTANT

## 2021-01-10 PROCEDURE — 63710000001 INSULIN ASPART PER 5 UNITS: Performed by: INTERNAL MEDICINE

## 2021-01-10 PROCEDURE — 82533 TOTAL CORTISOL: CPT | Performed by: HOSPITALIST

## 2021-01-10 PROCEDURE — 25010000002 COSYNTROPIN PER 0.25 MG: Performed by: HOSPITALIST

## 2021-01-10 PROCEDURE — 25010000002 VANCOMYCIN: Performed by: INTERNAL MEDICINE

## 2021-01-10 PROCEDURE — 82962 GLUCOSE BLOOD TEST: CPT

## 2021-01-10 PROCEDURE — 63710000001 INSULIN ASPART PER 5 UNITS: Performed by: PHYSICIAN ASSISTANT

## 2021-01-10 RX ORDER — COSYNTROPIN 0.25 MG/ML
0.25 INJECTION, POWDER, FOR SOLUTION INTRAMUSCULAR; INTRAVENOUS ONCE
Status: COMPLETED | OUTPATIENT
Start: 2021-01-10 | End: 2021-01-10

## 2021-01-10 RX ORDER — NICOTINE POLACRILEX 4 MG
15 LOZENGE BUCCAL
Status: DISCONTINUED | OUTPATIENT
Start: 2021-01-10 | End: 2021-01-14 | Stop reason: HOSPADM

## 2021-01-10 RX ORDER — DEXTROSE MONOHYDRATE 25 G/50ML
25 INJECTION, SOLUTION INTRAVENOUS
Status: DISCONTINUED | OUTPATIENT
Start: 2021-01-10 | End: 2021-01-14 | Stop reason: HOSPADM

## 2021-01-10 RX ADMIN — INSULIN ASPART 5 UNITS: 100 INJECTION, SOLUTION INTRAVENOUS; SUBCUTANEOUS at 20:43

## 2021-01-10 RX ADMIN — DEXTROSE AND SODIUM CHLORIDE 100 ML/HR: 5; 450 INJECTION, SOLUTION INTRAVENOUS at 08:54

## 2021-01-10 RX ADMIN — LISINOPRIL 20 MG: 10 TABLET ORAL at 08:04

## 2021-01-10 RX ADMIN — IPRATROPIUM BROMIDE AND ALBUTEROL SULFATE 3 ML: .5; 3 SOLUTION RESPIRATORY (INHALATION) at 06:09

## 2021-01-10 RX ADMIN — METOPROLOL TARTRATE 25 MG: 25 TABLET, FILM COATED ORAL at 11:02

## 2021-01-10 RX ADMIN — ATORVASTATIN CALCIUM 20 MG: 20 TABLET, FILM COATED ORAL at 20:43

## 2021-01-10 RX ADMIN — CASTOR OIL AND BALSAM, PERU: 788; 87 OINTMENT TOPICAL at 20:44

## 2021-01-10 RX ADMIN — BUMETANIDE 1 MG: 1 TABLET ORAL at 08:04

## 2021-01-10 RX ADMIN — METOPROLOL TARTRATE 50 MG: 50 TABLET, FILM COATED ORAL at 08:04

## 2021-01-10 RX ADMIN — PIPERACILLIN SODIUM AND TAZOBACTAM SODIUM 3.38 G: 3; .375 INJECTION, POWDER, LYOPHILIZED, FOR SOLUTION INTRAVENOUS at 11:12

## 2021-01-10 RX ADMIN — CASTOR OIL AND BALSAM, PERU: 788; 87 OINTMENT TOPICAL at 08:03

## 2021-01-10 RX ADMIN — INSULIN ASPART 8 UNITS: 100 INJECTION, SOLUTION INTRAVENOUS; SUBCUTANEOUS at 16:34

## 2021-01-10 RX ADMIN — INSULIN ASPART 5 UNITS: 100 INJECTION, SOLUTION INTRAVENOUS; SUBCUTANEOUS at 12:12

## 2021-01-10 RX ADMIN — INSULIN ASPART 8 UNITS: 100 INJECTION, SOLUTION INTRAVENOUS; SUBCUTANEOUS at 18:15

## 2021-01-10 RX ADMIN — AMLODIPINE BESYLATE 5 MG: 5 TABLET ORAL at 08:03

## 2021-01-10 RX ADMIN — COSYNTROPIN 0.25 MG: 0.25 INJECTION, POWDER, LYOPHILIZED, FOR SOLUTION INTRAMUSCULAR; INTRAVENOUS at 07:41

## 2021-01-10 RX ADMIN — METOPROLOL TARTRATE 75 MG: 50 TABLET, FILM COATED ORAL at 20:43

## 2021-01-10 RX ADMIN — APIXABAN 5 MG: 5 TABLET, FILM COATED ORAL at 08:03

## 2021-01-10 RX ADMIN — IPRATROPIUM BROMIDE AND ALBUTEROL SULFATE 3 ML: .5; 3 SOLUTION RESPIRATORY (INHALATION) at 00:14

## 2021-01-10 RX ADMIN — FOLIC ACID 1 MG: 1 TABLET ORAL at 08:03

## 2021-01-10 RX ADMIN — PIPERACILLIN SODIUM AND TAZOBACTAM SODIUM 3.38 G: 3; .375 INJECTION, POWDER, LYOPHILIZED, FOR SOLUTION INTRAVENOUS at 05:00

## 2021-01-10 RX ADMIN — PANTOPRAZOLE SODIUM 40 MG: 40 TABLET, DELAYED RELEASE ORAL at 08:04

## 2021-01-10 RX ADMIN — IPRATROPIUM BROMIDE AND ALBUTEROL SULFATE 3 ML: .5; 3 SOLUTION RESPIRATORY (INHALATION) at 13:19

## 2021-01-10 RX ADMIN — ASPIRIN 81 MG: 81 TABLET, COATED ORAL at 08:03

## 2021-01-10 RX ADMIN — Medication 1 CAPSULE: at 08:03

## 2021-01-10 RX ADMIN — SODIUM CHLORIDE, PRESERVATIVE FREE 3 ML: 5 INJECTION INTRAVENOUS at 20:43

## 2021-01-10 RX ADMIN — PIPERACILLIN SODIUM AND TAZOBACTAM SODIUM 3.38 G: 3; .375 INJECTION, POWDER, LYOPHILIZED, FOR SOLUTION INTRAVENOUS at 20:43

## 2021-01-10 RX ADMIN — APIXABAN 5 MG: 5 TABLET, FILM COATED ORAL at 20:43

## 2021-01-10 RX ADMIN — VANCOMYCIN HYDROCHLORIDE 750 MG: 1 INJECTION, POWDER, LYOPHILIZED, FOR SOLUTION INTRAVENOUS at 08:54

## 2021-01-10 RX ADMIN — IPRATROPIUM BROMIDE AND ALBUTEROL SULFATE 3 ML: .5; 3 SOLUTION RESPIRATORY (INHALATION) at 18:33

## 2021-01-10 NOTE — PLAN OF CARE
Goal Outcome Evaluation:  Plan of Care Reviewed With: patient  Progress: improving   Patient more alert today. She is able to talk and converse with me some, which is an improvement from yesterday. Patient has been hyperglycemic; Dextrose drip stopped and discontinued. Will continue to monitor and follow plan of care.

## 2021-01-10 NOTE — PROGRESS NOTES
Glucose is still rising despite the low-dose insulin I gave her at noon, now glucose over 400, I have placed a moderate sliding scale, follow

## 2021-01-10 NOTE — PROGRESS NOTES
LOS: 10 days     Name: Ashley Galvez  Age/Sex: 75 y.o. female  :  1945        PCP: Rony Callaway MD    Principal Problem:    Sepsis (CMS/[facility])      Admission Information: Ashley Galvez is a 75 y.o. female with a past medical history significant for CVA, essential hypertension, diabetes mellitus type 2 and anemia. Patient presented to the ED with complaints of confusion. She was found to have sepsis secondary to UTI and enterococcus bacteremia. Cardiology was consulted for possible atrial fibrillation. EKG showed ectopic atrial rhythm with frequent PAC's vs chaotic atrial rhythm. Patient denies any palpitations or chest pain.  is at bedside and denies any history of atrial fibrillation in the past. He reports that a couple years ago the patient started having weakness for unknown reasons and was unable to walk. MRI shows old CVA. She does not take blood thinners at home. No history of coronary artery disease. Echocardiogram showed normal LV function.     Chief Complaint: Follow-up atrial flutter, heart failure    Interval history: Patient seen and examined.  She continues to be fairly noncommunicative.  She denies any chest pain or shortness of    Subjective     Vital Signs  Vital Signs (last 72 hrs)        07  -   0659 700  -   0659 700  -  01/10 0659 01/10 0700  -  01/10 1222   Most Recent    Temp (°F) 97.1 -  98.1    97.4 -  98.2    97.7 -  98.7      98.3     98.3 (36.8)    Heart Rate 83 -  114    70 -  104    83 -  96      87     87    Resp 18 -  21    18 -  20    18 -  22      22     22    /78 -  161/70    130/63 -  154/75    129/77 -  167/77      189/84     189/84  Comment: RN Raimundo aware     SpO2 (%) 96 -  99    96 -  98    93 -  99      99     99        Temp:  [97.7 °F (36.5 °C)-98.7 °F (37.1 °C)] 98.3 °F (36.8 °C)  Heart Rate:  [83-96] 87  Resp:  [18-22] 22  BP: (142-189)/(65-91) 189/84  Body mass index is 30.42 kg/m².      Intake/Output  Summary (Last 24 hours) at 1/10/2021 1222  Last data filed at 1/10/2021 0900  Gross per 24 hour   Intake 1015 ml   Output 950 ml   Net 65 ml       Constitutional:       Appearance: Normal appearance. Well-developed.   Eyes:      General: Lids are normal.      Conjunctiva/sclera: Conjunctivae normal.      Pupils: Pupils are equal, round, and reactive to light.   HENT:      Head: Normocephalic and atraumatic.   Neck:      Vascular: No carotid bruit or JVD.   Pulmonary:      Effort: Pulmonary effort is normal. No respiratory distress.      Breath sounds: Normal breath sounds. No decreased breath sounds. No wheezing. No rhonchi. No rales.   Cardiovascular:      Normal rate. Regular rhythm.      Comments: No lower extremity edema  Skin:     General: Skin is warm and dry.   Neurological:      Mental Status: Mental status is at baseline.   Psychiatric:         Behavior: Behavior is cooperative.       Telemetry:   Sinus 70-80s     Results Review:     Results from last 7 days   Lab Units 01/10/21  0005 01/09/21  0443 01/08/21  0012 01/07/21  0656 01/06/21  0102 01/05/21  0151 01/04/21  0140   WBC 10*3/mm3 9.39 9.85 12.43* 8.74 9.41 10.03 9.61   HEMOGLOBIN g/dL 10.0* 10.4* 9.7* 8.9* 8.7* 8.8* 8.5*   PLATELETS 10*3/mm3 391 403 324 299 274 240 241     Results from last 7 days   Lab Units 01/10/21  0005 01/09/21  0443 01/08/21  0012 01/07/21  0656 01/06/21  0102 01/05/21  0939 01/05/21  0151 01/04/21  0140   SODIUM mmol/L 141 139 134* 132* 130* 134* 133* 142   POTASSIUM mmol/L 4.6 4.8 4.8 4.6 4.6  --  4.0 3.9   CHLORIDE mmol/L 107 106 105 104 103  --  107 114*   CO2 mmol/L 21.5* 22.6 17.2* 19.3* 17.3*  --  18.6* 18.6*   BUN mg/dL 30* 34* 51* 29* 22  --  27* 27*   CREATININE mg/dL 0.92 0.98 1.19* 1.02* 0.98  --  1.05* 0.91   CALCIUM mg/dL 9.1 9.3 9.5 8.9 8.3*  --  7.7* 7.3*   GLUCOSE mg/dL 94 236* 223* 364* 377*  --  242* 265*     Results from last 7 days   Lab Units 01/07/21  1222 01/07/21  1124   TROPONIN T ng/mL 0.033* 0.035*               I reviewed the patient's new clinical results.  I reviewed the patient's new imaging results and agree with the interpretation.  I personally viewed and interpreted the patient's EKG/Telemetry data      Medication Review:   amLODIPine, 5 mg, Oral, Q24H  apixaban, 5 mg, Oral, Q12H  aspirin, 81 mg, Oral, Daily  atorvastatin, 20 mg, Oral, Nightly  bumetanide, 1 mg, Oral, Daily  castor oil-balsam peru, , Topical, Q12H  castor oil-balsam peru, , Topical, Q12H  folic acid, 1 mg, Oral, Daily  ipratropium-albuterol, 3 mL, Nebulization, 4x Daily - RT  lactobacillus acidophilus, 1 capsule, Oral, Daily  lisinopril, 20 mg, Oral, Q24H  metoprolol tartrate, 75 mg, Oral, Q12H  pantoprazole, 40 mg, Oral, Daily  piperacillin-tazobactam, 3.375 g, Intravenous, Once  piperacillin-tazobactam, 3.375 g, Intravenous, Q8H  sodium chloride, 3 mL, Intravenous, Q12H  vancomycin, 750 mg, Intravenous, Q24H           Assessment:  1. Paroxysmal atrial flutter/fibrillation, [id number] score of at least 5, on Eliquis, currently sinus rhythm  2. Acute diastolic heart failure  3. Essential hypertension, overall controlled  4. History of CVA  5. Sepsis secondary to UTI and bacteremia    Recommendations:  1. Continue Eliquis for stroke prophylaxis and metoprolol for rate control in setting of paroxysmal atrial fibrillation.  2. Patient was placed on oral Bumex yesterday and kidney function has remained normal.  Continue to monitor    I discussed the patients findings and my recommendations with patient and family    Alina Hernandez, TOÁMS  01/10/21  12:22 EST    Addendum:

## 2021-01-10 NOTE — PROGRESS NOTES
PROGRESS NOTE         Patient Identification:  Name:  Ashley Galvez  Age:  75 y.o.  Sex:  female  :  1945  MRN:  [id number]  Visit Number:  [id number]  Primary Care Provider:  Rony Callaway MD         LOS: 10 days       ----------------------------------------------------------------------------------------------------------------------  Subjective       Chief Complaints:    Altered Mental Status        Interval History:      Patient continues on 2 L nasal cannula no apparent distress.  WBC normal.  CRP improving at 0.59.  CT of the head from 2021 reports no acute findings, marked left mastoiditis.    Review of Systems:    Constitutional: no fever, chills and night sweats.  Generalized fatigue.    Eyes: no eye drainage, itching or redness.  HEENT: no mouth sores, dysphagia or nose bleed.  Respiratory: no for shortness of breath, cough or production of sputum.  Cardiovascular: no chest pain, no palpitations, no orthopnea.  Gastrointestinal: no nausea, vomiting or diarrhea. No abdominal pain, hematemesis or rectal bleeding.  Genitourinary: no dysuria or polyuria.  Hematologic/lymphatic: no lymph node abnormalities, no easy bruising or easy bleeding.  Musculoskeletal: no muscle or joint pain.  Skin: No rash and no itching.  Neurological: no loss of consciousness, no seizure, no headache.  Behavioral/Psych: no depression or suicidal ideation.  Endocrine: no hot flashes.  Immunologic: negative.    ----------------------------------------------------------------------------------------------------------------------      Objective       Current [facility] Meds:  amLODIPine, 5 mg, Oral, Q24H  apixaban, 5 mg, Oral, Q12H  aspirin, 81 mg, Oral, Daily  atorvastatin, 20 mg, Oral, Nightly  bumetanide, 1 mg, Oral, Daily  castor oil-balsam peru, , Topical, Q12H  castor oil-balsam peru, , Topical, Q12H  folic acid, 1 mg, Oral, Daily  ipratropium-albuterol, 3 mL, Nebulization, 4x Daily -  RT  lactobacillus acidophilus, 1 capsule, Oral, Daily  lisinopril, 20 mg, Oral, Q24H  metoprolol tartrate, 75 mg, Oral, Q12H  pantoprazole, 40 mg, Oral, Daily  piperacillin-tazobactam, 3.375 g, Intravenous, Once  piperacillin-tazobactam, 3.375 g, Intravenous, Q8H  sodium chloride, 3 mL, Intravenous, Q12H  vancomycin, 750 mg, Intravenous, Q24H         ----------------------------------------------------------------------------------------------------------------------    Vital Signs:  Temp:  [97.7 °F (36.5 °C)-98.7 °F (37.1 °C)] 98.3 °F (36.8 °C)  Heart Rate:  [83-96] 87  Resp:  [18-22] 22  BP: (142-189)/(65-91) 189/84  Mean Arterial Pressure (Non-Invasive) for the past 24 hrs (Last 3 readings):   Noninvasive MAP (mmHg)   01/10/21 1015 118   01/10/21 0630 113   01/10/21 0300 121     SpO2 Percentage    01/10/21 0619 01/10/21 0630 01/10/21 1015   SpO2: 96% 93% 99%     SpO2:  [93 %-99 %] 99 %  on  Flow (L/min):  [2] 2;   Device (Oxygen Therapy): nasal cannula    Body mass index is 30.42 kg/m².  Wt Readings from Last 3 Encounters:   01/10/21 77.9 kg (171 lb 11.2 oz)        Intake/Output Summary (Last 24 hours) at 1/10/2021 1153  Last data filed at 1/10/2021 0900  Gross per 24 hour   Intake 1015 ml   Output 950 ml   Net 65 ml     Diet Soft Texture; Chopped; Thin; Consistent Carbohydrate  ----------------------------------------------------------------------------------------------------------------------      Physical Exam:    Constitutional: Chronically ill-appearing elderly lady.  No respiratory distress. Much more awake and alert today.     HENT:  Head: Normocephalic and atraumatic.  Mouth:  Moist mucous membranes.    Eyes:  Conjunctivae and EOM are normal.  No scleral icterus.  Neck:  Neck supple.  No JVD present.    Cardiovascular:   Regular rate.  Irregularly irregular rhythm.  No murmur auscultated.  Pulmonary/Chest:  No respiratory distress, no wheezes, no crackles, with normal breath sounds and good air  movement.  Abdominal:  Soft.  Bowel sounds are normal.  No distension and no tenderness to palpation.  Musculoskeletal:  No edema, no tenderness, and no deformity.  No swelling or redness of joints.  Ulcerations of pretibial area which do not appear infectious.  Right ankle turns inward.  Neurological:  Alert and oriented to person, place, and time.  No facial droop.  No slurred speech.   Skin:  Skin is warm and dry.  No rash noted.  No pallor.  Onychomycosis.  Psychiatric: Normal mood and affect.  Behavior is normal.  ----------------------------------------------------------------------------------------------------------------------  Results from last 7 days   Lab Units 01/07/21  1222 01/07/21  1124   TROPONIN T ng/mL 0.033* 0.035*     Results from last 7 days   Lab Units 01/07/21  0656   PROBNP pg/mL 2,050.0*     Results from last 7 days   Lab Units 01/06/21  0102   CHOLESTEROL mg/dL 110   TRIGLYCERIDES mg/dL 144   HDL CHOL mg/dL 29*   LDL CHOL mg/dL 56     Results from last 7 days   Lab Units 01/08/21  1516   PH, ARTERIAL pH units 7.404   PO2 ART mm Hg 86.1   PCO2, ARTERIAL mm Hg 41.0   HCO3 ART mmol/L 25.6     Results from last 7 days   Lab Units 01/10/21  0005 01/09/21  0443 01/08/21  0012   CRP mg/dL 0.59* 0.81* 2.94*   WBC 10*3/mm3 9.39 9.85 12.43*   HEMOGLOBIN g/dL 10.0* 10.4* 9.7*   HEMATOCRIT % 34.5 34.5 32.9*   MCV fL 106.8* 102.4* 105.4*   MCHC g/dL 29.0* 30.1* 29.5*   PLATELETS 10*3/mm3 391 403 324     Results from last 7 days   Lab Units 01/10/21  0005 01/09/21  0443 01/08/21  0012 01/07/21  0656 01/06/21  0102  01/05/21  0151   SODIUM mmol/L 141 139 134* 132* 130*   < > 133*   POTASSIUM mmol/L 4.6 4.8 4.8 4.6 4.6  --  4.0   MAGNESIUM mg/dL  --   --   --   --  3.1*  --  1.5*   CHLORIDE mmol/L 107 106 105 104 103  --  107   CO2 mmol/L 21.5* 22.6 17.2* 19.3* 17.3*  --  18.6*   BUN mg/dL 30* 34* 51* 29* 22  --  27*   CREATININE mg/dL 0.92 0.98 1.19* 1.02* 0.98  --  1.05*   EGFR IF NONAFRICN AM  mL/min/1.73 60* 55* 44* 53* 55*  --  51*   CALCIUM mg/dL 9.1 9.3 9.5 8.9 8.3*  --  7.7*   GLUCOSE mg/dL 94 236* 223* 364* 377*  --  242*   ALBUMIN g/dL  --   --   --  2.64* 2.34*  --  2.35*   BILIRUBIN mg/dL  --   --   --  <0.2 <0.2  --  0.2   ALK PHOS U/L  --   --   --  67 71  --  65   AST (SGOT) U/L  --   --   --  6 9  --  7   ALT (SGPT) U/L  --   --   --  7 8  --  9    < > = values in this interval not displayed.   Estimated Creatinine Clearance: 53 mL/min (by C-G formula based on SCr of 0.92 mg/dL).  No results found for: AMMONIA    Glucose   Date/Time Value Ref Range Status   01/10/2021 1030 342 (H) 70 - 130 mg/dL Final   01/10/2021 0728 276 (H) 70 - 130 mg/dL Final   01/09/2021 2234 221 (H) 70 - 130 mg/dL Final   01/09/2021 2114 240 (H) 70 - 130 mg/dL Final   01/09/2021 1841 124 70 - 130 mg/dL Final   01/09/2021 1730 129 70 - 130 mg/dL Final   01/09/2021 1641 66 (L) 70 - 130 mg/dL Final   01/09/2021 1604 142 (H) 70 - 130 mg/dL Final     Lab Results   Component Value Date    HGBA1C 7.90 (H) 12/31/2020     Lab Results   Component Value Date    TSH 1.240 12/31/2020       Blood Culture   Date Value Ref Range Status   01/02/2021 No growth at 24 hours  Preliminary   01/02/2021 No growth at 24 hours  Preliminary   12/31/2020 No growth at 2 days  Preliminary   12/31/2020 Enterococcus faecalis (C)  Final     Comment:     Infectious disease consultation is highly recommended.     Urine Culture   Date Value Ref Range Status   12/31/2020 Culture in progress  Preliminary     No results found for: WOUNDCX  No results found for: STOOLCX  No results found for: RESPCX  Pain Management Panel     Pain Management Panel Latest Ref Rng & Units 12/31/2020    AMPHETAMINES SCREEN, URINE Negative Negative    BARBITURATES SCREEN Negative Negative    BENZODIAZEPINE SCREEN, URINE Negative Negative    BUPRENORPHINEUR Negative Negative    COCAINE SCREEN, URINE Negative Negative    METHADONE SCREEN, URINE Negative Negative             ----------------------------------------------------------------------------------------------------------------------  Imaging Results (Last 24 Hours)     Procedure Component Value Units Date/Time    CT Head Without Contrast [[id number]] Collected: 01/09/21 1537     Updated: 01/09/21 1541    Narrative:      EXAM:    CT Head Without Intravenous Contrast     EXAM DATE:    1/9/2021 3:11 PM     CLINICAL HISTORY:    AMS; N17.9-Acute kidney failure, unspecified     TECHNIQUE:    Axial computed tomography images of the head/brain without intravenous  contrast.  Sagittal and coronal reformatted images were created and  reviewed.  This CT exam was performed using one or more of the following  dose reduction techniques:  automated exposure control, adjustment of  the mA and/or kV according to patient size, and/or use of iterative  reconstruction technique.     COMPARISON:    MRI 01/04/2021     FINDINGS:    Brain:  Encephalomalacia in the right cerebellar bowel or hemisphere  compatible with sequela of old infarct.  Advanced chronic small vessel  ischemic disease is noted.  No hemorrhage.    Ventricles:  Unremarkable.  No ventriculomegaly.    Bones/joints:  Unremarkable.  No acute fracture.    Soft tissues:  Unremarkable.    Sinuses:  Mild chronic sphenoid sinus disease.    Mastoid air cells:  Marked left mastoiditis is noted.    Sella:  Partial empty sella incidentally noted and stable.       Impression:      1.  No CT evidence of acute intracranial findings.  2.  Atrophy and chronic small vessel ischemic disease stable.  3.  Probable chronic infarcts related encephalomalacia right cerebellar  hemisphere, stable.  4.  Marked left mastoiditis.     This report was finalized on 1/9/2021 3:39 PM by Dr. Sagar Romero MD.             ----------------------------------------------------------------------------------------------------------------------    Assessment/Plan       Assessment/Plan     ASSESSMENT:    1.   Sepsis  2.  Pyelonephritis  3.  Enterococcus bacteremia  4.  Aspiration pneumonia    PLAN:    Patient continues on 2 L nasal cannula no apparent distress.  WBC normal.  CRP improving at 0.59.  CT of the head from 1/9/2021 reports no acute findings, marked left mastoiditis.    Respiratory panel PCR negative.  CT of the chest from 1/7/2021 reports stable CT chest with large hiatal hernia andleft lower lobe atelectasis.    Urinalysis on 12/31/2020 was positive with WBCs too numerous to count.  Urine culture on 1/3/2021 finalized as Pseudomonas aeruginosa and Proteus mirabilis.      CT head without contrast on 12/31/2020 showed no acute intracranial abnormality.  CSF density mass of the right posterior fossa with mild mass-effect on the right cerebellar convexity, most likely an arachnoid cyst with recommendation to evaluate further with MRI without and with contrast.  Presumed chronic microvascular ischemic changes.     CT abdomen pelvis without contrast on 12/31/2020 showed atherosclerotic disease with probable vascular calcifications in both kidneys.  No ureteral stones as seen on either side, and there is no hydronephrosis.  Mild thickening of the urinary bladder wall may be due to incomplete distention or cystitis.  Large amount of stool in the rectal vault.  There is colonic diverticulosis without diverticulitis.  The appendix and small bowel are within normal limits.  Small left adrenal nodule, likely a benign adenoma in the absence of known malignancy.  This could be assessed with nonemergent adrenal protocol MRI or CT if indicated.  Cholecystectomy and hysterectomy.     Blood cultures on 12/31/2020 are so far showing 1 out of 2 with Enterococcus.  BC ID on 12/31/2020 finalized as Enterococcus.  Repeat blood cultures on 1/2/2021 are so far showing no growth.     COVID-19 and flu A/B PCR on 12/31/2020 was negative.    Recommend to continue Zosyn 3.375 g IV every 8 hours and vancomycin through 1/11/2021.  We will  continue to follow closely and adjust antibiotic therapy as needed.    Code Status:   Code Status and Medical Interventions:   Ordered at: 12/31/20 3082     Level Of Support Discussed With:    Patient     Code Status:    CPR     Medical Interventions (Level of Support Prior to Arrest):    Full         TOMÁS Galaviz  01/10/21  11:53 EST

## 2021-01-10 NOTE — PROGRESS NOTES
"Assisted By: Raimundo LAINEZ    CC: Follow-up on hypoglycemia    Interview History/HPI: Patient is awake alert and did laugh some today, appears much better neurologically than yesterday.  Followed commands.  Her glucose was over 300 when I was in the room, I stopped the D5 drip, repeat glucose about an hour later was still over 300 so I have asked that nursing give 5 units of NovoLog, I am planning on rechecking this glucose at 1400.  Patient is otherwise without complaints.  She guesses she is \"okay\".    Vitals:    01/10/21 1015   BP: (!) 189/84   Pulse: 87   Resp: 22   Temp: 98.3 °F (36.8 °C)   SpO2: 99%         Intake/Output Summary (Last 24 hours) at 1/10/2021 1235  Last data filed at 1/10/2021 0900  Gross per 24 hour   Intake 1015 ml   Output 950 ml   Net 65 ml       EXAM: As above she is pleasant she follows commands hearing is diminished but intact pupils were equal conjunctiva unremarkable neurologically improved over yesterday and probably about at baseline, lungs are clear, heart is a regular rate and rhythm without murmur rub or gallop, abdomen is soft benign, extremities are without edema.    Tele: Sinus rhythm    LABS:   Lab Results (last 48 hours)     Procedure Component Value Units Date/Time    POC Glucose Once [[id number]]  (Abnormal) Collected: 01/10/21 1152    Specimen: Blood Updated: 01/10/21 1205     Glucose 361 mg/dL     POC Glucose Once [[id number]]  (Abnormal) Collected: 01/10/21 1030    Specimen: Blood Updated: 01/10/21 1037     Glucose 342 mg/dL     Cortisol [[id number]] Collected: 01/10/21 0841    Specimen: Blood Updated: 01/10/21 0914     Cortisol 31.03 mcg/dL     Narrative:      Cortisol Reference Ranges:    Cortisol 6AM - 10AM Range: 6.02-18.40 mcg/dl  Cortisol 4PM - 8PM Range: 2.68-10.50 mcg/dl      Results may be falsely increased if patient taking Biotin.      Cortisol [[id number]] Collected: 01/10/21 0810    Specimen: Blood Updated: 01/10/21 0851     Cortisol 26.39 mcg/dL     Narrative:      " Cortisol Reference Ranges:    Cortisol 6AM - 10AM Range: 6.02-18.40 mcg/dl  Cortisol 4PM - 8PM Range: 2.68-10.50 mcg/dl      Results may be falsely increased if patient taking Biotin.      Cortisol [[id number]] Collected: 01/10/21 0651    Specimen: Blood Updated: 01/10/21 0750     Cortisol 16.28 mcg/dL     Narrative:      Cortisol Reference Ranges:    Cortisol 6AM - 10AM Range: 6.02-18.40 mcg/dl  Cortisol 4PM - 8PM Range: 2.68-10.50 mcg/dl      Results may be falsely increased if patient taking Biotin.      POC Glucose Once [[id number]]  (Abnormal) Collected: 01/10/21 0728    Specimen: Blood Updated: 01/10/21 0734     Glucose 276 mg/dL     CBC & Differential [[id number]]  (Abnormal) Collected: 01/10/21 0005    Specimen: Blood Updated: 01/10/21 0042    Narrative:      The following orders were created for panel order CBC & Differential.  Procedure                               Abnormality         Status                     ---------                               -----------         ------                     Scan Slide[[id number]]                                                                  CBC Auto Differential[[id number]]        Abnormal            Final result                 Please view results for these tests on the individual orders.    C-reactive Protein [[id number]]  (Abnormal) Collected: 01/10/21 0005    Specimen: Blood Updated: 01/10/21 0032     C-Reactive Protein 0.59 mg/dL     Basic Metabolic Panel [[id number]]  (Abnormal) Collected: 01/10/21 0005    Specimen: Blood Updated: 01/10/21 0032     Glucose 94 mg/dL      BUN 30 mg/dL      Creatinine 0.92 mg/dL      Sodium 141 mmol/L      Potassium 4.6 mmol/L      Chloride 107 mmol/L      CO2 21.5 mmol/L      Calcium 9.1 mg/dL      eGFR Non African Amer 60 mL/min/1.73      BUN/Creatinine Ratio 32.6     Anion Gap 12.5 mmol/L     Narrative:      GFR Normal >60  Chronic Kidney Disease <60  Kidney Failure <15      CBC Auto Differential [[id number]]  (Abnormal) Collected:  01/10/21 0005    Specimen: Blood Updated: 01/10/21 0031     WBC 9.39 10*3/mm3      RBC 3.23 10*6/mm3      Hemoglobin 10.0 g/dL      Hematocrit 34.5 %      .8 fL      MCH 31.0 pg      MCHC 29.0 g/dL      RDW 14.3 %      RDW-SD 56.4 fl      MPV 9.7 fL      Platelets 391 10*3/mm3      Neutrophil % 60.4 %      Lymphocyte % 22.2 %      Monocyte % 12.2 %      Eosinophil % 4.4 %      Basophil % 0.5 %      Immature Grans % 0.3 %      Neutrophils, Absolute 5.67 10*3/mm3      Lymphocytes, Absolute 2.08 10*3/mm3      Monocytes, Absolute 1.15 10*3/mm3      Eosinophils, Absolute 0.41 10*3/mm3      Basophils, Absolute 0.05 10*3/mm3      Immature Grans, Absolute 0.03 10*3/mm3      nRBC 0.0 /100 WBC     POC Glucose Once [[id number]]  (Abnormal) Collected: 01/09/21 2234    Specimen: Blood Updated: 01/09/21 2241     Glucose 221 mg/dL     POC Glucose Once [[id number]]  (Abnormal) Collected: 01/09/21 2114    Specimen: Blood Updated: 01/09/21 2122     Glucose 240 mg/dL     Cortisol [[id number]] Collected: 01/09/21 1812    Specimen: Blood Updated: 01/09/21 1917     Cortisol 16.79 mcg/dL     Narrative:      Cortisol Reference Ranges:    Cortisol 6AM - 10AM Range: 6.02-18.40 mcg/dl  Cortisol 4PM - 8PM Range: 2.68-10.50 mcg/dl      Results may be falsely increased if patient taking Biotin.      POC Glucose Once [[id number]]  (Normal) Collected: 01/09/21 1841    Specimen: Blood Updated: 01/09/21 1849     Glucose 124 mg/dL     POC Glucose Once [[id number]]  (Normal) Collected: 01/09/21 1730    Specimen: Blood Updated: 01/09/21 1736     Glucose 129 mg/dL     POC Glucose Once [[id number]]  (Abnormal) Collected: 01/09/21 1641    Specimen: Blood Updated: 01/09/21 1649     Glucose 66 mg/dL     POC Glucose Once [[id number]]  (Abnormal) Collected: 01/09/21 1604    Specimen: Blood Updated: 01/09/21 1611     Glucose 142 mg/dL     POC Glucose Once [[id number]]  (Normal) Collected: 01/09/21 1528    Specimen: Blood Updated: 01/09/21 1534     Glucose 74  mg/dL     POC Glucose Once [[id number]]  (Abnormal) Collected: 01/09/21 1505    Specimen: Blood Updated: 01/09/21 1512     Glucose 173 mg/dL     POC Glucose Once [[id number]]  (Abnormal) Collected: 01/09/21 1456    Specimen: Blood Updated: 01/09/21 1511     Glucose 296 mg/dL     POC Glucose Once [[id number]]  (Abnormal) Collected: 01/09/21 1450    Specimen: Blood Updated: 01/09/21 1511     Glucose 34 mg/dL     POC Glucose Once [[id number]]  (Normal) Collected: 01/09/21 1314    Specimen: Blood Updated: 01/09/21 1321     Glucose 79 mg/dL     POC Glucose Once [[id number]]  (Abnormal) Collected: 01/09/21 1024    Specimen: Blood Updated: 01/09/21 1030     Glucose 272 mg/dL     POC Glucose Once [[id number]]  (Abnormal) Collected: 01/09/21 0630    Specimen: Blood Updated: 01/09/21 0636     Glucose 248 mg/dL     C-reactive Protein [[id number]]  (Abnormal) Collected: 01/09/21 0443    Specimen: Blood Updated: 01/09/21 0531     C-Reactive Protein 0.81 mg/dL     Basic Metabolic Panel [[id number]]  (Abnormal) Collected: 01/09/21 0443    Specimen: Blood Updated: 01/09/21 0531     Glucose 236 mg/dL      BUN 34 mg/dL      Creatinine 0.98 mg/dL      Sodium 139 mmol/L      Potassium 4.8 mmol/L      Chloride 106 mmol/L      CO2 22.6 mmol/L      Calcium 9.3 mg/dL      eGFR Non African Amer 55 mL/min/1.73      BUN/Creatinine Ratio 34.7     Anion Gap 10.4 mmol/L     Narrative:      GFR Normal >60  Chronic Kidney Disease <60  Kidney Failure <15      CBC & Differential [[id number]]  (Abnormal) Collected: 01/09/21 0443    Specimen: Blood Updated: 01/09/21 0514    Narrative:      The following orders were created for panel order CBC & Differential.  Procedure                               Abnormality         Status                     ---------                               -----------         ------                     CBC Auto Differential[[id number]]        Abnormal            Final result                 Please view results for these tests on the  individual orders.    CBC Auto Differential [[id number]]  (Abnormal) Collected: 01/09/21 0443    Specimen: Blood Updated: 01/09/21 0514     WBC 9.85 10*3/mm3      RBC 3.37 10*6/mm3      Hemoglobin 10.4 g/dL      Hematocrit 34.5 %      .4 fL      MCH 30.9 pg      MCHC 30.1 g/dL      RDW 14.3 %      RDW-SD 53.4 fl      MPV 9.5 fL      Platelets 403 10*3/mm3      Neutrophil % 67.5 %      Lymphocyte % 16.8 %      Monocyte % 10.3 %      Eosinophil % 3.8 %      Basophil % 0.7 %      Immature Grans % 0.9 %      Neutrophils, Absolute 6.66 10*3/mm3      Lymphocytes, Absolute 1.65 10*3/mm3      Monocytes, Absolute 1.01 10*3/mm3      Eosinophils, Absolute 0.37 10*3/mm3      Basophils, Absolute 0.07 10*3/mm3      Immature Grans, Absolute 0.09 10*3/mm3      nRBC 0.0 /100 WBC     POC Glucose Once [[id number]]  (Abnormal) Collected: 01/09/21 0209    Specimen: Blood Updated: 01/09/21 0214     Glucose 253 mg/dL     POC Glucose Once [[id number]]  (Abnormal) Collected: 01/08/21 2229    Specimen: Blood Updated: 01/08/21 2235     Glucose 315 mg/dL     POC Glucose Once [[id number]]  (Abnormal) Collected: 01/08/21 1925    Specimen: Blood Updated: 01/08/21 1931     Glucose 359 mg/dL     POC Glucose Once [[id number]]  (Normal) Collected: 01/08/21 1656    Specimen: Blood Updated: 01/08/21 1705     Glucose 105 mg/dL     POC Glucose Once [[id number]]  (Normal) Collected: 01/08/21 1541    Specimen: Blood Updated: 01/08/21 1612     Glucose 117 mg/dL     POC Glucose Once [[id number]]  (Abnormal) Collected: 01/08/21 1512    Specimen: Blood Updated: 01/08/21 1525     Glucose 136 mg/dL     Blood Gas, Arterial With Co-Ox [[id number]]  (Abnormal) Collected: 01/08/21 1516    Specimen: Arterial Blood Updated: 01/08/21 1522     Site Right Radial     Meng's Test Positive     pH, Arterial 7.404 pH units      pCO2, Arterial 41.0 mm Hg      pO2, Arterial 86.1 mm Hg      HCO3, Arterial 25.6 mmol/L      Base Excess, Arterial 0.8 mmol/L      O2 Saturation,  Arterial 97.3 %      Hemoglobin, Blood Gas 9.8 g/dL      Comment: 84 Value below reference range        Hematocrit, Blood Gas 30.0 %      Comment: 84 Value below reference range        Oxyhemoglobin 95.9 %      Methemoglobin <1.00 %      Comment: 94 Value below reportable range < 1.0        Carboxyhemoglobin 1.1 %      A-a Gradiant 57.9 mmHg      CO2 Content 26.9 mmol/L      Temperature 0.0 C      Barometric Pressure for Blood Gas 725 mmHg      Modality Nasal Cannula     FIO2 28 %      Ventilator Mode NA     Note --     Collected by [id number]     Comment: Meter: [id number]     :  [id number]        pH, Temp Corrected --     pCO2, Temperature Corrected --     pO2, Temperature Corrected --    POC Glucose Once [[id number]]  (Abnormal) Collected: 01/08/21 1456    Specimen: Blood Updated: 01/08/21 1505     Glucose 219 mg/dL           Radiology:    Imaging Results (Last 72 Hours)     Procedure Component Value Units Date/Time    CT Head Without Contrast [[id number]] Collected: 01/09/21 1537     Updated: 01/09/21 1541    Narrative:      EXAM:    CT Head Without Intravenous Contrast     EXAM DATE:    1/9/2021 3:11 PM     CLINICAL HISTORY:    AMS; N17.9-Acute kidney failure, unspecified     TECHNIQUE:    Axial computed tomography images of the head/brain without intravenous  contrast.  Sagittal and coronal reformatted images were created and  reviewed.  This CT exam was performed using one or more of the following  dose reduction techniques:  automated exposure control, adjustment of  the mA and/or kV according to patient size, and/or use of iterative  reconstruction technique.     COMPARISON:    MRI 01/04/2021     FINDINGS:    Brain:  Encephalomalacia in the right cerebellar bowel or hemisphere  compatible with sequela of old infarct.  Advanced chronic small vessel  ischemic disease is noted.  No hemorrhage.    Ventricles:  Unremarkable.  No ventriculomegaly.    Bones/joints:  Unremarkable.  No acute fracture.    Soft  tissues:  Unremarkable.    Sinuses:  Mild chronic sphenoid sinus disease.    Mastoid air cells:  Marked left mastoiditis is noted.    Sella:  Partial empty sella incidentally noted and stable.       Impression:      1.  No CT evidence of acute intracranial findings.  2.  Atrophy and chronic small vessel ischemic disease stable.  3.  Probable chronic infarcts related encephalomalacia right cerebellar  hemisphere, stable.  4.  Marked left mastoiditis.     This report was finalized on 1/9/2021 3:39 PM by Dr. Sagar Romero MD.       US Venous Doppler Lower Extremity Bilateral (duplex) [[id number]] Collected: 01/08/21 0850     Updated: 01/08/21 0911    Narrative:      US VENOUS DOPPLER LOWER EXTREMITY BILATERAL (DUPLEX)-     CLINICAL INDICATION: Rule out DVT; N17.9-Acute kidney failure,  unspecified        COMPARISON: None available.      TECHNIQUE: Color Doppler imaging was used with compression and  augmentation to evaluate the lower extremity deep venous system.     FINDINGS:   There is patent spontaneous flow from the common femoral vein through  the posterior tibial veins. With the exception of the right posterior  tibial vein which was unable to be imaged.  There was no internal clot or area of noncompressibility.  Normal augmentation was elicited where applicable.       Impression:      No DVT in the lower extremities on today's exam.      This report was finalized on 1/8/2021 9:08 AM by Dr. Serafin Yip MD.       NM Lung Scan Perfusion Particulate [[id number]] Collected: 01/07/21 1724     Updated: 01/07/21 1726    Narrative:      EXAMINATION: NM LUNG SCAN PERFUSION PARTICULATE-      CLINICAL INDICATION: SOB and Pain      TECHNIQUE:  4.4 mCi of Tc-99m MAA were administered IV for a perfusion  lung scan. Ventilation could not be performed.     COMPARISON: Chest CT performed on same day.     FINDINGS: Multiple perfusion images demonstrate a relatively homogeneous  pattern of pulmonary capillary tracer distribution  throughout the lungs.   Specifically, there are no segmental or sub-segmental defects to  suggest pulmonary embolism.  Attenuation defect left lung base  corresponds to large hiatal hernia on CT.       Impression:      Low suspicion of PE.      This report was finalized on 1/7/2021 5:24 PM by Dr. Sagar Romero MD.       FL Video Swallow Single Contrast [[id number]] Collected: 01/07/21 1556     Updated: 01/07/21 1558    Narrative:      EXAMINATION: FL VIDEO SWALLOW SINGLE-CONTRAST-      CLINICAL INDICATION:     aspiration; N17.9-Acute kidney failure,  unspecified     TECHNIQUE: Modified barium swallow was performed utilizing video  fluoroscopy in conjunction with the Speech Pathology team. The patient  ingested multiple barium media including thin barium, nectar, and honey  liquid as well as barium pudding and a barium pudding coated cracker.      FLUOROSCOPY TIME: 0.8 minutes     COMPARISON: NONE      FINDINGS:   Premature loss is noted with vallecular pooling.  Transient laryngeal penetration noted with thin liquids when presented  with cup and straw. No aspiration episodes identified.          Impression:      1.  No aspiration.  2. Please see dysphasia notes for further details.     This report was finalized on 1/7/2021 3:56 PM by Dr. Sagar Romero MD.             Results for orders placed during the hospital encounter of 12/31/20   Adult Transthoracic Echo Complete W/ Cont if Necessary Per Protocol    Narrative · Left ventricular ejection fraction appears to be 61 - 65%. Left   ventricular systolic function is normal.  · Left ventricular diastolic function is consistent with (grade II w/high   LAP) pseudonormalization.  · Mild aortic valve stenosis is present.          Assessment/Plan:   Hypoglycemia, patient was not adrenally insufficient, all insulin was stopped yesterday, glucose improved as this patient.  D5 drip stopped as above, I have given 5 units of insulin and rechecking glucose at 1400.  We will add  back sliding scale if needed.  I am holding basal insulin at this time.    Paroxysmal atrial fibrillation, [id number] is elevated, on Eliquis for stroke prevention.    Hypertension, Lopressor increased, will follow.    Severe sepsis secondary to Enterococcus bacteremia and Proteus/Pseudomonas UTI, patient is continuing Zosyn and vancomycin, she should complete her course of antibiotics after today.  Noted myoclonic jerks noted yesterday are not present currently, may have been due to hypoglycemia however beta-lactam antibiotics can do this and she was on Maxipime, she appears to be tolerating Zosyn.    Functional decline, therapy as tolerated and as available    Borderline folic acid, on replacement    Diastolic heart failure grade 2 by echocardiogram, on oral Bumex, BUN/creatinine tolerating this, overall appears compensated at this time    Calcific structure in the palate teen tonsil area, recommend ENT evaluation on Tuesday when available.    CVA by MRI, continue aspirin and statin as well as blood pressure control, Doppler showed moderate left stenosis    DVT prophylaxis, Eliquis will serve for this as well

## 2021-01-10 NOTE — PLAN OF CARE
Goal Outcome Evaluation:  Plan of Care Reviewed With: patient  Progress: improving     Pt alert , non verbal, no sign of distress or soa, pt able to cough and swallow meds without difficulty

## 2021-01-11 LAB
ALBUMIN SERPL-MCNC: 2.85 G/DL (ref 3.5–5.2)
ALBUMIN/GLOB SERPL: 0.9 G/DL
ALP SERPL-CCNC: 65 U/L (ref 39–117)
ALT SERPL W P-5'-P-CCNC: 8 U/L (ref 1–33)
ANION GAP SERPL CALCULATED.3IONS-SCNC: 14.6 MMOL/L (ref 5–15)
AST SERPL-CCNC: 10 U/L (ref 1–32)
BASOPHILS # BLD AUTO: 0.05 10*3/MM3 (ref 0–0.2)
BASOPHILS NFR BLD AUTO: 0.6 % (ref 0–1.5)
BILIRUB SERPL-MCNC: 0.2 MG/DL (ref 0–1.2)
BUN SERPL-MCNC: 30 MG/DL (ref 8–23)
BUN/CREAT SERPL: 24.6 (ref 7–25)
CALCIUM SPEC-SCNC: 9 MG/DL (ref 8.6–10.5)
CHLORIDE SERPL-SCNC: 104 MMOL/L (ref 98–107)
CO2 SERPL-SCNC: 18.4 MMOL/L (ref 22–29)
CREAT SERPL-MCNC: 1.22 MG/DL (ref 0.57–1)
CRP SERPL-MCNC: 0.35 MG/DL (ref 0–0.5)
DEPRECATED RDW RBC AUTO: 51.8 FL (ref 37–54)
EOSINOPHIL # BLD AUTO: 0.25 10*3/MM3 (ref 0–0.4)
EOSINOPHIL NFR BLD AUTO: 3 % (ref 0.3–6.2)
ERYTHROCYTE [DISTWIDTH] IN BLOOD BY AUTOMATED COUNT: 13.3 % (ref 12.3–15.4)
GFR SERPL CREATININE-BSD FRML MDRD: 43 ML/MIN/1.73
GLOBULIN UR ELPH-MCNC: 3.3 GM/DL
GLUCOSE BLDC GLUCOMTR-MCNC: 203 MG/DL (ref 70–130)
GLUCOSE BLDC GLUCOMTR-MCNC: 227 MG/DL (ref 70–130)
GLUCOSE BLDC GLUCOMTR-MCNC: 234 MG/DL (ref 70–130)
GLUCOSE BLDC GLUCOMTR-MCNC: 292 MG/DL (ref 70–130)
GLUCOSE BLDC GLUCOMTR-MCNC: 350 MG/DL (ref 70–130)
GLUCOSE SERPL-MCNC: 231 MG/DL (ref 65–99)
HCT VFR BLD AUTO: 29.1 % (ref 34–46.6)
HGB BLD-MCNC: 8.5 G/DL (ref 12–15.9)
IMM GRANULOCYTES # BLD AUTO: 0.03 10*3/MM3 (ref 0–0.05)
IMM GRANULOCYTES NFR BLD AUTO: 0.4 % (ref 0–0.5)
LYMPHOCYTES # BLD AUTO: 1.97 10*3/MM3 (ref 0.7–3.1)
LYMPHOCYTES NFR BLD AUTO: 23.7 % (ref 19.6–45.3)
MCH RBC QN AUTO: 30.6 PG (ref 26.6–33)
MCHC RBC AUTO-ENTMCNC: 29.2 G/DL (ref 31.5–35.7)
MCV RBC AUTO: 104.7 FL (ref 79–97)
MONOCYTES # BLD AUTO: 0.74 10*3/MM3 (ref 0.1–0.9)
MONOCYTES NFR BLD AUTO: 8.9 % (ref 5–12)
NEUTROPHILS NFR BLD AUTO: 5.28 10*3/MM3 (ref 1.7–7)
NEUTROPHILS NFR BLD AUTO: 63.4 % (ref 42.7–76)
NRBC BLD AUTO-RTO: 0 /100 WBC (ref 0–0.2)
PLATELET # BLD AUTO: 307 10*3/MM3 (ref 140–450)
PMV BLD AUTO: 9.6 FL (ref 6–12)
POTASSIUM SERPL-SCNC: 3.8 MMOL/L (ref 3.5–5.2)
PROT SERPL-MCNC: 6.1 G/DL (ref 6–8.5)
RBC # BLD AUTO: 2.78 10*6/MM3 (ref 3.77–5.28)
SODIUM SERPL-SCNC: 137 MMOL/L (ref 136–145)
WBC # BLD AUTO: 8.32 10*3/MM3 (ref 3.4–10.8)

## 2021-01-11 PROCEDURE — 25010000002 PIPERACILLIN SOD-TAZOBACTAM PER 1 G: Performed by: NURSE PRACTITIONER

## 2021-01-11 PROCEDURE — 25010000002 VANCOMYCIN 5 G RECONSTITUTED SOLUTION: Performed by: INTERNAL MEDICINE

## 2021-01-11 PROCEDURE — 86140 C-REACTIVE PROTEIN: CPT | Performed by: NURSE PRACTITIONER

## 2021-01-11 PROCEDURE — 94799 UNLISTED PULMONARY SVC/PX: CPT

## 2021-01-11 PROCEDURE — 99232 SBSQ HOSP IP/OBS MODERATE 35: CPT | Performed by: SPECIALIST

## 2021-01-11 PROCEDURE — 82962 GLUCOSE BLOOD TEST: CPT

## 2021-01-11 PROCEDURE — 80053 COMPREHEN METABOLIC PANEL: CPT | Performed by: PHYSICIAN ASSISTANT

## 2021-01-11 PROCEDURE — 85025 COMPLETE CBC W/AUTO DIFF WBC: CPT | Performed by: PHYSICIAN ASSISTANT

## 2021-01-11 PROCEDURE — 63710000001 INSULIN ASPART PER 5 UNITS: Performed by: INTERNAL MEDICINE

## 2021-01-11 PROCEDURE — 99233 SBSQ HOSP IP/OBS HIGH 50: CPT | Performed by: PHYSICIAN ASSISTANT

## 2021-01-11 RX ORDER — LISINOPRIL 10 MG/1
10 TABLET ORAL
Status: DISCONTINUED | OUTPATIENT
Start: 2021-01-12 | End: 2021-01-11

## 2021-01-11 RX ADMIN — CASTOR OIL AND BALSAM, PERU: 788; 87 OINTMENT TOPICAL at 08:00

## 2021-01-11 RX ADMIN — IPRATROPIUM BROMIDE AND ALBUTEROL SULFATE 3 ML: .5; 3 SOLUTION RESPIRATORY (INHALATION) at 12:44

## 2021-01-11 RX ADMIN — LISINOPRIL 20 MG: 10 TABLET ORAL at 07:58

## 2021-01-11 RX ADMIN — Medication 1 CAPSULE: at 07:59

## 2021-01-11 RX ADMIN — BUMETANIDE 1 MG: 1 TABLET ORAL at 07:59

## 2021-01-11 RX ADMIN — FOLIC ACID 1 MG: 1 TABLET ORAL at 07:59

## 2021-01-11 RX ADMIN — ASPIRIN 81 MG: 81 TABLET, COATED ORAL at 08:00

## 2021-01-11 RX ADMIN — IPRATROPIUM BROMIDE AND ALBUTEROL SULFATE 3 ML: .5; 3 SOLUTION RESPIRATORY (INHALATION) at 18:23

## 2021-01-11 RX ADMIN — METOPROLOL TARTRATE 75 MG: 50 TABLET, FILM COATED ORAL at 07:59

## 2021-01-11 RX ADMIN — SODIUM CHLORIDE, PRESERVATIVE FREE 3 ML: 5 INJECTION INTRAVENOUS at 20:41

## 2021-01-11 RX ADMIN — IPRATROPIUM BROMIDE AND ALBUTEROL SULFATE 3 ML: .5; 3 SOLUTION RESPIRATORY (INHALATION) at 00:31

## 2021-01-11 RX ADMIN — AMLODIPINE BESYLATE 5 MG: 5 TABLET ORAL at 08:00

## 2021-01-11 RX ADMIN — INSULIN ASPART 6 UNITS: 100 INJECTION, SOLUTION INTRAVENOUS; SUBCUTANEOUS at 10:42

## 2021-01-11 RX ADMIN — CASTOR OIL AND BALSAM, PERU: 788; 87 OINTMENT TOPICAL at 20:41

## 2021-01-11 RX ADMIN — PIPERACILLIN SODIUM AND TAZOBACTAM SODIUM 3.38 G: 3; .375 INJECTION, POWDER, LYOPHILIZED, FOR SOLUTION INTRAVENOUS at 04:47

## 2021-01-11 RX ADMIN — VANCOMYCIN HYDROCHLORIDE 750 MG: 1 INJECTION, POWDER, LYOPHILIZED, FOR SOLUTION INTRAVENOUS at 08:58

## 2021-01-11 RX ADMIN — INSULIN ASPART 4 UNITS: 100 INJECTION, SOLUTION INTRAVENOUS; SUBCUTANEOUS at 08:00

## 2021-01-11 RX ADMIN — APIXABAN 5 MG: 5 TABLET, FILM COATED ORAL at 20:40

## 2021-01-11 RX ADMIN — CASTOR OIL AND BALSAM, PERU: 788; 87 OINTMENT TOPICAL at 20:42

## 2021-01-11 RX ADMIN — ATORVASTATIN CALCIUM 20 MG: 20 TABLET, FILM COATED ORAL at 20:41

## 2021-01-11 RX ADMIN — APIXABAN 5 MG: 5 TABLET, FILM COATED ORAL at 07:59

## 2021-01-11 RX ADMIN — IPRATROPIUM BROMIDE AND ALBUTEROL SULFATE 3 ML: .5; 3 SOLUTION RESPIRATORY (INHALATION) at 06:32

## 2021-01-11 RX ADMIN — PANTOPRAZOLE SODIUM 40 MG: 40 TABLET, DELAYED RELEASE ORAL at 07:59

## 2021-01-11 RX ADMIN — PIPERACILLIN SODIUM AND TAZOBACTAM SODIUM 3.38 G: 3; .375 INJECTION, POWDER, LYOPHILIZED, FOR SOLUTION INTRAVENOUS at 10:57

## 2021-01-11 RX ADMIN — INSULIN ASPART 4 UNITS: 100 INJECTION, SOLUTION INTRAVENOUS; SUBCUTANEOUS at 17:26

## 2021-01-11 RX ADMIN — METOPROLOL TARTRATE 75 MG: 50 TABLET, FILM COATED ORAL at 20:40

## 2021-01-11 NOTE — PROGRESS NOTES
Patient Identification:  Name:  Ashley Galvez  Age:  75 y.o.  Sex:  female  :  1945  MRN:  [id number]  Visit Number:  [id number]  Primary Care Provider:  Rony Callaway MD    Length of stay:  11    Subjective/Interval History/Consultants/Procedures     Chief complaint: Leukocytosis; Follow-up severe sepsis, bacteremia with Enterococcus, UTI with Pseudomonas and Proteus    HPI/Hospital course:    Mrs. Galvez is a 75-year-old  female with PMH significant for diabetes mellitus, lacunar infarct, essential hypertension, and unspecified anemia. She initially presented to the ED of [facility] on 20 with chief complaint of confusion.  She was reportedly previously at [facility] and discharged home wit pneumonia prior to her presentation. On admission, she encephalopathic and admitted to the PCU for further evaluation with KAY and Sepsis 2/2 UTI.     In the interim, one of two blood cultures was positive with ultimately growth of Enterococcus in one of two cultures (2020).  ID was consulted for further IV abx guidance with IV Vancomycin started.  Rocephin was initially continued given concern for underlying UTI.  Urine culture ultimately grew Proteus mirabilis and Pseudomonas aeruginosa.  IV cephalosporin coverage was transitioned to IV Cefepime for further treatment.      Metabolic encephalopathy improved during admission with CT demonstrating possible arachnoid cyst.  MRI was then performed and revealed CSF density process involving the right cerebellar hemisphere which is most probably intra-axial in location and features on the MRI compatible with sequela of remote infarct with extensive encephalomalacia changes.  There is also marked left mastoiditis-itis and a chronic appearing infarct in the right occipital lobe with cortical sclerosis and mild chronic appearing ethmoid sinusitis.    Given debility of nearly 2 years, physical and occupational therapies were consulted  during hospitalization.      There was some concern for abnormal EKG with ectopic atrial rhythm with frequent PACs vs. Chaotic atrial rhythm with cardiology consultationanam wallace.  It was recommend she undergo event monitoring at the time of discharge for around two weeks with further outpatient cardiology follow-up.  She later had runs of atrial in the AM of 1/6 with full dose anticoagulation started given concern of underlying arrhythmia as source of possible prior CVA.      Additionally, given worsening dyspnea with right-side wheezing, CT chest and  Neck were ordered on 1/6/21.  Methylprednisolone was started for short duration, but quickly discontinued given difficulty tolerating due to hyperglycemia.  CT neck revealed calcification vs. Possible foreign body.      VQ scan and venous dopplers were ordered on 1/7 to rule out PE and US venous doppler. VQ revealed low probability and US doppler without evidence of known DVT.      On 1/8/2021, she did develop some worsening lethargy.  She was found to have hypoglycemia with glucose in the 30s after several days of hyperglycemia requiring upward titration of insulin.   She did require D5 on 1/9 evening for some period of time after recurrent hypoglycemia but became quickly hyperglycemic following. Cosyntropin stimulation test was ordered.      Subjective:    Mrs. Galvez is resting comfortably in bed.  She is more alert on today's examination than prior days examinations.  She reports she feels she must be doing well.    Discussed with AM MIGEL Eubanks with no known acute events overnight.         ----------------------------------------------------------------------------------------------------------------------  Current Hospital Meds:  amLODIPine, 5 mg, Oral, Q24H  apixaban, 5 mg, Oral, Q12H  aspirin, 81 mg, Oral, Daily  atorvastatin, 20 mg, Oral, Nightly  bumetanide, 1 mg, Oral, Daily  castor oil-balsam peru, , Topical, Q12H  castor oil-balsam peru, , Topical,  Q12H  folic acid, 1 mg, Oral, Daily  insulin aspart, 0-9 Units, Subcutaneous, TID AC  ipratropium-albuterol, 3 mL, Nebulization, 4x Daily - RT  lactobacillus acidophilus, 1 capsule, Oral, Daily  metoprolol tartrate, 75 mg, Oral, Q12H  pantoprazole, 40 mg, Oral, Daily  piperacillin-tazobactam, 3.375 g, Intravenous, Once  piperacillin-tazobactam, 3.375 g, Intravenous, Q8H  sodium chloride, 3 mL, Intravenous, Q12H         ----------------------------------------------------------------------------------------------------------------------      Objective     Vital Signs:  Temp:  [98 °F (36.7 °C)-99 °F (37.2 °C)] 98.2 °F (36.8 °C)  Heart Rate:  [78-99] 89  Resp:  [18-22] 18  BP: (115-147)/(65-72) 115/71      01/09/21  0508 01/10/21  0500 01/11/21  0500   Weight: 78.7 kg (173 lb 8 oz) 77.9 kg (171 lb 11.2 oz) 76.3 kg (168 lb 3.2 oz)     Body mass index is 29.8 kg/m².    Intake/Output Summary (Last 24 hours) at 1/11/2021 1021  Last data filed at 1/11/2021 0824  Gross per 24 hour   Intake 1710 ml   Output 1000 ml   Net 710 ml     I/O this shift:  In: 360 [P.O.:360]  Out: -   Diet Soft Texture; Chopped; Thin; Consistent Carbohydrate  ----------------------------------------------------------------------------------------------------------------------    Physical Exam  Vitals signs and nursing note reviewed.   Constitutional:       Appearance: She is well-developed and well-groomed. She is not ill-appearing.      Interventions: She is not intubated.Nasal cannula in place.   HENT:      Head: Normocephalic and atraumatic.      Mouth/Throat:      Lips: Pink.      Mouth: Mucous membranes are moist.      Dentition: Abnormal dentition. Dental caries present.      Tongue: No lesions.      Pharynx: No pharyngeal swelling or posterior oropharyngeal erythema.      Comments: Difficulty visualizing tonsils  Eyes:      General: Lids are normal.      Conjunctiva/sclera:      Right eye: Right conjunctiva is not injected.      Left eye: Left  conjunctiva is not injected.   Neck:      Musculoskeletal: Normal range of motion.      Thyroid: No thyroid mass.   Cardiovascular:      Rate and Rhythm: Normal rate and regular rhythm.      Heart sounds: S1 normal and S2 normal.   Pulmonary:      Effort: No tachypnea, bradypnea, accessory muscle usage, prolonged expiration, respiratory distress or retractions. She is not intubated.      Breath sounds: No decreased air movement. Examination of the right-lower field reveals decreased breath sounds. Examination of the left-lower field reveals decreased breath sounds. Decreased breath sounds present. No wheezing, rhonchi or rales.   Abdominal:      General: Bowel sounds are normal.      Palpations: Abdomen is soft.      Tenderness: There is no abdominal tenderness.   Musculoskeletal:         General: No swelling.      Right lower leg: No edema.      Left lower leg: No edema.   Skin:     General: Skin is warm and dry.      Comments: Bilateral lower extremities wrapped.     Neurological:      Mental Status: She is alert.      Comments: Alert to self and location today.     Psychiatric:         Attention and Perception: Attention normal.         Mood and Affect: Mood normal.             Nasal cannula in place:Yes       ----------------------------------------------------------------------------------------------------------------------  Tele:                       ----------------------------------------------------------------------------------------------------------------------  Results from last 7 days   Lab Units 01/07/21  1222 01/07/21  1124 01/07/21  0656   TROPONIN T ng/mL 0.033* 0.035*  --    PROBNP pg/mL  --   --  2,050.0*     Results from last 7 days   Lab Units 01/11/21  0726 01/11/21  0403 01/10/21  0005 01/09/21  0443   CRP mg/dL  --  0.35 0.59* 0.81*   WBC 10*3/mm3 8.32  --  9.39 9.85   HEMOGLOBIN g/dL 8.5*  --  10.0* 10.4*   HEMATOCRIT % 29.1*  --  34.5 34.5   MCV fL 104.7*  --  106.8* 102.4*   MCHC g/dL  29.2*  --  29.0* 30.1*   PLATELETS 10*3/mm3 307  --  391 403     Results from last 7 days   Lab Units 01/08/21  1516   PH, ARTERIAL pH units 7.404   PO2 ART mm Hg 86.1   PCO2, ARTERIAL mm Hg 41.0   HCO3 ART mmol/L 25.6     Results from last 7 days   Lab Units 01/11/21  0403 01/10/21  0005 01/09/21  0443  01/07/21  0656 01/06/21  0102  01/05/21  0151   SODIUM mmol/L 137 141 139   < > 132* 130*   < > 133*   POTASSIUM mmol/L 3.8 4.6 4.8   < > 4.6 4.6  --  4.0   MAGNESIUM mg/dL  --   --   --   --   --  3.1*  --  1.5*   CHLORIDE mmol/L 104 107 106   < > 104 103  --  107   CO2 mmol/L 18.4* 21.5* 22.6   < > 19.3* 17.3*  --  18.6*   BUN mg/dL 30* 30* 34*   < > 29* 22  --  27*   CREATININE mg/dL 1.22* 0.92 0.98   < > 1.02* 0.98  --  1.05*   EGFR IF NONAFRICN AM mL/min/1.73 43* 60* 55*   < > 53* 55*  --  51*   CALCIUM mg/dL 9.0 9.1 9.3   < > 8.9 8.3*  --  7.7*   GLUCOSE mg/dL 231* 94 236*   < > 364* 377*  --  242*   ALBUMIN g/dL 2.85*  --   --   --  2.64* 2.34*  --  2.35*   BILIRUBIN mg/dL 0.2  --   --   --  <0.2 <0.2  --  0.2   ALK PHOS U/L 65  --   --   --  67 71  --  65   AST (SGOT) U/L 10  --   --   --  6 9  --  7   ALT (SGPT) U/L 8  --   --   --  7 8  --  9    < > = values in this interval not displayed.   Estimated Creatinine Clearance: 39.6 mL/min (A) (by C-G formula based on SCr of 1.22 mg/dL (H)).  No results found for: AMMONIA  Results from last 7 days   Lab Units 01/06/21  0102   CHOLESTEROL mg/dL 110   TRIGLYCERIDES mg/dL 144   HDL CHOL mg/dL 29*   LDL CHOL mg/dL 56     Blood Culture   Date Value Ref Range Status   01/02/2021 No growth at 4 days  Preliminary   01/02/2021 No growth at 4 days  Preliminary   12/31/2020 No growth at 5 days  Final   12/31/2020 Enterococcus faecalis (C)  Final     Comment:     Infectious disease consultation is highly recommended.     Urine Culture   Date Value Ref Range Status   12/31/2020 >100,000 CFU/mL Pseudomonas aeruginosa (A)  Final   12/31/2020 50,000 CFU/mL Proteus mirabilis  (A)  Final     No results found for: WOUNDCX  No results found for: STOOLCX  ----------------------------------------------------------------------------------------------------------------------  Imaging Results (Last 24 Hours)     ** No results found for the last 24 hours. **          Microbiology Results (last 10 days)     Procedure Component Value - Date/Time    Respiratory Panel, PCR (WITHOUT COVID) - Swab, Nasopharynx [[id number]]  (Normal) Collected: 01/06/21 1433    Lab Status: Final result Specimen: Swab from Nasopharynx Updated: 01/06/21 1601     ADENOVIRUS, PCR Not Detected     Coronavirus 229E Not Detected     Coronavirus HKU1 Not Detected     Coronavirus NL63 Not Detected     Coronavirus OC43 Not Detected     Human Metapneumovirus Not Detected     Human Rhinovirus/Enterovirus Not Detected     Influenza B PCR Not Detected     Parainfluenza Virus 1 Not Detected     Parainfluenza Virus 2 Not Detected     Parainfluenza Virus 3 Not Detected     Parainfluenza Virus 4 Not Detected     Bordetella pertussis pcr Not Detected     Chlamydophila pneumoniae PCR Not Detected     Mycoplasma pneumo by PCR Not Detected     Influenza A PCR Not Detected     RSV, PCR Not Detected    Narrative:      The coronavirus on the RVP is NOT COVID-19 and is NOT indicative of infection with COVID-19.     Blood Culture - Blood, Arm, Left [[id number]] Collected: 01/02/21 0814    Lab Status: Final result Specimen: Blood from Arm, Left Updated: 01/07/21 0845     Blood Culture No growth at 5 days    Blood Culture - Blood, Hand, Left [[id number]] Collected: 01/02/21 0814    Lab Status: Final result Specimen: Blood from Hand, Left Updated: 01/07/21 0845     Blood Culture No growth at 5 days          ----------------------------------------------------------------------------------------------------------------------   I have reviewed the above laboratory values for 01/11/21    Assessment/Plan     Active Hospital Problems    Diagnosis POA   •  **Sepsis (CMS/[facility]) [A41.9] Yes         ASSESSMENT/PLAN:  · Severe sepsis, present on admission,  With RR>20, HR>90, elevated C-RP, UTI, bacteremia, and metabolic encephalopathy:  -Blood and urine cultures reviewed.   -Continue vital signs per floor protocol.   -Continue monitoring daily CBC & temperature curve.   -Continue IV Vancomycin & IV Zosyn complete today (1/11/2021). IV cefepime previously transitioned to IV Zosyn 2/2 myoclonic jerking observed when hypoglycemic.  In any event, IV abx complete this evening.   -ID input appreciated.   -CT chest report previously atelectasis from large hiatal hernia.     · Enterococcus bacteremia:  -Continue IV Vancomycin through 1/11/21.  -12/31 blood cultures reviewed (1/2 + as outlined).  -1/2/2021 blood cultures reviewed (unremarkable).    -Continue daily CBC & C-RP.   -C-RP is improving.   -WBC slightly increased on 1/8 but likely 2/2 recent steroids.      · Proteus/Pseudomonas UTI:   -Urine culture reviewed.   -Continue IV Zosyn with completion today.   -C-RP has normalized.     · Leukocytosis, improved:  -Likely reactive to recent steroids.  Continue to follow.   -Afebrile with improved heart rates.   -C-RP has normalized.     · 5.6mm calficified structure vs. Foreign Body:  -Called Outpatient clinic with no ENT coverage until 1/12/2021  -SLP consult placed for possible FEES  -Consult in AM for possible ENT evaluation with return of services reported to be on 1/12/21 with previous inquiry.      · Worsening tachypnea overnight with question of pneumonia:   -CXR without acute infiltrate and with possible left pleural effusion.  -IV vanc & Zosyn completes on 1/11/21.   -ABGs reviewed (last on 1/8/21).    -CT chest/CT neck ordered and pending.   -Respiratory PCR unremarkable.      · Runs of atrial flutter:  -[id number] at least 6 with prior CVA, CHF, DM, female, age, HTN.   -Eliquis started per Cardiology.  -Continue cardiac monitoring.   -VQ with low probability.    -US  venous doppler without evidence of known DVT.    -Metoprolol started on 1/7 with further titration during admission.          · Uncontrolled hypertension:  -Lisinopril discontinued on 1/11 given rising creatinine now with diuresis.   -Restarted home Norvasc at lower dose of 5mg qd.on 1/6 with improvement.   -Metoprolol continued with titration during admission.    -Monitor blood pressures per floor protocol.  -Bumex previously initiated per cardiology.        · Hypoglycemia on 1/9/21 with prior Hyperglycemia in the setting of DM 2:   -Hemoglobin a1c 7.90.   -FSBG ACHS with SSI PRN.  -Continue sliding scale insulin given brittle DM with prior hypoglycemic episodes with basal and mealtime insulin.     · Acute on chronic weakness/debility:  -Continue PT/OT.  -SLP evaluation performed.     -SS on board regarding possible rehab.      · CVA by MRI with left carotid stenosis on US carotid doppler:   -Continue ASA and statin.   -MRI reviewed.   -US carotid doppler with moderate stenosis in the left carotid system.  -Plan for outpatient event monitoring with outpatient Cardiology follow-up.   -Lipid panel reviewed.     · Grade II diastolic dysfunction:   -Echocardiogram reviewed from 1/1/2021.   -CXR with bibasilar atelectasis and small left pleural effusion.   -ABG reviewed from 1/5/21 @ 2321 with some hyperventilation.   -CT chest report reviewed previously.    -Oral Bumex started per Cardiology previously.  -ACEI stopped on 1/11 due to rising renal function.   -Steady decrease in weight since 1/8.  -I/O balance throughout admission is +42mL.  There were a few days when she was rather dry appearing.      · 7mm Noncalcified nodule in RLL:  -CT chest follow-up recommend in 6 months.      · Left adrenal nodule:   -Further outpatient follow-up recommended with adrenal CT protocol or MRI on nonemergent basis recommend.   -Cosyntropin stim test     · KAY vs. KAY on CKD IIIb on admission:   -Creatinine back up to 1.22 on 1/11/21.   Will continue to monitor AM BMP.   -Lisinopril discontinued given rising creatinine.   -Bumex 1mg added per cards BID.      · Metabolic encephalopathy, improved:  -CT head report reviewed.   -MRI report reviewed.   -Mentation improved from time of admission.   -Mentation improved with treatment of hypoglycemia during admission.      · Acute on cChronic macrocytic anemia with hemoglobin drop on 1/11/21:  -Repeat AM CBC.     -No known overt blood loss.   -Add iron panel to AM labs.    -TSH acceptable on admission.  -Folate acceptable but borderline low.    -Vitamin b12 acceptable.    -Daily CBC reviewed.   -No known overt blood loss.     · Functional decline:  -PT/OT  -SS on board with possible rehab placement    -----------  -DVT prophylaxis: Eliquis to serve with close monitoring of hemoglobin  -GI prophylaxis: PPI/Lactobacillus  -Activity: Up with assist  -Disposition: Remains hospitalized for further therapies with now pursuing possible rehab placement.   -Anticipated dispo needs:  Event monitor for 2 weeks.  Outpatient Cards follow-up. Outpatient CT chest follow-up in 6 months. Outpatienet CT adrenal protocol or MRI. SS on board as spouse is now interested in possible rehab.   -Diet:   Dietary Orders (From admission, onward)     Start     Ordered    01/06/21 0929  Diet Soft Texture; Chopped; Thin; Consistent Carbohydrate  Diet Effective Now     Question Answer Comment   Diet Texture / Consistency Soft Texture    Select Texture: Chopped    Fluid Consistency Thin    Common Modifiers Consistent Carbohydrate        01/06/21 0929    01/01/21 1800  Dietary Nutrition Supplements Boost Plus (Ensure Enlive, Ensure Plus)  Daily With Breakfast, Lunch & Dinner     Question:  Select Supplement  Answer:  Boost Plus (Ensure Enlive, Ensure Plus)    01/01/21 1347                    The patient is high risk due to the following diagnoses/reasons:  Severe sepsis, bacteremia, UTI, recent pneumonia, metabolic  encephalopathy    Gale Ozuna PA-C  01/11/21  10:21 EST  Pager # [phone]

## 2021-01-11 NOTE — PLAN OF CARE
Goal Outcome Evaluation:  Plan of Care Reviewed With: patient  Progress: improving   Patient doing well today. More alert. Blood sugars have been better controlled. Will continue to monitor and follow POC

## 2021-01-11 NOTE — PROGRESS NOTES
LOS: 11 days     Name: Ashley Galvez  Age/Sex: 75 y.o. female  :  1945        PCP: Rony Callaway MD  REF: Mal Oliver DO    Principal Problem:    Sepsis (CMS/HCC)      Reason for follow-up: Atrial flutter     Subjective       Subjective     Ashley Galvez is a 75 year old female with a past medical history significant for history of CVA, essential hypertension, diabetes mellitus type 2 and anemia. Patient presented to the ED with complaints of confusion. She was found to have sepsis secondary to UTI and enterococcus bacteremia. Cardiology was consulted for possible atrial fibrillation    Interval History: Patient reports she is doing ok today. Breathing is about the same. Denies any chest pain, palpitations or bleeding.       Vital Signs  Temp:  [98 °F (36.7 °C)-99 °F (37.2 °C)] 98.2 °F (36.8 °C)  Heart Rate:  [78-99] 89  Resp:  [18-22] 18  BP: (115-189)/(65-84) 115/71  Vital Signs (last 72 hrs)        0700  -   0659  07  -  01/10 0659 01/10 07  -   0659  07  -   0938   Most Recent    Temp (°F) 97.4 -  98.2    97.7 -  98.7    98 -  99       98.2 (36.8)    Heart Rate 70 -  104    83 -  96    78 -  99       89    Resp 18 -  20    18 -  22    18 -  22       18    /63 -  154/75    129/77 -  167/77    115/71 -  189/84       115/71    SpO2 (%) 96 -  98    93 -  99    93 -  99       95        Body mass index is 29.8 kg/m².    Intake/Output Summary (Last 24 hours) at 2021 0938  Last data filed at 2021 0824  Gross per 24 hour   Intake 1710 ml   Output 1000 ml   Net 710 ml     Objective    Objective       Physical Exam:     General Appearance:    Alert, cooperative, in no acute distress   Head:    Normocephalic, without obvious abnormality, atraumatic   Eyes:            Conjunctivae and sclerae normal, no   icterus, no pallor, corneas clear.   Neck:   No adenopathy, supple, trachea midline, no thyromegaly, no   carotid bruit, no JVD   Lungs:      Clear to auscultation,respirations regular, even and                  unlabored    Heart:    Regular rhythm and normal rate, normal S1 and S2, no            murmur, no gallop, no rub, no click   Chest Wall:    No abnormalities observed   Abdomen:     Normal bowel sounds, no masses, no organomegaly, soft        non-tender, non-distended, no guarding, no rebound                tenderness   Extremities:   Moves all extremities well, no edema, no cyanosis, no             redness   Pulses:   Pulses palpable and equal bilaterally   Skin:   No bleeding, bruising or rash       Neurologic:   Alert and oriented    I have seen and performed a physical examination today.     Results review       Results Review:   Results from last 7 days   Lab Units 01/11/21  0726 01/10/21  0005 01/09/21  0443 01/08/21  0012 01/07/21  0656 01/06/21  0102 01/05/21  0151   WBC 10*3/mm3 8.32 9.39 9.85 12.43* 8.74 9.41 10.03   HEMOGLOBIN g/dL 8.5* 10.0* 10.4* 9.7* 8.9* 8.7* 8.8*   PLATELETS 10*3/mm3 307 391 403 324 299 274 240     Results from last 7 days   Lab Units 01/11/21  0403 01/10/21  0005 01/09/21  0443 01/08/21  0012 01/07/21  0656 01/06/21  0102 01/05/21  0939 01/05/21  0151   SODIUM mmol/L 137 141 139 134* 132* 130* 134* 133*   POTASSIUM mmol/L 3.8 4.6 4.8 4.8 4.6 4.6  --  4.0   CHLORIDE mmol/L 104 107 106 105 104 103  --  107   CO2 mmol/L 18.4* 21.5* 22.6 17.2* 19.3* 17.3*  --  18.6*   BUN mg/dL 30* 30* 34* 51* 29* 22  --  27*   CREATININE mg/dL 1.22* 0.92 0.98 1.19* 1.02* 0.98  --  1.05*   CALCIUM mg/dL 9.0 9.1 9.3 9.5 8.9 8.3*  --  7.7*   GLUCOSE mg/dL 231* 94 236* 223* 364* 377*  --  242*   ALT (SGPT) U/L 8  --   --   --  7 8  --  9   AST (SGOT) U/L 10  --   --   --  6 9  --  7     Results from last 7 days   Lab Units 01/07/21  1222 01/07/21  1124   TROPONIN T ng/mL 0.033* 0.035*     Lab Results   Component Value Date    INR 1.08 12/31/2020     Lab Results   Component Value Date    MG 3.1 (H) 01/06/2021    MG 1.5 (L) 01/05/2021    MG  1.8 01/02/2021     Lab Results   Component Value Date    TSH 1.240 12/31/2020    TRIG 144 01/06/2021    HDL 29 (L) 01/06/2021    LDL 56 01/06/2021      Imaging Results (Last 48 Hours)     Procedure Component Value Units Date/Time    CT Head Without Contrast [[id number]] Collected: 01/09/21 1537     Updated: 01/09/21 1541    Narrative:      EXAM:    CT Head Without Intravenous Contrast     EXAM DATE:    1/9/2021 3:11 PM     CLINICAL HISTORY:    AMS; N17.9-Acute kidney failure, unspecified     TECHNIQUE:    Axial computed tomography images of the head/brain without intravenous  contrast.  Sagittal and coronal reformatted images were created and  reviewed.  This CT exam was performed using one or more of the following  dose reduction techniques:  automated exposure control, adjustment of  the mA and/or kV according to patient size, and/or use of iterative  reconstruction technique.     COMPARISON:    MRI 01/04/2021     FINDINGS:    Brain:  Encephalomalacia in the right cerebellar bowel or hemisphere  compatible with sequela of old infarct.  Advanced chronic small vessel  ischemic disease is noted.  No hemorrhage.    Ventricles:  Unremarkable.  No ventriculomegaly.    Bones/joints:  Unremarkable.  No acute fracture.    Soft tissues:  Unremarkable.    Sinuses:  Mild chronic sphenoid sinus disease.    Mastoid air cells:  Marked left mastoiditis is noted.    Sella:  Partial empty sella incidentally noted and stable.       Impression:      1.  No CT evidence of acute intracranial findings.  2.  Atrophy and chronic small vessel ischemic disease stable.  3.  Probable chronic infarcts related encephalomalacia right cerebellar  hemisphere, stable.  4.  Marked left mastoiditis.     This report was finalized on 1/9/2021 3:39 PM by Dr. Sagar Romero MD.             Echo   Results for orders placed during the hospital encounter of 12/31/20   Adult Transthoracic Echo Complete W/ Cont if Necessary Per Protocol    Narrative · Left  ventricular ejection fraction appears to be 61 - 65%. Left   ventricular systolic function is normal.  · Left ventricular diastolic function is consistent with (grade II w/high   LAP) pseudonormalization.  · Mild aortic valve stenosis is present.         I reviewed the patient's new clinical results.    Telemetry: NSR 70-80 bpm        Medication Review:   amLODIPine, 5 mg, Oral, Q24H  apixaban, 5 mg, Oral, Q12H  aspirin, 81 mg, Oral, Daily  atorvastatin, 20 mg, Oral, Nightly  bumetanide, 1 mg, Oral, Daily  castor oil-balsam peru, , Topical, Q12H  castor oil-balsam peru, , Topical, Q12H  folic acid, 1 mg, Oral, Daily  insulin aspart, 0-9 Units, Subcutaneous, TID AC  ipratropium-albuterol, 3 mL, Nebulization, 4x Daily - RT  lactobacillus acidophilus, 1 capsule, Oral, Daily  metoprolol tartrate, 75 mg, Oral, Q12H  pantoprazole, 40 mg, Oral, Daily  piperacillin-tazobactam, 3.375 g, Intravenous, Once  piperacillin-tazobactam, 3.375 g, Intravenous, Q8H  sodium chloride, 3 mL, Intravenous, Q12H  vancomycin, 750 mg, Intravenous, Q24H             Assessment      Assessment:  1. Paroxysmal atrial flutter/fibrillation, CHADS-VASc score of at least 5, on eliquis, currently sinus rhythm  2. Acute diastolic heart failure, compensated   3. Essential hypertension, overall controlled   4. History of CVA  5. Sepsis secondary to UTI and bacteremia.    Plan     Recommendations:  1. We will continue with Eliquis and current medications  2. Blood pressure is somewhat labile will monitor for now  3. She is tolerating p.o. Bumex continue current management monitor creatinine  4. Management of sepsis as per medicine service  5. We will follow at a distance please call if assistance is required    I discussed the patients findings and my recommendations with patient and family      Katia TOMÁS Lowry , acting as scribe for Dr. Rufino Hurt MD [facility]  01/11/21  09:38 EST  Rufino FALL MD, [facility], performed the services described in this  documentation as documented by the above-named individual under my supervision and made necessary changes in the note is both accurate and complete    Please note that portions of this note were completed with a voice recognition program.

## 2021-01-11 NOTE — PROGRESS NOTES
PROGRESS NOTE         Patient Identification:  Name:  Ashley Galvez  Age:  75 y.o.  Sex:  female  :  1945  MRN:  [id number]  Visit Number:  [id number]  Primary Care Provider:  Rony Callaway MD         LOS: 11 days       ----------------------------------------------------------------------------------------------------------------------  Subjective       Chief Complaints:    Altered Mental Status        Interval History:      Patient resting comfortably in bed this morning.  No issues or complaints.  Afebrile, no diarrhea.  WBC normal.  CRP normal.  Antibiotic therapy to complete today.    Review of Systems:    Constitutional: no fever, chills and night sweats.  Generalized fatigue.    Eyes: no eye drainage, itching or redness.  HEENT: no mouth sores, dysphagia or nose bleed.  Respiratory: no for shortness of breath, cough or production of sputum.  Cardiovascular: no chest pain, no palpitations, no orthopnea.  Gastrointestinal: no nausea, vomiting or diarrhea. No abdominal pain, hematemesis or rectal bleeding.  Genitourinary: no dysuria or polyuria.  Hematologic/lymphatic: no lymph node abnormalities, no easy bruising or easy bleeding.  Musculoskeletal: no muscle or joint pain.  Skin: No rash and no itching.  Neurological: no loss of consciousness, no seizure, no headache.  Behavioral/Psych: no depression or suicidal ideation.  Endocrine: no hot flashes.  Immunologic: negative.    ----------------------------------------------------------------------------------------------------------------------      Objective       Current Hospital Meds:  amLODIPine, 5 mg, Oral, Q24H  apixaban, 5 mg, Oral, Q12H  aspirin, 81 mg, Oral, Daily  atorvastatin, 20 mg, Oral, Nightly  bumetanide, 1 mg, Oral, Daily  castor oil-balsam peru, , Topical, Q12H  castor oil-balsam peru, , Topical, Q12H  folic acid, 1 mg, Oral, Daily  insulin aspart, 0-9 Units, Subcutaneous, TID AC  ipratropium-albuterol, 3 mL,  Nebulization, 4x Daily - RT  metoprolol tartrate, 75 mg, Oral, Q12H  pantoprazole, 40 mg, Oral, Daily  piperacillin-tazobactam, 3.375 g, Intravenous, Once  piperacillin-tazobactam, 3.375 g, Intravenous, Q8H  sodium chloride, 3 mL, Intravenous, Q12H         ----------------------------------------------------------------------------------------------------------------------    Vital Signs:  Temp:  [98 °F (36.7 °C)-99 °F (37.2 °C)] 98.6 °F (37 °C)  Heart Rate:  [72-99] 72  Resp:  [18-22] 18  BP: (115-147)/(65-72) 140/70  Mean Arterial Pressure (Non-Invasive) for the past 24 hrs (Last 3 readings):   Noninvasive MAP (mmHg)   01/11/21 1032 94   01/11/21 0603 85   01/11/21 0300 98     SpO2 Percentage    01/11/21 0632 01/11/21 0642 01/11/21 1032   SpO2: 95% 95% 95%     SpO2:  [93 %-98 %] 95 %  on  Flow (L/min):  [2] 2;   Device (Oxygen Therapy): nasal cannula    Body mass index is 29.8 kg/m².  Wt Readings from Last 3 Encounters:   01/11/21 76.3 kg (168 lb 3.2 oz)        Intake/Output Summary (Last 24 hours) at 1/11/2021 1136  Last data filed at 1/11/2021 0824  Gross per 24 hour   Intake 1710 ml   Output 1000 ml   Net 710 ml     Diet Soft Texture; Chopped; Thin; Consistent Carbohydrate  ----------------------------------------------------------------------------------------------------------------------      Physical Exam:    Constitutional: Chronically ill-appearing elderly lady.  No respiratory distress. Much more awake and alert today.     HENT:  Head: Normocephalic and atraumatic.  Mouth:  Moist mucous membranes.    Eyes:  Conjunctivae and EOM are normal.  No scleral icterus.  Neck:  Neck supple.  No JVD present.    Cardiovascular:   Regular rate.  Irregularly irregular rhythm.  No murmur auscultated.  Pulmonary/Chest:  No respiratory distress, no wheezes, no crackles, with normal breath sounds and good air movement.  Abdominal:  Soft.  Bowel sounds are normal.  No distension and no tenderness to  palpation.  Musculoskeletal:  No edema, no tenderness, and no deformity.  No swelling or redness of joints.  Ulcerations of pretibial area which do not appear infectious.  Right ankle turns inward.  Neurological:  Alert and oriented to person, place, and time.  No facial droop.  No slurred speech.   Skin:  Skin is warm and dry.  No rash noted.  No pallor.  Onychomycosis.  Psychiatric: Normal mood and affect.  Behavior is normal.  ----------------------------------------------------------------------------------------------------------------------  Results from last 7 days   Lab Units 01/07/21  1222 01/07/21  1124   TROPONIN T ng/mL 0.033* 0.035*     Results from last 7 days   Lab Units 01/07/21  0656   PROBNP pg/mL 2,050.0*     Results from last 7 days   Lab Units 01/06/21  0102   CHOLESTEROL mg/dL 110   TRIGLYCERIDES mg/dL 144   HDL CHOL mg/dL 29*   LDL CHOL mg/dL 56     Results from last 7 days   Lab Units 01/08/21  1516   PH, ARTERIAL pH units 7.404   PO2 ART mm Hg 86.1   PCO2, ARTERIAL mm Hg 41.0   HCO3 ART mmol/L 25.6     Results from last 7 days   Lab Units 01/11/21  0726 01/11/21  0403 01/10/21  0005 01/09/21  0443   CRP mg/dL  --  0.35 0.59* 0.81*   WBC 10*3/mm3 8.32  --  9.39 9.85   HEMOGLOBIN g/dL 8.5*  --  10.0* 10.4*   HEMATOCRIT % 29.1*  --  34.5 34.5   MCV fL 104.7*  --  106.8* 102.4*   MCHC g/dL 29.2*  --  29.0* 30.1*   PLATELETS 10*3/mm3 307  --  391 403     Results from last 7 days   Lab Units 01/11/21  0403 01/10/21  0005 01/09/21  0443  01/07/21  0656 01/06/21  0102  01/05/21  0151   SODIUM mmol/L 137 141 139   < > 132* 130*   < > 133*   POTASSIUM mmol/L 3.8 4.6 4.8   < > 4.6 4.6  --  4.0   MAGNESIUM mg/dL  --   --   --   --   --  3.1*  --  1.5*   CHLORIDE mmol/L 104 107 106   < > 104 103  --  107   CO2 mmol/L 18.4* 21.5* 22.6   < > 19.3* 17.3*  --  18.6*   BUN mg/dL 30* 30* 34*   < > 29* 22  --  27*   CREATININE mg/dL 1.22* 0.92 0.98   < > 1.02* 0.98  --  1.05*   EGFR IF NONAFRICN AM mL/min/1.73  43* 60* 55*   < > 53* 55*  --  51*   CALCIUM mg/dL 9.0 9.1 9.3   < > 8.9 8.3*  --  7.7*   GLUCOSE mg/dL 231* 94 236*   < > 364* 377*  --  242*   ALBUMIN g/dL 2.85*  --   --   --  2.64* 2.34*  --  2.35*   BILIRUBIN mg/dL 0.2  --   --   --  <0.2 <0.2  --  0.2   ALK PHOS U/L 65  --   --   --  67 71  --  65   AST (SGOT) U/L 10  --   --   --  6 9  --  7   ALT (SGPT) U/L 8  --   --   --  7 8  --  9    < > = values in this interval not displayed.   Estimated Creatinine Clearance: 39.6 mL/min (A) (by C-G formula based on SCr of 1.22 mg/dL (H)).  No results found for: AMMONIA    Glucose   Date/Time Value Ref Range Status   01/11/2021 1035 292 (H) 70 - 130 mg/dL Final   01/11/2021 0606 234 (H) 70 - 130 mg/dL Final   01/11/2021 0246 203 (H) 70 - 130 mg/dL Final   01/10/2021 2332 132 (H) 70 - 130 mg/dL Final   01/10/2021 1955 398 (H) 70 - 130 mg/dL Final   01/10/2021 1832 404 (C) 70 - 130 mg/dL Final   01/10/2021 1748 464 (C) 70 - 130 mg/dL Final   01/10/2021 1615 388 (H) 70 - 130 mg/dL Final     Lab Results   Component Value Date    HGBA1C 7.90 (H) 12/31/2020     Lab Results   Component Value Date    TSH 1.240 12/31/2020       Blood Culture   Date Value Ref Range Status   01/02/2021 No growth at 24 hours  Preliminary   01/02/2021 No growth at 24 hours  Preliminary   12/31/2020 No growth at 2 days  Preliminary   12/31/2020 Enterococcus faecalis (C)  Final     Comment:     Infectious disease consultation is highly recommended.     Urine Culture   Date Value Ref Range Status   12/31/2020 Culture in progress  Preliminary     No results found for: WOUNDCX  No results found for: STOOLCX  No results found for: RESPCX  Pain Management Panel     Pain Management Panel Latest Ref Rng & Units 12/31/2020    AMPHETAMINES SCREEN, URINE Negative Negative    BARBITURATES SCREEN Negative Negative    BENZODIAZEPINE SCREEN, URINE Negative Negative    BUPRENORPHINEUR Negative Negative    COCAINE SCREEN, URINE Negative Negative    METHADONE SCREEN,  URINE Negative Negative            ----------------------------------------------------------------------------------------------------------------------  Imaging Results (Last 24 Hours)     ** No results found for the last 24 hours. **          ----------------------------------------------------------------------------------------------------------------------    Assessment/Plan       Assessment/Plan     ASSESSMENT:    1.  Sepsis  2.  Pyelonephritis  3.  Enterococcus bacteremia  4.  Aspiration pneumonia    PLAN:    Patient resting comfortably in bed this morning.  No issues or complaints.  Afebrile, no diarrhea.  WBC normal.  CRP normal.  Antibiotic therapy to complete today.    CT of the head from 1/9/2021 reports no acute findings, marked left mastoiditis.    Respiratory panel PCR negative.  CT of the chest from 1/7/2021 reports stable CT chest with large hiatal hernia andleft lower lobe atelectasis.    Urinalysis on 12/31/2020 was positive with WBCs too numerous to count.  Urine culture on 1/3/2021 finalized as Pseudomonas aeruginosa and Proteus mirabilis.      CT head without contrast on 12/31/2020 showed no acute intracranial abnormality.  CSF density mass of the right posterior fossa with mild mass-effect on the right cerebellar convexity, most likely an arachnoid cyst with recommendation to evaluate further with MRI without and with contrast.  Presumed chronic microvascular ischemic changes.     CT abdomen pelvis without contrast on 12/31/2020 showed atherosclerotic disease with probable vascular calcifications in both kidneys.  No ureteral stones as seen on either side, and there is no hydronephrosis.  Mild thickening of the urinary bladder wall may be due to incomplete distention or cystitis.  Large amount of stool in the rectal vault.  There is colonic diverticulosis without diverticulitis.  The appendix and small bowel are within normal limits.  Small left adrenal nodule, likely a benign adenoma in the  absence of known malignancy.  This could be assessed with nonemergent adrenal protocol MRI or CT if indicated.  Cholecystectomy and hysterectomy.     Blood cultures on 12/31/2020 are so far showing 1 out of 2 with Enterococcus.  BC ID on 12/31/2020 finalized as Enterococcus.  Repeat blood cultures on 1/2/2021 are so far showing no growth.     COVID-19 and flu A/B PCR on 12/31/2020 was negative.    Vancomycin and Zosyn to complete today.  Patient stable from ID standpoint.  ID will sign off for now.  Please contact us if we can further assist in this case.    Code Status:   Code Status and Medical Interventions:   Ordered at: 12/31/20 6595     Level Of Support Discussed With:    Patient     Code Status:    CPR     Medical Interventions (Level of Support Prior to Arrest):    Full     Scribed for Yvan Dempsey MD.    Macie Latham, APRN  01/11/21  11:36 EST    Physician Attestation:    The documentation recorded by the scribe accurately reflects the service I personally performed and the decisions made by me.    Yvan Dempsey MD  Infectious Diseases  01/12/21  10:51 EST

## 2021-01-11 NOTE — PROGRESS NOTES
Discharge Planning Assessment  ANDREW Bansal     Patient Name: Ashley Galvez  MRN: [id number]  Today's Date: 1/11/2021    Admit Date: 12/31/2020    Discharge Needs Assessment    No documentation.       Discharge Plan     Row Name 01/11/21 1545       Plan    Plan Pt was admitted on 12/31/20. Pt utilizes [facility]. [facility] will need contacted at discharge. Pt uses home oxygen at 2 liters nasal cannula, nebulizer (has neb meds) wheelchair, hospital bed, and bedside commode via unknown provider. Pt's spouse, Zack is requesting inpt rehab at discharge. SS will follow.        Marcella Bryan

## 2021-01-11 NOTE — PLAN OF CARE
Goal Outcome Evaluation:  Plan of Care Reviewed With: patient  Progress: improving     Pt resting with no complaints. Will continue with plan of care.

## 2021-01-12 LAB
ANION GAP SERPL CALCULATED.3IONS-SCNC: 9.7 MMOL/L (ref 5–15)
BASOPHILS # BLD AUTO: 0.06 10*3/MM3 (ref 0–0.2)
BASOPHILS NFR BLD AUTO: 0.6 % (ref 0–1.5)
BUN SERPL-MCNC: 28 MG/DL (ref 8–23)
BUN/CREAT SERPL: 23.9 (ref 7–25)
CALCIUM SPEC-SCNC: 8.7 MG/DL (ref 8.6–10.5)
CHLORIDE SERPL-SCNC: 99 MMOL/L (ref 98–107)
CO2 SERPL-SCNC: 23.3 MMOL/L (ref 22–29)
CREAT SERPL-MCNC: 1.17 MG/DL (ref 0.57–1)
CRP SERPL-MCNC: 0.35 MG/DL (ref 0–0.5)
DEPRECATED RDW RBC AUTO: 49.7 FL (ref 37–54)
EOSINOPHIL # BLD AUTO: 0.36 10*3/MM3 (ref 0–0.4)
EOSINOPHIL NFR BLD AUTO: 3.7 % (ref 0.3–6.2)
ERYTHROCYTE [DISTWIDTH] IN BLOOD BY AUTOMATED COUNT: 13.3 % (ref 12.3–15.4)
GFR SERPL CREATININE-BSD FRML MDRD: 45 ML/MIN/1.73
GLUCOSE BLDC GLUCOMTR-MCNC: 212 MG/DL (ref 70–130)
GLUCOSE BLDC GLUCOMTR-MCNC: 223 MG/DL (ref 70–130)
GLUCOSE BLDC GLUCOMTR-MCNC: 252 MG/DL (ref 70–130)
GLUCOSE BLDC GLUCOMTR-MCNC: 260 MG/DL (ref 70–130)
GLUCOSE BLDC GLUCOMTR-MCNC: 262 MG/DL (ref 70–130)
GLUCOSE SERPL-MCNC: 276 MG/DL (ref 65–99)
HCT VFR BLD AUTO: 29.9 % (ref 34–46.6)
HGB BLD-MCNC: 9.1 G/DL (ref 12–15.9)
IMM GRANULOCYTES # BLD AUTO: 0.04 10*3/MM3 (ref 0–0.05)
IMM GRANULOCYTES NFR BLD AUTO: 0.4 % (ref 0–0.5)
IRON 24H UR-MRATE: 32 MCG/DL (ref 37–145)
IRON SATN MFR SERPL: 14 % (ref 20–50)
LYMPHOCYTES # BLD AUTO: 1.94 10*3/MM3 (ref 0.7–3.1)
LYMPHOCYTES NFR BLD AUTO: 19.9 % (ref 19.6–45.3)
MCH RBC QN AUTO: 30.7 PG (ref 26.6–33)
MCHC RBC AUTO-ENTMCNC: 30.4 G/DL (ref 31.5–35.7)
MCV RBC AUTO: 101 FL (ref 79–97)
MONOCYTES # BLD AUTO: 0.83 10*3/MM3 (ref 0.1–0.9)
MONOCYTES NFR BLD AUTO: 8.5 % (ref 5–12)
NEUTROPHILS NFR BLD AUTO: 6.51 10*3/MM3 (ref 1.7–7)
NEUTROPHILS NFR BLD AUTO: 66.9 % (ref 42.7–76)
NRBC BLD AUTO-RTO: 0 /100 WBC (ref 0–0.2)
PLATELET # BLD AUTO: 379 10*3/MM3 (ref 140–450)
PMV BLD AUTO: 9.7 FL (ref 6–12)
POTASSIUM SERPL-SCNC: 3.7 MMOL/L (ref 3.5–5.2)
RBC # BLD AUTO: 2.96 10*6/MM3 (ref 3.77–5.28)
SODIUM SERPL-SCNC: 132 MMOL/L (ref 136–145)
TIBC SERPL-MCNC: 234 MCG/DL (ref 298–536)
TRANSFERRIN SERPL-MCNC: 157 MG/DL (ref 200–360)
WBC # BLD AUTO: 9.74 10*3/MM3 (ref 3.4–10.8)

## 2021-01-12 PROCEDURE — 94799 UNLISTED PULMONARY SVC/PX: CPT

## 2021-01-12 PROCEDURE — 63710000001 INSULIN ASPART PER 5 UNITS: Performed by: PHYSICIAN ASSISTANT

## 2021-01-12 PROCEDURE — 84466 ASSAY OF TRANSFERRIN: CPT | Performed by: PHYSICIAN ASSISTANT

## 2021-01-12 PROCEDURE — 63710000001 INSULIN ASPART PER 5 UNITS: Performed by: INTERNAL MEDICINE

## 2021-01-12 PROCEDURE — 97110 THERAPEUTIC EXERCISES: CPT

## 2021-01-12 PROCEDURE — 99233 SBSQ HOSP IP/OBS HIGH 50: CPT | Performed by: PHYSICIAN ASSISTANT

## 2021-01-12 PROCEDURE — 83540 ASSAY OF IRON: CPT | Performed by: PHYSICIAN ASSISTANT

## 2021-01-12 PROCEDURE — 82962 GLUCOSE BLOOD TEST: CPT

## 2021-01-12 PROCEDURE — 85025 COMPLETE CBC W/AUTO DIFF WBC: CPT | Performed by: PHYSICIAN ASSISTANT

## 2021-01-12 PROCEDURE — 97530 THERAPEUTIC ACTIVITIES: CPT

## 2021-01-12 PROCEDURE — 80048 BASIC METABOLIC PNL TOTAL CA: CPT | Performed by: PHYSICIAN ASSISTANT

## 2021-01-12 PROCEDURE — 86140 C-REACTIVE PROTEIN: CPT | Performed by: NURSE PRACTITIONER

## 2021-01-12 RX ORDER — IRON POLYSACCHARIDE COMPLEX 150 MG
150 CAPSULE ORAL DAILY
Status: DISCONTINUED | OUTPATIENT
Start: 2021-01-12 | End: 2021-01-14 | Stop reason: HOSPADM

## 2021-01-12 RX ORDER — METOPROLOL TARTRATE 100 MG/1
100 TABLET ORAL EVERY 12 HOURS SCHEDULED
Status: DISCONTINUED | OUTPATIENT
Start: 2021-01-12 | End: 2021-01-14 | Stop reason: HOSPADM

## 2021-01-12 RX ADMIN — INSULIN ASPART 8 UNITS: 100 INJECTION, SOLUTION INTRAVENOUS; SUBCUTANEOUS at 11:40

## 2021-01-12 RX ADMIN — SODIUM CHLORIDE, PRESERVATIVE FREE 3 ML: 5 INJECTION INTRAVENOUS at 08:09

## 2021-01-12 RX ADMIN — FOLIC ACID 1 MG: 1 TABLET ORAL at 08:08

## 2021-01-12 RX ADMIN — METOPROLOL TARTRATE 100 MG: 100 TABLET, FILM COATED ORAL at 21:31

## 2021-01-12 RX ADMIN — CASTOR OIL AND BALSAM, PERU: 788; 87 OINTMENT TOPICAL at 08:09

## 2021-01-12 RX ADMIN — BUMETANIDE 1 MG: 1 TABLET ORAL at 08:08

## 2021-01-12 RX ADMIN — IPRATROPIUM BROMIDE AND ALBUTEROL SULFATE 3 ML: .5; 3 SOLUTION RESPIRATORY (INHALATION) at 07:11

## 2021-01-12 RX ADMIN — AMLODIPINE BESYLATE 5 MG: 5 TABLET ORAL at 08:08

## 2021-01-12 RX ADMIN — INSULIN ASPART 8 UNITS: 100 INJECTION, SOLUTION INTRAVENOUS; SUBCUTANEOUS at 16:30

## 2021-01-12 RX ADMIN — ASPIRIN 81 MG: 81 TABLET, COATED ORAL at 08:08

## 2021-01-12 RX ADMIN — IPRATROPIUM BROMIDE AND ALBUTEROL SULFATE 3 ML: .5; 3 SOLUTION RESPIRATORY (INHALATION) at 00:14

## 2021-01-12 RX ADMIN — APIXABAN 5 MG: 5 TABLET, FILM COATED ORAL at 21:32

## 2021-01-12 RX ADMIN — SODIUM CHLORIDE, PRESERVATIVE FREE 3 ML: 5 INJECTION INTRAVENOUS at 21:32

## 2021-01-12 RX ADMIN — IPRATROPIUM BROMIDE AND ALBUTEROL SULFATE 3 ML: .5; 3 SOLUTION RESPIRATORY (INHALATION) at 13:33

## 2021-01-12 RX ADMIN — APIXABAN 5 MG: 5 TABLET, FILM COATED ORAL at 08:08

## 2021-01-12 RX ADMIN — CASTOR OIL AND BALSAM, PERU: 788; 87 OINTMENT TOPICAL at 08:08

## 2021-01-12 RX ADMIN — CASTOR OIL AND BALSAM, PERU: 788; 87 OINTMENT TOPICAL at 21:33

## 2021-01-12 RX ADMIN — PANTOPRAZOLE SODIUM 40 MG: 40 TABLET, DELAYED RELEASE ORAL at 08:08

## 2021-01-12 RX ADMIN — METOPROLOL TARTRATE 75 MG: 50 TABLET, FILM COATED ORAL at 08:08

## 2021-01-12 RX ADMIN — INSULIN ASPART 6 UNITS: 100 INJECTION, SOLUTION INTRAVENOUS; SUBCUTANEOUS at 08:09

## 2021-01-12 RX ADMIN — IPRATROPIUM BROMIDE AND ALBUTEROL SULFATE 3 ML: .5; 3 SOLUTION RESPIRATORY (INHALATION) at 18:32

## 2021-01-12 RX ADMIN — ATORVASTATIN CALCIUM 20 MG: 20 TABLET, FILM COATED ORAL at 21:32

## 2021-01-12 RX ADMIN — Medication 150 MG: at 08:09

## 2021-01-12 RX ADMIN — CASTOR OIL AND BALSAM, PERU: 788; 87 OINTMENT TOPICAL at 21:32

## 2021-01-12 NOTE — PLAN OF CARE
Goal Outcome Evaluation:  Plan of Care Reviewed With: patient  Progress: improving       Patient resting in with no complaints, pending rehab placement.  PT to eval today.  Will continue to monitor and follow plan of care.

## 2021-01-12 NOTE — CONSULTS
Consultation note    Referring physician: Mal Oliver DO    Consulting Physician: Otis Arias MD    Reason for consultation: Abnormal CT scan with hyperdensity right tonsil    Chief Complaint   Patient presents with   • Altered Mental Status   Sepsis.  Abnormal CT of the neck and head    HPI:   The patient is currently on Zosyn for sepsis.  A CT neck on January 7 showed a 5.6 mm radiodense ovoids structure within the wall of the right palatine tonsil at the level of the vallecula.  They could not exclude foreign body.  She is currently afebrile and has responded to medication.  CT scan of the head on January 9 did show what was read as mastoiditis.  Spoke with patient's .  Patient was confused and would follow commands but not communicating otherwise.  He denies significant trouble swallowing, complains of pain in her throat or head.  No swelling or tenderness.      Past Medical History:   Diagnosis Date   • Anemia    • Anterolisthesis     Grade 1 anterolisthesis of L5 on S1 due to bilateral L5 pars defects   • Diverticulosis    • Essential hypertension    • Lacunar infarction (CMS/HCC)    • Type 2 diabetes mellitus (CMS/HCC)        Past Surgical History:   Procedure Laterality Date   • CHOLECYSTECTOMY     • HYSTERECTOMY           Current Facility-Administered Medications:   •  amLODIPine (NORVASC) tablet 5 mg, 5 mg, Oral, Q24H, Gale Ozuna PA-C, 5 mg at 01/12/21 0808  •  apixaban (ELIQUIS) tablet 5 mg, 5 mg, Oral, Q12H, Katia Lowry APRN, 5 mg at 01/12/21 0808  •  aspirin EC tablet 81 mg, 81 mg, Oral, Daily, Tacos Puente MD, 81 mg at 01/12/21 0808  •  atorvastatin (LIPITOR) tablet 20 mg, 20 mg, Oral, Nightly, Tacos Puente MD, 20 mg at 01/11/21 2041  •  bumetanide (BUMEX) tablet 1 mg, 1 mg, Oral, Daily, Alina Hernandez APRN, 1 mg at 01/12/21 0808  •  castor oil-balsam peru (VENELEX) ointment, , Topical, Q12H, Tacos Puente MD, Given at 01/11/21 2042  •  castor  oil-balsam peru (VENELEX) ointment, , Topical, Q12H, Tacos Puente MD, Given at 01/12/21 0808  •  dextrose (D50W) 25 g/ 50mL Intravenous Solution 25 g, 25 g, Intravenous, Q15 Min PRN, Tacos Puente MD, 25 g at 01/09/21 1647  •  dextrose (D50W) 25 g/ 50mL Intravenous Solution 25 g, 25 g, Intravenous, Q15 Min PRN, Tacos Puente MD  •  dextrose (GLUTOSE) oral gel 15 g, 15 g, Oral, Q15 Min PRN, Tacos Puente MD  •  dextrose (GLUTOSE) oral gel 15 g, 15 g, Oral, Q15 Min PRN, Tacos Puente MD  •  folic acid (FOLVITE) tablet 1 mg, 1 mg, Oral, Daily, Tacos Puente MD, 1 mg at 01/12/21 0808  •  glucagon (human recombinant) (GLUCAGEN DIAGNOSTIC) injection 1 mg, 1 mg, Subcutaneous, Q15 Min PRN, Tacos Puente MD  •  glucagon (human recombinant) (GLUCAGEN DIAGNOSTIC) injection 1 mg, 1 mg, Subcutaneous, Q15 Min PRN, Tacos Puente MD  •  hydrOXYzine (ATARAX) tablet 25 mg, 25 mg, Oral, TID PRN, Arlyn Chandra PA, 25 mg at 01/06/21 0714  •  insulin aspart (novoLOG) injection 0-14 Units, 0-14 Units, Subcutaneous, TID AC, Gale Ozuna PA-C  •  ipratropium-albuterol (DUO-NEB) nebulizer solution 3 mL, 3 mL, Nebulization, 4x Daily - RT, Tacos Puente MD, 3 mL at 01/12/21 0711  •  iron polysaccharides (NIFEREX) capsule 150 mg, 150 mg, Oral, Daily, Gale Ozuna PA-C, 150 mg at 01/12/21 0809  •  Magnesium Sulfate 2 gram Bolus, followed by 8 gram infusion (total Mg dose 10 grams)- Mg less than or equal to 1mg/dL, 2 g, Intravenous, PRN **OR** Magnesium Sulfate 2 gram / 50mL Infusion (GIVE X 3 BAGS TO EQUAL 6GM TOTAL DOSE) - Mg 1.1 - 1.5 mg/dl, 2 g, Intravenous, PRN **OR** Magnesium Sulfate 4 gram infusion- Mg 1.6-1.9 mg/dL, 4 g, Intravenous, PRN, Tacos Puente MD  •  metoprolol tartrate (LOPRESSOR) tablet 100 mg, 100 mg, Oral, Q12H, Gale Ozuna PA-C  •  nitroglycerin (NITROSTAT) SL tablet 0.4 mg, 0.4 mg, Sublingual, Q5 Min PRN, Tacos Puente MD  •  pantoprazole (PROTONIX) EC tablet 40  mg, 40 mg, Oral, Daily, Tacos Puente MD, 40 mg at 01/12/21 0808  •  piperacillin-tazobactam (ZOSYN) 3.375 g/100 mL 0.9% NS IVPB (mbp), 3.375 g, Intravenous, Once, Macie Latham, APRN  •  potassium chloride (MICRO-K) CR capsule 40 mEq, 40 mEq, Oral, PRN **OR** potassium chloride (KLOR-CON) packet 40 mEq, 40 mEq, Oral, PRN **OR** potassium chloride 10 mEq in 100 mL IVPB, 10 mEq, Intravenous, Q1H PRN, Tacos uPente MD  •  potassium phosphate 45 mmol in sodium chloride 0.9 % 500 mL infusion, 45 mmol, Intravenous, PRN **OR** potassium phosphate 30 mmol in sodium chloride 0.9 % 250 mL infusion, 30 mmol, Intravenous, PRN **OR** potassium phosphate 15 mmol in sodium chloride 0.9 % 100 mL infusion, 15 mmol, Intravenous, PRN **OR** sodium phosphates 45 mmol in sodium chloride 0.9 % 500 mL IVPB, 45 mmol, Intravenous, PRN **OR** sodium phosphates 30 mmol in sodium chloride 0.9 % 250 mL IVPB, 30 mmol, Intravenous, PRN **OR** sodium phosphates 15 mmol in sodium chloride 0.9 % 250 mL IVPB, 15 mmol, Intravenous, PRN, Tacos Puente MD  •  [COMPLETED] Insert peripheral IV, , , Once **AND** sodium chloride 0.9 % flush 10 mL, 10 mL, Intravenous, PRN, Tacos Puente MD  •  sodium chloride 0.9 % flush 3 mL, 3 mL, Intravenous, Q12H, Tacos Puente MD, 3 mL at 01/12/21 0809  •  sodium chloride 0.9 % flush 3-10 mL, 3-10 mL, Intravenous, PRN, Tacos Puente MD    No Known Allergies    Social History     Socioeconomic History   • Marital status:      Spouse name: Not on file   • Number of children: Not on file   • Years of education: Not on file   • Highest education level: Not on file   Tobacco Use   • Smoking status: Never Smoker   • Smokeless tobacco: Never Used   Substance and Sexual Activity   • Alcohol use: Never     Frequency: Never   • Drug use: Never   • Sexual activity: Defer       Family History   Family history unknown: Yes       ROS: Unobtainable from patient.   denied  Constitutional: denies any weight  changes, fatigue or weakness.  Eyes: : denies blurred or double vision  Cardiovascular: denies chest pain, palpitations, edemas.  Respiratory: denies cough, sputum, SOB.  Gastrointestinal: denies N&V, abd pain, diarrhea, constipation.  Genitourinary: denies dysuria, frequency.  Endocrine: denies cold intolerance, lethargy and flushing.  Hem: denies excessive bruising and postop bleeding.  Musculoskeletal: denies weakness, joint swelling, pain or stiffness.  Neuro: denies seizures, CVA, paresthesia, or peripheral neuropathy.   Skin: denies change in nevi, rashes, masses.    Lab Results   Component Value Date    GLUCOSE 276 (H) 01/12/2021    BUN 28 (H) 01/12/2021    CREATININE 1.17 (H) 01/12/2021    EGFRIFNONA 45 (L) 01/12/2021    BCR 23.9 01/12/2021    CO2 23.3 01/12/2021    CALCIUM 8.7 01/12/2021    ALBUMIN 2.85 (L) 01/11/2021    AST 10 01/11/2021    ALT 8 01/11/2021     WBC   Date Value Ref Range Status   01/12/2021 9.74 3.40 - 10.80 10*3/mm3 Final     RBC   Date Value Ref Range Status   01/12/2021 2.96 (L) 3.77 - 5.28 10*6/mm3 Final     Hemoglobin   Date Value Ref Range Status   01/12/2021 9.1 (L) 12.0 - 15.9 g/dL Final     Hematocrit   Date Value Ref Range Status   01/12/2021 29.9 (L) 34.0 - 46.6 % Final     MCV   Date Value Ref Range Status   01/12/2021 101.0 (H) 79.0 - 97.0 fL Final     MCH   Date Value Ref Range Status   01/12/2021 30.7 26.6 - 33.0 pg Final     MCHC   Date Value Ref Range Status   01/12/2021 30.4 (L) 31.5 - 35.7 g/dL Final     RDW   Date Value Ref Range Status   01/12/2021 13.3 12.3 - 15.4 % Final     RDW-SD   Date Value Ref Range Status   01/12/2021 49.7 37.0 - 54.0 fl Final     MPV   Date Value Ref Range Status   01/12/2021 9.7 6.0 - 12.0 fL Final     Platelets   Date Value Ref Range Status   01/12/2021 379 140 - 450 10*3/mm3 Final     Neutrophil %   Date Value Ref Range Status   01/12/2021 66.9 42.7 - 76.0 % Final     Lymphocyte %   Date Value Ref Range Status   01/12/2021 19.9 19.6 - 45.3  % Final     Monocyte %   Date Value Ref Range Status   01/12/2021 8.5 5.0 - 12.0 % Final     Eosinophil %   Date Value Ref Range Status   01/12/2021 3.7 0.3 - 6.2 % Final     Basophil %   Date Value Ref Range Status   01/12/2021 0.6 0.0 - 1.5 % Final     Immature Grans %   Date Value Ref Range Status   01/12/2021 0.4 0.0 - 0.5 % Final     Neutrophils, Absolute   Date Value Ref Range Status   01/12/2021 6.51 1.70 - 7.00 10*3/mm3 Final     Lymphocytes, Absolute   Date Value Ref Range Status   01/12/2021 1.94 0.70 - 3.10 10*3/mm3 Final     Monocytes, Absolute   Date Value Ref Range Status   01/12/2021 0.83 0.10 - 0.90 10*3/mm3 Final     Eosinophils, Absolute   Date Value Ref Range Status   01/12/2021 0.36 0.00 - 0.40 10*3/mm3 Final     Basophils, Absolute   Date Value Ref Range Status   01/12/2021 0.06 0.00 - 0.20 10*3/mm3 Final     Immature Grans, Absolute   Date Value Ref Range Status   01/12/2021 0.04 0.00 - 0.05 10*3/mm3 Final     nRBC   Date Value Ref Range Status   01/12/2021 0.0 0.0 - 0.2 /100 WBC Final       Physical Exam:   Vitals:    01/12/21 1116   BP: 141/68   Pulse: 75   Resp: 20   Temp: 97.5 °F (36.4 °C)   SpO2: 97%     GENERAL: alert, but confused.  Responds to commands  HEENT: normochephalic, atraumatic, no scleral icterus moist mucous membranes.  NECK: Supple there is no thyromegaly or lymphadenopathy.   Oral cavity: Some carious teeth.  Otherwise no swelling or induration along the floor mouth tongue palate and posterior pharynx.  1+ tonsils noted.  And with compression of the tongue careful inspection of the right tonsil showed no significant abnormality or evidence of foreign body.  Nose was clear anteriorly.  Ears shows some dried hardened wax bilaterally but palpation of the auricles was unremarkable with no swelling tenderness redness or protrusion.  Portions of the drums could be seen bilaterally no evidence of any infection or opacity.  Was fluid in the left ear.  Cranial nerves grossly  intact    Diagnostic workup:   CT scans were both visualized and reviewed.  Appears to be consistent with a tonsil stone in the right tonsil and on examination no evidence of any induration or infection around it there is no evidence of abscess on CT.  The head CT was reviewed and the report said left mastoiditis.  Careful inspection showed yes there is fluid in the left middle ear and mastoid.  There will always be fluid in the mastoid and there is fluid in the middle ear.  There is no evidence of bony erosion or coalescent process.  There is no cellulitis around the skin overlying the mastoid and no evidence of subperiosteal abscess.  Sinuses were totally clear and there is no air-fluid level that could be a source of the bacteremia        Assessment and plan:  Normal tonsil exam and had neck exam and a previously septic patient is currently afebrile on Zosyn.  Left ear is consistent with serous otitis but not a source of infection at this time and this is serous fluid in the mastoid without a actual acute mastoiditis.  Do not feel the ear is a source of bacteremia at this point.  Would continue with your program.  Would recommend follow-up in the office in 1 month to reevaluate the ears and also to perhaps perform fiberoptic exam of her hypopharynx and larynx.

## 2021-01-12 NOTE — THERAPY TREATMENT NOTE
Acute Care - Physical Therapy Treatment Note   [place]     Patient Name: Ashley Galvez  : 1945  MRN: [id number]  Today's Date: 2021   Onset of Illness/Injury or Date of Surgery: 20       PT Assessment (last 12 hours)      PT Evaluation and Treatment     Row Name 21 1350          Physical Therapy Time and Intention    Subjective Information  no complaints  -LL     Document Type  therapy note (daily note)  -LL     Mode of Treatment  physical therapy;individual therapy  -LL     Patient Effort  fair  -LL     Comment  Patient stated that she has a fear of falling and that is why she has difficulty with mobility.  Patient was in agreement for participation with PT this date.  -LL     Row Name 21 5038          General Information    Patient Profile Reviewed  yes  -LL     Row Name 21 4070          Cognition    Affect/Mental Status (Cognitive)  WFL  -     Orientation Status (Cognition)  oriented to;person;place;situation  -LL     Follows Commands (Cognition)  follows two-step commands;75-90% accuracy  -LL     Personal Safety Interventions  fall prevention program maintained;gait belt;nonskid shoes/slippers when out of bed;supervised activity  -LL     Row Name 21 3577          Bed Mobility    Bed Mobility  rolling left;rolling right;supine-sit;sit-supine  -LL     Rolling Left [place] (Bed Mobility)  moderate assist (50% patient effort);2 person assist;verbal cues;nonverbal cues (demo/gesture)  -LL     Rolling Right [place] (Bed Mobility)  maximum assist (25% patient effort);2 person assist;verbal cues;nonverbal cues (demo/gesture)  -LL     Supine-Sit [place] (Bed Mobility)  maximum assist (25% patient effort);2 person assist;verbal cues;nonverbal cues (demo/gesture)  -LL     Sit-Supine [place] (Bed Mobility)  maximum assist (25% patient effort);2 person assist;verbal cues;nonverbal cues (demo/gesture)  -     Row Name 21 2253          Transfers     Comment (Transfers)  patient unable to perform OOB activity  -LL     Row Name 01/12/21 1350          Balance    Comment, Balance  Patient sat EOB with min/mod A  and verbal cues for upright and erect posture.    -LL     Row Name             Wound 01/01/21 0040 Right gluteal Pressure Injury    Wound - Properties Group Placement Date: 01/01/21 -SA Placement Time: 0040 -SA Present on Hospital Admission: Y  -SA Side: Right  -SA Location: gluteal  -SA Primary Wound Type: Pressure inj  -TW Stage, Pressure Injury : Stage 3  -TW    Retired Wound - Properties Group Date first assessed: 01/01/21 -SA Time first assessed: 0040 -SA Present on Hospital Admission: Y  -SA Side: Right  -SA Location: gluteal  -SA Primary Wound Type: Pressure inj  -TW    Row Name             Wound 01/01/21 0042 Left anterior greater trochanter Pressure Injury    Wound - Properties Group Placement Date: 01/01/21 -SA Placement Time: 0042 -SA Present on Hospital Admission: Y  -TW Side: Left  -SA Orientation: anterior  -SA Location: greater trochanter  -SA Primary Wound Type: Pressure inj  -TW Stage, Pressure Injury : Stage 2  -TW    Retired Wound - Properties Group Date first assessed: 01/01/21 -SA Time first assessed: 0042 -SA Present on Hospital Admission: Y  -TW Side: Left  -SA Location: greater trochanter  -SA Primary Wound Type: Pressure inj  -TW    Row Name             Wound 01/01/21 0044 Left lower leg    Wound - Properties Group Placement Date: 01/01/21 -SA Placement Time: 0044 -SA Side: Left  -SA Orientation: lower  -SA Location: leg  -SA    Retired Wound - Properties Group Date first assessed: 01/01/21 -SA Time first assessed: 0044  -SA Side: Left  -SA Location: leg  -SA    Row Name             Wound 01/01/21 0044 Right lower leg    Wound - Properties Group Placement Date: 01/01/21 -SA Placement Time: 0044 -SA Side: Right  -SA Orientation: lower  -SA Location: leg  -SA    Retired Wound - Properties Group Date first assessed:  01/01/21  -SA Time first assessed: 0044 -SA Side: Right  -SA Location: leg  -SA    Row Name             Wound 01/04/21 1953 Right heel Pressure Injury    Wound - Properties Group Placement Date: 01/04/21  -LETA Placement Time: 1953  -LETA Present on Hospital Admission: N  -LETA Side: Right  -LETA Location: heel  -LETA Primary Wound Type: Pressure inj  -TW Stage, Pressure Injury : deep tissue injury  -LETA    Retired Wound - Properties Group Date first assessed: 01/04/21  -LETA Time first assessed: 1953  -LETA Present on Hospital Admission: N  -LETA Side: Right  -LETA Location: heel  -LETA Primary Wound Type: Pressure inj  -TW    Row Name 01/12/21 1350          Positioning and Restraints    Pre-Treatment Position  in bed  -LL     Post Treatment Position  bed  -LL     In Bed  supine;notified nsg;call light within reach;encouraged to call for assist;exit alarm on;side rails up x3;R waffle boot;L waffle boot  -LL       User Key  (r) = Recorded By, (t) = Taken By, (c) = Cosigned By    Initials Name Provider Type    Dana Pinedo, RN Registered Nurse    Ubmerto Zelaya, MIGEL Registered Nurse    Oanh Isaacs PTA Physical Therapy Assistant    Joy Mckinnon RN Registered Nurse        Physical Therapy Education                 Title: PT OT SLP Therapies (Done)     Topic: Physical Therapy (Done)     Point: Mobility training (Done)     Learning Progress Summary           Patient Acceptance, E,D, VU,NR by LL at 1/12/2021 1404    Acceptance, E,TB, NR by MP at 1/12/2021 0920    Nonacceptance, E, NR,NL by RD at 1/9/2021 1550    Acceptance, E, NR by BC at 1/8/2021 1508    Acceptance, E,TB, NL,NR by KM at 1/8/2021 0927    Acceptance, E, NR by EA at 1/6/2021 2213    Acceptance, E, VU by MC at 1/6/2021 0120    Acceptance, E, VU by AM at 1/5/2021 1358    Acceptance, E, NR by BC at 1/5/2021 1043                   Point: Home exercise program (Done)     Learning Progress Summary           Patient Acceptance, E,D, VU,NR by  at  1/12/2021 1404    Acceptance, E,TB, NR by MP at 1/12/2021 0920    Nonacceptance, E, NR,NL by RD at 1/9/2021 1550    Acceptance, E, NR by BC at 1/8/2021 1508    Acceptance, E,TB, NL,NR by KM at 1/8/2021 0927    Acceptance, E, NR by EA at 1/6/2021 2213    Acceptance, E, VU by MC at 1/6/2021 0120    Acceptance, E, VU by AM at 1/5/2021 1358    Acceptance, E, NR by BC at 1/5/2021 1043                   Point: Body mechanics (Done)     Learning Progress Summary           Patient Acceptance, E,D, VU,NR by LL at 1/12/2021 1404    Acceptance, E,TB, NR by MP at 1/12/2021 0920    Nonacceptance, E, NR,NL by RD at 1/9/2021 1550    Acceptance, E, NR by BC at 1/8/2021 1508    Acceptance, E,TB, NL,NR by KM at 1/8/2021 0927    Acceptance, E, NR by EA at 1/6/2021 2213    Acceptance, E, VU by MC at 1/6/2021 0120    Acceptance, E, VU by AM at 1/5/2021 1358    Acceptance, E, NR by BC at 1/5/2021 1043                   Point: Precautions (Done)     Learning Progress Summary           Patient Acceptance, E,D, VU,NR by LL at 1/12/2021 1404    Acceptance, E,TB, NR by MP at 1/12/2021 0920    Nonacceptance, E, NR,NL by RD at 1/9/2021 1550    Acceptance, E, NR by BC at 1/8/2021 1508    Acceptance, E,TB, NL,NR by KM at 1/8/2021 0927    Acceptance, E, NR by EA at 1/6/2021 2213    Acceptance, E, VU by MC at 1/6/2021 0120    Acceptance, E, VU by AM at 1/5/2021 1358    Acceptance, E, NR by BC at 1/5/2021 1043                               User Key     Initials Effective Dates Name Provider Type Discipline    JAYME 06/16/16 -  Brennan, Thompson Abner, RN Registered Nurse Nurse    EA 06/16/16 -  Michelle Stevenson, RN Registered Nurse Nurse    MP 06/16/16 -  Alicia Gutierrez, RN Registered Nurse Nurse    BC 03/14/16 -  Michelle Jenkins, PT Physical Therapist PT     05/02/16 -  Oanh Ragland PTA Physical Therapy Assistant PT     09/06/18 -  Collett, Morgan, RN Registered Nurse Nurse    RD 06/10/20 -  Naa Peter, MIGEL Registered Nurse Nurse    AM  06/24/20 -  Vera Hicks, RN Registered Nurse Nurse              PT Recommendation and Plan             Time Calculation:   PT Charges     Row Name 01/12/21 1405 01/12/21 1404          Time Calculation    PT Received On  --  01/12/21  -LL        Time Calculation- PT    Total Timed Code Minutes- PT  30 minute(s)  -LL  --       User Key  (r) = Recorded By, (t) = Taken By, (c) = Cosigned By    Initials Name Provider Type    LL Oanh Ragland PTA Physical Therapy Assistant        Therapy Charges for Today     Code Description Service Date Service Provider Modifiers Qty    86861516442 HC PT THERAPEUTIC ACT EA 15 MIN 1/12/2021 Oanh Ragland PTA GP 1    94784233750 HC PT THER PROC EA 15 MIN 1/12/2021 Oanh Ragland PTA GP 1               Oanh. RACHEL Ragland  1/12/2021

## 2021-01-12 NOTE — PROGRESS NOTES
Patient Identification:  Name:  Ashley Galvez  Age:  75 y.o.  Sex:  female  :  1945  MRN:  [id number]  Visit Number:  [id number]  Primary Care Provider:  Rony Callaway MD    Length of stay:  12    Subjective/Interval History/Consultants/Procedures     Chief complaint: Iron deficiency; follow-up Leukocytosis, hypoglycemia; Follow-up severe sepsis, bacteremia with Enterococcus, UTI with Pseudomonas and Proteus    HPI/Hospital course:    Mrs. Galvez is a 75-year-old  female with PMH significant for diabetes mellitus, lacunar infarct, essential hypertension, and unspecified anemia. She initially presented to the ED of [facility] on 20 with chief complaint of confusion.  She was reportedly previously at [facility] and discharged home wit pneumonia prior to her presentation. On admission, she encephalopathic and admitted to the PCU for further evaluation with KAY and Sepsis 2/2 UTI.     In the interim, one of two blood cultures was positive with ultimately growth of Enterococcus in one of two cultures (2020).  ID was consulted for further IV abx guidance with IV Vancomycin started.  Rocephin was initially continued given concern for underlying UTI.  Urine culture ultimately grew Proteus mirabilis and Pseudomonas aeruginosa.  IV cephalosporin coverage was transitioned to IV Cefepime for further treatment.      Metabolic encephalopathy improved during admission with CT demonstrating possible arachnoid cyst.  MRI was then performed and revealed CSF density process involving the right cerebellar hemisphere which is most probably intra-axial in location and features on the MRI compatible with sequela of remote infarct with extensive encephalomalacia changes.  There is also marked left mastoiditis-itis and a chronic appearing infarct in the right occipital lobe with cortical sclerosis and mild chronic appearing ethmoid sinusitis.    Given debility of nearly 2 years, physical and  occupational therapies were consulted during hospitalization.      There was some concern for abnormal EKG with ectopic atrial rhythm with frequent PACs vs. Chaotic atrial rhythm with cardiology consultationanam wallace.  It was recommend she undergo event monitoring at the time of discharge for around two weeks with further outpatient cardiology follow-up.  She later had runs of atrial in the AM of 1/6 with full dose anticoagulation started given concern of underlying arrhythmia as source of possible prior CVA.      Additionally, given worsening dyspnea with right-side wheezing, CT chest and  Neck were ordered on 1/6/21.  Methylprednisolone was started for short duration, but quickly discontinued given difficulty tolerating due to hyperglycemia.  CT neck revealed calcification vs. Possible foreign body.      VQ scan and venous dopplers were ordered on 1/7 to rule out PE and US venous doppler. VQ revealed low probability and US doppler without evidence of known DVT.      On 1/8/2021, she did develop some worsening lethargy.  She was found to have hypoglycemia with glucose in the 30s after several days of hyperglycemia requiring upward titration of insulin.   She did require D5 on 1/9 evening for some period of time after recurrent hypoglycemia but became quickly hyperglycemic following. Cosyntropin stimulation test was ordered.      Subjective:    Mrs. Galvez is resting comfortably in bed. She is eating pancakes.  She reports she feels well today.  She holds conversation well. She denies chest pain, dyspnea, cough, and wheeze.     Discussed with AM MIGEL Vargas with no known acute events.          ----------------------------------------------------------------------------------------------------------------------  Current Hospital Meds:  amLODIPine, 5 mg, Oral, Q24H  apixaban, 5 mg, Oral, Q12H  aspirin, 81 mg, Oral, Daily  atorvastatin, 20 mg, Oral, Nightly  bumetanide, 1 mg, Oral, Daily  castor oil-balsam peru, , Topical,  Q12H  castor oil-balsam peru, , Topical, Q12H  folic acid, 1 mg, Oral, Daily  insulin aspart, 0-9 Units, Subcutaneous, TID AC  ipratropium-albuterol, 3 mL, Nebulization, 4x Daily - RT  iron polysaccharides, 150 mg, Oral, Daily  metoprolol tartrate, 75 mg, Oral, Q12H  pantoprazole, 40 mg, Oral, Daily  piperacillin-tazobactam, 3.375 g, Intravenous, Once  sodium chloride, 3 mL, Intravenous, Q12H         ----------------------------------------------------------------------------------------------------------------------      Objective     Vital Signs:  Temp:  [96.8 °F (36 °C)-99 °F (37.2 °C)] 96.8 °F (36 °C)  Heart Rate:  [72-87] 84  Resp:  [18-20] 20  BP: (121-146)/(64-76) 146/76      01/10/21  0500 01/11/21  0500 01/12/21  0300   Weight: 77.9 kg (171 lb 11.2 oz) 76.3 kg (168 lb 3.2 oz) 77.3 kg (170 lb 8 oz)     Body mass index is 30.2 kg/m².    Intake/Output Summary (Last 24 hours) at 1/12/2021 0930  Last data filed at 1/12/2021 0300  Gross per 24 hour   Intake 480 ml   Output 725 ml   Net -245 ml     No intake/output data recorded.  Diet Soft Texture; Chopped; Thin; Consistent Carbohydrate  ----------------------------------------------------------------------------------------------------------------------    Physical Exam  Vitals signs and nursing note reviewed.   Constitutional:       Appearance: She is well-developed and well-groomed. She is not ill-appearing.      Interventions: She is not intubated.Nasal cannula in place.   HENT:      Head: Normocephalic and atraumatic.      Mouth/Throat:      Lips: Pink.      Mouth: Mucous membranes are moist.      Dentition: Abnormal dentition.      Tongue: No lesions.      Pharynx: No pharyngeal swelling or posterior oropharyngeal erythema.      Comments: Difficulty visualizing tonsils  Eyes:      General: Lids are normal.      Extraocular Movements:      Right eye: Normal extraocular motion.      Conjunctiva/sclera:      Right eye: Right conjunctiva is not injected.       Left eye: Left conjunctiva is not injected.   Neck:      Musculoskeletal: Normal range of motion.      Thyroid: No thyroid mass.   Cardiovascular:      Rate and Rhythm: Normal rate and regular rhythm.      Heart sounds: S1 normal and S2 normal.   Pulmonary:      Effort: No tachypnea, bradypnea, accessory muscle usage, prolonged expiration, respiratory distress or retractions. She is not intubated.      Breath sounds: No decreased air movement. Examination of the right-lower field reveals decreased breath sounds. Examination of the left-lower field reveals decreased breath sounds. Decreased breath sounds present. No wheezing, rhonchi or rales.   Abdominal:      General: Bowel sounds are normal.      Palpations: Abdomen is soft.      Tenderness: There is no abdominal tenderness.   Musculoskeletal:         General: No swelling.      Right lower leg: No edema.      Left lower leg: No edema.   Skin:     General: Skin is warm and dry.   Neurological:      Mental Status: She is alert.      Comments: She is more alert today.  She is sitting up and is very cheerful.    Psychiatric:         Attention and Perception: Attention normal.         Mood and Affect: Mood normal.           Nasal cannula in place:Yes       ----------------------------------------------------------------------------------------------------------------------  Tele:                       ----------------------------------------------------------------------------------------------------------------------  Results from last 7 days   Lab Units 01/07/21  1222 01/07/21  1124 01/07/21  0656   TROPONIN T ng/mL 0.033* 0.035*  --    PROBNP pg/mL  --   --  2,050.0*     Results from last 7 days   Lab Units 01/12/21  0253 01/11/21  0726 01/11/21  0403 01/10/21  0005   CRP mg/dL 0.35  --  0.35 0.59*   WBC 10*3/mm3 9.74 8.32  --  9.39   HEMOGLOBIN g/dL 9.1* 8.5*  --  10.0*   HEMATOCRIT % 29.9* 29.1*  --  34.5   MCV fL 101.0* 104.7*  --  106.8*   MCHC g/dL 30.4* 29.2*   --  29.0*   PLATELETS 10*3/mm3 379 307  --  391     Results from last 7 days   Lab Units 01/08/21  1516   PH, ARTERIAL pH units 7.404   PO2 ART mm Hg 86.1   PCO2, ARTERIAL mm Hg 41.0   HCO3 ART mmol/L 25.6     Results from last 7 days   Lab Units 01/12/21  0253 01/11/21  0403 01/10/21  0005  01/07/21  0656 01/06/21  0102   SODIUM mmol/L 132* 137 141   < > 132* 130*   POTASSIUM mmol/L 3.7 3.8 4.6   < > 4.6 4.6   MAGNESIUM mg/dL  --   --   --   --   --  3.1*   CHLORIDE mmol/L 99 104 107   < > 104 103   CO2 mmol/L 23.3 18.4* 21.5*   < > 19.3* 17.3*   BUN mg/dL 28* 30* 30*   < > 29* 22   CREATININE mg/dL 1.17* 1.22* 0.92   < > 1.02* 0.98   EGFR IF NONAFRICN AM mL/min/1.73 45* 43* 60*   < > 53* 55*   CALCIUM mg/dL 8.7 9.0 9.1   < > 8.9 8.3*   GLUCOSE mg/dL 276* 231* 94   < > 364* 377*   ALBUMIN g/dL  --  2.85*  --   --  2.64* 2.34*   BILIRUBIN mg/dL  --  0.2  --   --  <0.2 <0.2   ALK PHOS U/L  --  65  --   --  67 71   AST (SGOT) U/L  --  10  --   --  6 9   ALT (SGPT) U/L  --  8  --   --  7 8    < > = values in this interval not displayed.   Estimated Creatinine Clearance: 41.6 mL/min (A) (by C-G formula based on SCr of 1.17 mg/dL (H)).  No results found for: AMMONIA  Results from last 7 days   Lab Units 01/06/21  0102   CHOLESTEROL mg/dL 110   TRIGLYCERIDES mg/dL 144   HDL CHOL mg/dL 29*   LDL CHOL mg/dL 56     Blood Culture   Date Value Ref Range Status   01/02/2021 No growth at 4 days  Preliminary   01/02/2021 No growth at 4 days  Preliminary   12/31/2020 No growth at 5 days  Final   12/31/2020 Enterococcus faecalis (C)  Final     Comment:     Infectious disease consultation is highly recommended.     Urine Culture   Date Value Ref Range Status   12/31/2020 >100,000 CFU/mL Pseudomonas aeruginosa (A)  Final   12/31/2020 50,000 CFU/mL Proteus mirabilis (A)  Final     No results found for: WOUNDCX  No results found for:  STOOLCX  ----------------------------------------------------------------------------------------------------------------------  Imaging Results (Last 24 Hours)     ** No results found for the last 24 hours. **          Microbiology Results (last 10 days)     Procedure Component Value - Date/Time    Respiratory Panel, PCR (WITHOUT COVID) - Swab, Nasopharynx [[id number]]  (Normal) Collected: 01/06/21 1433    Lab Status: Final result Specimen: Swab from Nasopharynx Updated: 01/06/21 1601     ADENOVIRUS, PCR Not Detected     Coronavirus 229E Not Detected     Coronavirus HKU1 Not Detected     Coronavirus NL63 Not Detected     Coronavirus OC43 Not Detected     Human Metapneumovirus Not Detected     Human Rhinovirus/Enterovirus Not Detected     Influenza B PCR Not Detected     Parainfluenza Virus 1 Not Detected     Parainfluenza Virus 2 Not Detected     Parainfluenza Virus 3 Not Detected     Parainfluenza Virus 4 Not Detected     Bordetella pertussis pcr Not Detected     Chlamydophila pneumoniae PCR Not Detected     Mycoplasma pneumo by PCR Not Detected     Influenza A PCR Not Detected     RSV, PCR Not Detected    Narrative:      The coronavirus on the RVP is NOT COVID-19 and is NOT indicative of infection with COVID-19.           ----------------------------------------------------------------------------------------------------------------------   I have reviewed the above laboratory values for 01/12/21    Assessment/Plan     Active Hospital Problems    Diagnosis POA   • **Sepsis (CMS/HCC) [A41.9] Yes         ASSESSMENT/PLAN:  · Severe sepsis, present on admission,  With RR>20, HR>90, elevated C-RP, UTI, bacteremia, and metabolic encephalopathy:  -Blood and urine cultures reviewed.   -Continue vital signs per floor protocol.   -Continue monitoring daily CBC & temperature curve.   -Completed IV Vancomycin and IV cefepime with transition to Zosyn with completion of course.   -ID input appreciated.   -CT chest report  previously atelectasis from large hiatal hernia.     · Enterococcus bacteremia:  -IV abx completed.  See prior day's progress note regarding details.       · Proteus/Pseudomonas UTI:   -Urine culture reviewed.   -IV abx completed.   -C-RP has normalized.     · Leukocytosis, improved:  -CBC reviewed.   -C-RP previously normalized.      · 5.6mm calficified structure vs. Foreign Body:  -Consult placed with ENT for possible evaluation.        · Runs of atrial flutter:  -[id number] at least 6 with prior CVA, CHF, DM, female, age, HTN.   -Eliquis started per Cardiology.  -Continue cardiac monitoring.   -VQ with low probability.    -US venous doppler without evidence of known DVT.    -Metoprolol started on 1/7 with further titration during admission.          · Uncontrolled hypertension:  -Lisinopril discontinued on 1/11 given rising creatinine now with diuresis.   -Restarted home Norvasc at lower dose of 5mg qd.on 1/6 with improvement.   -Metoprolol continued with titration during admission (now 100mg BID with hold parameters for hypotension and bradycardia).    -Monitor blood pressures per floor protocol.  -Bumex previously initiated per cardiology.        · Brittle DM 2, ID with wide glucose fluctuations:   -Hemoglobin a1c 7.90.   -FSBG ACHS with SSI PRN.  -Continue sliding scale insulin given brittle DM with prior hypoglycemic episodes with basal and mealtime insulin.   -SSI increased on 1/12 given rising glucoses.   -Hesitant to add basal insulin at present given prior drops during admission when receiving basal insulin.     · Acute on chronic weakness/debility:  -Continue PT/OT.  -SLP evaluation performed.     -SS on board regarding possible rehab.      · CVA by MRI with left carotid stenosis on US carotid doppler:   -Continue ASA and statin.   -MRI reviewed.   -US carotid doppler with moderate stenosis in the left carotid system.  -Plan for outpatient event monitoring with outpatient Cardiology follow-up.   -Lipid  panel reviewed.     · Grade II diastolic dysfunction:   -Echocardiogram reviewed from 1/1/2021.   -CXR with bibasilar atelectasis and small left pleural effusion.   -ABG reviewed from 1/5/21 @ 2321 with some hyperventilation.   -CT chest report reviewed previously.    -Oral Bumex started per Cardiology previously.  -ACEI stopped on 1/11 due to rising renal function.   -Steady decrease in weight since 1/8.  -I/O balance mildly positive today now that she is more awake and taking in more liquids.       · 7mm Noncalcified nodule in RLL:  -CT chest follow-up recommend in 6 months.      · Left adrenal nodule:   -Further outpatient follow-up recommended with adrenal CT protocol or MRI on nonemergent basis recommend.   -Cosyntropin stim test previously during admission after hypoglycemia without known acute evidence of adrenal insufficiency per results.      · KAY vs. KAY on CKD IIIb on admission:   -Repeat AM BMP.   -Lisinopril discontinued given rising creatinine.   -Bumex 1mg added per cards BID.      · Metabolic encephalopathy, improved:  -CT head report reviewed.   -MRI report reviewed.   -Mentation improved from time of admission.   -Mentation improved with treatment of hypoglycemia during admission.      · Acute on Chronic macrocytic anemia with hemoglobin drop on 1/11/21:  -Repeat AM CBC.     -No known overt blood loss.   -Iron deficient with supplementation added.   -TSH acceptable on admission.  -Folate acceptable but borderline low.    -Vitamin b12 acceptable.    -Daily CBC reviewed.   -No known overt blood loss.     · Iron deficiency:  -Niferex added.     · Functional decline:  -PT/OT  -SS on board with possible rehab placement    -----------  -DVT prophylaxis: Eliquis to serve with close monitoring of hemoglobin  -GI prophylaxis: PPI/Lactobacillus  -Activity: Up with assist  -Disposition: Remains hospitalized for further therapies with now pursuing possible rehab placement.   -Anticipated dispo needs:  Event  monitor for 2 weeks.  Outpatient Cards follow-up. Outpatient CT chest follow-up in 6 months. Outpatient CT adrenal protocol or MRI. SS on board as spouse is now interested in possible rehab.   -Diet:   Dietary Orders (From admission, onward)     Start     Ordered    01/06/21 0929  Diet Soft Texture; Chopped; Thin; Consistent Carbohydrate  Diet Effective Now     Question Answer Comment   Diet Texture / Consistency Soft Texture    Select Texture: Chopped    Fluid Consistency Thin    Common Modifiers Consistent Carbohydrate        01/06/21 0929 01/01/21 1800  Dietary Nutrition Supplements Boost Plus (Ensure Enlive, Ensure Plus)  Daily With Breakfast, Lunch & Dinner     Question:  Select Supplement  Answer:  Boost Plus (Ensure Enlive, Ensure Plus)    01/01/21 5857                    The patient is high risk due to the following diagnoses/reasons:  Severe sepsis, bacteremia, UTI, recent pneumonia, metabolic encephalopathy    Gale Ozuna PA-C  01/12/21  09:30 EST  Pager # [phone]

## 2021-01-12 NOTE — PROGRESS NOTES
Discharge Planning Assessment   [facility]     Patient Name: Ashley Galvez  MRN: [id number]  Today's Date: 1/12/2021    Admit Date: 12/31/2020    Discharge Needs Assessment    No documentation.       Discharge Plan     Row Name 01/12/21 0934       Plan    Plan SS noted inpatient rehab consult. SS notified  [facility] acute rehab per Soraida of consult. SS will follow.    0956am SS spoke with  inpt acute rehab per Soraida who states she will submit pt's referral for pre-authorization to pt's insurance. SS will follow.     1440pm SS spoke with pt's spouse, Zack about discharge planning. SS updated spouse, notifying him that  [facility] acute rehab has submitted pre-authorization to pt's insurance. SS will follow.         Marcella Bryan

## 2021-01-12 NOTE — PLAN OF CARE
Goal Outcome Evaluation:  Plan of Care Reviewed With: patient    Pt resting with no complaints. Will continue to monitor and follow plan of care.

## 2021-01-13 LAB
ANION GAP SERPL CALCULATED.3IONS-SCNC: 7.6 MMOL/L (ref 5–15)
BASOPHILS # BLD AUTO: 0.06 10*3/MM3 (ref 0–0.2)
BASOPHILS NFR BLD AUTO: 0.6 % (ref 0–1.5)
BUN SERPL-MCNC: 28 MG/DL (ref 8–23)
BUN/CREAT SERPL: 21.9 (ref 7–25)
CALCIUM SPEC-SCNC: 9.2 MG/DL (ref 8.6–10.5)
CHLORIDE SERPL-SCNC: 102 MMOL/L (ref 98–107)
CO2 SERPL-SCNC: 25.4 MMOL/L (ref 22–29)
CREAT SERPL-MCNC: 1.28 MG/DL (ref 0.57–1)
DEPRECATED RDW RBC AUTO: 50 FL (ref 37–54)
EOSINOPHIL # BLD AUTO: 0.31 10*3/MM3 (ref 0–0.4)
EOSINOPHIL NFR BLD AUTO: 3.2 % (ref 0.3–6.2)
ERYTHROCYTE [DISTWIDTH] IN BLOOD BY AUTOMATED COUNT: 13.2 % (ref 12.3–15.4)
GFR SERPL CREATININE-BSD FRML MDRD: 41 ML/MIN/1.73
GLUCOSE BLDC GLUCOMTR-MCNC: 258 MG/DL (ref 70–130)
GLUCOSE BLDC GLUCOMTR-MCNC: 267 MG/DL (ref 70–130)
GLUCOSE BLDC GLUCOMTR-MCNC: 274 MG/DL (ref 70–130)
GLUCOSE BLDC GLUCOMTR-MCNC: 334 MG/DL (ref 70–130)
GLUCOSE SERPL-MCNC: 270 MG/DL (ref 65–99)
HCT VFR BLD AUTO: 30.9 % (ref 34–46.6)
HGB BLD-MCNC: 9.3 G/DL (ref 12–15.9)
IMM GRANULOCYTES # BLD AUTO: 0.06 10*3/MM3 (ref 0–0.05)
IMM GRANULOCYTES NFR BLD AUTO: 0.6 % (ref 0–0.5)
LYMPHOCYTES # BLD AUTO: 2.05 10*3/MM3 (ref 0.7–3.1)
LYMPHOCYTES NFR BLD AUTO: 21 % (ref 19.6–45.3)
MCH RBC QN AUTO: 30.7 PG (ref 26.6–33)
MCHC RBC AUTO-ENTMCNC: 30.1 G/DL (ref 31.5–35.7)
MCV RBC AUTO: 102 FL (ref 79–97)
MONOCYTES # BLD AUTO: 0.9 10*3/MM3 (ref 0.1–0.9)
MONOCYTES NFR BLD AUTO: 9.2 % (ref 5–12)
NEUTROPHILS NFR BLD AUTO: 6.36 10*3/MM3 (ref 1.7–7)
NEUTROPHILS NFR BLD AUTO: 65.4 % (ref 42.7–76)
NRBC BLD AUTO-RTO: 0 /100 WBC (ref 0–0.2)
PLATELET # BLD AUTO: 372 10*3/MM3 (ref 140–450)
PMV BLD AUTO: 9.6 FL (ref 6–12)
POTASSIUM SERPL-SCNC: 4 MMOL/L (ref 3.5–5.2)
RBC # BLD AUTO: 3.03 10*6/MM3 (ref 3.77–5.28)
SODIUM SERPL-SCNC: 135 MMOL/L (ref 136–145)
WBC # BLD AUTO: 9.74 10*3/MM3 (ref 3.4–10.8)

## 2021-01-13 PROCEDURE — 82962 GLUCOSE BLOOD TEST: CPT

## 2021-01-13 PROCEDURE — 80048 BASIC METABOLIC PNL TOTAL CA: CPT | Performed by: PHYSICIAN ASSISTANT

## 2021-01-13 PROCEDURE — 94799 UNLISTED PULMONARY SVC/PX: CPT

## 2021-01-13 PROCEDURE — 63710000001 INSULIN DETEMIR PER 5 UNITS: Performed by: PHYSICIAN ASSISTANT

## 2021-01-13 PROCEDURE — 85025 COMPLETE CBC W/AUTO DIFF WBC: CPT | Performed by: PHYSICIAN ASSISTANT

## 2021-01-13 PROCEDURE — 63710000001 INSULIN ASPART PER 5 UNITS: Performed by: PHYSICIAN ASSISTANT

## 2021-01-13 PROCEDURE — 99233 SBSQ HOSP IP/OBS HIGH 50: CPT | Performed by: PHYSICIAN ASSISTANT

## 2021-01-13 RX ORDER — AMLODIPINE BESYLATE 10 MG/1
10 TABLET ORAL
Status: DISCONTINUED | OUTPATIENT
Start: 2021-01-14 | End: 2021-01-14 | Stop reason: HOSPADM

## 2021-01-13 RX ADMIN — ATORVASTATIN CALCIUM 20 MG: 20 TABLET, FILM COATED ORAL at 21:45

## 2021-01-13 RX ADMIN — IPRATROPIUM BROMIDE AND ALBUTEROL SULFATE 3 ML: .5; 3 SOLUTION RESPIRATORY (INHALATION) at 18:25

## 2021-01-13 RX ADMIN — IPRATROPIUM BROMIDE AND ALBUTEROL SULFATE 3 ML: .5; 3 SOLUTION RESPIRATORY (INHALATION) at 06:23

## 2021-01-13 RX ADMIN — INSULIN ASPART 8 UNITS: 100 INJECTION, SOLUTION INTRAVENOUS; SUBCUTANEOUS at 08:59

## 2021-01-13 RX ADMIN — APIXABAN 5 MG: 5 TABLET, FILM COATED ORAL at 08:59

## 2021-01-13 RX ADMIN — IPRATROPIUM BROMIDE AND ALBUTEROL SULFATE 3 ML: .5; 3 SOLUTION RESPIRATORY (INHALATION) at 12:15

## 2021-01-13 RX ADMIN — SODIUM CHLORIDE, PRESERVATIVE FREE 3 ML: 5 INJECTION INTRAVENOUS at 21:46

## 2021-01-13 RX ADMIN — Medication 150 MG: at 09:00

## 2021-01-13 RX ADMIN — SODIUM CHLORIDE, PRESERVATIVE FREE 3 ML: 5 INJECTION INTRAVENOUS at 08:59

## 2021-01-13 RX ADMIN — PANTOPRAZOLE SODIUM 40 MG: 40 TABLET, DELAYED RELEASE ORAL at 08:59

## 2021-01-13 RX ADMIN — METOPROLOL TARTRATE 100 MG: 100 TABLET, FILM COATED ORAL at 08:59

## 2021-01-13 RX ADMIN — FOLIC ACID 1 MG: 1 TABLET ORAL at 08:59

## 2021-01-13 RX ADMIN — BUMETANIDE 1 MG: 1 TABLET ORAL at 08:59

## 2021-01-13 RX ADMIN — APIXABAN 5 MG: 5 TABLET, FILM COATED ORAL at 21:45

## 2021-01-13 RX ADMIN — CASTOR OIL AND BALSAM, PERU: 788; 87 OINTMENT TOPICAL at 08:58

## 2021-01-13 RX ADMIN — AMLODIPINE BESYLATE 5 MG: 5 TABLET ORAL at 08:59

## 2021-01-13 RX ADMIN — METOPROLOL TARTRATE 100 MG: 100 TABLET, FILM COATED ORAL at 21:45

## 2021-01-13 RX ADMIN — CASTOR OIL AND BALSAM, PERU: 788; 87 OINTMENT TOPICAL at 21:46

## 2021-01-13 RX ADMIN — ASPIRIN 81 MG: 81 TABLET, COATED ORAL at 08:59

## 2021-01-13 RX ADMIN — INSULIN DETEMIR 5 UNITS: 100 INJECTION, SOLUTION SUBCUTANEOUS at 21:46

## 2021-01-13 RX ADMIN — INSULIN ASPART 8 UNITS: 100 INJECTION, SOLUTION INTRAVENOUS; SUBCUTANEOUS at 17:52

## 2021-01-13 RX ADMIN — IPRATROPIUM BROMIDE AND ALBUTEROL SULFATE 3 ML: .5; 3 SOLUTION RESPIRATORY (INHALATION) at 00:04

## 2021-01-13 RX ADMIN — CASTOR OIL AND BALSAM, PERU: 788; 87 OINTMENT TOPICAL at 21:45

## 2021-01-13 RX ADMIN — INSULIN ASPART 8 UNITS: 100 INJECTION, SOLUTION INTRAVENOUS; SUBCUTANEOUS at 12:21

## 2021-01-13 NOTE — PROGRESS NOTES
Discharge Planning Assessment  [facility]     Patient Name: Ashley Galvez  MRN: [id number]  Today's Date: 1/13/2021    Admit Date: 12/31/2020    Discharge Plan     Row Name 01/13/21 0935       Plan    Plan  SS spoke with [facility] per Soraida.  Rehab continues to await acceptance or denial for admit to rehab from pt's insurance.  SS will continue to follow.          Soraida Salmeron, FREDDIEW

## 2021-01-13 NOTE — NURSING NOTE
IRF consult noted and patient information submitted to [facility] on 01/12/21. [facility] denied IRF admission on 01/13/21 and JUDAH Gallardo, notified. Thank you for this referral.

## 2021-01-13 NOTE — DISCHARGE PLACEMENT REQUEST
"Ashley Roland (75 y.o. Female)     Date of Birth Social Security Number Address Home Phone MRN    1945  [address] [facility] [place]  [place] [id number] [phone] [id number]    Alevism Marital Status          None        Admission Date Admission Type Admitting Provider Attending Provider Department, Room/Bed    12/31/20 Emergency Norman Saravia MD Cagle, William, DO [address], 3305/2S    Discharge Date Discharge Disposition Discharge Destination                       Attending Provider: Jorge Sidhu DO    Allergies: No Known Allergies    Isolation: None   Infection: None   Code Status: CPR    Ht: 160 cm (63\")   Wt: 76.8 kg (169 lb 4.8 oz)    Admission Cmt: None   Principal Problem: Sepsis (CMS/[facility]) [A41.9]                 Active Insurance as of 12/31/2020     Primary Coverage     Payor Plan Insurance Group Employer/Plan Group    HUMANA MEDICARE REPLACEMENT HUMANA MEDICARE REPLACEMENT [id number]     Payor Plan Address Payor Plan Phone Number Payor Plan Fax Number Effective Dates    [address] [address] [phone]  1/1/2018 - None Entered    [facility] [id number]       Subscriber Name Subscriber Birth Date Member ID       ASHLEY ROLAND 1945 [id number]                 Emergency Contacts      (Rel.) Home Phone Work Phone Mobile Phone    FREDDIE ROLAND (Spouse) [phone] -- --            Emergency Contact Information     Name Relation Home Work Mobile    FREDDIE ROLAND Spouse [phone]            Insurance Information                HUMANA MEDICARE REPLACEMENT/HUMANA MEDICARE REPLACEMENT Phone: [phone]    Subscriber: Ashley Roland Subscriber#: [id number]    Group#: [id number] Precert#:           Treatment Team     Provider Relationship Specialty Contact    Jorge Sidhu DO Attending --  [phone]    Leobardo Ott Patient Care Technician --     Gale Ozuna PA-C Physician Assistant Physician Assistant  [phone]    [place], " Demarco BOB MD Consulting Physician Otolaryngology  [phone]    Naa Peter, RN Registered Nurse --     Kinjal Hicks, RRT Respiratory Therapist --  [phone]    Anastacia Edward, PT Physical Therapist Physical Therapy           Problem List           Codes Noted - Resolved       Hospital    * (Principal) Sepsis (CMS/[facility]) ICD-10-CM: A41.9  ICD-9-CM: 038.9, 995.91 2020 - Present             History & Physical      Norman Saravia MD at 20 2316              [facility]    Patient Identification:  Name:  Ashley Galvez  Age:  75 y.o.  Sex:  female  :  1945  MRN:  [id number]   Visit Number:  [id number]  Admit Date: 2020   Admit Date: 2020   Room number:  P203/S2  Primary Care Physician:  Provider, No Known    Date of Admission: 2020     Subjective     Chief complaint:    Chief Complaint   Patient presents with   • Altered Mental Status     History of presenting illness:  75 y.o. female who was admitted on 2020 from the ED via EMS for confusion.  The patient had previously been at [facility] emergency department for pneumonia and discharged home with some antibiotics.  The  called EMS and this as the patient was not able to talk to him and appeared ill.  By the time she arrived in our emergency department, the patient was able to talk to the ER provider.  The patient was slightly oriented but able to answer most questions appropriately.  Please note that currently the patient is not oriented and as a result I am unable to obtain any history from her.  No family members are at bedside.  The previous HPI was obtained from review of the emergency department records.  Please note that the emergency department tried several times to contact the  via phone and they were unsuccessful.    The patient was able to tell me that she is not experienced any dysuria and she does not have any pain or  fevers.  ---------------------------------------------------------------------------------------------------------------------   Review of Systems   Unable to perform ROS: Mental status change     ---------------------------------------------------------------------------------------------------------------------   Past Medical History:   Diagnosis Date   • Anemia    • Anterolisthesis     Grade 1 anterolisthesis of L5 on S1 due to bilateral L5 pars defects   • Diverticulosis    • Essential hypertension    • Lacunar infarction (CMS/HCC)    • Type 2 diabetes mellitus (CMS/HCC)      Past Surgical History:   Procedure Laterality Date   • CHOLECYSTECTOMY     • HYSTERECTOMY       Family History   Family history unknown: Yes     Social History     Socioeconomic History   • Marital status:    Tobacco Use   • Smoking status: Never Smoker   • Smokeless tobacco: Never Used   Substance and Sexual Activity   • Alcohol use: Never     Frequency: Never   • Drug use: Never   • Sexual activity: Defer     ---------------------------------------------------------------------------------------------------------------------   Allergies:  Patient has no known allergies.  ---------------------------------------------------------------------------------------------------------------------   Medications below are reported home medications pulling from within the system; at this time, these medications have not been reconciled unless otherwise specified and are in the verification process for further verifcation as current home medications.      Prior to Admission Medications     None        Objective     Vital Signs:  Temp:  [98.2 °F (36.8 °C)-98.4 °F (36.9 °C)] 98.2 °F (36.8 °C)  Heart Rate:  [] 118  Resp:  [16-22] 22  BP: ()/(52-98) 141/79    Mean Arterial Pressure (Non-Invasive) for the past 24 hrs (Last 3 readings):   Noninvasive MAP (mmHg)   01/01/21 0302 90   01/01/21 0105 102   01/01/21 0050 111     SpO2:  [92 %-100  %] 99 %  on  Flow (L/min):  [2] 2;   Device (Oxygen Therapy): nasal cannula  Body mass index is 29.76 kg/m².    Wt Readings from Last 3 Encounters:   01/01/21 76.2 kg (168 lb)      ----------------------------------------------------------------------------------------------------------------------  Physical Exam  Vitals signs reviewed.   Constitutional:       General: She is sleeping. She is in acute distress.      Appearance: Normal appearance. She is well-developed. She is not toxic-appearing or diaphoretic.      Interventions: Nasal cannula in place.   HENT:      Head: Normocephalic and atraumatic.      Right Ear: External ear normal.      Left Ear: External ear normal.      Nose: Nose normal.   Eyes:      General: No scleral icterus.        Right eye: No discharge.         Left eye: No discharge.      Pupils: Pupils are equal, round, and reactive to light.   Cardiovascular:      Rate and Rhythm: Normal rate and regular rhythm.      Heart sounds: No murmur.   Pulmonary:      Effort: Pulmonary effort is normal. No respiratory distress.      Breath sounds: No stridor. No wheezing or rales.   Abdominal:      General: Abdomen is flat. Bowel sounds are normal. There is no distension.   Musculoskeletal:         General: No swelling, deformity or signs of injury.   Skin:     General: Skin is warm.      Capillary Refill: Capillary refill takes less than 2 seconds.      Coloration: Skin is not jaundiced.      Findings: No erythema or rash.      Comments: Well-healed vertical suprapubic incision; well-healed trocar sites in the right upper quadrant.   Neurological:      General: No focal deficit present.      Mental Status: She is easily aroused. She is confused.      Cranial Nerves: Cranial nerves are intact. No dysarthria.   Psychiatric:         Attention and Perception: Attention normal.         Mood and Affect: Mood normal.         Speech: Speech normal.         Behavior: Behavior normal.         Thought Content:  Thought content normal.         Cognition and Memory: Cognition is impaired.         Judgment: Judgment normal.       ---------------------------------------------------------------------------------------------------------------------  EKG: Sinus tachycardia with a heart rate of 107 and a QTC of 437 ms.  I see inferior Q waves but I do not see any acute ST elevation.  There are no comparison EKGs.    Telemetry: Sinus tachycardia with heart rates 125.    I have personally looked at both the EKG and the telemetry strips.  --------------------------------------------------------------------------------------------------------------------  LABS:    CBC and coagulation:  Results from last 7 days   Lab Units 01/01/21 0347 12/31/20 2009   LACTATE mmol/L 0.9 0.9   SED RATE mm/hr  --  24   CRP mg/dL 0.78* 0.75*   WBC 10*3/mm3 12.52* 11.73*   HEMOGLOBIN g/dL 11.3* 11.3*   HEMATOCRIT % 37.0 35.4   MCV fL 103.4* 100.6*   MCHC g/dL 30.5* 31.9   PLATELETS 10*3/mm3 413 449   INR   --  1.08     Acid/base balance:  Results from last 7 days   Lab Units 01/01/21  0322 12/31/20  2344 12/31/20  1902   PH, ARTERIAL pH units 7.300* 7.112* 7.137*   PO2 ART mm Hg 48.1* 125.0* 122.0*   PCO2, ARTERIAL mm Hg 26.2* 22.2* 21.1*   HCO3 ART mmol/L 12.8* 7.1* 7.1*     Renal and electrolytes:  Results from last 7 days   Lab Units 01/01/21 0347 12/31/20 2009   SODIUM mmol/L 153* 147*   POTASSIUM mmol/L 3.2* 3.9   MAGNESIUM mg/dL  --  2.2   CHLORIDE mmol/L 130* 127*   CO2 mmol/L 10.7* 7.4*   BUN mg/dL 62* 70*   CREATININE mg/dL 1.44* 1.71*   EGFR IF NONAFRICN AM mL/min/1.73 36* 29*   CALCIUM mg/dL 9.5 9.9   PHOSPHORUS mg/dL 2.3* 3.6   GLUCOSE mg/dL 103* 112*     Estimated Creatinine Clearance: 33.5 mL/min (A) (by C-G formula based on SCr of 1.44 mg/dL (H)).    Liver and pancreatic function:  Results from last 7 days   Lab Units 01/01/21 0347 12/31/20 2009   ALBUMIN g/dL 3.34* 3.64   BILIRUBIN mg/dL 0.2 0.2   ALK PHOS U/L 102 105   AST (SGOT)  U/L 14 13   ALT (SGPT) U/L 9 10   LIPASE U/L  --  51     Endocrine function:  Lab Results   Component Value Date    HGBA1C 7.90 (H) 12/31/2020     Point of care bedside glucose levels:  Results from last 7 days   Lab Units 01/01/21  0119 12/31/20  2329 12/31/20  1856   GLUCOSE mg/dL 123 69* 125     Lab Results   Component Value Date    TSH 1.240 12/31/2020     Cardiac:  Results from last 7 days   Lab Units 01/01/21  0347 12/31/20 2009   CK TOTAL U/L  --  92   TROPONIN T ng/mL 0.037* 0.052*   PROBNP pg/mL  --  1,580.0*       Cultures:  Lab Results   Component Value Date    COLORU Yellow 12/31/2020    CLARITYU Cloudy (A) 12/31/2020    PHUR 6.0 12/31/2020    GLUCOSEU Negative 12/31/2020    KETONESU Negative 12/31/2020    BLOODU Moderate (2+) (A) 12/31/2020    NITRITEU Positive (A) 12/31/2020    LEUKOCYTESUR Large (3+) (A) 12/31/2020    BILIRUBINUR Negative 12/31/2020    UROBILINOGEN 0.2 E.U./dL 12/31/2020    RBCUA 6-12 (A) 12/31/2020    WBCUA Too Numerous to Count (A) 12/31/2020    BACTERIA None Seen 12/31/2020       I have personally looked at the labs and they are summarized above.  ----------------------------------------------------------------------------------------------------------------------  Detailed radiology reports for the last 24 hours:    Imaging Results (Last 24 Hours)     Procedure Component Value Units Date/Time    CT Abdomen Pelvis Without Contrast [[id number]] Collected: 12/31/20 2232     Updated: 12/31/20 2234    Narrative:      CT Abdomen Pelvis WO    INDICATION:   Abdominal pain.    TECHNIQUE:   CT of the abdomen and pelvis without IV contrast. Coronal and sagittal reconstructions were obtained.  Radiation dose reduction techniques included automated exposure control or exposure modulation based on body size. Count of known CT and cardiac nuc  med studies performed in previous 12 months: 0.     COMPARISON:   None available.    FINDINGS:  Please see chest CT report dictated separately. Exam is  motion degraded. Gallbladder is surgically absent. Calcification in the portacaval space is likely a densely calcified lymph node. It measures up to 2.2 cm. There is diffuse atherosclerotic disease  with no aortic aneurysm. There is a 1.2 x 1.8 cm low density left adrenal nodule, likely a benign adenoma in the absence of a known malignancy. Calcifications in both kidneys are probably vascular. No ureteral stones are seen on either side, and there is  no hydronephrosis. Solid abdominal organs are otherwise grossly normal allowing for excessive motion.    The uterus is surgically absent. Urinary bladder is not well distended. There is mild bladder wall thickening which may indicate cystitis or may be artifact due to incomplete distention. Large amount of stool is noted in the rectal vault. There is  colonic diverticulosis without focal diverticulitis. The appendix is normal. No small bowel obstruction is seen. There is no free fluid. There is grade 1 anterolisthesis of L5 on S1 due to bilateral L5 pars defects.      Impression:        1. Motion degraded exam.  2. Atherosclerotic disease with probable vascular calcifications in both kidneys. No ureteral stones are seen on either side, and there is no hydronephrosis.  3. Mild thickening of the urinary bladder wall may be due to incomplete distention or cystitis.  4. Large amount of stool in the rectal vault. There is colonic diverticulosis without diverticulitis. The appendix and small bowel are within normal limits.  5. Small left adrenal nodule, likely a benign adenoma in the absence of a known malignancy. This could be assessed with nonemergent adrenal protocol MRI or CT if indicated.  6. Cholecystectomy and hysterectomy.        Signer Name: Rm Ku MD   Signed: 12/31/2020 10:32 PM   Workstation Name: [email or web address]    [facility]    CT Chest Without Contrast [[id number]] Collected: 12/31/20 2220     Updated: 12/31/20 2222    Narrative:       CT Chest WO    INDICATION:   Increased work of breathing. Abnormal chest x-ray.    TECHNIQUE:   CT of the thorax without IV contrast. Coronal and sagittal reconstructions were obtained.  Radiation dose reduction techniques included automated exposure control or exposure modulation based on body size. Count of known CT and cardiac nuc med studies  performed in previous 12 months: 0.     COMPARISON:   None available.    FINDINGS:  There is some motion degradation. There is atherosclerotic disease and extensive coronary artery disease. There is a large hiatal hernia to the left of midline which probably accounts for density on the patient's prior chest x-ray. The hernia contains  the gastric fundus and a portion of the gastric body. No pleural or pericardial effusion. There is no adenopathy. Images of the upper abdomen show cholecystectomy. There is a calcification in the portacaval space which is probably a large calcified lymph  node. There is a small left adrenal nodule measuring 1.2 x 1.8 cm. While technically indeterminate, this is likely a small benign adenoma.    There is a 7 mm noncalcified right lower lobe nodule. There is some atelectasis in the left lower lobe adjacent to the hernia sac. The lungs are otherwise clear. No CT findings present to indicate pneumonia. Note: CT may be negative in the earliest  stages of COVID-19.      Impression:        1. No evidence of acute pneumonia.  2. Large hiatal hernia at the left lung base.  3. Noncalcified nodule in the right lower lobe measuring 7 mm. Chest CT follow-up in 6 months is recommended.  4. Left adrenal nodule, most likely a benign adenoma. This can be further characterized with adrenal protocol CT or MRI on a nonemergent basis if indicated.    Signer Name: Rm Ku MD   Signed: 12/31/2020 10:20 PM   Workstation Name: [email or web address]    [facility]    CT Head Without Contrast [[id number]] Collected: 12/31/20 9778     Updated:  12/31/20 2211    Narrative:      CT Head WO    HISTORY:   Altered mental status, patient in the emergency    TECHNIQUE:   Routine noncontrast head CT. Radiation dose reduction techniques included automated exposure control or exposure modulation based on body size. Radiation audit for CT and nuclear cardiology exams in the last 12 months: 0.    COMPARISON:   None.    FINDINGS:       No evidence of acute intracranial hemorrhage. There is a CSF density mass in the right posterior fossa causing some mass effect on the right cerebellar hemisphere, most likely an arachnoid cyst but poorly evaluated on this exam. Mild global volume loss.    The gray-white matter differentiation is preserved. There are scattered ill-defined and patchy hypoattenuating foci within the bilateral cerebral and deep white matter which are nonspecific but most commonly associated with chronic microvascular ischemic  change. Probably remote right basal ganglia lacunar infarcts.    Partially empty sella.    Visualized paranasal sinuses are clear. Visualized mastoid air cells are clear.    No acute osseous abnormality.        Impression:        1.  No acute intracranial abnormality.  2.  CSF density mass right posterior fossa with mild mass effect on the right cerebellar convexity, most likely an arachnoid cyst. Consider further evaluation with MRI without and with contrast.  3.  Presumed chronic microvascular ischemic changes.    Signer Name: AMANDA SAWYER MD   Signed: 12/31/2020 10:09 PM   Workstation Name: [email or web address]    [facility]    XR Chest 1 View [[id number]] Collected: 12/31/20 2034     Updated: 12/31/20 2037    Narrative:      XR CHEST 1 VW-     CLINICAL INDICATION: increased work of breathing        COMPARISON: None available      TECHNIQUE: Single frontal view of the chest.     FINDINGS:     Left effusion and left basilar consolidation. Consider CT to exclude  mass  The cardiac silhouette is normal. The pulmonary  vasculature is  unremarkable.  There is no evidence of an acute osseous abnormality.              Impression:      Left effusion and left basilar consolidation. Consider CT to evaluate  for mass     This report was finalized on 12/31/2020 8:35 PM by Dr. Serafin Yip MD.           I have personally looked at the radiology images and I have read the available final reports.    Assessment & Plan       -Sepsis that was present on admission (respiratory rate 22, heart rate , CRP 0.75, metabolic cephalopathy) due to an acute urinary tract infection  -Acute kidney injury versus acute on chronic kidney disease versus chronic kidney disease with baseline creatinine unknown and admission creatinine 1.71; creatinine on 12/30/2020 at [facility] was 1.98  -Acidemia with a non-anion gap metabolic acidosis and respiratory alkalosis  -Noncalcified nodule in the right lower lobe measuring 7 mm. Chest CT follow-up in 6 months  -CSF density mass seen on noncontrasted head CT, right posterior fossa with mild mass effect on the right cerebellar convexity, most likely an arachnoid cyst  -Large hiatal hernia at the left lung base  -Acute hypophosphatemia  -Acute hypokalemia  -History of type 2 diabetes mellitus with some hypoglycemia while in the emergency department  -History of essential hypertension  -History of prior lacunar infarcts  -History of anemia, currently with macrocytic anemia    Admitted to the progressive care unit.  We will start the patient on D5 with 3 A of bicarbonate added at 100 mL/h; we will continue to replace the bicarbonate until it is above 20 as I suspect that the acidemia is due to kidney failure.  We will trend the creatinine; it is already starting to trend downward.  We will start the patient on high-dose Rocephin for the UTI and we will send a urine collected in the emergency department for culture.  We will replace the potassium and phosphorus per our replacement protocols.  We will  monitor her glucose levels 4 times a day, especially since she came in with hypoglycemia.  The hypoglycemia protocol has been added.  We will obtain an echocardiogram as patient does have an elevated proBNP on admission and she has sepsis and prior lacunar infarcts as well.  We will repeat her blood work in the morning.  We will also follow serial ABGs to see how her acidosis is trending.  Because of the patient's advanced age, we will consult PT, OT, and speech.    VTE Prophylaxis:   Mechanical Order History:     None      Pharmalogical Order History:      Ordered     Dose Route Frequency Stop    Signed and Held  Pharmacy to Dose enoxaparin (LOVENOX)     Question:  Indication of use  Answer:  Prophylaxis    -- XX Continuous PRN --              The patient is high risk due to the following diagnoses/reasons: Sepsis and acute kidney injury.      Norman Saravia MD  [facility]  01/01/21  04:54 EST        Electronically signed by Norman Saravia MD at 01/01/21 0530       Vital Signs (last day)     Date/Time   Temp   Temp src   Pulse   Resp   BP   Patient Position   SpO2    01/13/21 1037   98.5 (36.9)   Oral   75   18   129/95   Lying   97    01/13/21 0646   98.7 (37.1)   Oral   82   18   180/90   Lying   98    01/13/21 0633   --   --   --   --   --   --   97    01/13/21 0623   --   --   77   18   --   --   97    01/13/21 0231   98.9 (37.2)   --   86   18   152/88   --   96    01/13/21 0015   --   --   76   18   --   --   97    01/13/21 0004   --   --   74   18   --   --   97    01/12/21 2131   --   --   86   --   --   --   --    01/12/21 1859   98.3 (36.8)   --   78   18   155/81   --   99    01/12/21 1844   --   --   78   18   --   --   98    01/12/21 1832   --   --   74   18   --   --   98    01/12/21 1500   98.8 (37.1)   Oral   84   20   116/66   Lying   99    01/12/21 1343   --   --   81   20   --   Lying   98    01/12/21 1333   --   --   80   20   --   Lying   98    01/12/21 1116    97.5 (36.4)   Axillary   75   20   141/68   Lying   97    01/12/21 0721   --   --   84   20   --   Lying   98    01/12/21 0711   --   --   87   20   --   Lying   98    01/12/21 0300   96.8 (36)   --   76   18   146/76   --   93    01/12/21 0023   --   --   78   18   --   --   99    01/12/21 0014   --   --   77   18   --   --   98              Intake & Output (last day)       01/12 0701 - 01/13 0700 01/13 0701 - 01/14 0700    P.O. 1300     Total Intake(mL/kg) 1300 (16.9)     Urine (mL/kg/hr) 1100 (0.6)     Stool 0     Total Output 1100     Net +200           Urine Unmeasured Occurrence 3 x     Stool Unmeasured Occurrence 2 x         Lines, Drains & Airways    Active LDAs     Name:   Placement date:   Placement time:   Site:   Days:    Peripheral IV 01/10/21 2100 Left;Posterior Wrist   01/10/21    2100    Wrist   2    External Urinary Catheter   01/01/21    0115    --   12                  Current Facility-Administered Medications   Medication Dose Route Frequency Provider Last Rate Last Admin   • [START ON 1/14/2021] amLODIPine (NORVASC) tablet 10 mg  10 mg Oral Q24H Gale Ozuna PA-C       • apixaban (ELIQUIS) tablet 5 mg  5 mg Oral Q12H Katia Lowry APRN   5 mg at 01/13/21 0859   • aspirin EC tablet 81 mg  81 mg Oral Daily Tacos Puente MD   81 mg at 01/13/21 0859   • atorvastatin (LIPITOR) tablet 20 mg  20 mg Oral Nightly Tacos Puente MD   20 mg at 01/12/21 2132   • bumetanide (BUMEX) tablet 1 mg  1 mg Oral Daily Alina Hernandez APRN   1 mg at 01/13/21 0859   • castor oil-balsam peru (VENELEX) ointment   Topical Q12H Tacos Puente MD   Given at 01/12/21 2133   • castor oil-balsam peru (VENELEX) ointment   Topical Q12H Tacos Puente MD   Given at 01/13/21 0858   • dextrose (D50W) 25 g/ 50mL Intravenous Solution 25 g  25 g Intravenous Q15 Min PRN Tacos Puente MD   25 g at 01/09/21 1647   • dextrose (D50W) 25 g/ 50mL Intravenous Solution 25 g  25 g Intravenous Q15 Min PRVICKY Puente  Tacso MARISCAL MD       • dextrose (GLUTOSE) oral gel 15 g  15 g Oral Q15 Min PRN Tacos Puente MD       • dextrose (GLUTOSE) oral gel 15 g  15 g Oral Q15 Min PRN Tacos Puente MD       • folic acid (FOLVITE) tablet 1 mg  1 mg Oral Daily Tacos Puente MD   1 mg at 01/13/21 0859   • glucagon (human recombinant) (GLUCAGEN DIAGNOSTIC) injection 1 mg  1 mg Subcutaneous Q15 Min PRN Tacos Puente MD       • glucagon (human recombinant) (GLUCAGEN DIAGNOSTIC) injection 1 mg  1 mg Subcutaneous Q15 Min PRN Tacos Puente MD       • hydrOXYzine (ATARAX) tablet 25 mg  25 mg Oral TID PRN Arlyn Chandra PA   25 mg at 01/06/21 0714   • insulin aspart (novoLOG) injection 0-14 Units  0-14 Units Subcutaneous TID AC Gale Ozuna PA-C   8 Units at 01/13/21 0859   • insulin detemir (LEVEMIR) injection 5 Units  5 Units Subcutaneous Nightly Gale Ozuna PA-C       • ipratropium-albuterol (DUO-NEB) nebulizer solution 3 mL  3 mL Nebulization 4x Daily - RT Tacos Puente MD   3 mL at 01/13/21 0623   • iron polysaccharides (NIFEREX) capsule 150 mg  150 mg Oral Daily Gale Ozuna PA-C   150 mg at 01/13/21 0900   • Magnesium Sulfate 2 gram Bolus, followed by 8 gram infusion (total Mg dose 10 grams)- Mg less than or equal to 1mg/dL  2 g Intravenous PRN Tacos Puente MD        Or   • Magnesium Sulfate 2 gram / 50mL Infusion (GIVE X 3 BAGS TO EQUAL 6GM TOTAL DOSE) - Mg 1.1 - 1.5 mg/dl  2 g Intravenous PRTacos Jimenez MD        Or   • Magnesium Sulfate 4 gram infusion- Mg 1.6-1.9 mg/dL  4 g Intravenous PRN Tacos Puente MD       • metoprolol tartrate (LOPRESSOR) tablet 100 mg  100 mg Oral Q12H Gale Ozuna PA-C   100 mg at 01/13/21 0859   • nitroglycerin (NITROSTAT) SL tablet 0.4 mg  0.4 mg Sublingual Q5 Min PRN Tacos Puente MD       • pantoprazole (PROTONIX) EC tablet 40 mg  40 mg Oral Daily Tacos Puente MD   40 mg at 01/13/21 0859   • piperacillin-tazobactam (ZOSYN) 3.375 g/100 mL 0.9%  NS IVPB (mbp)  3.375 g Intravenous Once Macie Latham APRN       • potassium chloride (MICRO-K) CR capsule 40 mEq  40 mEq Oral PRN Tacos Puente MD        Or   • potassium chloride (KLOR-CON) packet 40 mEq  40 mEq Oral PRN Tacos Puente MD        Or   • potassium chloride 10 mEq in 100 mL IVPB  10 mEq Intravenous Q1H PRTacos Jimenez MD       • potassium phosphate 45 mmol in sodium chloride 0.9 % 500 mL infusion  45 mmol Intravenous PRN Tacos Puente MD        Or   • potassium phosphate 30 mmol in sodium chloride 0.9 % 250 mL infusion  30 mmol Intravenous PRTacos Jimenez MD        Or   • potassium phosphate 15 mmol in sodium chloride 0.9 % 100 mL infusion  15 mmol Intravenous PRN Tacos Puente MD        Or   • sodium phosphates 45 mmol in sodium chloride 0.9 % 500 mL IVPB  45 mmol Intravenous PRTacos Jimenez MD        Or   • sodium phosphates 30 mmol in sodium chloride 0.9 % 250 mL IVPB  30 mmol Intravenous PRTacos Jimenez MD        Or   • sodium phosphates 15 mmol in sodium chloride 0.9 % 250 mL IVPB  15 mmol Intravenous PRTacos Jimenez MD       • sodium chloride 0.9 % flush 10 mL  10 mL Intravenous PRTacos Jimenez MD       • sodium chloride 0.9 % flush 3 mL  3 mL Intravenous Q12H Tacos Puente MD   3 mL at 01/13/21 0859   • sodium chloride 0.9 % flush 3-10 mL  3-10 mL Intravenous PRaTcos Jimenez MD           Lab Results (last 24 hours)     Procedure Component Value Units Date/Time    POC Glucose Once [[id number]]  (Abnormal) Collected: 01/13/21 0648    Specimen: Blood Updated: 01/13/21 0712     Glucose 267 mg/dL     Basic Metabolic Panel [[id number]]  (Abnormal) Collected: 01/13/21 0425    Specimen: Blood Updated: 01/13/21 0541     Glucose 270 mg/dL      BUN 28 mg/dL      Creatinine 1.28 mg/dL      Sodium 135 mmol/L      Potassium 4.0 mmol/L      Chloride 102 mmol/L      CO2 25.4 mmol/L      Calcium 9.2 mg/dL      eGFR Non African Amer 41 mL/min/1.73      BUN/Creatinine Ratio  21.9     Anion Gap 7.6 mmol/L     Narrative:      GFR Normal >60  Chronic Kidney Disease <60  Kidney Failure <15      CBC & Differential [[id number]]  (Abnormal) Collected: 01/13/21 0425    Specimen: Blood Updated: 01/13/21 0520    Narrative:      The following orders were created for panel order CBC & Differential.  Procedure                               Abnormality         Status                     ---------                               -----------         ------                     CBC Auto Differential[[id number]]        Abnormal            Final result                 Please view results for these tests on the individual orders.    CBC Auto Differential [[id number]]  (Abnormal) Collected: 01/13/21 0425    Specimen: Blood Updated: 01/13/21 0520     WBC 9.74 10*3/mm3      RBC 3.03 10*6/mm3      Hemoglobin 9.3 g/dL      Hematocrit 30.9 %      .0 fL      MCH 30.7 pg      MCHC 30.1 g/dL      RDW 13.2 %      RDW-SD 50.0 fl      MPV 9.6 fL      Platelets 372 10*3/mm3      Neutrophil % 65.4 %      Lymphocyte % 21.0 %      Monocyte % 9.2 %      Eosinophil % 3.2 %      Basophil % 0.6 %      Immature Grans % 0.6 %      Neutrophils, Absolute 6.36 10*3/mm3      Lymphocytes, Absolute 2.05 10*3/mm3      Monocytes, Absolute 0.90 10*3/mm3      Eosinophils, Absolute 0.31 10*3/mm3      Basophils, Absolute 0.06 10*3/mm3      Immature Grans, Absolute 0.06 10*3/mm3      nRBC 0.0 /100 WBC     POC Glucose Once [[id number]]  (Abnormal) Collected: 01/12/21 2045    Specimen: Blood Updated: 01/12/21 2053     Glucose 212 mg/dL     POC Glucose Once [[id number]]  (Abnormal) Collected: 01/12/21 1624    Specimen: Blood Updated: 01/12/21 1636     Glucose 262 mg/dL     POC Glucose Once [[id number]]  (Abnormal) Collected: 01/12/21 1118    Specimen: Blood Updated: 01/12/21 1222     Glucose 223 mg/dL         Orders (last 24 hrs)      Start     Ordered    01/14/21 0900  amLODIPine (NORVASC) tablet 10 mg  Every 24 Hours Scheduled      01/13/21  0950    01/14/21 0600  CBC & Differential  Morning Draw      01/13/21 0806    01/14/21 0600  Basic Metabolic Panel  Morning Draw      01/13/21 0806    01/13/21 2100  insulin detemir (LEVEMIR) injection 5 Units  Nightly      01/13/21 0957    01/13/21 0713  POC Glucose Once  Once      01/13/21 0648    01/13/21 0600  CBC & Differential  Morning Draw      01/12/21 1514    01/13/21 0600  Basic Metabolic Panel  Morning Draw      01/12/21 1514    01/13/21 0600  CBC Auto Differential  PROCEDURE ONCE      01/13/21 0002    01/12/21 2100  metoprolol tartrate (LOPRESSOR) tablet 100 mg  Every 12 Hours Scheduled      01/12/21 0939    01/12/21 2054  POC Glucose Once  Once      01/12/21 2045    01/12/21 1637  POC Glucose Once  Once      01/12/21 1624    01/12/21 1223  POC Glucose Once  Once      01/12/21 1118    01/12/21 1130  insulin aspart (novoLOG) injection 0-14 Units  3 Times Daily Before Meals      01/12/21 0942    01/12/21 0900  iron polysaccharides (NIFEREX) capsule 150 mg  Daily      01/12/21 0714    01/10/21 1700  POC Glucose 4x Daily AC & at Bedtime  4 Times Daily Before Meals & at Bedtime     Comments: If bedtime blood glucose is greater than 350 mg/dl, call MD.      01/10/21 1406    01/10/21 1406  dextrose (GLUTOSE) oral gel 15 g  Every 15 Minutes PRN      01/10/21 1406    01/10/21 1406  dextrose (D50W) 25 g/ 50mL Intravenous Solution 25 g  Every 15 Minutes PRN      01/10/21 1406    01/10/21 1406  glucagon (human recombinant) (GLUCAGEN DIAGNOSTIC) injection 1 mg  Every 15 Minutes PRN      01/10/21 1406    01/09/21 1600  bumetanide (BUMEX) tablet 1 mg  Daily      01/09/21 1458    01/09/21 1600  folic acid (FOLVITE) tablet 1 mg  Daily      01/09/21 1510    01/09/21 1400  piperacillin-tazobactam (ZOSYN) 3.375 g/100 mL 0.9% NS IVPB (mbp)  Once      01/09/21 1316    01/06/21 2200  POC Glucose 4x Daily AC & at Bedtime  4 Times Daily Before Meals & at Bedtime     Comments: If bedtime blood glucose is greater than 350 mg/dl,  call MD.      01/06/21 1745    01/06/21 2100  apixaban (ELIQUIS) tablet 5 mg  Every 12 Hours Scheduled      01/06/21 1348    01/06/21 1744  dextrose (GLUTOSE) oral gel 15 g  Every 15 Minutes PRN      01/06/21 1745    01/06/21 1744  dextrose (D50W) 25 g/ 50mL Intravenous Solution 25 g  Every 15 Minutes PRN      01/06/21 1745    01/06/21 1744  glucagon (human recombinant) (GLUCAGEN DIAGNOSTIC) injection 1 mg  Every 15 Minutes PRN      01/06/21 1745    01/06/21 1100  amLODIPine (NORVASC) tablet 5 mg  Every 24 Hours Scheduled,   Status:  Discontinued      01/06/21 0945    01/06/21 0635  hydrOXYzine (ATARAX) tablet 25 mg  3 Times Daily PRN      01/06/21 0635    01/05/21 2100  atorvastatin (LIPITOR) tablet 20 mg  Nightly      01/05/21 0927    01/05/21 1200  castor oil-balsam peru (VENELEX) ointment  Every 12 Hours Scheduled      01/05/21 1106    01/05/21 1157  Patient Currently On Electrolyte Replacement Protocol - Please Refer to MAR for Protocol Details  Misc Nursing Order (Specify)  Daily     Comments: Patient Currently On Electrolyte Replacement Protocol - Please Refer to MAR for Protocol Details    01/05/21 1156    01/05/21 1156  Magnesium Sulfate 2 gram Bolus, followed by 8 gram infusion (total Mg dose 10 grams)- Mg less than or equal to 1mg/dL  As Needed      01/05/21 1156    01/05/21 1156  Magnesium Sulfate 2 gram / 50mL Infusion (GIVE X 3 BAGS TO EQUAL 6GM TOTAL DOSE) - Mg 1.1 - 1.5 mg/dl  As Needed      01/05/21 1156    01/05/21 1156  Magnesium Sulfate 4 gram infusion- Mg 1.6-1.9 mg/dL  As Needed      01/05/21 1156    01/05/21 1100  aspirin EC tablet 81 mg  Daily      01/05/21 0927    01/03/21 1900  ipratropium-albuterol (DUO-NEB) nebulizer solution 3 mL  4 Times Daily - RT      01/03/21 1622    01/03/21 1100  castor oil-balsam peru (VENELEX) ointment  Every 12 Hours Scheduled      01/03/21 0907    01/01/21 2200  POC Glucose 4x Daily AC & at Bedtime  4 Times Daily Before Meals & at Bedtime     Comments: If  bedtime blood glucose is greater than 350 mg/dl, call MD.      01/01/21 1936 01/01/21 2030  pantoprazole (PROTONIX) EC tablet 40 mg  Daily      01/01/21 1935 01/01/21 1800  Dietary Nutrition Supplements Boost Plus (Ensure Enlive, Ensure Plus)  Daily With Breakfast, Lunch & Dinner      01/01/21 1347    01/01/21 0458  Patient Currently On Electrolyte Replacement Protocol - Please Refer to MAR for Protocol Details  Misc Nursing Order (Specify)  Daily     Comments: Patient Currently On Electrolyte Replacement Protocol - Please Refer to MAR for Protocol Details    01/01/21 0457    01/01/21 0456  potassium chloride (MICRO-K) CR capsule 40 mEq  As Needed      01/01/21 0457    01/01/21 0456  potassium chloride (KLOR-CON) packet 40 mEq  As Needed      01/01/21 0457    01/01/21 0456  potassium chloride 10 mEq in 100 mL IVPB  Every 1 Hour PRN      01/01/21 0457    01/01/21 0456  potassium phosphate 45 mmol in sodium chloride 0.9 % 500 mL infusion  As Needed      01/01/21 0457    01/01/21 0456  potassium phosphate 30 mmol in sodium chloride 0.9 % 250 mL infusion  As Needed      01/01/21 0457    01/01/21 0456  potassium phosphate 15 mmol in sodium chloride 0.9 % 100 mL infusion  As Needed      01/01/21 0457    01/01/21 0456  sodium phosphates 45 mmol in sodium chloride 0.9 % 500 mL IVPB  As Needed      01/01/21 0457    01/01/21 0456  sodium phosphates 30 mmol in sodium chloride 0.9 % 250 mL IVPB  As Needed      01/01/21 0457    01/01/21 0456  sodium phosphates 15 mmol in sodium chloride 0.9 % 250 mL IVPB  As Needed      01/01/21 0457    01/01/21 0200  sodium chloride 0.9 % flush 3 mL  Every 12 Hours Scheduled      01/01/21 0100 01/01/21 0100  Intake & Output  Every Shift      01/01/21 0100    01/01/21 0100  sodium chloride 0.9 % flush 3-10 mL  As Needed      01/01/21 0100    01/01/21 0100  nitroglycerin (NITROSTAT) SL tablet 0.4 mg  Every 5 Minutes PRN      01/01/21 0100 12/31/20 1905  sodium chloride 0.9 % flush 10  mL  As Needed      12/31/20 1907    Unscheduled  Straight cath  As Needed      12/31/20 2047    Unscheduled  Oxygen Therapy- Nasal Cannula; Titrate for SPO2: 90% - 95%  Continuous PRN     Comments: If Patient Develops Unresponsiveness, Acute Dyspnea, Cyanosis or Suspected Hypoxemia Start Continuous Pulse Ox Monitoring, Apply Oxygen & Notify Provider    01/01/21 0100    Unscheduled  ECG 12 Lead  As Needed     Comments: Nurse to Release if Patient Expericences Acute Chest Pain or Dysrhythmias    01/01/21 0100    Unscheduled  Potassium  As Needed     Comments: For Ventricular Arrhythmias      01/01/21 0100    Unscheduled  Troponin  As Needed     Comments: For Chest Pain      01/01/21 0100    Unscheduled  Digoxin Level  As Needed     Comments: For Atrial Arrhythmias      01/01/21 0100    Unscheduled  Blood Gas, Arterial -With Co-Ox Panel: Yes  As Needed     Comments: Per O2 PolicyNotify Physician      01/01/21 0100    Unscheduled  Magnesium  As Needed      01/01/21 0457    Unscheduled  Potassium  As Needed      01/01/21 0457    Unscheduled  Assist With Feeding Patient  As Needed      01/01/21 1438    Unscheduled  Stage III Pressure Ulcer Care - As Needed  Daily PRN     Comments: - Gently Cleanse With Normal Saline  - Apply Thera-honey daily and cover with a mepilex    01/03/21 0907    Unscheduled  Magnesium  As Needed      01/05/21 1156    Unscheduled  Potassium  As Needed      01/05/21 1156    --  pantoprazole (PROTONIX) 40 MG EC tablet  Daily      01/01/21 1624    --  aspirin 81 MG EC tablet  Daily      01/01/21 1624    --  amLODIPine (NORVASC) 10 MG tablet  Daily      01/01/21 1624    --  lisinopril-hydrochlorothiazide (PRINZIDE,ZESTORETIC) 20-25 MG per tablet  Daily      01/01/21 1624    --  albuterol sulfate  (90 Base) MCG/ACT inhaler  Every 4 Hours      01/01/21 1624    --  insulin NPH (humuLIN N,novoLIN N) 100 UNIT/ML injection  Every Morning      01/01/21 1629    --  insulin NPH (humuLIN N,novoLIN N) 100  UNIT/ML injection  Every Night at Bedtime      21 1629    Pending  albuterol (PROVENTIL) nebulizer solution 0.083% 2.5 mg/3mL  Every 4 Hours - RT      Pending    Pending  amLODIPine (NORVASC) tablet 10 mg  Daily      Pending    Pending  aspirin EC tablet 81 mg  Daily      Pending    Pending  insulin NPH (humuLIN N,novoLIN N) injection 20 Units  Daily      Pending    Pending  insulin NPH (humuLIN N,novoLIN N) injection 10 Units  Nightly      Pending    Pending  pantoprazole (PROTONIX) EC tablet 40 mg  Daily      Pending    --  SCANNED - TELEMETRY        21 0000    --  SCANNED - TELEMETRY        20 0000    --  SCANNED - TELEMETRY        20 0000    --  SCANNED - TELEMETRY        20 0000    --  SCANNED - TELEMETRY        21 0000    --  SCANNED - TELEMETRY        21 0000    --  SCANNED - TELEMETRY        21 0000    --  SCANNED - TELEMETRY        21 0000    --  SCANNED - TELEMETRY        21 0000    --  SCANNED - TELEMETRY        20 0000    --  SCANNED - TELEMETRY        20 0000    --  SCANNED - TELEMETRY        21 0000    --  SCANNED - TELEMETRY        21 0000    --  SCANNED - TELEMETRY        21 0000    --  SCANNED - TELEMETRY        21 0000    --  SCANNED - TELEMETRY        21 0000    --  SCANNED - TELEMETRY        21 0000    --  SCANNED - TELEMETRY        20 0000                Operative/Procedure Notes (last 24 hours) (Notes from 21 1048 through 21 1048)    No notes of this type exist for this encounter.            Physician Progress Notes (last 24 hours) (Notes from 21 1048 through 21 1048)      Gale Ozuna PA-C at 21 0803          Patient Identification:  Name:  Ashley Galvez  Age:  75 y.o.  Sex:  female  :  1945  MRN:  [id number]  Visit Number:  [id number]  Primary Care Provider:  Rony Callaway MD    Length of stay:  13    Subjective/Interval  History/Consultants/Procedures     Chief complaint: Uncontrolled hypertension; follow-up Iron deficiency; follow-up Leukocytosis, hypoglycemia; Follow-up severe sepsis, bacteremia with Enterococcus, UTI with Pseudomonas and Proteus    HPI/Hospital course:    Mrs. Galvez is a 75-year-old  female with PMH significant for diabetes mellitus, lacunar infarct, essential hypertension, and unspecified anemia. She initially presented to the ED of [facility] on 12/31/20 with chief complaint of confusion.  She was reportedly previously at [facility] and discharged home wit pneumonia prior to her presentation. On admission, she encephalopathic and admitted to the PCU for further evaluation with KAY and Sepsis 2/2 UTI.     In the interim, one of two blood cultures was positive with ultimately growth of Enterococcus in one of two cultures (12/31/2020).  ID was consulted for further IV abx guidance with IV Vancomycin started.  Rocephin was initially continued given concern for underlying UTI.  Urine culture ultimately grew Proteus mirabilis and Pseudomonas aeruginosa.  IV cephalosporin coverage was transitioned to IV Cefepime for further treatment.      Metabolic encephalopathy improved during admission with CT demonstrating possible arachnoid cyst.  MRI was then performed and revealed CSF density process involving the right cerebellar hemisphere which is most probably intra-axial in location and features on the MRI compatible with sequela of remote infarct with extensive encephalomalacia changes.  There is also marked left mastoiditis-itis and a chronic appearing infarct in the right occipital lobe with cortical sclerosis and mild chronic appearing ethmoid sinusitis.    Given debility of nearly 2 years, physical and occupational therapies were consulted during hospitalization.      There was some concern for abnormal EKG with ectopic atrial rhythm with frequent PACs vs. Chaotic atrial rhythm with cardiology  amanda wallace.  It was recommend she undergo event monitoring at the time of discharge for around two weeks with further outpatient cardiology follow-up.  She later had runs of atrial in the AM of 1/6 with full dose anticoagulation started given concern of underlying arrhythmia as source of possible prior CVA.      Additionally, given worsening dyspnea with right-side wheezing, CT chest and  Neck were ordered on 1/6/21.  Methylprednisolone was started for short duration, but quickly discontinued given difficulty tolerating due to hyperglycemia.  CT neck revealed calcification vs. Possible foreign body.      VQ scan and venous dopplers were ordered on 1/7 to rule out PE and US venous doppler. VQ revealed low probability and US doppler without evidence of known DVT.      On 1/8/2021, she did develop some worsening lethargy.  She was found to have hypoglycemia with glucose in the 30s after several days of hyperglycemia requiring upward titration of insulin.   She did require D5 on 1/9 evening for some period of time after recurrent hypoglycemia but became quickly hyperglycemic following. Cosyntropin stimulation test was ordered.      Mrs. Galvez completed her course of IV abx and at present is waiting for further rehabilitation. Throughout hospitalization, blood pressure regimen has been titrated upward for improved control.     Subjective:    Mrs. Galvez is resting in bed. She is eating her breakfast independently.  She is not coughing or choking.  She continues to feel improved.  She looks forward to rehab of some sort.  She has been urinating and having bowel movements.  .     Discussed with AM MIGEL Jacome with no known significant events overnight.          ----------------------------------------------------------------------------------------------------------------------  Current [facility] Meds:  [START ON 1/14/2021] amLODIPine, 10 mg, Oral, Q24H  apixaban, 5 mg, Oral, Q12H  aspirin, 81 mg, Oral,  Daily  atorvastatin, 20 mg, Oral, Nightly  bumetanide, 1 mg, Oral, Daily  castor oil-balsam peru, , Topical, Q12H  castor oil-balsam peru, , Topical, Q12H  folic acid, 1 mg, Oral, Daily  insulin aspart, 0-14 Units, Subcutaneous, TID AC  insulin detemir, 5 Units, Subcutaneous, Nightly  ipratropium-albuterol, 3 mL, Nebulization, 4x Daily - RT  iron polysaccharides, 150 mg, Oral, Daily  metoprolol tartrate, 100 mg, Oral, Q12H  pantoprazole, 40 mg, Oral, Daily  piperacillin-tazobactam, 3.375 g, Intravenous, Once  sodium chloride, 3 mL, Intravenous, Q12H         ----------------------------------------------------------------------------------------------------------------------      Objective     Vital Signs:  Temp:  [97.5 °F (36.4 °C)-98.9 °F (37.2 °C)] 98.7 °F (37.1 °C)  Heart Rate:  [74-86] 82  Resp:  [18-20] 18  BP: (116-180)/(66-90) 180/90      01/11/21  0500 01/12/21  0300 01/13/21  0300   Weight: 76.3 kg (168 lb 3.2 oz) 77.3 kg (170 lb 8 oz) 76.8 kg (169 lb 4.8 oz)     Body mass index is 29.99 kg/m².    Intake/Output Summary (Last 24 hours) at 1/13/2021 1001  Last data filed at 1/13/2021 0231  Gross per 24 hour   Intake 840 ml   Output 1100 ml   Net -260 ml     No intake/output data recorded.  Diet Soft Texture; Chopped; Thin; Consistent Carbohydrate  ----------------------------------------------------------------------------------------------------------------------    Physical Exam  Vitals signs and nursing note reviewed.   Constitutional:       Appearance: She is well-developed and well-groomed. She is not ill-appearing.      Interventions: She is not intubated.Nasal cannula in place.   HENT:      Head: Normocephalic and atraumatic.      Mouth/Throat:      Lips: Pink.      Mouth: Mucous membranes are moist.      Dentition: Abnormal dentition.      Tongue: No lesions.      Pharynx: No pharyngeal swelling or posterior oropharyngeal erythema.      Comments: Difficulty visualizing tonsils  Eyes:      General:  Lids are normal.      Extraocular Movements:      Right eye: Normal extraocular motion.      Conjunctiva/sclera:      Right eye: Right conjunctiva is not injected.      Left eye: Left conjunctiva is not injected.   Neck:      Musculoskeletal: Normal range of motion.      Thyroid: No thyroid mass.   Cardiovascular:      Rate and Rhythm: Normal rate and regular rhythm.      Heart sounds: S1 normal and S2 normal.   Pulmonary:      Effort: No tachypnea, bradypnea, accessory muscle usage, prolonged expiration, respiratory distress or retractions. She is not intubated.      Breath sounds: No decreased air movement. Examination of the right-lower field reveals decreased breath sounds. Examination of the left-lower field reveals decreased breath sounds. Decreased breath sounds present. No wheezing, rhonchi or rales.   Abdominal:      General: Bowel sounds are normal.      Palpations: Abdomen is soft.      Tenderness: There is no abdominal tenderness.   Musculoskeletal:         General: No swelling.      Right lower leg: No edema.      Left lower leg: No edema.   Skin:     General: Skin is warm and dry.   Neurological:      Mental Status: She is alert.      Comments: She is more alert today.  She is sitting up and is very cheerful.    Psychiatric:         Attention and Perception: Attention normal.         Mood and Affect: Mood normal.           Nasal cannula in place:Yes       ----------------------------------------------------------------------------------------------------------------------  Tele:               ----------------------------------------------------------------------------------------------------------------------  Results from last 7 days   Lab Units 01/07/21  1222 01/07/21  1124 01/07/21  0656   TROPONIN T ng/mL 0.033* 0.035*  --    PROBNP pg/mL  --   --  2,050.0*     Results from last 7 days   Lab Units 01/13/21  0425 01/12/21  0253 01/11/21  0726 01/11/21  0403 01/10/21  0005   CRP mg/dL  --  0.35  --   0.35 0.59*   WBC 10*3/mm3 9.74 9.74 8.32  --  9.39   HEMOGLOBIN g/dL 9.3* 9.1* 8.5*  --  10.0*   HEMATOCRIT % 30.9* 29.9* 29.1*  --  34.5   MCV fL 102.0* 101.0* 104.7*  --  106.8*   MCHC g/dL 30.1* 30.4* 29.2*  --  29.0*   PLATELETS 10*3/mm3 372 379 307  --  391     Results from last 7 days   Lab Units 01/08/21  1516   PH, ARTERIAL pH units 7.404   PO2 ART mm Hg 86.1   PCO2, ARTERIAL mm Hg 41.0   HCO3 ART mmol/L 25.6     Results from last 7 days   Lab Units 01/13/21  0425 01/12/21  0253 01/11/21  0403  01/07/21  0656   SODIUM mmol/L 135* 132* 137   < > 132*   POTASSIUM mmol/L 4.0 3.7 3.8   < > 4.6   CHLORIDE mmol/L 102 99 104   < > 104   CO2 mmol/L 25.4 23.3 18.4*   < > 19.3*   BUN mg/dL 28* 28* 30*   < > 29*   CREATININE mg/dL 1.28* 1.17* 1.22*   < > 1.02*   EGFR IF NONAFRICN AM mL/min/1.73 41* 45* 43*   < > 53*   CALCIUM mg/dL 9.2 8.7 9.0   < > 8.9   GLUCOSE mg/dL 270* 276* 231*   < > 364*   ALBUMIN g/dL  --   --  2.85*  --  2.64*   BILIRUBIN mg/dL  --   --  0.2  --  <0.2   ALK PHOS U/L  --   --  65  --  67   AST (SGOT) U/L  --   --  10  --  6   ALT (SGPT) U/L  --   --  8  --  7    < > = values in this interval not displayed.   Estimated Creatinine Clearance: 37.9 mL/min (A) (by C-G formula based on SCr of 1.28 mg/dL (H)).  No results found for: AMMONIA      Blood Culture   Date Value Ref Range Status   01/02/2021 No growth at 4 days  Preliminary   01/02/2021 No growth at 4 days  Preliminary   12/31/2020 No growth at 5 days  Final   12/31/2020 Enterococcus faecalis (C)  Final     Comment:     Infectious disease consultation is highly recommended.     Urine Culture   Date Value Ref Range Status   12/31/2020 >100,000 CFU/mL Pseudomonas aeruginosa (A)  Final   12/31/2020 50,000 CFU/mL Proteus mirabilis (A)  Final     No results found for: WOUNDCX  No results found for: STOOLCX  ----------------------------------------------------------------------------------------------------------------------  Imaging Results (Last  24 Hours)     ** No results found for the last 24 hours. **          Microbiology Results (last 10 days)     Procedure Component Value - Date/Time    Respiratory Panel, PCR (WITHOUT COVID) - Swab, Nasopharynx [[id number]]  (Normal) Collected: 01/06/21 1433    Lab Status: Final result Specimen: Swab from Nasopharynx Updated: 01/06/21 1601     ADENOVIRUS, PCR Not Detected     Coronavirus 229E Not Detected     Coronavirus HKU1 Not Detected     Coronavirus NL63 Not Detected     Coronavirus OC43 Not Detected     Human Metapneumovirus Not Detected     Human Rhinovirus/Enterovirus Not Detected     Influenza B PCR Not Detected     Parainfluenza Virus 1 Not Detected     Parainfluenza Virus 2 Not Detected     Parainfluenza Virus 3 Not Detected     Parainfluenza Virus 4 Not Detected     Bordetella pertussis pcr Not Detected     Chlamydophila pneumoniae PCR Not Detected     Mycoplasma pneumo by PCR Not Detected     Influenza A PCR Not Detected     RSV, PCR Not Detected    Narrative:      The coronavirus on the RVP is NOT COVID-19 and is NOT indicative of infection with COVID-19.           ----------------------------------------------------------------------------------------------------------------------   I have reviewed the above laboratory values for 01/13/21    Assessment/Plan     Active Hospital Problems    Diagnosis POA   • **Sepsis (CMS/[facility]) [A41.9] Yes         ASSESSMENT/PLAN:  · Severe sepsis, present on admission,  With RR>20, HR>90, elevated C-RP, UTI, bacteremia, and metabolic encephalopathy:  -Blood and urine cultures reviewed.   -Continue vital signs per floor protocol.   -Continue monitoring daily CBC & temperature curve.   -Completed IV Vancomycin and IV cefepime with transition to Zosyn with completion of course.   -ID input appreciated.   -CT chest report previously atelectasis from large hiatal hernia.   -Awaiting rehab eval.     · Enterococcus bacteremia:  -IV abx completed.  See prior day's progress note  regarding details.       · Proteus/Pseudomonas UTI:   -Urine culture reviewed.   -IV abx completed.   -C-RP has normalized.     · Leukocytosis, improved:  -CBC reviewed.   -C-RP previously normalized.      · 5.6mm calficified structure vs. Foreign Body (suspected to be tonsil stone):  -Consult placed with ENT for possible evaluation.    -ENT evaluation appreciated with 1 months follow-up recommended to re evaluate her ears and for fiberoptic exam of hypopharynx and larynx.  FB suspected to be possible tonsil stone.        · Runs of atrial flutter:  -[id number] at least 6 with prior CVA, CHF, DM, female, age, HTN.   -Eliquis started per Cardiology.  -Continue cardiac monitoring.   -VQ with low probability.    -US venous doppler without evidence of known DVT.    -Metoprolol started on 1/7 with further titration during admission.          · Uncontrolled hypertension:  -Norvasc upward titrated on 1/13/21 for improved control of blood pressures.   -Metoprolol continued with titration during admission (now 100mg BID with hold parameters for hypotension and bradycardia).    -Monitor blood pressures per floor protocol.  -Bumex previously initiated per cardiology.        · Brittle DM 2, ID with wide glucose fluctuations:   -Hemoglobin a1c 7.90.   -FSBG ACHS with SSI PRN.  -Continue sliding scale insulin given brittle DM with prior hypoglycemic episodes with basal and mealtime insulin.   -SSI increased on 1/12 given rising glucoses.  -5u Levemir added qhs on 1/13. Will monitor response closely.     · Acute on chronic weakness/debility:  -Continue PT/OT.  -SLP evaluation performed.     -SS on board regarding possible rehab.      · CVA by MRI with left carotid stenosis on US carotid doppler:   -Continue ASA and statin.   -MRI reviewed.   -US carotid doppler with moderate stenosis in the left carotid system.  -Plan for outpatient event monitoring with outpatient Cardiology follow-up.   -Lipid panel reviewed.     · Grade II  diastolic dysfunction:   -Echocardiogram reviewed from 1/1/2021.   -CXR with bibasilar atelectasis and small left pleural effusion.   -ABG reviewed from 1/5/21 @ 2321 with some hyperventilation.   -CT chest report reviewed previously.    -Oral Bumex started per Cardiology previously.  -Monior BMP as we continue Bumex.    -Monitor Creatinine closely.    -ACEI stopped on 1/11 due to rising renal function.   -I/O balance positive.       · 7mm Noncalcified nodule in RLL:  -CT chest follow-up recommend in 6 months.      · Left adrenal nodule:   -Further outpatient follow-up recommended with adrenal CT protocol or MRI on nonemergent basis recommend.   -Cosyntropin stim test previously during admission after hypoglycemia without known acute evidence of adrenal insufficiency per results.      · KAY vs. KAY on CKD IIIb on admission:   -Repeat AM BMP.   -Creatinine with slight rise, possibly 2/2 uncontrolled HTN.   -BP medication adjustments made again on this date.    -Bumex 1mg added per cards.    -Monitor daily BMP.     · Metabolic encephalopathy, improved:  -CT head report reviewed.   -MRI report reviewed.   -Mentation improved from time of admission.   -Mentation improved with treatment of hypoglycemia during admission.      · Acute on Chronic macrocytic anemia with hemoglobin drop on 1/11/21:  -Repeat AM CBC.     -No known overt blood loss.   -Iron deficient with supplementation added.   -TSH acceptable on admission.  -Folate acceptable but borderline low.    -Vitamin b12 acceptable.    -Daily CBC reviewed.   -No known overt blood loss.     · Iron deficiency:  -Niferex added previously.     · Functional decline:  -PT/OT  -SS on board with possible rehab placement    -----------  -DVT prophylaxis: Eliquis to serve with close monitoring of hemoglobin  -GI prophylaxis: PPI/Lactobacillus  -Activity: Up with assist  -Disposition: Remains hospitalized for further therapies with now pursuing possible rehab placement.    -Anticipated dispo needs:  Event monitor for 2 weeks.  Outpatient Cards follow-up. Outpatient CT chest follow-up in 6 months. Outpatient CT adrenal protocol or MRI. SS on board as spouse is now interested in possible rehab.   -Diet:   Dietary Orders (From admission, onward)     Start     Ordered    01/06/21 0929  Diet Soft Texture; Chopped; Thin; Consistent Carbohydrate  Diet Effective Now     Question Answer Comment   Diet Texture / Consistency Soft Texture    Select Texture: Chopped    Fluid Consistency Thin    Common Modifiers Consistent Carbohydrate        01/06/21 0929    01/01/21 1800  Dietary Nutrition Supplements Boost Plus (Ensure Enlive, Ensure Plus)  Daily With Breakfast, Lunch & Dinner     Question:  Select Supplement  Answer:  Boost Plus (Ensure Enlive, Ensure Plus)    01/01/21 1347                    The patient is high risk due to the following diagnoses/reasons:  Severe sepsis, bacteremia, UTI, recent pneumonia, metabolic encephalopathy    Gale Ozuna PA-C  01/13/21  10:01 EST  Pager # [phone]      Electronically signed by Gale Ozuna PA-C at 01/13/21 1001          Consult Notes (last 48 hours) (Notes from 01/11/21 1048 through 01/13/21 1048)      Demarco Simon MD at 01/12/21 1115      Consult Orders    1. Inpatient ENT Consult [[id number]] ordered by Gale Ozuna PA-C at 01/11/21 0809               Consultation note    Referring physician: Mal Oliver DO    Consulting Physician: Otis Arias MD    Reason for consultation: Abnormal CT scan with hyperdensity right tonsil    Chief Complaint   Patient presents with   • Altered Mental Status   Sepsis.  Abnormal CT of the neck and head    HPI:   The patient is currently on Zosyn for sepsis.  A CT neck on January 7 showed a 5.6 mm radiodense ovoids structure within the wall of the right palatine tonsil at the level of the vallecula.  They could not exclude foreign body.  She is currently afebrile and has  responded to medication.  CT scan of the head on January 9 did show what was read as mastoiditis.  Spoke with patient's .  Patient was confused and would follow commands but not communicating otherwise.  He denies significant trouble swallowing, complains of pain in her throat or head.  No swelling or tenderness.      Past Medical History:   Diagnosis Date   • Anemia    • Anterolisthesis     Grade 1 anterolisthesis of L5 on S1 due to bilateral L5 pars defects   • Diverticulosis    • Essential hypertension    • Lacunar infarction (CMS/HCC)    • Type 2 diabetes mellitus (CMS/HCC)        Past Surgical History:   Procedure Laterality Date   • CHOLECYSTECTOMY     • HYSTERECTOMY           Current Facility-Administered Medications:   •  amLODIPine (NORVASC) tablet 5 mg, 5 mg, Oral, Q24H, Gale Ozuna PA-C, 5 mg at 01/12/21 0808  •  apixaban (ELIQUIS) tablet 5 mg, 5 mg, Oral, Q12H, Katia Lowry APRN, 5 mg at 01/12/21 0808  •  aspirin EC tablet 81 mg, 81 mg, Oral, Daily, Tacos Puente MD, 81 mg at 01/12/21 0808  •  atorvastatin (LIPITOR) tablet 20 mg, 20 mg, Oral, Nightly, Tacos Puente MD, 20 mg at 01/11/21 2041  •  bumetanide (BUMEX) tablet 1 mg, 1 mg, Oral, Daily, Alina Hernandez APRN, 1 mg at 01/12/21 0808  •  castor oil-balsam peru (VENELEX) ointment, , Topical, Q12H, Tacos Puente MD, Given at 01/11/21 2042  •  castor oil-balsam peru (VENELEX) ointment, , Topical, Q12H, Tacos Puente MD, Given at 01/12/21 0808  •  dextrose (D50W) 25 g/ 50mL Intravenous Solution 25 g, 25 g, Intravenous, Q15 Min PRN, Tacos Puente MD, 25 g at 01/09/21 1647  •  dextrose (D50W) 25 g/ 50mL Intravenous Solution 25 g, 25 g, Intravenous, Q15 Min PRN, Tacos Puente MD  •  dextrose (GLUTOSE) oral gel 15 g, 15 g, Oral, Q15 Min PRN, Tacos Puente MD  •  dextrose (GLUTOSE) oral gel 15 g, 15 g, Oral, Q15 Min PRN, Tacos Puente MD  •  folic acid (FOLVITE) tablet 1 mg, 1 mg, Oral, Daily, Tacos Puente,  MD, 1 mg at 01/12/21 0808  •  glucagon (human recombinant) (GLUCAGEN DIAGNOSTIC) injection 1 mg, 1 mg, Subcutaneous, Q15 Min PRN, Tacos Puente MD  •  glucagon (human recombinant) (GLUCAGEN DIAGNOSTIC) injection 1 mg, 1 mg, Subcutaneous, Q15 Min PRN, Tacos Puente MD  •  hydrOXYzine (ATARAX) tablet 25 mg, 25 mg, Oral, TID PRN, Arlyn Chandra PA, 25 mg at 01/06/21 0714  •  insulin aspart (novoLOG) injection 0-14 Units, 0-14 Units, Subcutaneous, TID AC, Gale Ozuna PA-C  •  ipratropium-albuterol (DUO-NEB) nebulizer solution 3 mL, 3 mL, Nebulization, 4x Daily - RT, Tacos Puente MD, 3 mL at 01/12/21 0711  •  iron polysaccharides (NIFEREX) capsule 150 mg, 150 mg, Oral, Daily, Gale Ozuna PA-C, 150 mg at 01/12/21 0809  •  Magnesium Sulfate 2 gram Bolus, followed by 8 gram infusion (total Mg dose 10 grams)- Mg less than or equal to 1mg/dL, 2 g, Intravenous, PRN **OR** Magnesium Sulfate 2 gram / 50mL Infusion (GIVE X 3 BAGS TO EQUAL 6GM TOTAL DOSE) - Mg 1.1 - 1.5 mg/dl, 2 g, Intravenous, PRN **OR** Magnesium Sulfate 4 gram infusion- Mg 1.6-1.9 mg/dL, 4 g, Intravenous, PRN, Tacos Puente MD  •  metoprolol tartrate (LOPRESSOR) tablet 100 mg, 100 mg, Oral, Q12H, Gale Ozuna PA-C  •  nitroglycerin (NITROSTAT) SL tablet 0.4 mg, 0.4 mg, Sublingual, Q5 Min PRN, Tacos Puente MD  •  pantoprazole (PROTONIX) EC tablet 40 mg, 40 mg, Oral, Daily, Tacos Puente MD, 40 mg at 01/12/21 0808  •  piperacillin-tazobactam (ZOSYN) 3.375 g/100 mL 0.9% NS IVPB (mbp), 3.375 g, Intravenous, Once, Macie Latham APRN  •  potassium chloride (MICRO-K) CR capsule 40 mEq, 40 mEq, Oral, PRN **OR** potassium chloride (KLOR-CON) packet 40 mEq, 40 mEq, Oral, PRN **OR** potassium chloride 10 mEq in 100 mL IVPB, 10 mEq, Intravenous, Q1H PRN, Tacos Puente MD  •  potassium phosphate 45 mmol in sodium chloride 0.9 % 500 mL infusion, 45 mmol, Intravenous, PRN **OR** potassium phosphate 30 mmol in sodium  chloride 0.9 % 250 mL infusion, 30 mmol, Intravenous, PRN **OR** potassium phosphate 15 mmol in sodium chloride 0.9 % 100 mL infusion, 15 mmol, Intravenous, PRN **OR** sodium phosphates 45 mmol in sodium chloride 0.9 % 500 mL IVPB, 45 mmol, Intravenous, PRN **OR** sodium phosphates 30 mmol in sodium chloride 0.9 % 250 mL IVPB, 30 mmol, Intravenous, PRN **OR** sodium phosphates 15 mmol in sodium chloride 0.9 % 250 mL IVPB, 15 mmol, Intravenous, PRN, Tacos Puente MD  •  [COMPLETED] Insert peripheral IV, , , Once **AND** sodium chloride 0.9 % flush 10 mL, 10 mL, Intravenous, PRN, Tacos Puente MD  •  sodium chloride 0.9 % flush 3 mL, 3 mL, Intravenous, Q12H, Tacos Puente MD, 3 mL at 01/12/21 0809  •  sodium chloride 0.9 % flush 3-10 mL, 3-10 mL, Intravenous, PRN, Tacos Puente MD    No Known Allergies    Social History     Socioeconomic History   • Marital status:      Spouse name: Not on file   • Number of children: Not on file   • Years of education: Not on file   • Highest education level: Not on file   Tobacco Use   • Smoking status: Never Smoker   • Smokeless tobacco: Never Used   Substance and Sexual Activity   • Alcohol use: Never     Frequency: Never   • Drug use: Never   • Sexual activity: Defer       Family History   Family history unknown: Yes       ROS: Unobtainable from patient.   denied  Constitutional: denies any weight changes, fatigue or weakness.  Eyes: : denies blurred or double vision  Cardiovascular: denies chest pain, palpitations, edemas.  Respiratory: denies cough, sputum, SOB.  Gastrointestinal: denies N&V, abd pain, diarrhea, constipation.  Genitourinary: denies dysuria, frequency.  Endocrine: denies cold intolerance, lethargy and flushing.  Hem: denies excessive bruising and postop bleeding.  Musculoskeletal: denies weakness, joint swelling, pain or stiffness.  Neuro: denies seizures, CVA, paresthesia, or peripheral neuropathy.   Skin: denies change in nevi, rashes,  masses.    Lab Results   Component Value Date    GLUCOSE 276 (H) 01/12/2021    BUN 28 (H) 01/12/2021    CREATININE 1.17 (H) 01/12/2021    EGFRIFNONA 45 (L) 01/12/2021    BCR 23.9 01/12/2021    CO2 23.3 01/12/2021    CALCIUM 8.7 01/12/2021    ALBUMIN 2.85 (L) 01/11/2021    AST 10 01/11/2021    ALT 8 01/11/2021     WBC   Date Value Ref Range Status   01/12/2021 9.74 3.40 - 10.80 10*3/mm3 Final     RBC   Date Value Ref Range Status   01/12/2021 2.96 (L) 3.77 - 5.28 10*6/mm3 Final     Hemoglobin   Date Value Ref Range Status   01/12/2021 9.1 (L) 12.0 - 15.9 g/dL Final     Hematocrit   Date Value Ref Range Status   01/12/2021 29.9 (L) 34.0 - 46.6 % Final     MCV   Date Value Ref Range Status   01/12/2021 101.0 (H) 79.0 - 97.0 fL Final     MCH   Date Value Ref Range Status   01/12/2021 30.7 26.6 - 33.0 pg Final     MCHC   Date Value Ref Range Status   01/12/2021 30.4 (L) 31.5 - 35.7 g/dL Final     RDW   Date Value Ref Range Status   01/12/2021 13.3 12.3 - 15.4 % Final     RDW-SD   Date Value Ref Range Status   01/12/2021 49.7 37.0 - 54.0 fl Final     MPV   Date Value Ref Range Status   01/12/2021 9.7 6.0 - 12.0 fL Final     Platelets   Date Value Ref Range Status   01/12/2021 379 140 - 450 10*3/mm3 Final     Neutrophil %   Date Value Ref Range Status   01/12/2021 66.9 42.7 - 76.0 % Final     Lymphocyte %   Date Value Ref Range Status   01/12/2021 19.9 19.6 - 45.3 % Final     Monocyte %   Date Value Ref Range Status   01/12/2021 8.5 5.0 - 12.0 % Final     Eosinophil %   Date Value Ref Range Status   01/12/2021 3.7 0.3 - 6.2 % Final     Basophil %   Date Value Ref Range Status   01/12/2021 0.6 0.0 - 1.5 % Final     Immature Grans %   Date Value Ref Range Status   01/12/2021 0.4 0.0 - 0.5 % Final     Neutrophils, Absolute   Date Value Ref Range Status   01/12/2021 6.51 1.70 - 7.00 10*3/mm3 Final     Lymphocytes, Absolute   Date Value Ref Range Status   01/12/2021 1.94 0.70 - 3.10 10*3/mm3 Final     Monocytes, Absolute   Date  Value Ref Range Status   01/12/2021 0.83 0.10 - 0.90 10*3/mm3 Final     Eosinophils, Absolute   Date Value Ref Range Status   01/12/2021 0.36 0.00 - 0.40 10*3/mm3 Final     Basophils, Absolute   Date Value Ref Range Status   01/12/2021 0.06 0.00 - 0.20 10*3/mm3 Final     Immature Grans, Absolute   Date Value Ref Range Status   01/12/2021 0.04 0.00 - 0.05 10*3/mm3 Final     nRBC   Date Value Ref Range Status   01/12/2021 0.0 0.0 - 0.2 /100 WBC Final       Physical Exam:   Vitals:    01/12/21 1116   BP: 141/68   Pulse: 75   Resp: 20   Temp: 97.5 °F (36.4 °C)   SpO2: 97%     GENERAL: alert, but confused.  Responds to commands  HEENT: normochephalic, atraumatic, no scleral icterus moist mucous membranes.  NECK: Supple there is no thyromegaly or lymphadenopathy.   Oral cavity: Some carious teeth.  Otherwise no swelling or induration along the floor mouth tongue palate and posterior pharynx.  1+ tonsils noted.  And with compression of the tongue careful inspection of the right tonsil showed no significant abnormality or evidence of foreign body.  Nose was clear anteriorly.  Ears shows some dried hardened wax bilaterally but palpation of the auricles was unremarkable with no swelling tenderness redness or protrusion.  Portions of the drums could be seen bilaterally no evidence of any infection or opacity.  Was fluid in the left ear.  Cranial nerves grossly intact    Diagnostic workup:   CT scans were both visualized and reviewed.  Appears to be consistent with a tonsil stone in the right tonsil and on examination no evidence of any induration or infection around it there is no evidence of abscess on CT.  The head CT was reviewed and the report said left mastoiditis.  Careful inspection showed yes there is fluid in the left middle ear and mastoid.  There will always be fluid in the mastoid and there is fluid in the middle ear.  There is no evidence of bony erosion or coalescent process.  There is no cellulitis around the  skin overlying the mastoid and no evidence of subperiosteal abscess.  Sinuses were totally clear and there is no air-fluid level that could be a source of the bacteremia        Assessment and plan:  Normal tonsil exam and had neck exam and a previously septic patient is currently afebrile on Zosyn.  Left ear is consistent with serous otitis but not a source of infection at this time and this is serous fluid in the mastoid without a actual acute mastoiditis.  Do not feel the ear is a source of bacteremia at this point.  Would continue with your program.  Would recommend follow-up in the office in 1 month to reevaluate the ears and also to perhaps perform fiberoptic exam of her hypopharynx and larynx.      Electronically signed by Demarco Simon MD at 21 1145          Physical Therapy Notes (most recent note)      Oanh Ragland PTA at 21 1405  Version 1 of 1         [facility] - Physical Therapy Treatment Note   [place]     Patient Name: Ashley Galvez  : 1945  MRN: [id number]  Today's Date: 2021   Onset of Illness/Injury or Date of Surgery: 20       PT Assessment (last 12 hours)      PT Evaluation and Treatment     Row Name [id number]          Physical Therapy Time and Intention    Subjective Information  no complaints  -LL     Document Type  therapy note (daily note)  -LL     Mode of Treatment  physical therapy;individual therapy  -LL     Patient Effort  fair  -LL     Comment  Patient stated that she has a fear of falling and that is why she has difficulty with mobility.  Patient was in agreement for participation with PT this date.  -LL     Row Name 21 [id number]          General Information    Patient Profile Reviewed  yes  -LL     Row Name 21 [id number]          Cognition    Affect/Mental Status (Cognitive)  WFL  -LL     Orientation Status (Cognition)  oriented to;person;place;situation  -LL     Follows Commands (Cognition)  follows two-step commands;75-90% accuracy  -LL      Personal Safety Interventions  fall prevention program maintained;gait belt;nonskid shoes/slippers when out of bed;supervised activity  -LL     Row Name 01/12/21 1350          Bed Mobility    Bed Mobility  rolling left;rolling right;supine-sit;sit-supine  -LL     Rolling Left [place] (Bed Mobility)  moderate assist (50% patient effort);2 person assist;verbal cues;nonverbal cues (demo/gesture)  -LL     Rolling Right [place] (Bed Mobility)  maximum assist (25% patient effort);2 person assist;verbal cues;nonverbal cues (demo/gesture)  -LL     Supine-Sit [place] (Bed Mobility)  maximum assist (25% patient effort);2 person assist;verbal cues;nonverbal cues (demo/gesture)  -LL     Sit-Supine [place] (Bed Mobility)  maximum assist (25% patient effort);2 person assist;verbal cues;nonverbal cues (demo/gesture)  -LL     Row Name 01/12/21 1350          Transfers    Comment (Transfers)  patient unable to perform OOB activity  -     Row Name 01/12/21 1350          Balance    Comment, Balance  Patient sat EOB with min/mod A  and verbal cues for upright and erect posture.    -LL     Row Name             Wound 01/01/21 0040 Right gluteal Pressure Injury    Wound - Properties Group Placement Date: 01/01/21 -SA Placement Time: 0040 -SA Present on Hospital Admission: Y  -SA Side: Right  -SA Location: gluteal  -SA Primary Wound Type: Pressure inj  -TW Stage, Pressure Injury : Stage 3  -TW    Retired Wound - Properties Group Date first assessed: 01/01/21 -SA Time first assessed: 0040 -SA Present on Hospital Admission: Y  -SA Side: Right  -SA Location: gluteal  -SA Primary Wound Type: Pressure inj  -TW    Row Name             Wound 01/01/21 0042 Left anterior greater trochanter Pressure Injury    Wound - Properties Group Placement Date: 01/01/21 -SA Placement Time: 0042 -SA Present on Hospital Admission: Y  -TW Side: Left  -SA Orientation: anterior  -SA Location: greater trochanter  -SA Primary Wound Type:  Pressure inj  -TW Stage, Pressure Injury : Stage 2  -TW    Retired Wound - Properties Group Date first assessed: 01/01/21 -SA Time first assessed: 0042 -SA Present on Hospital Admission: Y  -TW Side: Left  -SA Location: greater trochanter  -SA Primary Wound Type: Pressure inj  -TW    Row Name             Wound 01/01/21 0044 Left lower leg    Wound - Properties Group Placement Date: 01/01/21 -SA Placement Time: 0044 -SA Side: Left  -SA Orientation: lower  -SA Location: leg  -SA    Retired Wound - Properties Group Date first assessed: 01/01/21 -SA Time first assessed: 0044  -SA Side: Left  -SA Location: leg  -SA    Row Name             Wound 01/01/21 0044 Right lower leg    Wound - Properties Group Placement Date: 01/01/21 -SA Placement Time: 0044 -SA Side: Right  -SA Orientation: lower  -SA Location: leg  -SA    Retired Wound - Properties Group Date first assessed: 01/01/21 -SA Time first assessed: 0044  -SA Side: Right  -SA Location: leg  -SA    Row Name             Wound 01/04/21 1953 Right heel Pressure Injury    Wound - Properties Group Placement Date: 01/04/21  -LETA Placement Time: 1953 -JP Present on Hospital Admission: N  -LETA Side: Right  -LEAT Location: heel  -LETA Primary Wound Type: Pressure inj  -TW Stage, Pressure Injury : deep tissue injury  -LETA    Retired Wound - Properties Group Date first assessed: 01/04/21  -JP Time first assessed: 1953  -JP Present on Hospital Admission: N  -LETA Side: Right  -LETA Location: heel  -LETA Primary Wound Type: Pressure inj  -TW    Row Name 01/12/21 1350          Positioning and Restraints    Pre-Treatment Position  in bed  -LL     Post Treatment Position  bed  -LL     In Bed  supine;notified nsg;call light within reach;encouraged to call for assist;exit alarm on;side rails up x3;R waffle boot;L waffle boot  -LL       User Key  (r) = Recorded By, (t) = Taken By, (c) = Cosigned By    Initials Name Provider Type    Dana Pinedo, RN Registered Nurse    LETA Tolbert,  Umberto Joshua, RN Registered Nurse    Oanh Isaacs PTA Physical Therapy Assistant    Joy Mckinnon RN Registered Nurse        Physical Therapy Education                 Title: PT OT SLP Therapies (Done)     Topic: Physical Therapy (Done)     Point: Mobility training (Done)     Learning Progress Summary           Patient Acceptance, E,D, VU,NR by LL at 1/12/2021 1404    Acceptance, E,TB, NR by MP at 1/12/2021 0920    Nonacceptance, E, NR,NL by RD at 1/9/2021 1550    Acceptance, E, NR by BC at 1/8/2021 1508    Acceptance, E,TB, NL,NR by KM at 1/8/2021 0927    Acceptance, E, NR by EA at 1/6/2021 2213    Acceptance, E, VU by MC at 1/6/2021 0120    Acceptance, E, VU by AM at 1/5/2021 1358    Acceptance, E, NR by BC at 1/5/2021 1043                   Point: Home exercise program (Done)     Learning Progress Summary           Patient Acceptance, E,D, VU,NR by LL at 1/12/2021 1404    Acceptance, E,TB, NR by MP at 1/12/2021 0920    Nonacceptance, E, NR,NL by RD at 1/9/2021 1550    Acceptance, E, NR by BC at 1/8/2021 1508    Acceptance, E,TB, NL,NR by KM at 1/8/2021 0927    Acceptance, E, NR by EA at 1/6/2021 2213    Acceptance, E, VU by  at 1/6/2021 0120    Acceptance, E, VU by AM at 1/5/2021 1358    Acceptance, E, NR by BC at 1/5/2021 1043                   Point: Body mechanics (Done)     Learning Progress Summary           Patient Acceptance, E,D, VU,NR by  at 1/12/2021 1404    Acceptance, E,TB, NR by MP at 1/12/2021 0920    Nonacceptance, E, NR,NL by RD at 1/9/2021 1550    Acceptance, E, NR by BC at 1/8/2021 1508    Acceptance, E,TB, NL,NR by KM at 1/8/2021 0927    Acceptance, E, NR by EA at 1/6/2021 2213    Acceptance, E, VU by MC at 1/6/2021 0120    Acceptance, E, VU by AM at 1/5/2021 1358    Acceptance, E, NR by BC at 1/5/2021 1043                   Point: Precautions (Done)     Learning Progress Summary           Patient Acceptance, E,D, VU,NR by LL at 1/12/2021 1404    Acceptance, E,TB, NR by  MP at 2021 0920    Nonacceptance, E, NR,NL by  at 2021 1550    Acceptance, E, NR by BC at 2021 1508    Acceptance, E,TB, NL,NR by  at 2021 0927    Acceptance, E, NR by  at 2021 2213    Acceptance, E, VU by  at 2021 0120    Acceptance, E, VU by AM at 2021 1358    Acceptance, E, NR by BC at 2021 1043                               User Key     Initials Effective Dates Name Provider Type Discipline     16 -  Thompson Brennan, RN Registered Nurse Nurse    EA 16 -  Michelle Stevenson, RN Registered Nurse Nurse     16 -  Alicia Gutierrez, RN Registered Nurse Nurse    BC 16 -  Michelle Jenkins PT Physical Therapist PT     16 -  Oanh Ragland PTA Physical Therapy Assistant PT     18 -  Collett, Morgan, RN Registered Nurse Nurse     06/10/20 -  Naa Peter, MIGEL Registered Nurse Nurse     20 -  Vera Hicks RN Registered Nurse Nurse              PT Recommendation and Plan             Time Calculation:   PT Charges     Row Name 21 1405 21 1404          Time Calculation    PT Received On  --  21  -LL        Time Calculation- PT    Total Timed Code Minutes- PT  30 minute(s)  -LL  --       User Key  (r) = Recorded By, (t) = Taken By, (c) = Cosigned By    Initials Name Provider Type    LL Oanh Ragland PTA Physical Therapy Assistant        Therapy Charges for Today     Code Description Service Date Service Provider Modifiers Qty    14470544591 HC PT THERAPEUTIC ACT EA 15 MIN 2021 Oanh Ragland PTA GP 1    19520659528 HC PT THER PROC EA 15 MIN 2021 Oanh Ragland PTA GP 1               Oanh. RACHEL Ragland  2021      Electronically signed by Oanh Ragland PTA at 21 1405          Occupational Therapy Notes (most recent note)      Sheron Finley R, OT at 21 1525          Acute Care - Occupational Therapy Treatment Note   Rolf     Patient Name: Ashley RUIZ:  1945  MRN: [id number]  Today's Date: 1/8/2021  Onset of Illness/Injury or Date of Surgery: 12/31/20     Referring Physician: Dr. Puente    Admit Date: 12/31/2020       ICD-10-CM ICD-9-CM   1. Acute renal failure, unspecified acute renal failure type (CMS/HCC)  N17.9 584.9     Patient Active Problem List   Diagnosis   • Sepsis (CMS/HCC)     Past Medical History:   Diagnosis Date   • Anemia    • Anterolisthesis     Grade 1 anterolisthesis of L5 on S1 due to bilateral L5 pars defects   • Diverticulosis    • Essential hypertension    • Lacunar infarction (CMS/HCC)    • Type 2 diabetes mellitus (CMS/HCC)      Past Surgical History:   Procedure Laterality Date   • CHOLECYSTECTOMY     • HYSTERECTOMY              OT ASSESSMENT FLOWSHEET (last 12 hours)      OT Evaluation and Treatment     Row Name 01/08/21 1522                   OT Time and Intention    Subjective Information  no complaints  -LM        Document Type  therapy note (daily note)  -LM        Mode of Treatment  occupational therapy  -LM        Patient Effort  poor  -LM        Comment  Patient seen this date for adl retraining/education and fxl mobility.  Decreased alertness noted, decreased verbal communication.  Unable to sit EOB this date.  Total assist x 3 for bed mobility.  Notified physician of alertness changes.    -LM           Cognition    Affect/Mental Status (Cognitive)  low arousal/lethargic  -LM        Orientation Status (Cognition)  unable/difficult to assess  -LM        Follows Commands (Cognition)  unable/difficult to assess  -LM        Safety Deficit (Cognitive)  severe deficit;moderate deficit;ability to follow commands  -LM           Wound 01/01/21 0040 Right gluteal Pressure Injury    Wound - Properties Group Placement Date: 01/01/21  - Placement Time: 0040 -SA Present on Hospital Admission: Y  -SA Side: Right  -SA Location: gluteal  -SA Primary Wound Type: Pressure inj  -TW Stage, Pressure Injury : Stage 3  -TW    Retired Wound -  Properties Group Date first assessed: 01/01/21 -SA Time first assessed: 0040 -SA Present on Hospital Admission: Y  -SA Side: Right  -SA Location: gluteal  -SA Primary Wound Type: Pressure inj  -TW       Wound 01/01/21 0042 Left anterior greater trochanter Pressure Injury    Wound - Properties Group Placement Date: 01/01/21 -SA Placement Time: 0042 -SA Present on Hospital Admission: Y  -TW Side: Left  -SA Orientation: anterior  -SA Location: greater trochanter  -SA Primary Wound Type: Pressure inj  -TW Stage, Pressure Injury : Stage 2  -TW    Retired Wound - Properties Group Date first assessed: 01/01/21 -SA Time first assessed: 0042 -SA Present on Hospital Admission: Y  -TW Side: Left  -SA Location: greater trochanter  -SA Primary Wound Type: Pressure inj  -TW       Wound 01/01/21 0044 Left lower leg    Wound - Properties Group Placement Date: 01/01/21 -SA Placement Time: 0044 -SA Side: Left  -SA Orientation: lower  -SA Location: leg  -SA    Retired Wound - Properties Group Date first assessed: 01/01/21 -SA Time first assessed: 0044 -SA Side: Left  -SA Location: leg  -SA       Wound 01/01/21 0044 Right lower leg    Wound - Properties Group Placement Date: 01/01/21 -SA Placement Time: 0044 -SA Side: Right  -SA Orientation: lower  -SA Location: leg  -SA    Retired Wound - Properties Group Date first assessed: 01/01/21 -SA Time first assessed: 0044 -SA Side: Right  -SA Location: leg  -SA       Wound 01/04/21 1953 Right heel Pressure Injury    Wound - Properties Group Placement Date: 01/04/21  -JP Placement Time: 1953 -JP Present on Hospital Admission: N  -LETA Side: Right  -LETA Location: heel  -LETA Primary Wound Type: Pressure inj  -TW Stage, Pressure Injury : deep tissue injury  -LETA    Retired Wound - Properties Group Date first assessed: 01/04/21  -JP Time first assessed: 1953 -JP Present on Hospital Admission: N  -LETA Side: Right  -LETA Location: heel  -LETA Primary Wound Type: Pressure inj  -TW        Positioning and Restraints    Post Treatment Position  bed  -LM        In Bed  call light within reach;encouraged to call for assist  -LM          User Key  (r) = Recorded By, (t) = Taken By, (c) = Cosigned By    Initials Name Effective Dates    TW Dana Ford, MIGEL 16 -     Umberto Zelaya RN 20 -     LM Sheron Finley OT 16 -     Joy Mckinnon RN 20 -                OT Recommendation and Plan     Plan of Care Review  Plan of Care Reviewed With: patient  Plan of Care Reviewed With: patient        Time Calculation:     Therapy Charges for Today     Code Description Service Date Service Provider Modifiers Qty    68799749581 HC OT SELF CARE/MGMT/TRAIN EA 15 MIN 2021 Sheron Finley OT GO 2               Sheron Finley OT  2021    Electronically signed by Sheron Finley OT at 21 1526          Speech Language Pathology Notes (most recent note)      Ruth Mccray MA,CCC-SLP at 21 1601          Acute Care - Speech Language Pathology   Swallow Initial Evaluation  Rolf   MODIFIED BARIUM SWALLOW STUDY     Patient Name: Ashley Galvez  : 1945  MRN: [id number]  Today's Date: 2021  Onset of Illness/Injury or Date of Surgery: 21     Referring Physician: Dr. Puente      Admit Date: 2020    Visit Dx:     ICD-10-CM ICD-9-CM   1. Acute renal failure, unspecified acute renal failure type (CMS/HCC)  N17.9 584.9     Patient Active Problem List   Diagnosis   • Sepsis (CMS/HCC)     Past Medical History:   Diagnosis Date   • Anemia    • Anterolisthesis     Grade 1 anterolisthesis of L5 on S1 due to bilateral L5 pars defects   • Diverticulosis    • Essential hypertension    • Lacunar infarction (CMS/HCC)    • Type 2 diabetes mellitus (CMS/HCC)      Past Surgical History:   Procedure Laterality Date   • CHOLECYSTECTOMY     • HYSTERECTOMY         Ashley Galvez  presents to the radiology suite this pm from 3S 3306/1S to participate in  an instrumental MBS to evaluate safety/efficacy of swallowing fnx, determine safest/least restrictive diet.    Ms. Galvez is familiar to SLP for initial clinical dysphagia assessment 1/1/21 w/ recommendation for puree diet, thin liquids. She was reassessed 1/4/21 and upgrade to least restrictive mechanical soft diet, chopped meats, thin liquids. She is accepting this po diet w/o overt s/s aspiration.     Noted per SLP consultation order, request for possible FEES to visualize anatomy 2/2 calcification vs. foreign body of right palatine tonsillar fossa pharyngeal wall vs. calcification as noted on CT soft tissue neck w/o contrast this am. D/w radiologist today's imaging, and per location of density noted on CT, this would not be visible via transnasal FEES. Radiologist indicates direct visualization orally would likely be more efficient. Recommend consideration of pt candidacy for ENT referral to further address this as FEES per SLP is not clinically indicated at this time.     D/w Gale Ozuna PA-C, pt status via telephone, specifically deferral of FEES at this time w/ verbal understanding. Although Ms. Galvez is accepting her least restrictive po diet w/o overt s/s aspiration, she does request instrumental MBS to objectively evaluate swallowing fnx 2/2 wheezing in her R lung yesterday.         Social History     Socioeconomic History   • Marital status:      Spouse name: Not on file   • Number of children: Not on file   • Years of education: Not on file   • Highest education level: Not on file   Tobacco Use   • Smoking status: Never Smoker   • Smokeless tobacco: Never Used   Substance and Sexual Activity   • Alcohol use: Never     Frequency: Never   • Drug use: Never   • Sexual activity: Defer      EXAM:    CT Chest Without Intravenous Contrast     EXAM DATE:    1/7/2021 8:31 AM     CLINICAL HISTORY:    Abnormal xray - pleural effusion; N17.9-Acute kidney failure,  unspecified     TECHNIQUE:    Axial  computed tomography images of the chest without intravenous  contrast.  Sagittal and coronal reformatted images were created and  reviewed.  This CT exam was performed using one or more of the following  dose reduction techniques:  automated exposure control, adjustment of  the mA and/or kV according to patient size, and/or use of iterative  reconstruction technique.     COMPARISON:    No relevant prior studies available.     FINDINGS:    Lungs:  No convincing evidence of lung mass identified.  Stable 7 mm  nodule right lower lobe.    Pleural space:  Unremarkable.  No pneumothorax.  No significant  effusion.    Heart:  Cardiomegaly is noted with severe coronary artery  calcifications.  No significant pericardial effusion.    Mediastinum:  There is a large hiatal hernia with significant portion  of the stomach and specifically the gastric fundus in the left lower  lobe region producing mass effect associated atelectasis in the left  lower lobe.  No metallic foreign bodies are seen in the thoracic  esophagus.    Bones/joints:  Unremarkable.  No acute fracture.  No dislocation.    Soft tissues:  Unremarkable.    Vasculature:  Aortic calcifications are noted but no evidence of  aneurysm.    Lymph nodes:  Unremarkable.  No enlarged lymph nodes.    Gallbladder and bile ducts:  Cholecystectomy.    Adrenals:  Small left adrenal nodule is stable from the previous exam.    Other findings:  Calcifications in the region of the bong hepatis are  stable from previous.     IMPRESSION:  1.  Essentially stable chest CT demonstrating large hiatal hernia and  left lower lobe atelectasis.  2.  No metallic or radiopaque foreign body seen throughout the thoracic  esophagus.  3.  Otherwise stable exam with other nonacute findings described above.  Please refer to prior report for follow-up recommendations of above  findings.     This report was finalized on 1/7/2021 9:16 AM by Dr. Sagar Romero MD.    Diet Orders (active) (From  admission, onward)     Start     Ordered    01/06/21 0929  Diet Soft Texture; Chopped; Thin; Consistent Carbohydrate  Diet Effective Now      01/06/21 0929    01/01/21 1800  Dietary Nutrition Supplements Boost Plus (Ensure Enlive, Ensure Plus)  Daily With Breakfast, Lunch & Dinner      01/01/21 1347              She is observed on 1L O2 via NC w/o complications.     Risks and benefits of the procedure are explained w/ pt verbalizing understanding/agreement to participate. Proceed per protocol.     Pt is positioned upright and centered in a soft strap supportive chair to accept multiple po presentations of solid cracker, puree, honey thick, nectar thick, and thin liquids via spoon, cup and straw, along w/ whole placebo pill in puree. Pt is able to self feed.     All views are from the lateral plane.     Facial/oral structures are symmetrical upon observation w/o lingual deviation upon protrusion. Oral mucosa are moist, pink and clean. Secretions are clear, thin and well controlled. OROM/WALKER is adequate to imitate oral postures. Gag is not assessed. Volitional cough is adequate in intensity, clear in quality, nonproductive. Vocal quality is adequate in intensity, clear in quality w/ intelligible speech. Pt is a/a and cooperative to particpate. She is oriented to person and place, follows simple directives, and participates in simple conversational exchanges.     Upon po presentations, adequate bolus anticipation w/ good labial seal for bolus clearance via spoon bowl, cup rim stability and suction via straw. Bolus formation, manipulation, and control are adequate w/ mildly increased oral prep time w/ solid, rotary mastication pattern. A-p transit is slightly delayed w/o oral residue. Tongue base retraction and linguavelar seal are adequate w/o premature spillage. No laryngeal penetration or aspiration is evidenced before the swallow.     Pharyngeal swallow is timely w/ adequate hyolaryngeal elevation and epiglottic  inversion. Transient laryngeal penetration occurs during the swallow w/ thin liquids via cup and straw, clearing upon completion w/o significant residue. Pharyngeal contraction is adequate w/o significant residue. No laryngeal penetration or aspiration evidenced during or after the swallow.     Partial esophageal sweep reveals no obvious mucosal abnormalities. Motility appears adequate w/o retrograde flow noted to the upper 1/3.      Impression: Per this evaluation, Ms. Galvez presents w/ a trace-mild mastication weakness, wfl pharyngeal swallow w/o evidence of aspiration across this evaluation. Recommend continuing current least restrictive po diet. This evaluation is consistent w/ most recent clinical dysphagia assessment 1/4/21.     EDUCATION  The patient has been educated in the following areas:   Dysphagia (Swallowing Impairment).    SLP Recommendation and Plan  1. Mechanical soft diet, chopped meats, thin liquids.    2. Meds whole in puree/thins.   3. Upright and centered for all po intake.  4. DANIEL precautions.  5. Oral care protocol.  SLP to sign off at this time.      D/w pt results and recommendations w/ verbal agreement.     Thank you for allowing me to participate in the care of your patient-  Ruth Mccray M.A., CCC-SLP      Time Calculation:       Therapy Charges for Today     Code Description Service Date Service Provider Modifiers Qty    66620919404 HC ST MOTION FLUORO EVAL SWALLOW 6 1/7/2021 Ruth Mccray MA,CCC-SLP GN 1        Patient was not wearing a face mask during this therapy encounter. Therapist used appropriate personal protective equipment including mask, eye protection and gloves.  Mask used was standard procedure mask. Appropriate PPE was worn during the entire therapy session. The patient coughed during this evaluation. Therapist was within 6 feet for 15 minutes or more during the evaluation. Hand hygiene was completed before and after therapy session. Patient is not in  enhanced droplet precautions.            Ruth Mccray MA,CCC-SLP  1/7/2021    Electronically signed by Ruth Mccray MA,CCC-SLP at 01/07/21 1620       Respiratory Therapy Notes (most recent note)    No notes exist for this encounter.

## 2021-01-13 NOTE — PROGRESS NOTES
Discharge Planning Assessment   Madelia     Patient Name: Ashley Galvez  MRN: [id number]  Today's Date: 1/13/2021    Admit Date: 12/31/2020      Discharge Plan     Row Name 01/13/21 1050       Plan    Plan  SS spoke with [facility] per Soraida who stated insurance has denied pt for admit and recommends SNF.  SS spoke with pt's spouse who is agreeable for SS to attempt placement for rehab.  SS faxed pt information to [facility] for review.  SS notified Janet.  SS will follow.    Row Name 01/13/21 0904       Plan    Plan  SS spoke with [facility] per Soraida.  Rehab continues to await acceptance or denial for admit to rehab from pt's insurance.  SS will continue to follow.        Continued Care and Services - Admitted Since 12/31/2020     Destination     Service Provider Request Status Selected Services Address Phone Fax Patient Preferred    [facility]  Pending - Request Sent N/A [address] [place] [id number] [phone] --                ANDRES Jessica

## 2021-01-13 NOTE — PROGRESS NOTES
Patient Identification:  Name:  Ashley Galvez  Age:  75 y.o.  Sex:  female  :  1945  MRN:  [id number]  Visit Number:  [id number]  Primary Care Provider:  Rony Callaway MD    Length of stay:  13    Subjective/Interval History/Consultants/Procedures     Chief complaint: Uncontrolled hypertension; follow-up Iron deficiency; follow-up Leukocytosis, hypoglycemia; Follow-up severe sepsis, bacteremia with Enterococcus, UTI with Pseudomonas and Proteus    HPI/Hospital course:    Mrs. Galvez is a 75-year-old  female with PMH significant for diabetes mellitus, lacunar infarct, essential hypertension, and unspecified anemia. She initially presented to the ED of [facility] on 20 with chief complaint of confusion.  She was reportedly previously at [facility] and discharged home wit pneumonia prior to her presentation. On admission, she encephalopathic and admitted to the PCU for further evaluation with KAY and Sepsis 2/2 UTI.     In the interim, one of two blood cultures was positive with ultimately growth of Enterococcus in one of two cultures (2020).  ID was consulted for further IV abx guidance with IV Vancomycin started.  Rocephin was initially continued given concern for underlying UTI.  Urine culture ultimately grew Proteus mirabilis and Pseudomonas aeruginosa.  IV cephalosporin coverage was transitioned to IV Cefepime for further treatment.      Metabolic encephalopathy improved during admission with CT demonstrating possible arachnoid cyst.  MRI was then performed and revealed CSF density process involving the right cerebellar hemisphere which is most probably intra-axial in location and features on the MRI compatible with sequela of remote infarct with extensive encephalomalacia changes.  There is also marked left mastoiditis-itis and a chronic appearing infarct in the right occipital lobe with cortical sclerosis and mild chronic appearing ethmoid sinusitis.    Given  debility of nearly 2 years, physical and occupational therapies were consulted during hospitalization.      There was some concern for abnormal EKG with ectopic atrial rhythm with frequent PACs vs. Chaotic atrial rhythm with cardiology consultationanam wallace.  It was recommend she undergo event monitoring at the time of discharge for around two weeks with further outpatient cardiology follow-up.  She later had runs of atrial in the AM of 1/6 with full dose anticoagulation started given concern of underlying arrhythmia as source of possible prior CVA.      Additionally, given worsening dyspnea with right-side wheezing, CT chest and  Neck were ordered on 1/6/21.  Methylprednisolone was started for short duration, but quickly discontinued given difficulty tolerating due to hyperglycemia.  CT neck revealed calcification vs. Possible foreign body.      VQ scan and venous dopplers were ordered on 1/7 to rule out PE and US venous doppler. VQ revealed low probability and US doppler without evidence of known DVT.      On 1/8/2021, she did develop some worsening lethargy.  She was found to have hypoglycemia with glucose in the 30s after several days of hyperglycemia requiring upward titration of insulin.   She did require D5 on 1/9 evening for some period of time after recurrent hypoglycemia but became quickly hyperglycemic following. Cosyntropin stimulation test was ordered.      Mrs. aGlvez completed her course of IV abx and at present is waiting for further rehabilitation. Throughout hospitalization, blood pressure regimen has been titrated upward for improved control.     Subjective:    Mrs. Galvez is resting in bed. She is eating her breakfast independently.  She is not coughing or choking.  She continues to feel improved.  She looks forward to rehab of some sort.  She has been urinating and having bowel movements.  .     Discussed with AM MIGEL Jacome with no known significant events overnight.             ----------------------------------------------------------------------------------------------------------------------  [facility] Meds:  [START ON 1/14/2021] amLODIPine, 10 mg, Oral, Q24H  apixaban, 5 mg, Oral, Q12H  aspirin, 81 mg, Oral, Daily  atorvastatin, 20 mg, Oral, Nightly  bumetanide, 1 mg, Oral, Daily  castor oil-balsam peru, , Topical, Q12H  castor oil-balsam peru, , Topical, Q12H  folic acid, 1 mg, Oral, Daily  insulin aspart, 0-14 Units, Subcutaneous, TID AC  insulin detemir, 5 Units, Subcutaneous, Nightly  ipratropium-albuterol, 3 mL, Nebulization, 4x Daily - RT  iron polysaccharides, 150 mg, Oral, Daily  metoprolol tartrate, 100 mg, Oral, Q12H  pantoprazole, 40 mg, Oral, Daily  piperacillin-tazobactam, 3.375 g, Intravenous, Once  sodium chloride, 3 mL, Intravenous, Q12H         ----------------------------------------------------------------------------------------------------------------------      Objective     Vital Signs:  Temp:  [97.5 °F (36.4 °C)-98.9 °F (37.2 °C)] 98.7 °F (37.1 °C)  Heart Rate:  [74-86] 82  Resp:  [18-20] 18  BP: (116-180)/(66-90) 180/90      01/11/21  0500 01/12/21  0300 01/13/21  0300   Weight: 76.3 kg (168 lb 3.2 oz) 77.3 kg (170 lb 8 oz) 76.8 kg (169 lb 4.8 oz)     Body mass index is 29.99 kg/m².    Intake/Output Summary (Last 24 hours) at 1/13/2021 1001  Last data filed at 1/13/2021 0231  Gross per 24 hour   Intake 840 ml   Output 1100 ml   Net -260 ml     No intake/output data recorded.  Diet Soft Texture; Chopped; Thin; Consistent Carbohydrate  ----------------------------------------------------------------------------------------------------------------------    Physical Exam  Vitals signs and nursing note reviewed.   Constitutional:       Appearance: She is well-developed and well-groomed. She is not ill-appearing.      Interventions: She is not intubated.Nasal cannula in place.   HENT:      Head: Normocephalic and atraumatic.      Mouth/Throat:      Lips:  Pink.      Mouth: Mucous membranes are moist.      Dentition: Abnormal dentition.      Tongue: No lesions.      Pharynx: No pharyngeal swelling or posterior oropharyngeal erythema.      Comments: Difficulty visualizing tonsils  Eyes:      General: Lids are normal.      Extraocular Movements:      Right eye: Normal extraocular motion.      Conjunctiva/sclera:      Right eye: Right conjunctiva is not injected.      Left eye: Left conjunctiva is not injected.   Neck:      Musculoskeletal: Normal range of motion.      Thyroid: No thyroid mass.   Cardiovascular:      Rate and Rhythm: Normal rate and regular rhythm.      Heart sounds: S1 normal and S2 normal.   Pulmonary:      Effort: No tachypnea, bradypnea, accessory muscle usage, prolonged expiration, respiratory distress or retractions. She is not intubated.      Breath sounds: No decreased air movement. Examination of the right-lower field reveals decreased breath sounds. Examination of the left-lower field reveals decreased breath sounds. Decreased breath sounds present. No wheezing, rhonchi or rales.   Abdominal:      General: Bowel sounds are normal.      Palpations: Abdomen is soft.      Tenderness: There is no abdominal tenderness.   Musculoskeletal:         General: No swelling.      Right lower leg: No edema.      Left lower leg: No edema.   Skin:     General: Skin is warm and dry.   Neurological:      Mental Status: She is alert.      Comments: She is more alert today.  She is sitting up and is very cheerful.    Psychiatric:         Attention and Perception: Attention normal.         Mood and Affect: Mood normal.           Nasal cannula in place:Yes       ----------------------------------------------------------------------------------------------------------------------  Tele:               ----------------------------------------------------------------------------------------------------------------------  Results from last 7 days   Lab Units  01/07/21  1222 01/07/21  1124 01/07/21  0656   TROPONIN T ng/mL 0.033* 0.035*  --    PROBNP pg/mL  --   --  2,050.0*     Results from last 7 days   Lab Units 01/13/21  0425 01/12/21  0253 01/11/21  0726 01/11/21  0403 01/10/21  0005   CRP mg/dL  --  0.35  --  0.35 0.59*   WBC 10*3/mm3 9.74 9.74 8.32  --  9.39   HEMOGLOBIN g/dL 9.3* 9.1* 8.5*  --  10.0*   HEMATOCRIT % 30.9* 29.9* 29.1*  --  34.5   MCV fL 102.0* 101.0* 104.7*  --  106.8*   MCHC g/dL 30.1* 30.4* 29.2*  --  29.0*   PLATELETS 10*3/mm3 372 379 307  --  391     Results from last 7 days   Lab Units 01/08/21  1516   PH, ARTERIAL pH units 7.404   PO2 ART mm Hg 86.1   PCO2, ARTERIAL mm Hg 41.0   HCO3 ART mmol/L 25.6     Results from last 7 days   Lab Units 01/13/21  0425 01/12/21  0253 01/11/21  0403  01/07/21  0656   SODIUM mmol/L 135* 132* 137   < > 132*   POTASSIUM mmol/L 4.0 3.7 3.8   < > 4.6   CHLORIDE mmol/L 102 99 104   < > 104   CO2 mmol/L 25.4 23.3 18.4*   < > 19.3*   BUN mg/dL 28* 28* 30*   < > 29*   CREATININE mg/dL 1.28* 1.17* 1.22*   < > 1.02*   EGFR IF NONAFRICN AM mL/min/1.73 41* 45* 43*   < > 53*   CALCIUM mg/dL 9.2 8.7 9.0   < > 8.9   GLUCOSE mg/dL 270* 276* 231*   < > 364*   ALBUMIN g/dL  --   --  2.85*  --  2.64*   BILIRUBIN mg/dL  --   --  0.2  --  <0.2   ALK PHOS U/L  --   --  65  --  67   AST (SGOT) U/L  --   --  10  --  6   ALT (SGPT) U/L  --   --  8  --  7    < > = values in this interval not displayed.   Estimated Creatinine Clearance: 37.9 mL/min (A) (by C-G formula based on SCr of 1.28 mg/dL (H)).  No results found for: AMMONIA      Blood Culture   Date Value Ref Range Status   01/02/2021 No growth at 4 days  Preliminary   01/02/2021 No growth at 4 days  Preliminary   12/31/2020 No growth at 5 days  Final   12/31/2020 Enterococcus faecalis (C)  Final     Comment:     Infectious disease consultation is highly recommended.     Urine Culture   Date Value Ref Range Status   12/31/2020 >100,000 CFU/mL Pseudomonas aeruginosa (A)  Final    12/31/2020 50,000 CFU/mL Proteus mirabilis (A)  Final     No results found for: WOUNDCX  No results found for: STOOLCX  ----------------------------------------------------------------------------------------------------------------------  Imaging Results (Last 24 Hours)     ** No results found for the last 24 hours. **          Microbiology Results (last 10 days)     Procedure Component Value - Date/Time    Respiratory Panel, PCR (WITHOUT COVID) - Swab, Nasopharynx [[id number]]  (Normal) Collected: 01/06/21 1433    Lab Status: Final result Specimen: Swab from Nasopharynx Updated: 01/06/21 1601     ADENOVIRUS, PCR Not Detected     Coronavirus 229E Not Detected     Coronavirus HKU1 Not Detected     Coronavirus NL63 Not Detected     Coronavirus OC43 Not Detected     Human Metapneumovirus Not Detected     Human Rhinovirus/Enterovirus Not Detected     Influenza B PCR Not Detected     Parainfluenza Virus 1 Not Detected     Parainfluenza Virus 2 Not Detected     Parainfluenza Virus 3 Not Detected     Parainfluenza Virus 4 Not Detected     Bordetella pertussis pcr Not Detected     Chlamydophila pneumoniae PCR Not Detected     Mycoplasma pneumo by PCR Not Detected     Influenza A PCR Not Detected     RSV, PCR Not Detected    Narrative:      The coronavirus on the RVP is NOT COVID-19 and is NOT indicative of infection with COVID-19.           ----------------------------------------------------------------------------------------------------------------------   I have reviewed the above laboratory values for 01/13/21    Assessment/Plan     Active Hospital Problems    Diagnosis POA   • **Sepsis (CMS/[facility]) [A41.9] Yes         ASSESSMENT/PLAN:  · Severe sepsis, present on admission,  With RR>20, HR>90, elevated C-RP, UTI, bacteremia, and metabolic encephalopathy:  -Blood and urine cultures reviewed.   -Continue vital signs per floor protocol.   -Continue monitoring daily CBC & temperature curve.   -Completed IV Vancomycin and  IV cefepime with transition to Zosyn with completion of course.   -ID input appreciated.   -CT chest report previously atelectasis from large hiatal hernia.   -Awaiting rehab eval.     · Enterococcus bacteremia:  -IV abx completed.  See prior day's progress note regarding details.       · Proteus/Pseudomonas UTI:   -Urine culture reviewed.   -IV abx completed.   -C-RP has normalized.     · Leukocytosis, improved:  -CBC reviewed.   -C-RP previously normalized.      · 5.6mm calficified structure vs. Foreign Body (suspected to be tonsil stone):  -Consult placed with ENT for possible evaluation.    -ENT evaluation appreciated with 1 months follow-up recommended to re evaluate her ears and for fiberoptic exam of hypopharynx and larynx.  FB suspected to be possible tonsil stone.        · Runs of atrial flutter:  -[id number] at least 6 with prior CVA, CHF, DM, female, age, HTN.   -Eliquis started per Cardiology.  -Continue cardiac monitoring.   -VQ with low probability.    -US venous doppler without evidence of known DVT.    -Metoprolol started on 1/7 with further titration during admission.          · Uncontrolled hypertension:  -Norvasc upward titrated on 1/13/21 for improved control of blood pressures.   -Metoprolol continued with titration during admission (now 100mg BID with hold parameters for hypotension and bradycardia).    -Monitor blood pressures per floor protocol.  -Bumex previously initiated per cardiology.        · Brittle DM 2, ID with wide glucose fluctuations:   -Hemoglobin a1c 7.90.   -FSBG ACHS with SSI PRN.  -Continue sliding scale insulin given brittle DM with prior hypoglycemic episodes with basal and mealtime insulin.   -SSI increased on 1/12 given rising glucoses.  -5u Levemir added qhs on 1/13. Will monitor response closely.     · Acute on chronic weakness/debility:  -Continue PT/OT.  -SLP evaluation performed.     -SS on board regarding possible rehab.      · CVA by MRI with left carotid  stenosis on US carotid doppler:   -Continue ASA and statin.   -MRI reviewed.   -US carotid doppler with moderate stenosis in the left carotid system.  -Plan for outpatient event monitoring with outpatient Cardiology follow-up.   -Lipid panel reviewed.     · Grade II diastolic dysfunction:   -Echocardiogram reviewed from 1/1/2021.   -CXR with bibasilar atelectasis and small left pleural effusion.   -ABG reviewed from 1/5/21 @ 2321 with some hyperventilation.   -CT chest report reviewed previously.    -Oral Bumex started per Cardiology previously.  -Monior BMP as we continue Bumex.    -Monitor Creatinine closely.    -ACEI stopped on 1/11 due to rising renal function.   -I/O balance positive.       · 7mm Noncalcified nodule in RLL:  -CT chest follow-up recommend in 6 months.      · Left adrenal nodule:   -Further outpatient follow-up recommended with adrenal CT protocol or MRI on nonemergent basis recommend.   -Cosyntropin stim test previously during admission after hypoglycemia without known acute evidence of adrenal insufficiency per results.      · KAY vs. AKY on CKD IIIb on admission:   -Repeat AM BMP.   -Creatinine with slight rise, possibly 2/2 uncontrolled HTN.   -BP medication adjustments made again on this date.    -Bumex 1mg added per cards.    -Monitor daily BMP.     · Metabolic encephalopathy, improved:  -CT head report reviewed.   -MRI report reviewed.   -Mentation improved from time of admission.   -Mentation improved with treatment of hypoglycemia during admission.      · Acute on Chronic macrocytic anemia with hemoglobin drop on 1/11/21:  -Repeat AM CBC.     -No known overt blood loss.   -Iron deficient with supplementation added.   -TSH acceptable on admission.  -Folate acceptable but borderline low.    -Vitamin b12 acceptable.    -Daily CBC reviewed.   -No known overt blood loss.     · Iron deficiency:  -Niferex added previously.     · Functional decline:  -PT/OT  -SS on board with possible rehab  placement    -----------  -DVT prophylaxis: Eliquis to serve with close monitoring of hemoglobin  -GI prophylaxis: PPI/Lactobacillus  -Activity: Up with assist  -Disposition: Remains hospitalized for further therapies with now pursuing possible rehab placement.   -Anticipated dispo needs:  Event monitor for 2 weeks.  Outpatient Cards follow-up. Outpatient CT chest follow-up in 6 months. Outpatient CT adrenal protocol or MRI. SS on board as spouse is now interested in possible rehab.   -Diet:   Dietary Orders (From admission, onward)     Start     Ordered    01/06/21 0929  Diet Soft Texture; Chopped; Thin; Consistent Carbohydrate  Diet Effective Now     Question Answer Comment   Diet Texture / Consistency Soft Texture    Select Texture: Chopped    Fluid Consistency Thin    Common Modifiers Consistent Carbohydrate        01/06/21 0929 01/01/21 1800  Dietary Nutrition Supplements Boost Plus (Ensure Enlive, Ensure Plus)  Daily With Breakfast, Lunch & Dinner     Question:  Select Supplement  Answer:  Boost Plus (Ensure Enlive, Ensure Plus)    01/01/21 1347                    The patient is high risk due to the following diagnoses/reasons:  Severe sepsis, bacteremia, UTI, recent pneumonia, metabolic encephalopathy    Gale Ozuna PA-C  01/13/21  10:01 EST  Pager # [phone]

## 2021-01-13 NOTE — PLAN OF CARE
Pt has rested well with no complaints. Stated she is looking forward to going to rehab. No s/s of distress noted. Continue with plan of care.

## 2021-01-13 NOTE — PLAN OF CARE
Goal Outcome Evaluation:  Plan of Care Reviewed With: patient  Progress: improving    Patient has been very pleasant today. Compliant with all medications and care during shift. Patient's family has been at bedside during visiting hours today. Wound care was performed per order. Purwick still in place; good output noted. Patient also had a bowel movement during shift. She is tolerating diet and staff assistance during meals well. No complaints or concerns at this time. Will continue to monitor and follow plan of care.

## 2021-01-13 NOTE — PROGRESS NOTES
Discharge Planning Assessment   [place]     Patient Name: Ashley Galvez  MRN: [id number]  Today's Date: 1/13/2021    Admit Date: 12/31/2020        Discharge Plan     Row Name 01/13/21 1245       Plan    Plan  [facility] agreeable to accept pt per Janet and will begin process for pre authorization on this date.  Pt and spouse aware and agreeable.  SS will follow.    Row Name 01/13/21 9449       Plan    Plan  SS spoke with [facility] per Soraida who stated insurance has denied pt for admit and recommends SNF.  SS spoke with pt's spouse who is agreeable for SS to attempt placement for rehab.  SS faxed pt information to [facility] for review.  SS notified Janet.  SS will follow.        Continued Care and Services - Admitted Since 12/31/2020     Destination     Service Provider Request Status Selected Services Address Phone Fax Patient Preferred    [facility]  Pending - Request Sent N/A [address] [place] [id number] [phone] --              ANDRES Jessica

## 2021-01-14 ENCOUNTER — APPOINTMENT (OUTPATIENT)
Dept: RESPIRATORY THERAPY | Facility: HOSPITAL | Age: 76
End: 2021-01-14

## 2021-01-14 VITALS
BODY MASS INDEX: 30.37 KG/M2 | SYSTOLIC BLOOD PRESSURE: 165 MMHG | HEIGHT: 63 IN | RESPIRATION RATE: 20 BRPM | HEART RATE: 82 BPM | TEMPERATURE: 98.3 F | OXYGEN SATURATION: 96 % | DIASTOLIC BLOOD PRESSURE: 78 MMHG | WEIGHT: 171.4 LBS

## 2021-01-14 LAB
ANION GAP SERPL CALCULATED.3IONS-SCNC: 10.1 MMOL/L (ref 5–15)
BASOPHILS # BLD AUTO: 0.04 10*3/MM3 (ref 0–0.2)
BASOPHILS NFR BLD AUTO: 0.5 % (ref 0–1.5)
BUN SERPL-MCNC: 31 MG/DL (ref 8–23)
BUN/CREAT SERPL: 26.3 (ref 7–25)
CALCIUM SPEC-SCNC: 8.8 MG/DL (ref 8.6–10.5)
CHLORIDE SERPL-SCNC: 98 MMOL/L (ref 98–107)
CO2 SERPL-SCNC: 25.9 MMOL/L (ref 22–29)
CREAT SERPL-MCNC: 1.18 MG/DL (ref 0.57–1)
DEPRECATED RDW RBC AUTO: 48.7 FL (ref 37–54)
EOSINOPHIL # BLD AUTO: 0.27 10*3/MM3 (ref 0–0.4)
EOSINOPHIL NFR BLD AUTO: 3.2 % (ref 0.3–6.2)
ERYTHROCYTE [DISTWIDTH] IN BLOOD BY AUTOMATED COUNT: 12.9 % (ref 12.3–15.4)
GFR SERPL CREATININE-BSD FRML MDRD: 45 ML/MIN/1.73
GLUCOSE BLDC GLUCOMTR-MCNC: 281 MG/DL (ref 70–130)
GLUCOSE BLDC GLUCOMTR-MCNC: 293 MG/DL (ref 70–130)
GLUCOSE BLDC GLUCOMTR-MCNC: 390 MG/DL (ref 70–130)
GLUCOSE SERPL-MCNC: 237 MG/DL (ref 65–99)
HCT VFR BLD AUTO: 30 % (ref 34–46.6)
HGB BLD-MCNC: 9.1 G/DL (ref 12–15.9)
IMM GRANULOCYTES # BLD AUTO: 0.05 10*3/MM3 (ref 0–0.05)
IMM GRANULOCYTES NFR BLD AUTO: 0.6 % (ref 0–0.5)
LYMPHOCYTES # BLD AUTO: 1.89 10*3/MM3 (ref 0.7–3.1)
LYMPHOCYTES NFR BLD AUTO: 22.1 % (ref 19.6–45.3)
MCH RBC QN AUTO: 30.8 PG (ref 26.6–33)
MCHC RBC AUTO-ENTMCNC: 30.3 G/DL (ref 31.5–35.7)
MCV RBC AUTO: 101.7 FL (ref 79–97)
MONOCYTES # BLD AUTO: 0.71 10*3/MM3 (ref 0.1–0.9)
MONOCYTES NFR BLD AUTO: 8.3 % (ref 5–12)
NEUTROPHILS NFR BLD AUTO: 5.59 10*3/MM3 (ref 1.7–7)
NEUTROPHILS NFR BLD AUTO: 65.3 % (ref 42.7–76)
NRBC BLD AUTO-RTO: 0 /100 WBC (ref 0–0.2)
PLATELET # BLD AUTO: 361 10*3/MM3 (ref 140–450)
PMV BLD AUTO: 9.6 FL (ref 6–12)
POTASSIUM SERPL-SCNC: 4 MMOL/L (ref 3.5–5.2)
RBC # BLD AUTO: 2.95 10*6/MM3 (ref 3.77–5.28)
SARS-COV-2 RNA RESP QL NAA+PROBE: NOT DETECTED
SODIUM SERPL-SCNC: 134 MMOL/L (ref 136–145)
WBC # BLD AUTO: 8.55 10*3/MM3 (ref 3.4–10.8)

## 2021-01-14 PROCEDURE — 82962 GLUCOSE BLOOD TEST: CPT

## 2021-01-14 PROCEDURE — U0003 INFECTIOUS AGENT DETECTION BY NUCLEIC ACID (DNA OR RNA); SEVERE ACUTE RESPIRATORY SYNDROME CORONAVIRUS 2 (SARS-COV-2) (CORONAVIRUS DISEASE [COVID-19]), AMPLIFIED PROBE TECHNIQUE, MAKING USE OF HIGH THROUGHPUT TECHNOLOGIES AS DESCRIBED BY CMS-2020-01-R: HCPCS | Performed by: PHYSICIAN ASSISTANT

## 2021-01-14 PROCEDURE — 94799 UNLISTED PULMONARY SVC/PX: CPT

## 2021-01-14 PROCEDURE — 93246 EXT ECG>7D<15D RECORDING: CPT

## 2021-01-14 PROCEDURE — 99239 HOSP IP/OBS DSCHRG MGMT >30: CPT | Performed by: PHYSICIAN ASSISTANT

## 2021-01-14 PROCEDURE — 63710000001 INSULIN ASPART PER 5 UNITS: Performed by: PHYSICIAN ASSISTANT

## 2021-01-14 PROCEDURE — 80048 BASIC METABOLIC PNL TOTAL CA: CPT | Performed by: PHYSICIAN ASSISTANT

## 2021-01-14 PROCEDURE — 85025 COMPLETE CBC W/AUTO DIFF WBC: CPT | Performed by: PHYSICIAN ASSISTANT

## 2021-01-14 RX ORDER — IPRATROPIUM BROMIDE AND ALBUTEROL SULFATE 2.5; .5 MG/3ML; MG/3ML
3 SOLUTION RESPIRATORY (INHALATION)
Qty: 360 ML
Start: 2021-01-14

## 2021-01-14 RX ORDER — ALBUTEROL SULFATE 90 UG/1
1 AEROSOL, METERED RESPIRATORY (INHALATION) EVERY 4 HOURS PRN
Start: 2021-01-14

## 2021-01-14 RX ORDER — FOLIC ACID 1 MG/1
1 TABLET ORAL DAILY
Qty: 30 TABLET | Refills: 0 | Status: SHIPPED | OUTPATIENT
Start: 2021-01-14 | End: 2021-02-13

## 2021-01-14 RX ORDER — ATORVASTATIN CALCIUM 20 MG/1
20 TABLET, FILM COATED ORAL NIGHTLY
Start: 2021-01-14

## 2021-01-14 RX ORDER — IRON POLYSACCHARIDE COMPLEX 150 MG
150 CAPSULE ORAL DAILY
Start: 2021-01-14

## 2021-01-14 RX ORDER — BUMETANIDE 1 MG/1
1 TABLET ORAL DAILY
Start: 2021-01-14

## 2021-01-14 RX ORDER — HYDROXYZINE HYDROCHLORIDE 25 MG/1
25 TABLET, FILM COATED ORAL 3 TIMES DAILY PRN
Start: 2021-01-14 | End: 2021-02-13

## 2021-01-14 RX ORDER — METOPROLOL TARTRATE 100 MG/1
100 TABLET ORAL EVERY 12 HOURS SCHEDULED
Start: 2021-01-14

## 2021-01-14 RX ADMIN — IPRATROPIUM BROMIDE AND ALBUTEROL SULFATE 3 ML: .5; 3 SOLUTION RESPIRATORY (INHALATION) at 00:11

## 2021-01-14 RX ADMIN — ASPIRIN 81 MG: 81 TABLET, COATED ORAL at 09:44

## 2021-01-14 RX ADMIN — CASTOR OIL AND BALSAM, PERU: 788; 87 OINTMENT TOPICAL at 09:45

## 2021-01-14 RX ADMIN — IPRATROPIUM BROMIDE AND ALBUTEROL SULFATE 3 ML: .5; 3 SOLUTION RESPIRATORY (INHALATION) at 12:10

## 2021-01-14 RX ADMIN — APIXABAN 5 MG: 5 TABLET, FILM COATED ORAL at 09:44

## 2021-01-14 RX ADMIN — METOPROLOL TARTRATE 100 MG: 100 TABLET, FILM COATED ORAL at 09:44

## 2021-01-14 RX ADMIN — BUMETANIDE 1 MG: 1 TABLET ORAL at 09:44

## 2021-01-14 RX ADMIN — IPRATROPIUM BROMIDE AND ALBUTEROL SULFATE 3 ML: .5; 3 SOLUTION RESPIRATORY (INHALATION) at 06:22

## 2021-01-14 RX ADMIN — FOLIC ACID 1 MG: 1 TABLET ORAL at 09:44

## 2021-01-14 RX ADMIN — SODIUM CHLORIDE, PRESERVATIVE FREE 3 ML: 5 INJECTION INTRAVENOUS at 09:44

## 2021-01-14 RX ADMIN — PANTOPRAZOLE SODIUM 40 MG: 40 TABLET, DELAYED RELEASE ORAL at 09:44

## 2021-01-14 RX ADMIN — AMLODIPINE BESYLATE 10 MG: 10 TABLET ORAL at 09:44

## 2021-01-14 RX ADMIN — INSULIN ASPART 8 UNITS: 100 INJECTION, SOLUTION INTRAVENOUS; SUBCUTANEOUS at 09:43

## 2021-01-14 RX ADMIN — Medication 150 MG: at 09:45

## 2021-01-14 RX ADMIN — INSULIN ASPART 12 UNITS: 100 INJECTION, SOLUTION INTRAVENOUS; SUBCUTANEOUS at 12:28

## 2021-01-14 NOTE — DISCHARGE PLACEMENT REQUEST
"Ashley Roland (75 y.o. Female)     Date of Birth Social Security Number Address Home Phone MRN    1945  [id number] [facility] [place]  [place] [address] [phone] [id number]    [facility] Mar[facility] Status          None        Admission Date Admission Type Admitting Provider Attending Provider Department, Room/Bed    12/31/20 Emergency Norman Saravia MD Cagle, William, DO [address], 3305/2S    Discharge Date Discharge Disposition Discharge Destination         Skilled Nursing Facility (DC - External)              Attending Provider: Jorge Sidhu DO    Allergies: No Known Allergies    Isolation: None   Infection: None   Code Status: CPR    Ht: 160 cm (63\")   Wt: 77.7 kg (171 lb 6.4 oz)    Admission Cmt: None   Principal Problem: Sepsis (CMS/[facility]) [A41.9]                 Active Insurance as of 12/31/2020     Primary Coverage     Payor Plan Insurance Group Employer/Plan Group    HUMANA MEDICARE REPLACEMENT HUMANA MEDICARE REPLACEMENT [id number]     Payor Plan Address Payor Plan Phone Number Payor Plan Fax Number Effective Dates    [address] [address] [phone]  1/1/2018 - None Entered    [facility] [id number]       Subscriber Name Subscriber Birth Date Member ID       ASHLEY ROLAND 1945 [id number]                 Emergency Contacts      (Rel.) Home Phone Work Phone Mobile Phone    FREDDIE ROLAND (Spouse) [phone] -- --            Vital Signs (last day)     Date/Time   Temp   Temp src   Pulse   Resp   BP   Patient Position   SpO2    01/14/21 1027   98.3 (36.8)   Oral   88   20   165/78   Lying   98    01/14/21 0632   --   --   --   --   --   --   97    01/14/21 0622   98.4 (36.9)   Oral   78   18   163/86   Lying   97    01/14/21 0229   98.2 (36.8)   --   77   20   154/86   --   98    01/14/21 0018   --   --   88   18   --   --   97    01/14/21 0011   --   --   85   18   --   --   95    01/13/21 2145   --   --   89   --   --   --   --    01/13/21 1900   98.2 " (36.8)   --   91   18   152/89   --   99    01/13/21 1835   --   --   88   18   --   --   96    01/13/21 1825   --   --   84   18   --   --   95    01/13/21 1433   98.7 (37.1)   Oral   86   18   159/88   Lying   96    01/13/21 1225   --   --   --   --   --   --   96    01/13/21 1215   --   --   72   18   --   --   96    01/13/21 1037   98.5 (36.9)   Oral   75   18   129/95   Lying   97    01/13/21 0646   98.7 (37.1)   Oral   82   18   180/90   Lying   98    01/13/21 0633   --   --   --   --   --   --   97    01/13/21 0623   --   --   77   18   --   --   97    01/13/21 0231   98.9 (37.2)   --   86   18   152/88   --   96    01/13/21 0015   --   --   76   18   --   --   97    01/13/21 0004   --   --   74   18   --   --   97              Lines, Drains & Airways    Active LDAs     Name:   Placement date:   Placement time:   Site:   Days:    Peripheral IV 01/10/21 2100 Left;Posterior Wrist   01/10/21    2100    Wrist   3    External Urinary Catheter   01/01/21    0115    --   13                  Current Facility-Administered Medications   Medication Dose Route Frequency Provider Last Rate Last Admin   • amLODIPine (NORVASC) tablet 10 mg  10 mg Oral Q24H Gale Ozuna PA-C   10 mg at 01/14/21 0944   • apixaban (ELIQUIS) tablet 5 mg  5 mg Oral Q12H Katia Lowry APRN   5 mg at 01/14/21 0944   • aspirin EC tablet 81 mg  81 mg Oral Daily Tacos Puente MD   81 mg at 01/14/21 0944   • atorvastatin (LIPITOR) tablet 20 mg  20 mg Oral Nightly Tacos Puente MD   20 mg at 01/13/21 2145   • bumetanide (BUMEX) tablet 1 mg  1 mg Oral Daily Alina Hernandez APRN   1 mg at 01/14/21 0944   • castor oil-balsam peru (VENELEX) ointment   Topical Q12H Tacos Puente MD   Given at 01/13/21 2146   • castor oil-balsam peru (VENELEX) ointment   Topical Q12H Tacos Puente MD   Given at 01/14/21 0959   • dextrose (D50W) 25 g/ 50mL Intravenous Solution 25 g  25 g Intravenous Q15 Min PRN Tacos Puente MD   25 g at  01/09/21 1647   • dextrose (D50W) 25 g/ 50mL Intravenous Solution 25 g  25 g Intravenous Q15 Min PRN Tacos Puente MD       • dextrose (GLUTOSE) oral gel 15 g  15 g Oral Q15 Min PRN Tacos Puente MD       • dextrose (GLUTOSE) oral gel 15 g  15 g Oral Q15 Min PRN Tacos Puente MD       • folic acid (FOLVITE) tablet 1 mg  1 mg Oral Daily Tacos Puente MD   1 mg at 01/14/21 0944   • glucagon (human recombinant) (GLUCAGEN DIAGNOSTIC) injection 1 mg  1 mg Subcutaneous Q15 Min PRN Tacos Puente MD       • glucagon (human recombinant) (GLUCAGEN DIAGNOSTIC) injection 1 mg  1 mg Subcutaneous Q15 Min PRN Tacos Puente MD       • hydrOXYzine (ATARAX) tablet 25 mg  25 mg Oral TID PRN Arlyn Chandra PA   25 mg at 01/06/21 0714   • insulin aspart (novoLOG) injection 0-14 Units  0-14 Units Subcutaneous TID AC Gale Ozuna PA-C   8 Units at 01/14/21 0943   • insulin detemir (LEVEMIR) injection 5 Units  5 Units Subcutaneous Nightly Gale Ozuna PA-C   5 Units at 01/13/21 2146   • ipratropium-albuterol (DUO-NEB) nebulizer solution 3 mL  3 mL Nebulization 4x Daily - RT Tacos Puente MD   3 mL at 01/14/21 0622   • iron polysaccharides (NIFEREX) capsule 150 mg  150 mg Oral Daily Gale Ozuna PA-C   150 mg at 01/14/21 0945   • Magnesium Sulfate 2 gram Bolus, followed by 8 gram infusion (total Mg dose 10 grams)- Mg less than or equal to 1mg/dL  2 g Intravenous PRN Tacos Puente MD        Or   • Magnesium Sulfate 2 gram / 50mL Infusion (GIVE X 3 BAGS TO EQUAL 6GM TOTAL DOSE) - Mg 1.1 - 1.5 mg/dl  2 g Intravenous PRN Tacos Puente MD        Or   • Magnesium Sulfate 4 gram infusion- Mg 1.6-1.9 mg/dL  4 g Intravenous PRN Tacos Puente MD       • metoprolol tartrate (LOPRESSOR) tablet 100 mg  100 mg Oral Q12H Gale Ozuna PA-C   100 mg at 01/14/21 0944   • nitroglycerin (NITROSTAT) SL tablet 0.4 mg  0.4 mg Sublingual Q5 Min PRN Tacos Puente MD       • pantoprazole (PROTONIX) EC  tablet 40 mg  40 mg Oral Daily Tacos Puente MD   40 mg at 01/14/21 0944   • piperacillin-tazobactam (ZOSYN) 3.375 g/100 mL 0.9% NS IVPB (mbp)  3.375 g Intravenous Once Macie Latham, TOMÁS       • potassium chloride (MICRO-K) CR capsule 40 mEq  40 mEq Oral PRN Tacos Puente MD        Or   • potassium chloride (KLOR-CON) packet 40 mEq  40 mEq Oral PRN Tacos Puente MD        Or   • potassium chloride 10 mEq in 100 mL IVPB  10 mEq Intravenous Q1H PRN Tacos Puente MD       • potassium phosphate 45 mmol in sodium chloride 0.9 % 500 mL infusion  45 mmol Intravenous PRN Tacos Puente MD        Or   • potassium phosphate 30 mmol in sodium chloride 0.9 % 250 mL infusion  30 mmol Intravenous PRN Tacos Puente MD        Or   • potassium phosphate 15 mmol in sodium chloride 0.9 % 100 mL infusion  15 mmol Intravenous PRN Tacos Puente MD        Or   • sodium phosphates 45 mmol in sodium chloride 0.9 % 500 mL IVPB  45 mmol Intravenous PRN Tacos Puente MD        Or   • sodium phosphates 30 mmol in sodium chloride 0.9 % 250 mL IVPB  30 mmol Intravenous PRN Tacos Puente MD        Or   • sodium phosphates 15 mmol in sodium chloride 0.9 % 250 mL IVPB  15 mmol Intravenous PRN Tacos Puente MD       • sodium chloride 0.9 % flush 10 mL  10 mL Intravenous PRN Tacos Puente MD       • sodium chloride 0.9 % flush 3 mL  3 mL Intravenous Q12H Tacos Puente MD   3 mL at 01/14/21 0944   • sodium chloride 0.9 % flush 3-10 mL  3-10 mL Intravenous PRN Tacos Puente MD         Physician Progress Notes (last 24 hours) (Notes from 01/13/21 1108 through 01/14/21 1108)    No notes of this type exist for this encounter.            Consult Notes (most recent note)      Demarco Simon MD at 01/12/21 1115      Consult Orders    1. Inpatient ENT Consult [[id number]] ordered by Gale Ozuna PA-C at 01/11/21 0809               Consultation note    Referring physician: Mal Oliver DO    Consulting Physician:  Otis Arias MD    Reason for consultation: Abnormal CT scan with hyperdensity right tonsil    Chief Complaint   Patient presents with   • Altered Mental Status   Sepsis.  Abnormal CT of the neck and head    HPI:   The patient is currently on Zosyn for sepsis.  A CT neck on January 7 showed a 5.6 mm radiodense ovoids structure within the wall of the right palatine tonsil at the level of the vallecula.  They could not exclude foreign body.  She is currently afebrile and has responded to medication.  CT scan of the head on January 9 did show what was read as mastoiditis.  Spoke with patient's .  Patient was confused and would follow commands but not communicating otherwise.  He denies significant trouble swallowing, complains of pain in her throat or head.  No swelling or tenderness.      Past Medical History:   Diagnosis Date   • Anemia    • Anterolisthesis     Grade 1 anterolisthesis of L5 on S1 due to bilateral L5 pars defects   • Diverticulosis    • Essential hypertension    • Lacunar infarction (CMS/HCC)    • Type 2 diabetes mellitus (CMS/HCC)        Past Surgical History:   Procedure Laterality Date   • CHOLECYSTECTOMY     • HYSTERECTOMY           Current Facility-Administered Medications:   •  amLODIPine (NORVASC) tablet 5 mg, 5 mg, Oral, Q24H, Gale Ozuna PA-C, 5 mg at 01/12/21 0808  •  apixaban (ELIQUIS) tablet 5 mg, 5 mg, Oral, Q12H, Katia Lowry APRN, 5 mg at 01/12/21 0808  •  aspirin EC tablet 81 mg, 81 mg, Oral, Daily, Tacos Puente MD, 81 mg at 01/12/21 0808  •  atorvastatin (LIPITOR) tablet 20 mg, 20 mg, Oral, Nightly, Tacos Puente MD, 20 mg at 01/11/21 2041  •  bumetanide (BUMEX) tablet 1 mg, 1 mg, Oral, Daily, Alina Hernandez APRN, 1 mg at 01/12/21 0808  •  castor oil-balsam peru (VENELEX) ointment, , Topical, Q12H, Tacos Puente MD, Given at 01/11/21 2042  •  castor oil-balsam peru (VENELEX) ointment, , Topical, Q12H, Tacos Puente MD, Given at 01/12/21 0808  •   dextrose (D50W) 25 g/ 50mL Intravenous Solution 25 g, 25 g, Intravenous, Q15 Min PRN, Tacos Puente MD, 25 g at 01/09/21 1647  •  dextrose (D50W) 25 g/ 50mL Intravenous Solution 25 g, 25 g, Intravenous, Q15 Min PRN, Tacos Puente MD  •  dextrose (GLUTOSE) oral gel 15 g, 15 g, Oral, Q15 Min PRN, Tacos Puente MD  •  dextrose (GLUTOSE) oral gel 15 g, 15 g, Oral, Q15 Min PRN, Tacos Puente MD  •  folic acid (FOLVITE) tablet 1 mg, 1 mg, Oral, Daily, Tacos Puente MD, 1 mg at 01/12/21 0808  •  glucagon (human recombinant) (GLUCAGEN DIAGNOSTIC) injection 1 mg, 1 mg, Subcutaneous, Q15 Min PRN, Tacos Puente MD  •  glucagon (human recombinant) (GLUCAGEN DIAGNOSTIC) injection 1 mg, 1 mg, Subcutaneous, Q15 Min PRN, Tacos Puente MD  •  hydrOXYzine (ATARAX) tablet 25 mg, 25 mg, Oral, TID PRN, Arlyn Chandra PA, 25 mg at 01/06/21 0714  •  insulin aspart (novoLOG) injection 0-14 Units, 0-14 Units, Subcutaneous, TID AC, Gale Ozuna PA-C  •  ipratropium-albuterol (DUO-NEB) nebulizer solution 3 mL, 3 mL, Nebulization, 4x Daily - RT, Tacos Puente MD, 3 mL at 01/12/21 0711  •  iron polysaccharides (NIFEREX) capsule 150 mg, 150 mg, Oral, Daily, Gale Ozuna PA-C, 150 mg at 01/12/21 0809  •  Magnesium Sulfate 2 gram Bolus, followed by 8 gram infusion (total Mg dose 10 grams)- Mg less than or equal to 1mg/dL, 2 g, Intravenous, PRN **OR** Magnesium Sulfate 2 gram / 50mL Infusion (GIVE X 3 BAGS TO EQUAL 6GM TOTAL DOSE) - Mg 1.1 - 1.5 mg/dl, 2 g, Intravenous, PRN **OR** Magnesium Sulfate 4 gram infusion- Mg 1.6-1.9 mg/dL, 4 g, Intravenous, PRN, Tacos Puente MD  •  metoprolol tartrate (LOPRESSOR) tablet 100 mg, 100 mg, Oral, Q12H, Gale Ozuna PA-C  •  nitroglycerin (NITROSTAT) SL tablet 0.4 mg, 0.4 mg, Sublingual, Q5 Min PRN, Tacos Puente MD  •  pantoprazole (PROTONIX) EC tablet 40 mg, 40 mg, Oral, Daily, Tacos Puente MD, 40 mg at 01/12/21 0808  •  piperacillin-tazobactam  (ZOSYN) 3.375 g/100 mL 0.9% NS IVPB (mbp), 3.375 g, Intravenous, Once, Macie Latham, TOMÁS  •  potassium chloride (MICRO-K) CR capsule 40 mEq, 40 mEq, Oral, PRN **OR** potassium chloride (KLOR-CON) packet 40 mEq, 40 mEq, Oral, PRN **OR** potassium chloride 10 mEq in 100 mL IVPB, 10 mEq, Intravenous, Q1H PRN, Tacos Puente MD  •  potassium phosphate 45 mmol in sodium chloride 0.9 % 500 mL infusion, 45 mmol, Intravenous, PRN **OR** potassium phosphate 30 mmol in sodium chloride 0.9 % 250 mL infusion, 30 mmol, Intravenous, PRN **OR** potassium phosphate 15 mmol in sodium chloride 0.9 % 100 mL infusion, 15 mmol, Intravenous, PRN **OR** sodium phosphates 45 mmol in sodium chloride 0.9 % 500 mL IVPB, 45 mmol, Intravenous, PRN **OR** sodium phosphates 30 mmol in sodium chloride 0.9 % 250 mL IVPB, 30 mmol, Intravenous, PRN **OR** sodium phosphates 15 mmol in sodium chloride 0.9 % 250 mL IVPB, 15 mmol, Intravenous, PRNKwame Karl F, MD  •  [COMPLETED] Insert peripheral IV, , , Once **AND** sodium chloride 0.9 % flush 10 mL, 10 mL, Intravenous, PRKwame PERRY Karl F, MD  •  sodium chloride 0.9 % flush 3 mL, 3 mL, Intravenous, Q12H, Tacos Puente MD, 3 mL at 01/12/21 0809  •  sodium chloride 0.9 % flush 3-10 mL, 3-10 mL, Intravenous, PRKwame PERRY Karl F, MD    No Known Allergies    Social History     Socioeconomic History   • Marital status:      Spouse name: Not on file   • Number of children: Not on file   • Years of education: Not on file   • Highest education level: Not on file   Tobacco Use   • Smoking status: Never Smoker   • Smokeless tobacco: Never Used   Substance and Sexual Activity   • Alcohol use: Never     Frequency: Never   • Drug use: Never   • Sexual activity: Defer       Family History   Family history unknown: Yes       ROS: Unobtainable from patient.   denied  Constitutional: denies any weight changes, fatigue or weakness.  Eyes: : denies blurred or double vision  Cardiovascular: denies  chest pain, palpitations, edemas.  Respiratory: denies cough, sputum, SOB.  Gastrointestinal: denies N&V, abd pain, diarrhea, constipation.  Genitourinary: denies dysuria, frequency.  Endocrine: denies cold intolerance, lethargy and flushing.  Hem: denies excessive bruising and postop bleeding.  Musculoskeletal: denies weakness, joint swelling, pain or stiffness.  Neuro: denies seizures, CVA, paresthesia, or peripheral neuropathy.   Skin: denies change in nevi, rashes, masses.    Lab Results   Component Value Date    GLUCOSE 276 (H) 01/12/2021    BUN 28 (H) 01/12/2021    CREATININE 1.17 (H) 01/12/2021    EGFRIFNONA 45 (L) 01/12/2021    BCR 23.9 01/12/2021    CO2 23.3 01/12/2021    CALCIUM 8.7 01/12/2021    ALBUMIN 2.85 (L) 01/11/2021    AST 10 01/11/2021    ALT 8 01/11/2021     WBC   Date Value Ref Range Status   01/12/2021 9.74 3.40 - 10.80 10*3/mm3 Final     RBC   Date Value Ref Range Status   01/12/2021 2.96 (L) 3.77 - 5.28 10*6/mm3 Final     Hemoglobin   Date Value Ref Range Status   01/12/2021 9.1 (L) 12.0 - 15.9 g/dL Final     Hematocrit   Date Value Ref Range Status   01/12/2021 29.9 (L) 34.0 - 46.6 % Final     MCV   Date Value Ref Range Status   01/12/2021 101.0 (H) 79.0 - 97.0 fL Final     MCH   Date Value Ref Range Status   01/12/2021 30.7 26.6 - 33.0 pg Final     MCHC   Date Value Ref Range Status   01/12/2021 30.4 (L) 31.5 - 35.7 g/dL Final     RDW   Date Value Ref Range Status   01/12/2021 13.3 12.3 - 15.4 % Final     RDW-SD   Date Value Ref Range Status   01/12/2021 49.7 37.0 - 54.0 fl Final     MPV   Date Value Ref Range Status   01/12/2021 9.7 6.0 - 12.0 fL Final     Platelets   Date Value Ref Range Status   01/12/2021 379 140 - 450 10*3/mm3 Final     Neutrophil %   Date Value Ref Range Status   01/12/2021 66.9 42.7 - 76.0 % Final     Lymphocyte %   Date Value Ref Range Status   01/12/2021 19.9 19.6 - 45.3 % Final     Monocyte %   Date Value Ref Range Status   01/12/2021 8.5 5.0 - 12.0 % Final      Eosinophil %   Date Value Ref Range Status   01/12/2021 3.7 0.3 - 6.2 % Final     Basophil %   Date Value Ref Range Status   01/12/2021 0.6 0.0 - 1.5 % Final     Immature Grans %   Date Value Ref Range Status   01/12/2021 0.4 0.0 - 0.5 % Final     Neutrophils, Absolute   Date Value Ref Range Status   01/12/2021 6.51 1.70 - 7.00 10*3/mm3 Final     Lymphocytes, Absolute   Date Value Ref Range Status   01/12/2021 1.94 0.70 - 3.10 10*3/mm3 Final     Monocytes, Absolute   Date Value Ref Range Status   01/12/2021 0.83 0.10 - 0.90 10*3/mm3 Final     Eosinophils, Absolute   Date Value Ref Range Status   01/12/2021 0.36 0.00 - 0.40 10*3/mm3 Final     Basophils, Absolute   Date Value Ref Range Status   01/12/2021 0.06 0.00 - 0.20 10*3/mm3 Final     Immature Grans, Absolute   Date Value Ref Range Status   01/12/2021 0.04 0.00 - 0.05 10*3/mm3 Final     nRBC   Date Value Ref Range Status   01/12/2021 0.0 0.0 - 0.2 /100 WBC Final       Physical Exam:   Vitals:    01/12/21 1116   BP: 141/68   Pulse: 75   Resp: 20   Temp: 97.5 °F (36.4 °C)   SpO2: 97%     GENERAL: alert, but confused.  Responds to commands  HEENT: normochephalic, atraumatic, no scleral icterus moist mucous membranes.  NECK: Supple there is no thyromegaly or lymphadenopathy.   Oral cavity: Some carious teeth.  Otherwise no swelling or induration along the floor mouth tongue palate and posterior pharynx.  1+ tonsils noted.  And with compression of the tongue careful inspection of the right tonsil showed no significant abnormality or evidence of foreign body.  Nose was clear anteriorly.  Ears shows some dried hardened wax bilaterally but palpation of the auricles was unremarkable with no swelling tenderness redness or protrusion.  Portions of the drums could be seen bilaterally no evidence of any infection or opacity.  Was fluid in the left ear.  Cranial nerves grossly intact    Diagnostic workup:   CT scans were both visualized and reviewed.  Appears to be  consistent with a tonsil stone in the right tonsil and on examination no evidence of any induration or infection around it there is no evidence of abscess on CT.  The head CT was reviewed and the report said left mastoiditis.  Careful inspection showed yes there is fluid in the left middle ear and mastoid.  There will always be fluid in the mastoid and there is fluid in the middle ear.  There is no evidence of bony erosion or coalescent process.  There is no cellulitis around the skin overlying the mastoid and no evidence of subperiosteal abscess.  Sinuses were totally clear and there is no air-fluid level that could be a source of the bacteremia        Assessment and plan:  Normal tonsil exam and had neck exam and a previously septic patient is currently afebrile on Zosyn.  Left ear is consistent with serous otitis but not a source of infection at this time and this is serous fluid in the mastoid without a actual acute mastoiditis.  Do not feel the ear is a source of bacteremia at this point.  Would continue with your program.  Would recommend follow-up in the office in 1 month to reevaluate the ears and also to perhaps perform fiberoptic exam of her hypopharynx and larynx.      Electronically signed by Demarco Simon MD at 21 1145          Physical Therapy Notes (most recent note)      Oanh Ragland PTA at 21 1405  Version 1 of 1         Acute Care - Physical Therapy Treatment Note  ANDREW Bansal     Patient Name: Ashley Galvez  : 1945  MRN: [id number]  Today's Date: 2021   Onset of Illness/Injury or Date of Surgery: 20       PT Assessment (last 12 hours)      PT Evaluation and Treatment     Row Name 21 1350          Physical Therapy Time and Intention    Subjective Information  no complaints  -LL     Document Type  therapy note (daily note)  -LL     Mode of Treatment  physical therapy;individual therapy  -LL     Patient Effort  fair  -LL     Comment  Patient stated that  she has a fear of falling and that is why she has difficulty with mobility.  Patient was in agreement for participation with PT this date.  -     Row Name 01/12/21 1350          General Information    Patient Profile Reviewed  yes  -     Row Name 01/12/21 1350          Cognition    Affect/Mental Status (Cognitive)  WFL  -     Orientation Status (Cognition)  oriented to;person;place;situation  -     Follows Commands (Cognition)  follows two-step commands;75-90% accuracy  -     Personal Safety Interventions  fall prevention program maintained;gait belt;nonskid shoes/slippers when out of bed;supervised activity  -     Row Name 01/12/21 1350          Bed Mobility    Bed Mobility  rolling left;rolling right;supine-sit;sit-supine  -LL     Rolling Left [place] (Bed Mobility)  moderate assist (50% patient effort);2 person assist;verbal cues;nonverbal cues (demo/gesture)  -     Rolling Right [place] (Bed Mobility)  maximum assist (25% patient effort);2 person assist;verbal cues;nonverbal cues (demo/gesture)  -LL     Supine-Sit [place] (Bed Mobility)  maximum assist (25% patient effort);2 person assist;verbal cues;nonverbal cues (demo/gesture)  -LL     Sit-Supine [place] (Bed Mobility)  maximum assist (25% patient effort);2 person assist;verbal cues;nonverbal cues (demo/gesture)  -     Row Name 01/12/21 1350          Transfers    Comment (Transfers)  patient unable to perform OOB activity  -     Row Name 01/12/21 1350          Balance    Comment, Balance  Patient sat EOB with min/mod A  and verbal cues for upright and erect posture.    -     Row Name             Wound 01/01/21 0040 Right gluteal Pressure Injury    Wound - Properties Group Placement Date: 01/01/21 -SA Placement Time: 0040 -SA Present on Hospital Admission: Y  -SA Side: Right  -SA Location: gluteal  -SA Primary Wound Type: Pressure inj  -TW Stage, Pressure Injury : Stage 3  -TW    Retired Wound - Properties Group Date  first assessed: 01/01/21 -SA Time first assessed: 0040 -SA Present on Hospital Admission: Y  -SA Side: Right  -SA Location: gluteal  -SA Primary Wound Type: Pressure inj  -TW    Row Name             Wound 01/01/21 0042 Left anterior greater trochanter Pressure Injury    Wound - Properties Group Placement Date: 01/01/21 -SA Placement Time: 0042 -SA Present on Hospital Admission: Y  -TW Side: Left  -SA Orientation: anterior  -SA Location: greater trochanter  -SA Primary Wound Type: Pressure inj  -TW Stage, Pressure Injury : Stage 2  -TW    Retired Wound - Properties Group Date first assessed: 01/01/21 -SA Time first assessed: 0042 -SA Present on Hospital Admission: Y  -TW Side: Left  -SA Location: greater trochanter  -SA Primary Wound Type: Pressure inj  -TW    Row Name             Wound 01/01/21 0044 Left lower leg    Wound - Properties Group Placement Date: 01/01/21 -SA Placement Time: 0044 -SA Side: Left  -SA Orientation: lower  -SA Location: leg  -SA    Retired Wound - Properties Group Date first assessed: 01/01/21 -SA Time first assessed: 0044  -SA Side: Left  -SA Location: leg  -SA    Row Name             Wound 01/01/21 0044 Right lower leg    Wound - Properties Group Placement Date: 01/01/21 -SA Placement Time: 0044 -SA Side: Right  -SA Orientation: lower  -SA Location: leg  -SA    Retired Wound - Properties Group Date first assessed: 01/01/21 -SA Time first assessed: 0044  -SA Side: Right  -SA Location: leg  -SA    Row Name             Wound 01/04/21 1953 Right heel Pressure Injury    Wound - Properties Group Placement Date: 01/04/21  -JP Placement Time: 1953 -JP Present on Hospital Admission: N  -LETA Side: Right  -LETA Location: heel  -LETA Primary Wound Type: Pressure inj  -TW Stage, Pressure Injury : deep tissue injury  -LETA    Retired Wound - Properties Group Date first assessed: 01/04/21  -JP Time first assessed: 1953 -JP Present on Hospital Admission: N  -LETA Side: Right  -LETA Location: heel  -LETA  Primary Wound Type: Pressure inj  -TW    Row Name 01/12/21 1350          Positioning and Restraints    Pre-Treatment Position  in bed  -LL     Post Treatment Position  bed  -LL     In Bed  supine;notified nsg;call light within reach;encouraged to call for assist;exit alarm on;side rails up x3;R waffle boot;L waffle boot  -LL       User Key  (r) = Recorded By, (t) = Taken By, (c) = Cosigned By    Initials Name Provider Type    TW Dana Ford, RN Registered Nurse    Umberto Zelaya, RN Registered Nurse    Oanh Isaacs PTA Physical Therapy Assistant    Joy Mckinnon RN Registered Nurse        Physical Therapy Education                 Title: PT OT SLP Therapies (Done)     Topic: Physical Therapy (Done)     Point: Mobility training (Done)     Learning Progress Summary           Patient Acceptance, E,D, VU,NR by  at 1/12/2021 1404    Acceptance, E,TB, NR by MP at 1/12/2021 0920    Nonacceptance, E, NR,NL by RD at 1/9/2021 1550    Acceptance, E, NR by BC at 1/8/2021 1508    Acceptance, E,TB, NL,NR by KM at 1/8/2021 0927    Acceptance, E, NR by EA at 1/6/2021 2213    Acceptance, E, VU by  at 1/6/2021 0120    Acceptance, E, VU by AM at 1/5/2021 1358    Acceptance, E, NR by BC at 1/5/2021 1043                   Point: Home exercise program (Done)     Learning Progress Summary           Patient Acceptance, E,D, VU,NR by  at 1/12/2021 1404    Acceptance, E,TB, NR by MP at 1/12/2021 0920    Nonacceptance, E, NR,NL by RD at 1/9/2021 1550    Acceptance, E, NR by BC at 1/8/2021 1508    Acceptance, E,TB, NL,NR by KM at 1/8/2021 0927    Acceptance, E, NR by EA at 1/6/2021 2213    Acceptance, E, VU by AURELIO at 1/6/2021 0120    Acceptance, E, VU by AM at 1/5/2021 1358    Acceptance, E, NR by BC at 1/5/2021 1043                   Point: Body mechanics (Done)     Learning Progress Summary           Patient Acceptance, E,D, VU,NR by LL at 1/12/2021 1404    Acceptance, E,TB, NR by MP at 1/12/2021 0920     Nonacceptance, E, NR,NL by RD at 1/9/2021 1550    Acceptance, E, NR by BC at 1/8/2021 1508    Acceptance, E,TB, NL,NR by KM at 1/8/2021 0927    Acceptance, E, NR by EA at 1/6/2021 2213    Acceptance, E, VU by MC at 1/6/2021 0120    Acceptance, E, VU by AM at 1/5/2021 1358    Acceptance, E, NR by BC at 1/5/2021 1043                   Point: Precautions (Done)     Learning Progress Summary           Patient Acceptance, E,D, VU,NR by  at 1/12/2021 1404    Acceptance, E,TB, NR by  at 1/12/2021 0920    Nonacceptance, E, NR,NL by RD at 1/9/2021 1550    Acceptance, E, NR by BC at 1/8/2021 1508    Acceptance, E,TB, NL,NR by  at 1/8/2021 0927    Acceptance, E, NR by EA at 1/6/2021 2213    Acceptance, E, VU by  at 1/6/2021 0120    Acceptance, E, VU by AM at 1/5/2021 1358    Acceptance, E, NR by BC at 1/5/2021 1043                               User Key     Initials Effective Dates Name Provider Type Discipline     06/16/16 -  Thompson Brennan, RN Registered Nurse Nurse     06/16/16 -  Michelle Stevenson, RN Registered Nurse Nurse     06/16/16 -  Alicia Gutierrez, RN Registered Nurse Nurse    BC 03/14/16 -  Michelle Jenkins PT Physical Therapist PT     05/02/16 -  Oanh Ragland PTA Physical Therapy Assistant PT     09/06/18 -  Collett, Morgan, RN Registered Nurse Nurse    RD 06/10/20 -  Naa Peter, MIGEL Registered Nurse Nurse    AM 06/24/20 -  Vera Hicks, RN Registered Nurse Nurse              PT Recommendation and Plan             Time Calculation:   PT Charges     Row Name 01/12/21 1405 01/12/21 1404          Time Calculation    PT Received On  --  01/12/21  -LL        Time Calculation- PT    Total Timed Code Minutes- PT  30 minute(s)  -LL  --       User Key  (r) = Recorded By, (t) = Taken By, (c) = Cosigned By    Initials Name Provider Type    Oanh Isaacs PTA Physical Therapy Assistant        Therapy Charges for Today     Code Description Service Date Service Provider Modifiers  Qty    15608357723 HC PT THERAPEUTIC ACT EA 15 MIN 1/12/2021 Oanh Ragland PTA GP 1    90149800521 HC PT THER PROC EA 15 MIN 1/12/2021 Oanh Ragland PTA GP 1               Oanh. RACHEL Ragland  1/12/2021      Electronically signed by Oanh Ragland PTA at 01/12/21 1405          Discharge Summary      Gale Ozuna PA-C at 01/14/21 0952              [facility] Medicine Services  DISCHARGE SUMMARY    Date of Admission: 12/31/2020    Date of Discharge:  1/14/2021    PCP: Rony Callaway MD    Discharging Provider: Gale Ozuna PA-C  Attending Physician on day of DC: Dr. 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  · Acute on chronic weakness/debility  · CVA by MRI with left carotid stenosis on ultrasound carotid Doppler  · Grade 2 diastolic dysfunction  · 7 mm noncalcified nodule in right lower lung (further outpatient imaging recommending in 6 months)  · Left adrenal nodule (further outpatient work-up recommended)  · KAY versus KAY on CKD 3 versus CKD 3B on admission  · Metabolic encephalopathy, improved  · Acute on chronic macrocytic anemia  · Folate deficiency       Consults:   Consults     Date and Time Order Name Status Description    1/12/2021 0030 Inpatient ENT Consult Completed     1/5/2021 0914 Inpatient Cardiology Consult Completed     1/1/2021 1414 Inpatient Infectious Diseases Consult Completed           HPI     History of Present Illness:  Ashley Galvez is a 75-year-old  female  with PMH significant for diabetes mellitus, lacunar infarct, essential hypertension, and unspecified anemia. She initially presented to the ED of [facility] on 12/31/20 with chief complaint of confusion.  She was reportedly previously at [facility] and discharged home wit pneumonia prior to her presentation. On admission, she encephalopathic and admitted to the PCU for further evaluation with KAY and Sepsis 2/2 UTI.        Hospital Course     Hospital Course  Ashley Galvez was admitted as outlined in above HPI.     In the interim, one of two blood cultures was positive with ultimately growth of Enterococcus in one of two cultures (12/31/2020).  ID was consulted for further IV abx guidance with IV Vancomycin started.  Rocephin was initially continued given concern for underlying UTI.  Urine culture ultimately grew Proteus mirabilis and Pseudomonas aeruginosa.  IV cephalosporin coverage was transitioned to IV Cefepime for further treatment.  This was later transitioned to Zosyn due to concern for some mild myoclonic jerking during admission.       Metabolic encephalopathy improved during admission with CT demonstrating possible arachnoid cyst.  MRI was then performed and revealed CSF density process involving the right cerebellar hemisphere which is most probably intra-axial in location and features on the MRI compatible with sequela of remote infarct with extensive encephalomalacia changes.  There is also marked left mastoiditis-itis and a chronic appearing infarct in the right occipital lobe with cortical sclerosis and mild chronic appearing ethmoid sinusitis.     Given debility of nearly 2 years, physical and occupational therapies were consulted during hospitalization.       There was some concern for abnormal EKG with ectopic atrial rhythm with frequent PACs vs. Chaotic atrial rhythm with cardiology consultationp [place].  It was recommend she undergo event monitoring at the time of discharge for around two  weeks with further outpatient cardiology follow-up.  She later had runs of atrial in the AM of 1/6 with full dose anticoagulation started given concern of underlying arrhythmia as source of possible prior CVA.   Outpatient event monitor was recommended for 2 weeks with further outpatient Cardiology follow-up.  This was arranged prior to discharge.  Additionally, Metoprolol was further upward titrated during admission.      Additionally, given worsening dyspnea with right-side wheezing, CT chest and Neck were ordered on 1/6/21.  Methylprednisolone was started for short duration, but quickly discontinued given difficulty tolerating due to hyperglycemia.  CT neck revealed calcification vs. Possible foreign body. She was later evaluated by ENT for this with concern for possible tonsil stone and recommended of further outpatient follow-up.         VQ scan and venous dopplers were ordered on 1/7 to rule out PE and US venous doppler. VQ revealed low probability and US doppler without evidence of known DVT.       On 1/8/2021, she did develop some worsening lethargy.  She was found to have hypoglycemia with glucose in the 30s after several days of hyperglycemia requiring upward titration of insulin.   She did require D5 on 1/9 evening for some period of time after recurrent hypoglycemia but became quickly hyperglycemic following. Cosyntropin stimulation test was ordered and unremarkable. She required frequent insulin adjustments throughout hospitalization due to brittle diabetes mellitus.       Mrs. Galvez completed her course of IV abx and at present is waiting for further rehabilitation.  Given ongoing debility with worsening functional decline, it was felt she could benefit from SNF for further therapies.   She was discharged on 2LPM of O2, which she was wearing while hospitalized.      Discussed with DEEPIKA Jacome on day of discharge with no known acute events overnight.      In regard to further outpatient follow-up, she  does have a 7 mm noncalcified nodule in the right lower lung with CT chest follow-up recommendation made per radiology to be performed within the next 6 months.    She does also have an a left adrenal nodule with outpatient follow-up recommended with adrenal CT protocol or MRI on nonemergent basis.    Pertinent Laboratory and Radiology Results     Pertinent Test Results:      Results from last 7 days   Lab Units 01/08/21  1516   PH, ARTERIAL pH units 7.404   PO2 ART mm Hg 86.1   PCO2, ARTERIAL mm Hg 41.0   HCO3 ART mmol/L 25.6     Results from last 7 days   Lab Units 01/14/21  0227 01/13/21  0425 01/12/21  0253 01/11/21  0726 01/11/21  0403 01/10/21  0005 01/09/21  0443 01/08/21  0012   WBC 10*3/mm3 8.55 9.74 9.74 8.32  --  9.39 9.85 12.43*   HEMOGLOBIN g/dL 9.1* 9.3* 9.1* 8.5*  --  10.0* 10.4* 9.7*   HEMATOCRIT % 30.0* 30.9* 29.9* 29.1*  --  34.5 34.5 32.9*   PLATELETS 10*3/mm3 361 372 379 307  --  391 403 324   MCV fL 101.7* 102.0* 101.0* 104.7*  --  106.8* 102.4* 105.4*   SODIUM mmol/L 134* 135* 132*  --  137 141 139 134*   POTASSIUM mmol/L 4.0 4.0 3.7  --  3.8 4.6 4.8 4.8   CHLORIDE mmol/L 98 102 99  --  104 107 106 105   CO2 mmol/L 25.9 25.4 23.3  --  18.4* 21.5* 22.6 17.2*   BUN mg/dL 31* 28* 28*  --  30* 30* 34* 51*   CREATININE mg/dL 1.18* 1.28* 1.17*  --  1.22* 0.92 0.98 1.19*   GLUCOSE mg/dL 237* 270* 276*  --  231* 94 236* 223*   CALCIUM mg/dL 8.8 9.2 8.7  --  9.0 9.1 9.3 9.5        Results from last 7 days   Lab Units 01/07/21  1222 01/07/21  1124   TROPONIN T ng/mL 0.033* 0.035*     Results from last 7 days   Lab Units 01/14/21  0227 01/13/21  0425 01/12/21  0253 01/11/21  0726 01/11/21  0403 01/10/21  0005 01/09/21  0443 01/08/21  0012   CRP mg/dL  --   --  0.35  --  0.35 0.59* 0.81* 2.94*   WBC 10*3/mm3 8.55 9.74 9.74 8.32  --  9.39 9.85 12.43*     Results from last 7 days   Lab Units 01/11/21  0403   BILIRUBIN mg/dL 0.2   ALK PHOS U/L 65   ALT (SGPT) U/L 8   AST (SGOT) U/L 10           Invalid  input(s): TG, LDLCALC, LDLREALC  Results from last 7 days   Lab Units 01/10/21  0841 01/10/21  0810 01/10/21  0651 01/09/21  1812   CORTISOL mcg/dL 31.03 26.39 16.28 16.79     Brief Urine Lab Results  (Last result in the past 365 days)      Color   Clarity   Blood   Leuk Est   Nitrite   Protein   CREAT   Urine HCG        12/31/20 2011 Yellow Cloudy Moderate (2+) Large (3+) Positive 30 mg/dL (1+)                        Results for orders placed during the hospital encounter of 12/31/20   Adult Transthoracic Echo Complete W/ Cont if Necessary Per Protocol    Narrative · Left ventricular ejection fraction appears to be 61 - 65%. Left   ventricular systolic function is normal.  · Left ventricular diastolic function is consistent with (grade II w/high   LAP) pseudonormalization.  · Mild aortic valve stenosis is present.          Pending Labs     Order Current Status    COVID PRE-OP / PRE-PROCEDURE SCREENING ORDER (NO ISOLATION) - Swab, Nasopharynx Collected (01/14/21 0942)    COVID-19,CEPHEID,COR/MACK/PAD IN-HOUSE(OR EMERGENT/ADD-ON),NP SWAB IN TRANSPORT MEDIA 3-4 HR TAT - Swab, Nasopharynx Collected (01/14/21 0942)            ----------------------------------------------------------------------------------------------------------------------  Ct Abdomen Pelvis Without Contrast    Result Date: 12/31/2020  1. Motion degraded exam. 2. Atherosclerotic disease with probable vascular calcifications in both kidneys. No ureteral stones are seen on either side, and there is no hydronephrosis. 3. Mild thickening of the urinary bladder wall may be due to incomplete distention or cystitis. 4. Large amount of stool in the rectal vault. There is colonic diverticulosis without diverticulitis. The appendix and small bowel are within normal limits. 5. Small left adrenal nodule, likely a benign adenoma in the absence of a known malignancy. This could be assessed with nonemergent adrenal protocol MRI or CT if indicated. 6. Cholecystectomy  and hysterectomy. Signer Name: Rm Ku MD  Signed: 12/31/2020 10:32 PM  Workstation Name: [facility]  [facility]    Xr Ankle 3+ View Right    Result Date: 1/4/2021  1.  Advanced hindfoot and midfoot arthrosis and varus positioning of the ankle. 2.  No acute appearing fracture or dislocation. 3.  Benign-appearing soft tissue calcifications noted diffusely with considerations above.  This report was finalized on 1/4/2021 3:59 PM by Dr. Sagar Romero MD.      Ct Head Without Contrast    Result Date: 1/9/2021  1.  No CT evidence of acute intracranial findings. 2.  Atrophy and chronic small vessel ischemic disease stable. 3.  Probable chronic infarcts related encephalomalacia right cerebellar hemisphere, stable. 4.  Marked left mastoiditis.  This report was finalized on 1/9/2021 3:39 PM by Dr. Sagar Romero MD.      Ct Head Without Contrast    Result Date: 12/31/2020  1.  No acute intracranial abnormality. 2.  CSF density mass right posterior fossa with mild mass effect on the right cerebellar convexity, most likely an arachnoid cyst. Consider further evaluation with MRI without and with contrast. 3.  Presumed chronic microvascular ischemic changes. Signer Name: AMANDA SAWYER MD  Signed: 12/31/2020 10:09 PM  Workstation Name: [facility]  [facility]    Ct Soft Tissue Neck Without Contrast    Result Date: 1/7/2021  1.  5.6 mm either densely calcified structure versus foreign body in the right palatine fossa pharyngeal wall with considerations as above.  This does not appear to be in the lumen of the oropharynx. 2.  Other findings above.  This report was finalized on 1/7/2021 9:03 AM by Dr. Sagar Romero MD.      Ct Chest Without Contrast Diagnostic    Result Date: 1/7/2021  1.  Essentially stable chest CT demonstrating large hiatal hernia and left lower lobe atelectasis. 2.  No metallic or radiopaque foreign body seen throughout the thoracic esophagus. 3.   Otherwise stable exam with other nonacute findings described above. Please refer to prior report for follow-up recommendations of above findings.  This report was finalized on 1/7/2021 9:16 AM by Dr. Sagar Romero MD.      Ct Chest Without Contrast    Result Date: 12/31/2020  1. No evidence of acute pneumonia. 2. Large hiatal hernia at the left lung base. 3. Noncalcified nodule in the right lower lobe measuring 7 mm. Chest CT follow-up in 6 months is recommended. 4. Left adrenal nodule, most likely a benign adenoma. This can be further characterized with adrenal protocol CT or MRI on a nonemergent basis if indicated. Signer Name: Rm Ku MD  Signed: 12/31/2020 10:20 PM  Workstation Name: [facility]  Radiology [facility]    Mri Brain Without Contrast    Result Date: 1/4/2021  1.  Near CSF density process involving the right cerebellar hemisphere which is most probably intra-axial in location and has features ON the MRI that are most compatible with sequela of a remote infarct with extensive encephalomalacia changes. Cyst also considered but felt less likely. 2.  No solid appearing features and no mass effect is noted. 3.  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 4.  Generalized atrophy and advanced chronic small vessel ischemic disease. 5.  Marked left mastoiditis. 6.  Mild chronic appearing ethmoid sinusitis. 7.  No acute intracranial findings are otherwise noted.  This report was finalized on 1/4/2021 10:47 AM by Dr. Sagar Romero MD.      Nm Lung Scan Perfusion Particulate    Result Date: 1/7/2021  Low suspicion of PE.  This report was finalized on 1/7/2021 5:24 PM by Dr. Sagar Romero MD.      Xr Chest 1 View    Result Date: 1/6/2021  1. Persistent low lung volumes with bibasilar atelectasis. Small left pleural effusion is not excluded. 2. Large hiatal hernia. Signer Name: Musa Arevalo MD  Signed: 1/6/2021 1:53 AM  Workstation Name:  [email or web address]  [facility] [place]    Xr Chest 1 View    Result Date: 12/31/2020  Left effusion and left basilar consolidation. Consider CT to evaluate for mass  This report was finalized on 12/31/2020 8:35 PM by Dr. Serafin Yip MD.      Us Carotid Bilateral    Result Date: 1/5/2021  Impression:   1. Moderate stenosis in the left carotid system  This report was finalized on 1/5/2021 2:57 PM by Dr. Serafin Yip MD.      Us Venous Doppler Lower Extremity Bilateral (duplex)    Result Date: 1/8/2021  No DVT in the lower extremities on today's exam.  This report was finalized on 1/8/2021 9:08 AM by Dr. Serafin Yip MD.      Fl Video Swallow Single Contrast    Result Date: 1/7/2021  1.  No aspiration. 2. Please see dysphasia notes for further details.  This report was finalized on 1/7/2021 3:56 PM by Dr. Sagar Romero MD.          Microbiology Results (last 10 days)     Procedure Component Value - Date/Time    Respiratory Panel, PCR (WITHOUT COVID) - Swab, Nasopharynx [[id number]]  (Normal) Collected: 01/06/21 1433    Lab Status: Final result Specimen: Swab from Nasopharynx Updated: 01/06/21 1601     ADENOVIRUS, PCR Not Detected     Coronavirus 229E Not Detected     Coronavirus HKU1 Not Detected     Coronavirus NL63 Not Detected     Coronavirus OC43 Not Detected     Human Metapneumovirus Not Detected     Human Rhinovirus/Enterovirus Not Detected     Influenza B PCR Not Detected     Parainfluenza Virus 1 Not Detected     Parainfluenza Virus 2 Not Detected     Parainfluenza Virus 3 Not Detected     Parainfluenza Virus 4 Not Detected     Bordetella pertussis pcr Not Detected     Chlamydophila pneumoniae PCR Not Detected     Mycoplasma pneumo by PCR Not Detected     Influenza A PCR Not Detected     RSV, PCR Not Detected    Narrative:      The coronavirus on the RVP is NOT COVID-19 and is NOT indicative of infection with COVID-19.           Labs above have been reviewed on the day of discharge.  Radiology  images from prior 30 days were reviewed prior to discharge as incorporated into this document.     Discharge Vitals and Physical Examination       Vital Signs  Temp:  [98.2 °F (36.8 °C)-98.7 °F (37.1 °C)] 98.4 °F (36.9 °C)  Heart Rate:  [72-91] 77  Resp:  [18-20] 18  BP: (129-163)/(86-95) 163/86     PHYSICAL EXAMINATION:   Physical Exam   Constitutional: She is oriented to person, place, and time and well-developed, well-nourished, and in no distress.   HENT:   Head: Normocephalic and atraumatic.   Eyes: Pupils are equal, round, and reactive to light. EOM are normal.   Cardiovascular: Normal rate and regular rhythm.   Pulmonary/Chest: Effort normal and breath sounds normal. No respiratory distress. She has no wheezes.   Abdominal: Soft. Bowel sounds are normal. She exhibits no distension.   Musculoskeletal:         General: No deformity or edema.   Neurological: She is alert and oriented to person, place, and time.   Skin: Skin is warm and dry.   Psychiatric: Affect and judgment normal.   Nursing note and vitals reviewed.        Discharge Disposition, Discharge Medications, and Discharge Appointments     Discharge Disposition:   SNF    Condition on Discharge:  Fair    Discharge Medications:     Discharge Medications      New Medications      Instructions Start Date   apixaban 5 MG tablet tablet  Commonly known as: ELIQUIS   5 mg, Oral, Every 12 Hours Scheduled      atorvastatin 20 MG tablet  Commonly known as: LIPITOR   20 mg, Oral, Nightly      bumetanide 1 MG tablet  Commonly known as: BUMEX   1 mg, Oral, Daily      folic acid 1 MG tablet  Commonly known as: FOLVITE   1 mg, Oral, Daily      hydrOXYzine 25 MG tablet  Commonly known as: ATARAX   25 mg, Oral, 3 Times Daily PRN      insulin aspart 100 UNIT/ML injection  Commonly known as: novoLOG   0-14 Units, Subcutaneous, 3 Times Daily Before Meals      insulin detemir 100 UNIT/ML injection  Commonly known as: LEVEMIR   5 Units, Subcutaneous, Nightly       ipratropium-albuterol 0.5-2.5 mg/3 ml nebulizer  Commonly known as: DUO-NEB   3 mL, Nebulization, 4 Times Daily - RT      iron polysaccharides 150 MG capsule  Commonly known as: NIFEREX   150 mg, Oral, Daily      metoprolol tartrate 100 MG tablet  Commonly known as: LOPRESSOR   100 mg, Oral, Every 12 Hours Scheduled         Continue These Medications      Instructions Start Date   albuterol sulfate  (90 Base) MCG/ACT inhaler  Commonly known as: PROVENTIL HFA;VENTOLIN HFA;PROAIR HFA   1 puff, Inhalation, Every 4 Hours      amLODIPine 10 MG tablet  Commonly known as: NORVASC   10 mg, Oral, Daily      aspirin 81 MG EC tablet   81 mg, Oral, Daily      pantoprazole 40 MG EC tablet  Commonly known as: PROTONIX   40 mg, Oral, Daily         Stop These Medications    insulin  UNIT/ML injection  Commonly known as: humuLIN N,novoLIN N     lisinopril-hydrochlorothiazide 20-25 MG per tablet  Commonly known as: PRINZIDE,ZESTORETIC            Discharged medication regimen discussed with attending physician prior to discharge.     Discharge Diet:     Dietary Orders (From admission, onward)     Start     Ordered    01/06/21 0929  Diet Soft Texture; Chopped; Thin; Consistent Carbohydrate  Diet Effective Now     Question Answer Comment   Diet Texture / Consistency Soft Texture    Select Texture: Chopped    Fluid Consistency Thin    Common Modifiers Consistent Carbohydrate        01/06/21 0929 01/01/21 1800  Dietary Nutrition Supplements Boost Plus (Ensure Enlive, Ensure Plus)  Daily With Breakfast, Lunch & Dinner     Question:  Select Supplement  Answer:  Boost Plus (Ensure Enlive, Ensure Plus)    01/01/21 1347                Activity at Discharge:  activity as tolerated         Discharge Disposition:    Skilled Nursing Facility (DC - External)        Follow-up Appointments:      Additional Instructions for the Follow-ups that You Need to Schedule     Discharge Follow-up with Specified Provider: Dr. Simon; 2 Weeks    As directed      To: Dr. Simon    Follow Up: 2 Weeks    Follow Up Details: ENT follow-up from hospital evaluation with serous otitis media and possible pharynx foreign body         Discharge Follow-up with Specified Provider: Dr. Hurt; 2 Weeks   As directed      To: Dr. Hurt    Follow Up: 2 Weeks    Follow Up Details: atrial flutter           Follow-up Information     Rony Callaway MD .    Specialty: Family Medicine  Contact information:  [address] [place] [id number] [phone]                   Additional Instructions for the Follow-ups that You Need to Schedule     Discharge Follow-up with Specified Provider: Dr. Simon; 2 Weeks   As directed      To: Dr. Simon    Follow Up: 2 Weeks    Follow Up Details: ENT follow-up from hospital evaluation with serous otitis media and possible pharynx foreign body         Discharge Follow-up with Specified Provider: Dr. Hurt; 2 Weeks   As directed      To: Dr. Hurt    Follow Up: 2 Weeks    Follow Up Details: atrial flutter               Test Results Pending at Discharge:    Pending Labs     Order Current Status    COVID PRE-OP / PRE-PROCEDURE SCREENING ORDER (NO ISOLATION) - Swab, Nasopharynx Collected (01/14/21 0942)    COVID-19,CEPHEID,COR/MACK/PAD IN-HOUSE(OR EMERGENT/ADD-ON),NP SWAB IN TRANSPORT MEDIA 3-4 HR TAT - Swab, Nasopharynx Collected (01/14/21 0942)             Gale Ozuna PA-C  Hospital Medicine Team  01/14/21  09:52 EST      Time: Greater than 30 minutes spent on this discharge.  I spent 45 minutes on this discharge activity which included:  Face-to-face encounter with the patient, discussing plan with attending physician, reviewing the data in the system, coordination of the care with the nursing staff as well as consultations, documentation, and entering orders.               Electronically signed by Gale Ozuna PA-C at 01/14/21 1000       Discharge Order (From admission, onward)     Start     Ordered    01/14/21  0934  Discharge patient  Once     Expected Discharge Date: 01/14/21    Discharge Disposition: Skilled Nursing Facility (DC - External)    Physician of Record for Attribution - Please select from Treatment Team: NICHOLE CONCEPCION [[id number]]    Review needed by CMO to determine Physician of Record: No       Question Answer Comment   Physician of Record for Attribution - Please select from Treatment Team NICHOLE CONCEPCION    Review needed by CMO to determine Physician of Record No        01/14/21 0952

## 2021-01-14 NOTE — DISCHARGE INSTR - APPOINTMENTS
No  Apt  Made  Pt  Discharge  To  [facility]  Called  Respiratory  For  Event  Monitor  Spoke  With  Luis E marquez

## 2021-01-14 NOTE — NURSING NOTE
Orthodoxy monitor placed on patient. Report was given to PAXTON Crisostomo at [facility]. Notified her patient was unable to understand instructions for Jainism monitor. Informed Andressa the box to return monitor and the phone number to call for instructions would be given to EMS and delivered with patient to NH. She verbalized understanding. Marcella Bryan was notified that report was called and patient is ready for EMS.

## 2021-01-14 NOTE — PLAN OF CARE
Goal Outcome Evaluation:  Plan of Care Reviewed With: patient  Progress: improving    Patient has been very pleasant today. Compliant with all medications and care during shift. Patient still on 2 liters and saturations are maintaining above 93%. Patient is to be discharged today, going to [facility]. IV and Telemetry monitor removed. No complaints or concerns at this time. Will continue to monitor and follow plan of care.

## 2021-01-14 NOTE — DISCHARGE SUMMARY
[facility] Medicine Services  DISCHARGE SUMMARY    Date of Admission: 12/31/2020    Date of Discharge:  1/14/2021    PCP: Rony Callaway MD    Discharging Provider: Gale Ozuna PA-C  Attending Physician on day of DC: Dr. 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  · Acute on chronic weakness/debility  · CVA by MRI with left carotid stenosis on ultrasound carotid Doppler  · Grade 2 diastolic dysfunction  · 7 mm noncalcified nodule in right lower lung (further outpatient imaging recommending in 6 months)  · Left adrenal nodule (further outpatient work-up recommended)  · KAY versus KAY on CKD 3 versus CKD 3B on admission  · Metabolic encephalopathy, improved  · Acute on chronic macrocytic anemia  · Folate deficiency       Consults:   Consults     Date and Time Order Name Status Description    1/12/2021 0030 Inpatient ENT Consult Completed     1/5/2021 0914 Inpatient Cardiology Consult Completed     1/1/2021 1414 Inpatient Infectious Diseases Consult Completed           HPI     History of Present Illness:  Ashley Galvez is a 75-year-old  female with PMH significant for diabetes mellitus, lacunar infarct, essential hypertension, and unspecified anemia. She initially presented to the ED of [facility] on 12/31/20 with chief complaint of confusion.  She was reportedly previously at [facility] and discharged home wit pneumonia prior to her presentation. On admission, she encephalopathic and  admitted to the PCU for further evaluation with KAY and Sepsis 2/2 UTI.        Hospital Course     Hospital Course  Ashley Galvez was admitted as outlined in above HPI.     In the interim, one of two blood cultures was positive with ultimately growth of Enterococcus in one of two cultures (12/31/2020).  ID was consulted for further IV abx guidance with IV Vancomycin started.  Rocephin was initially continued given concern for underlying UTI.  Urine culture ultimately grew Proteus mirabilis and Pseudomonas aeruginosa.  IV cephalosporin coverage was transitioned to IV Cefepime for further treatment.  This was later transitioned to Zosyn due to concern for some mild myoclonic jerking during admission.       Metabolic encephalopathy improved during admission with CT demonstrating possible arachnoid cyst.  MRI was then performed and revealed CSF density process involving the right cerebellar hemisphere which is most probably intra-axial in location and features on the MRI compatible with sequela of remote infarct with extensive encephalomalacia changes.  There is also marked left mastoiditis-itis and a chronic appearing infarct in the right occipital lobe with cortical sclerosis and mild chronic appearing ethmoid sinusitis.     Given debility of nearly 2 years, physical and occupational therapies were consulted during hospitalization.       There was some concern for abnormal EKG with ectopic atrial rhythm with frequent PACs vs. Chaotic atrial rhythm with cardiology consultationp laced.  It was recommend she undergo event monitoring at the time of discharge for around two weeks with further outpatient cardiology follow-up.  She later had runs of atrial in the AM of 1/6 with full dose anticoagulation started given concern of underlying arrhythmia as source of possible prior CVA.   Outpatient event monitor was recommended for 2 weeks with further outpatient Cardiology follow-up.  This was arranged prior to discharge.   Additionally, Metoprolol was further upward titrated during admission.      Additionally, given worsening dyspnea with right-side wheezing, CT chest and Neck were ordered on 1/6/21.  Methylprednisolone was started for short duration, but quickly discontinued given difficulty tolerating due to hyperglycemia.  CT neck revealed calcification vs. Possible foreign body. She was later evaluated by ENT for this with concern for possible tonsil stone and recommended of further outpatient follow-up.         VQ scan and venous dopplers were ordered on 1/7 to rule out PE and US venous doppler. VQ revealed low probability and US doppler without evidence of known DVT.       On 1/8/2021, she did develop some worsening lethargy.  She was found to have hypoglycemia with glucose in the 30s after several days of hyperglycemia requiring upward titration of insulin.   She did require D5 on 1/9 evening for some period of time after recurrent hypoglycemia but became quickly hyperglycemic following. Cosyntropin stimulation test was ordered and unremarkable. She required frequent insulin adjustments throughout hospitalization due to brittle diabetes mellitus.       Mrs. Galvez completed her course of IV abx and at present is waiting for further rehabilitation.  Given ongoing debility with worsening functional decline, it was felt she could benefit from SNF for further therapies.   She was discharged on 2LPM of O2, which she was wearing while hospitalized.      Discussed with DEEPIKA Jacome on day of discharge with no known acute events overnight.      In regard to further outpatient follow-up, she does have a 7 mm noncalcified nodule in the right lower lung with CT chest follow-up recommendation made per radiology to be performed within the next 6 months.    She does also have an a left adrenal nodule with outpatient follow-up recommended with adrenal CT protocol or MRI on nonemergent basis.    Pertinent Laboratory and Radiology Results          Pertinent Test Results:      Results from last 7 days   Lab Units 01/08/21  1516   PH, ARTERIAL pH units 7.404   PO2 ART mm Hg 86.1   PCO2, ARTERIAL mm Hg 41.0   HCO3 ART mmol/L 25.6     Results from last 7 days   Lab Units 01/14/21 0227 01/13/21 0425 01/12/21  0253 01/11/21  0726 01/11/21  0403 01/10/21  0005 01/09/21  0443 01/08/21  0012   WBC 10*3/mm3 8.55 9.74 9.74 8.32  --  9.39 9.85 12.43*   HEMOGLOBIN g/dL 9.1* 9.3* 9.1* 8.5*  --  10.0* 10.4* 9.7*   HEMATOCRIT % 30.0* 30.9* 29.9* 29.1*  --  34.5 34.5 32.9*   PLATELETS 10*3/mm3 361 372 379 307  --  391 403 324   MCV fL 101.7* 102.0* 101.0* 104.7*  --  106.8* 102.4* 105.4*   SODIUM mmol/L 134* 135* 132*  --  137 141 139 134*   POTASSIUM mmol/L 4.0 4.0 3.7  --  3.8 4.6 4.8 4.8   CHLORIDE mmol/L 98 102 99  --  104 107 106 105   CO2 mmol/L 25.9 25.4 23.3  --  18.4* 21.5* 22.6 17.2*   BUN mg/dL 31* 28* 28*  --  30* 30* 34* 51*   CREATININE mg/dL 1.18* 1.28* 1.17*  --  1.22* 0.92 0.98 1.19*   GLUCOSE mg/dL 237* 270* 276*  --  231* 94 236* 223*   CALCIUM mg/dL 8.8 9.2 8.7  --  9.0 9.1 9.3 9.5        Results from last 7 days   Lab Units 01/07/21  1222 01/07/21  1124   TROPONIN T ng/mL 0.033* 0.035*     Results from last 7 days   Lab Units 01/14/21 0227 01/13/21 0425 01/12/21  0253 01/11/21  0726 01/11/21  0403 01/10/21  0005 01/09/21  0443 01/08/21  0012   CRP mg/dL  --   --  0.35  --  0.35 0.59* 0.81* 2.94*   WBC 10*3/mm3 8.55 9.74 9.74 8.32  --  9.39 9.85 12.43*     Results from last 7 days   Lab Units 01/11/21  0403   BILIRUBIN mg/dL 0.2   ALK PHOS U/L 65   ALT (SGPT) U/L 8   AST (SGOT) U/L 10           Invalid input(s): TG, LDLCALC, LDLREALC  Results from last 7 days   Lab Units 01/10/21  0841 01/10/21  0810 01/10/21  0651 01/09/21  1812   CORTISOL mcg/dL 31.03 26.39 16.28 16.79     Brief Urine Lab Results  (Last result in the past 365 days)      Color   Clarity   Blood   Leuk Est   Nitrite   Protein   CREAT   Urine HCG        12/31/20 2011 Yellow Cloudy  Moderate (2+) Large (3+) Positive 30 mg/dL (1+)                        Results for orders placed during the hospital encounter of 12/31/20   Adult Transthoracic Echo Complete W/ Cont if Necessary Per Protocol    Narrative · Left ventricular ejection fraction appears to be 61 - 65%. Left   ventricular systolic function is normal.  · Left ventricular diastolic function is consistent with (grade II w/high   LAP) pseudonormalization.  · Mild aortic valve stenosis is present.          Pending Labs     Order Current Status    COVID PRE-OP / PRE-PROCEDURE SCREENING ORDER (NO ISOLATION) - Swab, Nasopharynx Collected (01/14/21 0942)    COVID-19,CEPHEID,COR/MACK/PAD IN-HOUSE(OR EMERGENT/ADD-ON),NP SWAB IN TRANSPORT MEDIA 3-4 HR TAT - Swab, Nasopharynx Collected (01/14/21 0942)            ----------------------------------------------------------------------------------------------------------------------  Ct Abdomen Pelvis Without Contrast    Result Date: 12/31/2020  1. Motion degraded exam. 2. Atherosclerotic disease with probable vascular calcifications in both kidneys. No ureteral stones are seen on either side, and there is no hydronephrosis. 3. Mild thickening of the urinary bladder wall may be due to incomplete distention or cystitis. 4. Large amount of stool in the rectal vault. There is colonic diverticulosis without diverticulitis. The appendix and small bowel are within normal limits. 5. Small left adrenal nodule, likely a benign adenoma in the absence of a known malignancy. This could be assessed with nonemergent adrenal protocol MRI or CT if indicated. 6. Cholecystectomy and hysterectomy. Signer Name: Rm Ku MD  Signed: 12/31/2020 10:32 PM  Workstation Name: [email or web address]  [facility]    Xr Ankle 3+ View Right    Result Date: 1/4/2021  1.  Advanced hindfoot and midfoot arthrosis and varus positioning of the ankle. 2.  No acute appearing fracture or dislocation. 3.  Benign-appearing  soft tissue calcifications noted diffusely with considerations above.  This report was finalized on 1/4/2021 3:59 PM by Dr. Sagar Romero MD.      Ct Head Without Contrast    Result Date: 1/9/2021  1.  No CT evidence of acute intracranial findings. 2.  Atrophy and chronic small vessel ischemic disease stable. 3.  Probable chronic infarcts related encephalomalacia right cerebellar hemisphere, stable. 4.  Marked left mastoiditis.  This report was finalized on 1/9/2021 3:39 PM by Dr. Sagar Romero MD.      Ct Head Without Contrast    Result Date: 12/31/2020  1.  No acute intracranial abnormality. 2.  CSF density mass right posterior fossa with mild mass effect on the right cerebellar convexity, most likely an arachnoid cyst. Consider further evaluation with MRI without and with contrast. 3.  Presumed chronic microvascular ischemic changes. Signer Name: AMANDA SAWYER MD  Signed: 12/31/2020 10:09 PM  Workstation Name: [facility]  [facility]    Ct Soft Tissue Neck Without Contrast    Result Date: 1/7/2021  1.  5.6 mm either densely calcified structure versus foreign body in the right palatine fossa pharyngeal wall with considerations as above.  This does not appear to be in the lumen of the oropharynx. 2.  Other findings above.  This report was finalized on 1/7/2021 9:03 AM by Dr. Sagar Romero MD.      Ct Chest Without Contrast Diagnostic    Result Date: 1/7/2021  1.  Essentially stable chest CT demonstrating large hiatal hernia and left lower lobe atelectasis. 2.  No metallic or radiopaque foreign body seen throughout the thoracic esophagus. 3.  Otherwise stable exam with other nonacute findings described above. Please refer to prior report for follow-up recommendations of above findings.  This report was finalized on 1/7/2021 9:16 AM by Dr. Sagar Romero MD.      Ct Chest Without Contrast    Result Date: 12/31/2020  1. No evidence of acute pneumonia. 2. Large hiatal hernia at the left lung  base. 3. Noncalcified nodule in the right lower lobe measuring 7 mm. Chest CT follow-up in 6 months is recommended. 4. Left adrenal nodule, most likely a benign adenoma. This can be further characterized with adrenal protocol CT or MRI on a nonemergent basis if indicated. Signer Name: Rm Ku MD  Signed: 12/31/2020 10:20 PM  Workstation Name: [facility]  [facility]    Mri Brain Without Contrast    Result Date: 1/4/2021  1.  Near CSF density process involving the right cerebellar hemisphere which is most probably intra-axial in location and has features ON the MRI that are most compatible with sequela of a remote infarct with extensive encephalomalacia changes. Cyst also considered but felt less likely. 2.  No solid appearing features and no mass effect is noted. 3.  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 4.  Generalized atrophy and advanced chronic small vessel ischemic disease. 5.  Marked left mastoiditis. 6.  Mild chronic appearing ethmoid sinusitis. 7.  No acute intracranial findings are otherwise noted.  This report was finalized on 1/4/2021 10:47 AM by Dr. Sagar Romero MD.      Nm Lung Scan Perfusion Particulate    Result Date: 1/7/2021  Low suspicion of PE.  This report was finalized on 1/7/2021 5:24 PM by Dr. Sagar Romero MD.      Xr Chest 1 View    Result Date: 1/6/2021  1. Persistent low lung volumes with bibasilar atelectasis. Small left pleural effusion is not excluded. 2. Large hiatal hernia. Signer Name: Musa Arevalo MD  Signed: 1/6/2021 1:53 AM  Workstation Name: [id number]  [facility]    Xr Chest 1 View    Result Date: 12/31/2020  Left effusion and left basilar consolidation. Consider CT to evaluate for mass  This report was finalized on 12/31/2020 8:35 PM by Dr. Serafin Yip MD.      Us Carotid Bilateral    Result Date: 1/5/2021  Impression:   1. Moderate stenosis in the left carotid  system  This report was finalized on 1/5/2021 2:57 PM by Dr. Serafin Yip MD.      Us Venous Doppler Lower Extremity Bilateral (duplex)    Result Date: 1/8/2021  No DVT in the lower extremities on today's exam.  This report was finalized on 1/8/2021 9:08 AM by Dr. Serafin Yip MD.      Fl Video Swallow Single Contrast    Result Date: 1/7/2021  1.  No aspiration. 2. Please see dysphasia notes for further details.  This report was finalized on 1/7/2021 3:56 PM by Dr. Sagar Romero MD.          Microbiology Results (last 10 days)     Procedure Component Value - Date/Time    Respiratory Panel, PCR (WITHOUT COVID) - Swab, Nasopharynx [[id number]]  (Normal) Collected: 01/06/21 1433    Lab Status: Final result Specimen: Swab from Nasopharynx Updated: 01/06/21 1601     ADENOVIRUS, PCR Not Detected     Coronavirus 229E Not Detected     Coronavirus HKU1 Not Detected     Coronavirus NL63 Not Detected     Coronavirus OC43 Not Detected     Human Metapneumovirus Not Detected     Human Rhinovirus/Enterovirus Not Detected     Influenza B PCR Not Detected     Parainfluenza Virus 1 Not Detected     Parainfluenza Virus 2 Not Detected     Parainfluenza Virus 3 Not Detected     Parainfluenza Virus 4 Not Detected     Bordetella pertussis pcr Not Detected     Chlamydophila pneumoniae PCR Not Detected     Mycoplasma pneumo by PCR Not Detected     Influenza A PCR Not Detected     RSV, PCR Not Detected    Narrative:      The coronavirus on the RVP is NOT COVID-19 and is NOT indicative of infection with COVID-19.           Labs above have been reviewed on the day of discharge.  Radiology images from prior 30 days were reviewed prior to discharge as incorporated into this document.     Discharge Vitals and Physical Examination       Vital Signs  Temp:  [98.2 °F (36.8 °C)-98.7 °F (37.1 °C)] 98.4 °F (36.9 °C)  Heart Rate:  [72-91] 77  Resp:  [18-20] 18  BP: (129-163)/(86-95) 163/86     PHYSICAL EXAMINATION:   Physical Exam   Constitutional:  She is oriented to person, place, and time and well-developed, well-nourished, and in no distress.   HENT:   Head: Normocephalic and atraumatic.   Eyes: Pupils are equal, round, and reactive to light. EOM are normal.   Cardiovascular: Normal rate and regular rhythm.   Pulmonary/Chest: Effort normal and breath sounds normal. No respiratory distress. She has no wheezes.   Abdominal: Soft. Bowel sounds are normal. She exhibits no distension.   Musculoskeletal:         General: No deformity or edema.   Neurological: She is alert and oriented to person, place, and time.   Skin: Skin is warm and dry.   Psychiatric: Affect and judgment normal.   Nursing note and vitals reviewed.        Discharge Disposition, Discharge Medications, and Discharge Appointments     Discharge Disposition:   SNF    Condition on Discharge:  Fair    Discharge Medications:     Discharge Medications      New Medications      Instructions Start Date   apixaban 5 MG tablet tablet  Commonly known as: ELIQUIS   5 mg, Oral, Every 12 Hours Scheduled      atorvastatin 20 MG tablet  Commonly known as: LIPITOR   20 mg, Oral, Nightly      bumetanide 1 MG tablet  Commonly known as: BUMEX   1 mg, Oral, Daily      folic acid 1 MG tablet  Commonly known as: FOLVITE   1 mg, Oral, Daily      hydrOXYzine 25 MG tablet  Commonly known as: ATARAX   25 mg, Oral, 3 Times Daily PRN      insulin aspart 100 UNIT/ML injection  Commonly known as: novoLOG   0-14 Units, Subcutaneous, 3 Times Daily Before Meals      insulin detemir 100 UNIT/ML injection  Commonly known as: LEVEMIR   5 Units, Subcutaneous, Nightly      ipratropium-albuterol 0.5-2.5 mg/3 ml nebulizer  Commonly known as: DUO-NEB   3 mL, Nebulization, 4 Times Daily - RT      iron polysaccharides 150 MG capsule  Commonly known as: NIFEREX   150 mg, Oral, Daily      metoprolol tartrate 100 MG tablet  Commonly known as: LOPRESSOR   100 mg, Oral, Every 12 Hours Scheduled         Continue These Medications       Instructions Start Date   albuterol sulfate  (90 Base) MCG/ACT inhaler  Commonly known as: PROVENTIL HFA;VENTOLIN HFA;PROAIR HFA   1 puff, Inhalation, Every 4 Hours      amLODIPine 10 MG tablet  Commonly known as: NORVASC   10 mg, Oral, Daily      aspirin 81 MG EC tablet   81 mg, Oral, Daily      pantoprazole 40 MG EC tablet  Commonly known as: PROTONIX   40 mg, Oral, Daily         Stop These Medications    insulin  UNIT/ML injection  Commonly known as: humuLIN N,novoLIN N     lisinopril-hydrochlorothiazide 20-25 MG per tablet  Commonly known as: PRINZIDE,ZESTORETIC            Discharged medication regimen discussed with attending physician prior to discharge.     Discharge Diet:     Dietary Orders (From admission, onward)     Start     Ordered    01/06/21 0929  Diet Soft Texture; Chopped; Thin; Consistent Carbohydrate  Diet Effective Now     Question Answer Comment   Diet Texture / Consistency Soft Texture    Select Texture: Chopped    Fluid Consistency Thin    Common Modifiers Consistent Carbohydrate        01/06/21 0929 01/01/21 1800  Dietary Nutrition Supplements Boost Plus (Ensure Enlive, Ensure Plus)  Daily With Breakfast, Lunch & Dinner     Question:  Select Supplement  Answer:  Boost Plus (Ensure Enlive, Ensure Plus)    01/01/21 1347                Activity at Discharge:  activity as tolerated         Discharge Disposition:    Skilled Nursing Facility (DC - External)        Follow-up Appointments:      Additional Instructions for the Follow-ups that You Need to Schedule     Discharge Follow-up with Specified Provider: Dr. Simon; 2 Weeks   As directed      To: Dr. Simon    Follow Up: 2 Weeks    Follow Up Details: ENT follow-up from hospital evaluation with serous otitis media and possible pharynx foreign body         Discharge Follow-up with Specified Provider: Dr. Hurt; 2 Weeks   As directed      To: Dr. Hurt    Follow Up: 2 Weeks    Follow Up Details: atrial flutter           Follow-up  Information     Rony Callaway MD .    Specialty: Family Medicine  Contact information:  [address]  [place] [address]  [phone]                   Additional Instructions for the Follow-ups that You Need to Schedule     Discharge Follow-up with Specified Provider: Dr. Simon; 2 Weeks   As directed      To: Dr. Simon    Follow Up: 2 Weeks    Follow Up Details: ENT follow-up from hospital evaluation with serous otitis media and possible pharynx foreign body         Discharge Follow-up with Specified Provider: Dr. Hurt; 2 Weeks   As directed      To: Dr. Hurt    Follow Up: 2 Weeks    Follow Up Details: atrial flutter               Test Results Pending at Discharge:    Pending Labs     Order Current Status    COVID PRE-OP / PRE-PROCEDURE SCREENING ORDER (NO ISOLATION) - Swab, Nasopharynx Collected (01/14/21 0942)    COVID-19,CEPHEID,COR/MACK/PAD IN-HOUSE(OR EMERGENT/ADD-ON),NP SWAB IN TRANSPORT MEDIA 3-4 HR TAT - Swab, Nasopharynx Collected (01/14/21 0942)             Gale Ozuna PA-C  Hospital Medicine Team  01/14/21  09:52 EST      Time: Greater than 30 minutes spent on this discharge.  I spent 45 minutes on this discharge activity which included:  Face-to-face encounter with the patient, discussing plan with attending physician, reviewing the data in the system, coordination of the care with the nursing staff as well as consultations, documentation, and entering orders.

## 2021-01-14 NOTE — PROGRESS NOTES
Continued Stay Note  ANDREW Bansal     Patient Name: Ashley Galvez  MRN: [id number]  Today's Date: 1/14/2021    Admit Date: 12/31/2020    Discharge Plan     Row Name 01/14/21 1205       Plan    Final Discharge Disposition Code  03 - skilled nursing facility (SNF)    Final Note Pt is being discharged to [facility].  has faxed negative cov-id results, AVS summary, discharge summary and clinical update to fax [phone].  will provide lead nurse with report number [phone].  will arrange EMS.    1245:  contacted [facility] per Bhupinder for transport.           Marcella Bryan

## 2021-02-05 PROCEDURE — 93248 EXT ECG>7D<15D REV&INTERPJ: CPT | Performed by: SPECIALIST

## 2022-01-27 NOTE — PROGRESS NOTES
LOS: 7 days     Name: Ashley Galvez  Age/Sex: 75 y.o. female  :  1945        PCP: Rony Callaway MD  REF: Mal Oliver DO    Principal Problem:    Sepsis (CMS/[facility])      Reason for follow-up: Atrial flutter/fibrillation     Subjective       Subjective     Ashley Galvez is a 75 year old female with a past medical history significant for history of CVA, essential hypertension, diabetes mellitus type 2 and anemia. Patient presented to the ED with complaints of confusion. She was found to have sepsis secondary to UTI and enterococcus bacteremia. Cardiology was consulted for possible atrial fibrillation. EKG showed ectopic atrial rhythm with frequent PAC's vs chaotic atrial rhythm. Patient denies any palpitations or chest pain.  is at bedside and denies any history of atrial fibrillation in the past. He reports that a couple years ago the patient started having weakness for unknown reasons and was unable to walk. MRI shows old CVA. She does not take blood thinners at home. No history of coronary artery disease. Echocardiogram showed normal LV function.     Interval History: Patient reports her breathing is about the same. Still having some shortness of breath. CT of chest without contrast yesterday showed no acute findings with severe coronary artery calcifications.  Her chest x-ray is not consistent with acute heart failure or pneumonia.    Vital Signs  Temp:  [97 °F (36.1 °C)-98.4 °F (36.9 °C)] 97.2 °F (36.2 °C)  Heart Rate:  [] 101  Resp:  [18-25] 18  BP: (130-159)/(59-88) 141/86     Vital Signs (last 72 hrs)       / 0700  -  / 0659 / 0700  -   0659 / 07  -   0659 / 07  -   0857   Most Recent    Temp (°F) 98 -  98.8    97.2 -  99.5    97 -  98.4       97.2 (36.2)    Heart Rate 78 -  101    80 -  115    91 -  125       101    Resp 16 -  22    18 -  30    18 -  25       18    /84 -  156/81    103/85 -  173/89    130/83 -  159/88        ----- Message from Dg Swenson MD sent at 1/26/2022  8:16 AM CST -----  Please schedule patient's labs and a follow up afterwards in 3 months. Per chart review pt has thoracic aneurysm and chronic a fib from previous PCP that was unknown previously. Will forwards a message to Dr. Andujar.        Lab and f/u appt scheduled   141/86    SpO2 (%) 90 -  100    93 -  100    92 -  99       95        Body mass index is 31.85 kg/m².    Intake/Output Summary (Last 24 hours) at 1/7/2021 0857  Last data filed at 1/7/2021 0500  Gross per 24 hour   Intake 1596 ml   Output 1625 ml   Net -29 ml     Objective    Objective     I have seen and examined Ms. Galvez today.  Physical Exam:     General Appearance:    Alert, cooperative, in mild respiratory distress with some tachypnea.   Head:    Normocephalic, without obvious abnormality, atraumatic   Eyes:            Conjunctivae and sclerae normal, no   icterus, no pallor, corneas clear.   Neck:   No adenopathy, supple, trachea midline, no thyromegaly, no   carotid bruit, no JVD   Lungs:     Mild tachypnea, Clear to auscultation    Heart:    Regular rhythm and normal rate, normal S1 and S2, no            murmur, no gallop, no rub, no click   Chest Wall:    No abnormalities observed   Abdomen:     Normal bowel sounds, no masses, no organomegaly, soft        non-tender, non-distended, no guarding, no rebound                tenderness   Extremities:   Moves all extremities well, no edema, no cyanosis, no             redness   Pulses:   Pulses 1+ palpable and equal bilaterally   Skin:   No bleeding, bruising or rash       Neurologic:   Alert and oriented    I have seen and performed a physical examination today.     Results review       Results Review:   Results from last 7 days   Lab Units 01/07/21  0656 01/06/21  0102 01/05/21  0151 01/04/21  0140 01/03/21  0121 01/02/21  0135 01/01/21  0347   WBC 10*3/mm3 8.74 9.41 10.03 9.61 9.66 12.48* 12.52*   HEMOGLOBIN g/dL 8.9* 8.7* 8.8* 8.5* 9.6* 11.0* 11.3*   PLATELETS 10*3/mm3 299 274 240 241 364 406 413     Results from last 7 days   Lab Units 01/07/21  0656 01/06/21  0102 01/05/21  0939 01/05/21  0151 01/04/21  0140 01/03/21  0121 01/02/21  1735 01/02/21  0135 01/01/21  0347   SODIUM mmol/L 132* 130* 134* 133* 142 154*  --  151* 153*   POTASSIUM mmol/L 4.6 4.6  --   4.0 3.9 4.7 5.0 2.9* 3.2*   CHLORIDE mmol/L 104 103  --  107 114* 128*  --  122* 130*   CO2 mmol/L 19.3* 17.3*  --  18.6* 18.6* 16.8*  --  18.2* 10.7*   BUN mg/dL 29* 22  --  27* 27* 32*  --  39* 62*   CREATININE mg/dL 1.02* 0.98  --  1.05* 0.91 0.95  --  1.18* 1.44*   CALCIUM mg/dL 8.9 8.3*  --  7.7* 7.3* 8.4*  --  8.9 9.5   GLUCOSE mg/dL 364* 377*  --  242* 265* 249*  --  135* 103*   ALT (SGPT) U/L 7 8  --  9 9 10  --  9 9   AST (SGOT) U/L 6 9  --  7 8 18  --  12 14     Results from last 7 days   Lab Units 01/01/21  0347 12/31/20 2009   CK TOTAL U/L  --  92   TROPONIN T ng/mL 0.037* 0.052*     Lab Results   Component Value Date    INR 1.08 12/31/2020     Lab Results   Component Value Date    MG 3.1 (H) 01/06/2021    MG 1.5 (L) 01/05/2021    MG 1.8 01/02/2021     Lab Results   Component Value Date    TSH 1.240 12/31/2020    TRIG 144 01/06/2021    HDL 29 (L) 01/06/2021    LDL 56 01/06/2021      Imaging Results (Last 48 Hours)     Procedure Component Value Units Date/Time    CT CHEST WITHOUT CONTRAST DIAGNOSTIC [[id number]] Resulted: 01/07/21 0831     Updated: 01/07/21 0842    CT Soft Tissue Neck Without Contrast [[id number]] Resulted: 01/07/21 0850     Updated: 01/07/21 0841    XR Chest 1 View [[id number]] Collected: 01/06/21 0153     Updated: 01/06/21 0155    Narrative:      CR Chest 1 Vw    INDICATION:   75-year-old female with shortness of breath today.     COMPARISON:    Chest x-ray and CT chest 12/31/2020    FINDINGS:  Single portable AP view(s) of the chest.    Low lung volumes with bibasilar atelectasis. Small left pleural effusion is not excluded. No pneumothorax. Heart size and mediastinum are within expected limits. Large hiatal hernia.        Impression:        1. Persistent low lung volumes with bibasilar atelectasis. Small left pleural effusion is not excluded.  2. Large hiatal hernia.    Signer Name: Musa Arevalo MD   Signed: 1/6/2021 1:53 AM   Workstation Name: [email or web address]    [facility]    US Carotid Bilateral [[id number]] Collected: 01/05/21 1456     Updated: 01/05/21 1500    Narrative:      EXAMINATION: US CAROTID BILATERAL-      Technique: Multiple real-time color Doppler images were acquired of  bilateral carotid arteries.     Stenosis measurements if obtained, were performed by the NASCET or  similar method.        CLINICAL INDICATION:     CVA; N17.9-Acute kidney failure, unspecified     COMPARISON:    None     FINDINGS:        RIGHT:  No significant intimal thickening or plaque is noted. No occlusion.     RIGHT ICA PSV: 143 cm/s  RIGHT ICA EDV: 22.5 cm/s.   Right ICA/CCA Ratio: 0.7  Unable to visualize the right vertebral artery.     LEFT:  Moderate stenosis     LEFT ICA PSV: 178 cm/s  LEFT EDV: 37.2 cm/s.   Left ICA/CCA Ratio: 2.0  Anterograde flow is demonstrated in LEFT vertebral artery.          Impression:      Impression:        1. Moderate stenosis in the left carotid system     This report was finalized on 1/5/2021 2:57 PM by Dr. Serafin Yip MD.           Echo   Results for orders placed during the hospital encounter of 12/31/20   Adult Transthoracic Echo Complete W/ Cont if Necessary Per Protocol    Narrative · Left ventricular ejection fraction appears to be 61 - 65%. Left   ventricular systolic function is normal.  · Left ventricular diastolic function is consistent with (grade II w/high   LAP) pseudonormalization.  · Mild aortic valve stenosis is present.         I reviewed the patient's new clinical results.    Telemetry: Normal sinus rhythm/ Sinus tachycardia  bpm        Medication Review:   amLODIPine, 5 mg, Oral, Q24H  apixaban, 5 mg, Oral, Q12H  aspirin, 81 mg, Oral, Daily  atorvastatin, 20 mg, Oral, Nightly  castor oil-balsam peru, , Topical, Q12H  castor oil-balsam peru, , Topical, Q12H  cefepime, 2 g, Intravenous, Q12H  insulin aspart, 0-14 Units, Subcutaneous, TID AC  insulin aspart, 8 Units, Subcutaneous, TID With Meals  insulin detemir, 10 Units,  Subcutaneous, Daily  insulin detemir, 33 Units, Subcutaneous, Nightly  ipratropium-albuterol, 3 mL, Nebulization, 4x Daily - RT  lactobacillus acidophilus, 1 capsule, Oral, Daily  lisinopril-hydroCHLOROthiazide (ZESTORTIC) 20-25 mg combo dose, , Oral, Q24H  metoprolol tartrate, 25 mg, Oral, Q12H  metroNIDAZOLE, 500 mg, Intravenous, Q8H  pantoprazole, 40 mg, Oral, Daily  sodium chloride, 3 mL, Intravenous, Q12H  vancomycin, 1,000 mg, Intravenous, Q24H             Assessment      Assessment:  1.  Paroxysmal atrial flutter/fibrillation, CHADS-VASc score of at least 5, on eliquis, currently sinus rhythm/sinus tachycardia   2. Essential hypertension, overall controlled   3. History of CVA  4. Sepsis secondary to UTI and bacteremia.  5. Tachypnea and tachycardia, of unclear etiology, may have to rule out PE.    Plan     Recommendations:  1. We will obtain a proBNP and if this is elevated, consider diuretics.  2. If he continues to be short of breath and tachycardic, may have to rule out pulmonary embolus.  3. Agree with the oral anticoagulation for stroke prevention.  4. Monitor H&H closely and consider GI evaluation if she continues to have a drop in her H&H.    I discussed the patients findings and my recommendations with patient and family      Katia TOMÁS Lowry , acting as scribe for Dr. Brandon Menezes MD [facility]  01/07/21  08:57 EST    I, Brandon Menezes MD, [facility], personally performed the services described in this documentation as documented by the above named individual under my supervision and made necessary changes and the note is both accurate and complete.     Brandon Menezes MD, [facility]  1/7/2021      Please note that portions of this note were completed with a voice recognition program.   none

## 2024-07-24 NOTE — CONSULTS
Date of Admit: 12/31/2020  Date of Consult: 01/05/21  Mal Oliver,         Sepsis (CMS/[facility])      Assessment      1. Abnormal EKG,  Ectopic atrial rhythm with frequent PAC's vs Chaotic atrial rhythm   2. Essential hypertension, overall controlled   3. History of CVA  4. Sepsis secondary to UTI and bacteremia     Recommendations     1. No atrial fibrillation seen on the EKG patient has frequent premature atrial complexes and occasional premature junctional ectopies however recommend after the patient being discharged home to get at least 2 weeks of event monitoring because of history of CVA as well the risk factors could be intermittent atrial fibrillation  2. With keeping her potassium at 4 or above and magnesium at 2 and above respectively  3. And after discharge she can follow-up with cardiology office as an outpatient  4. We will follow at a distance please call if assistance is required        Reason for consultation: History of CVA and atrial fibrillation     Subjective       Subjective     History of Present Illness     Ashley Galvez is a 75 year old female with a past medical history significant for history of CVA, essential hypertension, diabetes mellitus type 2 and anemia. Patient presented to the ED with complaints of confusion. She was found to have sepsis secondary to UTI and enterococcus bacteremia. Cardiology was consulted for possible atrial fibrillation. EKG showed ectopic atrial rhythm with frequent PAC's vs chaotic atrial rhythm. Patient denies any palpitations or chest pain.  is at bedside and denies any history of atrial fibrillation in the past. He reports that a couple years ago the patient started having weakness for unknown reasons and was unable to walk. MRI shows old CVA. She does not take blood thinners at home. No history of coronary artery disease. Echocardiogram showed normal LV function.     Cardiac risk factors:cerebral vascular disease, hypertension and Sedentary life  style    Last Echo: 1/1/2021  · Left ventricular ejection fraction appears to be 61 - 65%. Left ventricular systolic function is normal.  · Left ventricular diastolic function is consistent with (grade II w/high LAP) pseudonormalization.  · Mild aortic valve stenosis is present.    Past Medical History:   Diagnosis Date   • Anemia    • Anterolisthesis     Grade 1 anterolisthesis of L5 on S1 due to bilateral L5 pars defects   • Diverticulosis    • Essential hypertension    • Lacunar infarction (CMS/HCC)    • Type 2 diabetes mellitus (CMS/HCC)      Past Surgical History:   Procedure Laterality Date   • CHOLECYSTECTOMY     • HYSTERECTOMY       Family History   Family history unknown: Yes     Social History     Tobacco Use   • Smoking status: Never Smoker   • Smokeless tobacco: Never Used   Substance Use Topics   • Alcohol use: Never     Frequency: Never   • Drug use: Never     Medications Prior to Admission   Medication Sig Dispense Refill Last Dose   • albuterol sulfate  (90 Base) MCG/ACT inhaler Inhale 1 puff Every 4 (Four) Hours.   Unknown at Unknown time   • amLODIPine (NORVASC) 10 MG tablet Take 10 mg by mouth Daily.   Unknown at Unknown time   • aspirin 81 MG EC tablet Take 81 mg by mouth Daily.   Unknown at Unknown time   • insulin NPH (humuLIN N,novoLIN N) 100 UNIT/ML injection Inject 20 Units under the skin into the appropriate area as directed Every Morning.   Unknown at Unknown time   • insulin NPH (humuLIN N,novoLIN N) 100 UNIT/ML injection Inject 10 Units under the skin into the appropriate area as directed every night at bedtime.   Unknown at Unknown time   • lisinopril-hydrochlorothiazide (PRINZIDE,ZESTORETIC) 20-25 MG per tablet Take 1 tablet by mouth Daily.   Unknown at Unknown time   • pantoprazole (PROTONIX) 40 MG EC tablet Take 40 mg by mouth Daily.   Unknown at Unknown time     Allergies:  Patient has no known allergies.    Review of Systems   Constitutional: Negative for diaphoresis,  fatigue and fever.   HENT: Negative for congestion and trouble swallowing.    Eyes: Negative for photophobia and visual disturbance.   Respiratory: Negative for cough, chest tightness, shortness of breath and wheezing.    Cardiovascular: Negative for chest pain, palpitations and leg swelling.   Gastrointestinal: Negative for nausea and vomiting.   Endocrine: Negative for polyphagia and polyuria.   Genitourinary: Negative for dysuria and hematuria.   Musculoskeletal: Negative for back pain, neck pain and neck stiffness.   Skin: Negative for rash and wound.   Allergic/Immunologic: Negative for food allergies and immunocompromised state.   Neurological: Negative for dizziness, syncope, weakness and headaches.   Hematological: Negative for adenopathy. Does not bruise/bleed easily.   Psychiatric/Behavioral: Positive for confusion. The patient is not nervous/anxious.        Objective       Objective      Vital Signs  Temp:  [98.3 °F (36.8 °C)-98.8 °F (37.1 °C)] 98.6 °F (37 °C)  Heart Rate:  [] 82  Resp:  [16-22] 20  BP: (108-164)/() 164/88     Vital Signs (last 72 hrs)       01/02 0700  -  01/03 0659 01/03 0700  -  01/04 0659 01/04 0700  -  01/05 0659 01/05 0700  -  01/05 0936   Most Recent    Temp (°F) 97.8 -  98.2    97.2 -  97.8    98 -  98.8       98.6 (37)    Heart Rate 104 -  126    76 -  112    78 -  101    80 -  82     82    Resp 16 -  20    16 -  20    16 -  22    20 -  22     20    BP 97/62 -  181/108    103/82 -  145/82    108/84 -  156/81      164/88     164/88    SpO2 (%) 93 -  98    91 -  100    90 -  100      100     100        Body mass index is 31.89 kg/m².    Intake/Output Summary (Last 24 hours) at 1/5/2021 0936  Last data filed at 1/5/2021 0800  Gross per 24 hour   Intake 2903 ml   Output 2125 ml   Net 778 ml     Physical Exam  Constitutional:       General: She is not in acute distress.     Appearance: Normal appearance. She is well-developed and normal weight.   HENT:      Head:  Normocephalic and atraumatic.   Eyes:      General: Lids are normal.      Conjunctiva/sclera: Conjunctivae normal.      Pupils: Pupils are equal, round, and reactive to light.   Neck:      Musculoskeletal: Normal range of motion and neck supple.      Vascular: No carotid bruit or JVD.   Cardiovascular:      Rate and Rhythm: Normal rate. Rhythm regularly irregular.      Pulses: Normal pulses.      Heart sounds: Normal heart sounds, S1 normal and S2 normal. No murmur.   Pulmonary:      Effort: Pulmonary effort is normal. No respiratory distress.      Breath sounds: Normal breath sounds. No wheezing.   Abdominal:      General: Bowel sounds are normal. There is no distension.      Palpations: Abdomen is soft. There is no hepatomegaly or splenomegaly.      Tenderness: There is no abdominal tenderness.   Musculoskeletal: Normal range of motion.         General: No swelling.      Right lower leg: No edema.      Left lower leg: No edema.   Skin:     General: Skin is warm and dry.      Coloration: Skin is not jaundiced.      Findings: No rash.   Neurological:      General: No focal deficit present.      Mental Status: She is alert and oriented to person, place, and time. Mental status is at baseline.   Psychiatric:         Mood and Affect: Mood normal.         Speech: Speech normal.         Behavior: Behavior normal.         Thought Content: Thought content normal.         Judgment: Judgment normal.           Results review     Results Review:    I reviewed the patient's new clinical results.  Results from last 7 days   Lab Units 01/01/21 0347 12/31/20 2009   CK TOTAL U/L  --  92   TROPONIN T ng/mL 0.037* 0.052*     Results from last 7 days   Lab Units 01/05/21  0151 01/04/21  0140 01/03/21  0121 01/02/21  0135 01/01/21  0347 12/31/20 2009   WBC 10*3/mm3 10.03 9.61 9.66 12.48* 12.52* 11.73*   HEMOGLOBIN g/dL 8.8* 8.5* 9.6* 11.0* 11.3* 11.3*   PLATELETS 10*3/mm3 240 241 364 406 413 449     Results from last 7 days   Lab  Units 01/05/21  0151 01/04/21  0140 01/03/21  0121 01/02/21  1735 01/02/21  0135 01/01/21  0347 12/31/20 2009   SODIUM mmol/L 133* 142 154*  --  151* 153* 147*   POTASSIUM mmol/L 4.0 3.9 4.7 5.0 2.9* 3.2* 3.9   CHLORIDE mmol/L 107 114* 128*  --  122* 130* 127*   CO2 mmol/L 18.6* 18.6* 16.8*  --  18.2* 10.7* 7.4*   BUN mg/dL 27* 27* 32*  --  39* 62* 70*   CREATININE mg/dL 1.05* 0.91 0.95  --  1.18* 1.44* 1.71*   CALCIUM mg/dL 7.7* 7.3* 8.4*  --  8.9 9.5 9.9   GLUCOSE mg/dL 242* 265* 249*  --  135* 103* 112*   ALT (SGPT) U/L 9 9 10  --  9 9 10   AST (SGOT) U/L 7 8 18  --  12 14 13     Lab Results   Component Value Date    INR 1.08 12/31/2020     Lab Results   Component Value Date    MG 1.5 (L) 01/05/2021    MG 1.8 01/02/2021    MG 2.2 12/31/2020     Lab Results   Component Value Date    TSH 1.240 12/31/2020      Lab Results   Component Value Date    PROBNP 1,580.0 (H) 12/31/2020       ECG  ECG/EMG Results (last 24 hours)     Procedure Component Value Units Date/Time    ECG 12 Lead [[id number]] Collected: 01/04/21 1004     Updated: 01/04/21 1847     QT Interval 348 ms      QTC Interval 435 ms     Narrative:      Test Reason : eval rhyth  Blood Pressure : **/** mmHG  Vent. Rate : 094 BPM     Atrial Rate : 122 BPM     P-R Int : 000 ms          QRS Dur : 082 ms      QT Int : 348 ms       P-R-T Axes : 000 003 016 degrees     QTc Int : 435 ms    Atrial fibrillation  Inferior infarct (cited on or before 31-DEC-2020)  Abnormal ECG  When compared with ECG of 04-JAN-2021 10:04, (Unconfirmed)  Atrial fibrillation has replaced Sinus rhythm  Confirmed by Brandon Menezes ([id number]) on 1/4/2021 6:46:55 PM    Referred By:  CARLENE           Confirmed By:Brandon Menezes        Imaging Results (Last 72 Hours)     Procedure Component Value Units Date/Time    XR Ankle 3+ View Right [[id number]] Collected: 01/04/21 1558     Updated: 01/04/21 1601    Narrative:      EXAM:    XR Right Ankle Complete, 3 or More Views     EXAM DATE:    1/4/2021 3:36  PM     CLINICAL HISTORY:    eval deformity; N17.9-Acute kidney failure, unspecified     TECHNIQUE:    Frontal, lateral and oblique views of the right ankle.     COMPARISON:    No relevant prior studies available.     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  Disuse osteoporosis.  Chronic appearing varus deformity of  the ankle.  No acute appearing displaced fracture or dislocation.    Soft tissues:  Unremarkable.    Vasculature:  Diffuse soft tissue calcifications are noted in either  likely due to a chronic inflammatory process or chronic venous stasis.       Impression:      1.  Advanced hindfoot and midfoot arthrosis and varus positioning of the  ankle.  2.  No acute appearing fracture or dislocation.  3.  Benign-appearing soft tissue calcifications noted diffusely with  considerations above.     This report was finalized on 1/4/2021 3:59 PM by Dr. Sagar Romero MD.       MRI Brain Without Contrast [[id number]] Collected: 01/04/21 1044     Updated: 01/04/21 1120    Narrative:      EXAM:    MR Head WITHOUT Intravenous Contrast     EXAM DATE:    1/4/2021 9:20 AM     CLINICAL HISTORY:    CSF density mass with mass effect, MRI w/wo was recommended, patient  with AMS; N17.9-Acute kidney failure, unspecified     TECHNIQUE:    Magnetic resonance images of the head/brain without intravenous  contrast in multiple planes.     COMPARISON:    CT 12/31/2020     FINDINGS:    Brain:  Near fluid density process in the region of the right  cerebellar hemisphere is noted corresponding to abnormality identified  on CT and appears to be parenchymal in nature and most probably  represents marked encephalomalacia from sequela of a previous cerebellar  infarction.  Additionally, there is marked gliosis noted with cortical  sclerosis of the right posterior medial occipital lobe compatible with  sequela of remote occipital region infarct.  Advanced chronic small  vessel ischemic disease noted.   Generalized cerebral atrophy in part  age-related.  No hemorrhage.    Ventricles:  Unremarkable.  No ventriculomegaly.    Bones/joints:  Unremarkable.    Sinuses:  Mild chronic ethmoid sinusitis.    Mastoid air cells:  Marked left mastoiditis changes are noted.    Orbits:  Unremarkable as visualized.       Impression:      1.  Near CSF density process involving the right cerebellar hemisphere  which is most probably intra-axial in location and has features ON the  MRI that are most compatible with sequela of a remote infarct with  extensive encephalomalacia changes. Cyst also considered but felt less  likely.  2.  No solid appearing features and no mass effect is noted.  3.  Chronic appearing infarct in the right occipital lobe with cortical  sclerosis noted.  4.  Generalized atrophy and advanced chronic small vessel ischemic  disease.  5.  Marked left mastoiditis.  6.  Mild chronic appearing ethmoid sinusitis.  7.  No acute intracranial findings are otherwise noted.     This report was finalized on 1/4/2021 10:47 AM by Dr. Sagar Romero MD.             I have discussed my impression and recommendations with the patient and family.    Thank you very much for asking us to be involved in this patient's care.  We will follow along with you.      TOMÁS Boswell , acting as scribe for Dr. Rufino Hurt MD [facility]  01/05/21  09:36 EST    Please note that portions of this note were completed with a voice recognition program.         Detail Level: Generalized Quality 226: Preventive Care And Screening: Tobacco Use: Screening And Cessation Intervention: Patient screened for tobacco use and is an ex/non-smoker Quality 130: Documentation Of Current Medications In The Medical Record: Current Medications Documented Quality 431: Preventive Care And Screening: Unhealthy Alcohol Use - Screening: Patient not identified as an unhealthy alcohol user when screened for unhealthy alcohol use using a systematic screening method